# Patient Record
Sex: FEMALE | Race: WHITE | NOT HISPANIC OR LATINO | URBAN - METROPOLITAN AREA
[De-identification: names, ages, dates, MRNs, and addresses within clinical notes are randomized per-mention and may not be internally consistent; named-entity substitution may affect disease eponyms.]

---

## 2019-08-20 ENCOUNTER — APPOINTMENT (RX ONLY)
Dept: URBAN - METROPOLITAN AREA CLINIC 12 | Facility: CLINIC | Age: 54
Setting detail: DERMATOLOGY
End: 2019-08-20

## 2019-08-20 VITALS — HEIGHT: 69 IN | WEIGHT: 205 LBS

## 2019-08-20 DIAGNOSIS — Z41.9 ENCOUNTER FOR PROCEDURE FOR PURPOSES OTHER THAN REMEDYING HEALTH STATE, UNSPECIFIED: ICD-10-CM

## 2019-08-20 DIAGNOSIS — D22 MELANOCYTIC NEVI: ICD-10-CM

## 2019-08-20 DIAGNOSIS — Z71.89 OTHER SPECIFIED COUNSELING: ICD-10-CM

## 2019-08-20 DIAGNOSIS — Z80.8 FAMILY HISTORY OF MALIGNANT NEOPLASM OF OTHER ORGANS OR SYSTEMS: ICD-10-CM

## 2019-08-20 DIAGNOSIS — D18.0 HEMANGIOMA: ICD-10-CM

## 2019-08-20 DIAGNOSIS — L82.1 OTHER SEBORRHEIC KERATOSIS: ICD-10-CM

## 2019-08-20 PROBLEM — D22.61 MELANOCYTIC NEVI OF RIGHT UPPER LIMB, INCLUDING SHOULDER: Status: ACTIVE | Noted: 2019-08-20

## 2019-08-20 PROBLEM — D48.5 NEOPLASM OF UNCERTAIN BEHAVIOR OF SKIN: Status: ACTIVE | Noted: 2019-08-20

## 2019-08-20 PROBLEM — D22.62 MELANOCYTIC NEVI OF LEFT UPPER LIMB, INCLUDING SHOULDER: Status: ACTIVE | Noted: 2019-08-20

## 2019-08-20 PROBLEM — D22.39 MELANOCYTIC NEVI OF OTHER PARTS OF FACE: Status: ACTIVE | Noted: 2019-08-20

## 2019-08-20 PROBLEM — D22.5 MELANOCYTIC NEVI OF TRUNK: Status: ACTIVE | Noted: 2019-08-20

## 2019-08-20 PROBLEM — D18.01 HEMANGIOMA OF SKIN AND SUBCUTANEOUS TISSUE: Status: ACTIVE | Noted: 2019-08-20

## 2019-08-20 PROCEDURE — ? COSMETIC CONSULTATION: BROWN SPOTS

## 2019-08-20 PROCEDURE — ? COUNSELING

## 2019-08-20 PROCEDURE — ? OBSERVATION

## 2019-08-20 PROCEDURE — 99213 OFFICE O/P EST LOW 20 MIN: CPT

## 2019-08-20 ASSESSMENT — LOCATION SIMPLE DESCRIPTION DERM
LOCATION SIMPLE: CHEST
LOCATION SIMPLE: LEFT LOWER BACK
LOCATION SIMPLE: RIGHT CHEEK
LOCATION SIMPLE: RIGHT UPPER BACK
LOCATION SIMPLE: ABDOMEN
LOCATION SIMPLE: RIGHT CHEEK
LOCATION SIMPLE: RIGHT POSTERIOR UPPER ARM
LOCATION SIMPLE: NOSE
LOCATION SIMPLE: LEFT UPPER ARM
LOCATION SIMPLE: RIGHT UPPER ARM
LOCATION SIMPLE: LEFT CHEEK
LOCATION SIMPLE: CHIN

## 2019-08-20 ASSESSMENT — LOCATION DETAILED DESCRIPTION DERM
LOCATION DETAILED: LEFT LATERAL SUPERIOR CHEST
LOCATION DETAILED: RIGHT CENTRAL MALAR CHEEK
LOCATION DETAILED: RIGHT INFERIOR UPPER BACK
LOCATION DETAILED: RIGHT INFERIOR CENTRAL MALAR CHEEK
LOCATION DETAILED: LEFT CHIN
LOCATION DETAILED: EPIGASTRIC SKIN
LOCATION DETAILED: LEFT SUPERIOR LATERAL MIDBACK
LOCATION DETAILED: RIGHT PROXIMAL POSTERIOR UPPER ARM
LOCATION DETAILED: RIGHT MEDIAL SUPERIOR CHEST
LOCATION DETAILED: RIGHT ANTERIOR PROXIMAL UPPER ARM
LOCATION DETAILED: LEFT ANTERIOR PROXIMAL UPPER ARM
LOCATION DETAILED: NASAL DORSUM
LOCATION DETAILED: LEFT INFERIOR CENTRAL MALAR CHEEK

## 2019-08-20 ASSESSMENT — LOCATION ZONE DERM
LOCATION ZONE: TRUNK
LOCATION ZONE: FACE
LOCATION ZONE: NOSE
LOCATION ZONE: ARM
LOCATION ZONE: FACE

## 2019-08-20 ASSESSMENT — PAIN INTENSITY VAS: HOW INTENSE IS YOUR PAIN 0 BEING NO PAIN, 10 BEING THE MOST SEVERE PAIN POSSIBLE?: NO PAIN

## 2019-08-20 NOTE — HPI: SKIN LESION
Is This A New Presentation, Or A Follow-Up?: Skin Lesion
What Type Of Note Output Would You Prefer (Optional)?: Standard Output
Has Your Skin Lesion Been Treated?: not been treated
Additional History: Would also like a full skin examination given her family history and length of time since last seen 11/2015
Which Family Member (Optional)?: Father
Which Family Member (Optional)?: Parents and sister

## 2019-08-26 ENCOUNTER — APPOINTMENT (RX ONLY)
Dept: URBAN - METROPOLITAN AREA CLINIC 12 | Facility: CLINIC | Age: 54
Setting detail: DERMATOLOGY
End: 2019-08-26

## 2019-08-26 DIAGNOSIS — Z41.9 ENCOUNTER FOR PROCEDURE FOR PURPOSES OTHER THAN REMEDYING HEALTH STATE, UNSPECIFIED: ICD-10-CM

## 2019-08-26 PROCEDURE — ? PALOMAR ICON LASER

## 2019-08-26 ASSESSMENT — LOCATION DETAILED DESCRIPTION DERM
LOCATION DETAILED: LEFT CHIN
LOCATION DETAILED: LEFT CENTRAL MALAR CHEEK
LOCATION DETAILED: INFERIOR MID FOREHEAD
LOCATION DETAILED: RIGHT INFERIOR CENTRAL MALAR CHEEK
LOCATION DETAILED: NASAL TIP

## 2019-08-26 ASSESSMENT — LOCATION ZONE DERM
LOCATION ZONE: NOSE
LOCATION ZONE: FACE

## 2019-08-26 ASSESSMENT — LOCATION SIMPLE DESCRIPTION DERM
LOCATION SIMPLE: RIGHT CHEEK
LOCATION SIMPLE: LEFT CHEEK
LOCATION SIMPLE: NOSE
LOCATION SIMPLE: CHIN
LOCATION SIMPLE: INFERIOR FOREHEAD

## 2019-08-26 NOTE — PROCEDURE: PALOMAR ICON LASER
Render Post Care In The Note?: yes
Pulse Duration (In Milliseconds): 5
Number Of Passes: 1
Pulse Duration (In Milliseconds): 20
Price (Use Numbers Only, No Special Characters Or $): 200
External Cooling Fan Speed: 0
Fluence: 36
Post-Care Instructions: I reviewed with the patient in detail post-care instructions. Patient should stay away from the sun and wear sun protection until treated areas are fully healed.
Treated Area: small area
Fluence: 40
Detail Level: Zone
Fluence: 20
Location: full face
Pre-Procedure Text: The patients skin was cleaned.
Overlap: 50%
Overlap: 100%
Fluence Units: J/cm2
Pulse Duration (In Milliseconds): 10
Consent: Written consent obtained, risks reviewed including but not limited to crusting, scabbing, blistering, scarring, darker or lighter pigmentary change, bruising, and/or incomplete response.
Anesthesia Type: 1% lidocaine with epinephrine

## 2019-10-07 ENCOUNTER — APPOINTMENT (RX ONLY)
Dept: URBAN - METROPOLITAN AREA CLINIC 12 | Facility: CLINIC | Age: 54
Setting detail: DERMATOLOGY
End: 2019-10-07

## 2019-10-07 DIAGNOSIS — Z41.9 ENCOUNTER FOR PROCEDURE FOR PURPOSES OTHER THAN REMEDYING HEALTH STATE, UNSPECIFIED: ICD-10-CM

## 2019-10-07 PROCEDURE — ? PALOMAR ICON LASER

## 2019-10-07 ASSESSMENT — LOCATION SIMPLE DESCRIPTION DERM
LOCATION SIMPLE: CHIN
LOCATION SIMPLE: RIGHT CHEEK
LOCATION SIMPLE: NOSE
LOCATION SIMPLE: INFERIOR FOREHEAD
LOCATION SIMPLE: LEFT CHEEK

## 2019-10-07 ASSESSMENT — LOCATION DETAILED DESCRIPTION DERM
LOCATION DETAILED: INFERIOR MID FOREHEAD
LOCATION DETAILED: LEFT INFERIOR CENTRAL MALAR CHEEK
LOCATION DETAILED: NASAL DORSUM
LOCATION DETAILED: RIGHT INFERIOR CENTRAL MALAR CHEEK
LOCATION DETAILED: LEFT CHIN

## 2019-10-07 ASSESSMENT — LOCATION ZONE DERM
LOCATION ZONE: FACE
LOCATION ZONE: NOSE

## 2019-10-07 NOTE — PROCEDURE: PALOMAR ICON LASER
External Cooling Fan Speed: 0
Overlap: 100%
Location: full face except eyelids
Price (Use Numbers Only, No Special Characters Or $): 100
Number Of Passes: 1
Consent: Written consent obtained, risks reviewed including but not limited to crusting, scabbing, blistering, scarring, darker or lighter pigmentary change, bruising, and/or incomplete response.
Fluence: 40
Fluence: 40
Treatment Number: 2
Pulse Duration (In Milliseconds): 10
Render Post Care In The Note?: yes
Fluence Units: J/cm2
Overlap: 50%
Treated Area: small area
Pulse Duration (In Milliseconds): 15
Pre-Procedure Text: The patients skin was cleaned.
Location: cheeks
Detail Level: Zone
Fluence: 36
Anesthesia Type: 1% lidocaine with epinephrine
Post-Care Instructions: I reviewed with the patient in detail post-care instructions. Patient should stay away from the sun and wear sun protection until treated areas are fully healed.

## 2020-10-27 ENCOUNTER — APPOINTMENT (RX ONLY)
Dept: URBAN - METROPOLITAN AREA CLINIC 12 | Facility: CLINIC | Age: 55
Setting detail: DERMATOLOGY
End: 2020-10-27

## 2020-10-27 DIAGNOSIS — Z71.89 OTHER SPECIFIED COUNSELING: ICD-10-CM

## 2020-10-27 DIAGNOSIS — Z80.8 FAMILY HISTORY OF MALIGNANT NEOPLASM OF OTHER ORGANS OR SYSTEMS: ICD-10-CM

## 2020-10-27 DIAGNOSIS — L82.1 OTHER SEBORRHEIC KERATOSIS: ICD-10-CM

## 2020-10-27 DIAGNOSIS — D22 MELANOCYTIC NEVI: ICD-10-CM

## 2020-10-27 DIAGNOSIS — D18.0 HEMANGIOMA: ICD-10-CM

## 2020-10-27 DIAGNOSIS — L91.0 HYPERTROPHIC SCAR: ICD-10-CM

## 2020-10-27 PROBLEM — D22.5 MELANOCYTIC NEVI OF TRUNK: Status: ACTIVE | Noted: 2020-10-27

## 2020-10-27 PROBLEM — D18.01 HEMANGIOMA OF SKIN AND SUBCUTANEOUS TISSUE: Status: ACTIVE | Noted: 2020-10-27

## 2020-10-27 PROCEDURE — 99213 OFFICE O/P EST LOW 20 MIN: CPT | Mod: 25

## 2020-10-27 PROCEDURE — ? COUNSELING

## 2020-10-27 PROCEDURE — ? PATIENT EDUCATION

## 2020-10-27 PROCEDURE — 11901 INJECT SKIN LESIONS >7: CPT

## 2020-10-27 PROCEDURE — ? INTRALESIONAL KENALOG

## 2020-10-27 ASSESSMENT — LOCATION SIMPLE DESCRIPTION DERM
LOCATION SIMPLE: ABDOMEN
LOCATION SIMPLE: CHEST
LOCATION SIMPLE: RIGHT BACK

## 2020-10-27 ASSESSMENT — LOCATION DETAILED DESCRIPTION DERM
LOCATION DETAILED: RIGHT SUPERIOR LATERAL UPPER BACK
LOCATION DETAILED: RIGHT MEDIAL SUPERIOR CHEST
LOCATION DETAILED: EPIGASTRIC SKIN
LOCATION DETAILED: PERIUMBILICAL SKIN
LOCATION DETAILED: LEFT LATERAL ABDOMEN

## 2020-10-27 ASSESSMENT — LOCATION ZONE DERM: LOCATION ZONE: TRUNK

## 2020-10-27 NOTE — PROCEDURE: PATIENT EDUCATION
Abridged Text (If No Specifics Are Selected): Educational resources were provided and detailed information can be found on the patient education handout.
Detail Level: Simple
Render Patient Education In Note?: render abridged patient education
Include Automatic Impression Counseling If It Exists: Yes

## 2020-10-27 NOTE — PROCEDURE: INTRALESIONAL KENALOG
Detail Level: Detailed
Concentration Of Solution Injected (Mg/Ml): 10.0
Include Z78.9 (Other Specified Conditions Influencing Health Status) As An Associated Diagnosis?: No
Expiration Date (Optional): 3/2022
Kenalog Preparation: Kenalog
Administered By (Optional): Pete Levin P.A.-C
Lot # (Optional): [id number]
Consent: The risks of atrophy were reviewed with the patient.
X Size Of Lesion In Cm (Optional): 0
Total Volume Injected (Ccs- Only Use Numbers And Decimals): 1.0
Medical Necessity Clause: This procedure was medically necessary because the lesions that were treated were: painful

## 2023-10-27 ENCOUNTER — APPOINTMENT (RX ONLY)
Dept: URBAN - METROPOLITAN AREA CLINIC 12 | Facility: CLINIC | Age: 58
Setting detail: DERMATOLOGY
End: 2023-10-27

## 2023-10-27 DIAGNOSIS — L82.1 OTHER SEBORRHEIC KERATOSIS: ICD-10-CM

## 2023-10-27 DIAGNOSIS — B35.4 TINEA CORPORIS: ICD-10-CM

## 2023-10-27 PROCEDURE — ? PHOTO-DOCUMENTATION

## 2023-10-27 PROCEDURE — ? PRESCRIPTION MEDICATION MANAGEMENT

## 2023-10-27 PROCEDURE — ? KOH PREP

## 2023-10-27 PROCEDURE — ? PRESCRIPTION

## 2023-10-27 PROCEDURE — ? COUNSELING

## 2023-10-27 PROCEDURE — 99213 OFFICE O/P EST LOW 20 MIN: CPT

## 2023-10-27 RX ORDER — CICLOPIROX OLAMINE 7.7 MG/G
0.77% CREAM TOPICAL BID
Qty: 90 | Refills: 2 | Status: ERX | COMMUNITY
Start: 2023-10-27

## 2023-10-27 RX ADMIN — CICLOPIROX OLAMINE 0.77%: 7.7 CREAM TOPICAL at 00:00

## 2023-10-27 ASSESSMENT — LOCATION DETAILED DESCRIPTION DERM
LOCATION DETAILED: LEFT LATERAL ABDOMEN
LOCATION DETAILED: RIGHT PROXIMAL LATERAL POSTERIOR UPPER ARM

## 2023-10-27 ASSESSMENT — LOCATION ZONE DERM
LOCATION ZONE: TRUNK
LOCATION ZONE: ARM

## 2023-10-27 ASSESSMENT — PAIN INTENSITY VAS: HOW INTENSE IS YOUR PAIN 0 BEING NO PAIN, 10 BEING THE MOST SEVERE PAIN POSSIBLE?: NO PAIN

## 2023-10-27 ASSESSMENT — LOCATION SIMPLE DESCRIPTION DERM
LOCATION SIMPLE: RIGHT POSTERIOR UPPER ARM
LOCATION SIMPLE: ABDOMEN

## 2023-10-27 ASSESSMENT — SEVERITY ASSESSMENT: SEVERITY: MODERATE

## 2023-10-27 NOTE — PROCEDURE: PRESCRIPTION MEDICATION MANAGEMENT
Render In Strict Bullet Format?: No
Initiate Treatment: Ciclopirox 0.77% cream, apply to rash affected area of the abdomen twice daily x4-6 weeks, continue for 1 additional week past clearance.
Plan: RTC in 6 weeks should the rash fail to resolve
Detail Level: Zone

## 2023-10-27 NOTE — PROCEDURE: PHOTO-DOCUMENTATION
Details (Free Text): in order to monitor progress
Detail Level: Zone
Photo Preface (Leave Blank If You Do Not Want): Photographs were obtained today

## 2023-11-04 ENCOUNTER — APPOINTMENT (OUTPATIENT)
Dept: GENERAL RADIOLOGY | Age: 58
DRG: 190 | End: 2023-11-04
Payer: MEDICARE

## 2023-11-04 ENCOUNTER — HOSPITAL ENCOUNTER (OUTPATIENT)
Age: 58
Setting detail: OBSERVATION
Discharge: HOME | DRG: 190 | End: 2023-11-06
Attending: EMERGENCY MEDICINE | Admitting: INTERNAL MEDICINE
Payer: MEDICARE

## 2023-11-04 DIAGNOSIS — J44.1 COPD EXACERBATION (HCC): Primary | ICD-10-CM

## 2023-11-04 DIAGNOSIS — Z99.81 SUPPLEMENTAL OXYGEN DEPENDENT: ICD-10-CM

## 2023-11-04 LAB
ALBUMIN SERPL-MCNC: 4.3 G/DL (ref 3.5–5.2)
ALP SERPL-CCNC: 72 U/L (ref 35–104)
ALT SERPL-CCNC: 10 U/L (ref 0–32)
ANION GAP SERPL CALCULATED.3IONS-SCNC: 5 MMOL/L (ref 7–16)
AST SERPL-CCNC: 13 U/L (ref 0–31)
B PARAP IS1001 DNA NPH QL NAA+NON-PROBE: NOT DETECTED
B PERT DNA SPEC QL NAA+PROBE: NOT DETECTED
BASOPHILS # BLD: 0.03 K/UL (ref 0–0.2)
BASOPHILS NFR BLD: 0 % (ref 0–2)
BILIRUB SERPL-MCNC: 0.2 MG/DL (ref 0–1.2)
BNP SERPL-MCNC: 172 PG/ML (ref 0–125)
BUN SERPL-MCNC: 10 MG/DL (ref 6–20)
C PNEUM DNA NPH QL NAA+NON-PROBE: NOT DETECTED
CALCIUM SERPL-MCNC: 9.6 MG/DL (ref 8.6–10.2)
CHLORIDE SERPL-SCNC: 99 MMOL/L (ref 98–107)
CO2 SERPL-SCNC: 36 MMOL/L (ref 22–29)
CREAT SERPL-MCNC: 0.7 MG/DL (ref 0.5–1)
D DIMER: <200 NG/ML DDU (ref 0–232)
EKG ATRIAL RATE: 78 BPM
EKG P AXIS: 43 DEGREES
EKG P-R INTERVAL: 138 MS
EKG Q-T INTERVAL: 378 MS
EKG QRS DURATION: 74 MS
EKG QTC CALCULATION (BAZETT): 430 MS
EKG R AXIS: 47 DEGREES
EKG T AXIS: 71 DEGREES
EKG VENTRICULAR RATE: 78 BPM
EOSINOPHIL # BLD: 0.16 K/UL (ref 0.05–0.5)
EOSINOPHILS RELATIVE PERCENT: 2 % (ref 0–6)
ERYTHROCYTE [DISTWIDTH] IN BLOOD BY AUTOMATED COUNT: 12.7 % (ref 11.5–15)
FLUAV RNA NPH QL NAA+NON-PROBE: NOT DETECTED
FLUBV RNA NPH QL NAA+NON-PROBE: NOT DETECTED
GFR SERPL CREATININE-BSD FRML MDRD: >60 ML/MIN/1.73M2
GLUCOSE SERPL-MCNC: 98 MG/DL (ref 74–99)
HADV DNA NPH QL NAA+NON-PROBE: NOT DETECTED
HCOV 229E RNA NPH QL NAA+NON-PROBE: NOT DETECTED
HCOV HKU1 RNA NPH QL NAA+NON-PROBE: NOT DETECTED
HCOV NL63 RNA NPH QL NAA+NON-PROBE: NOT DETECTED
HCOV OC43 RNA NPH QL NAA+NON-PROBE: NOT DETECTED
HCT VFR BLD AUTO: 47.3 % (ref 34–48)
HGB BLD-MCNC: 14.4 G/DL (ref 11.5–15.5)
HMPV RNA NPH QL NAA+NON-PROBE: NOT DETECTED
HPIV1 RNA NPH QL NAA+NON-PROBE: NOT DETECTED
HPIV2 RNA NPH QL NAA+NON-PROBE: NOT DETECTED
HPIV3 RNA NPH QL NAA+NON-PROBE: NOT DETECTED
HPIV4 RNA NPH QL NAA+NON-PROBE: NOT DETECTED
IMM GRANULOCYTES # BLD AUTO: <0.03 K/UL (ref 0–0.58)
IMM GRANULOCYTES NFR BLD: 0 % (ref 0–5)
LYMPHOCYTES NFR BLD: 1.81 K/UL (ref 1.5–4)
LYMPHOCYTES RELATIVE PERCENT: 24 % (ref 20–42)
M PNEUMO DNA NPH QL NAA+NON-PROBE: NOT DETECTED
MCH RBC QN AUTO: 29.3 PG (ref 26–35)
MCHC RBC AUTO-ENTMCNC: 30.4 G/DL (ref 32–34.5)
MCV RBC AUTO: 96.1 FL (ref 80–99.9)
MONOCYTES NFR BLD: 0.37 K/UL (ref 0.1–0.95)
MONOCYTES NFR BLD: 5 % (ref 2–12)
NEUTROPHILS NFR BLD: 68 % (ref 43–80)
NEUTS SEG NFR BLD: 5.02 K/UL (ref 1.8–7.3)
PLATELET # BLD AUTO: 217 K/UL (ref 130–450)
PMV BLD AUTO: 10.6 FL (ref 7–12)
POTASSIUM SERPL-SCNC: 4.3 MMOL/L (ref 3.5–5)
PROCALCITONIN SERPL-MCNC: 0.03 NG/ML (ref 0–0.08)
PROT SERPL-MCNC: 7.1 G/DL (ref 6.4–8.3)
RBC # BLD AUTO: 4.92 M/UL (ref 3.5–5.5)
RSV RNA NPH QL NAA+NON-PROBE: NOT DETECTED
RV+EV RNA NPH QL NAA+NON-PROBE: NOT DETECTED
SARS-COV-2 RNA NPH QL NAA+NON-PROBE: NOT DETECTED
SODIUM SERPL-SCNC: 140 MMOL/L (ref 132–146)
SPECIMEN DESCRIPTION: NORMAL
TROPONIN I SERPL HS-MCNC: 7 NG/L (ref 0–9)
TROPONIN I SERPL HS-MCNC: 7 NG/L (ref 0–9)
WBC OTHER # BLD: 7.4 K/UL (ref 4.5–11.5)

## 2023-11-04 PROCEDURE — 85025 COMPLETE CBC W/AUTO DIFF WBC: CPT

## 2023-11-04 PROCEDURE — 2580000003 HC RX 258

## 2023-11-04 PROCEDURE — 2580000003 HC RX 258: Performed by: STUDENT IN AN ORGANIZED HEALTH CARE EDUCATION/TRAINING PROGRAM

## 2023-11-04 PROCEDURE — 85379 FIBRIN DEGRADATION QUANT: CPT

## 2023-11-04 PROCEDURE — 94640 AIRWAY INHALATION TREATMENT: CPT

## 2023-11-04 PROCEDURE — 6360000002 HC RX W HCPCS: Performed by: STUDENT IN AN ORGANIZED HEALTH CARE EDUCATION/TRAINING PROGRAM

## 2023-11-04 PROCEDURE — 0202U NFCT DS 22 TRGT SARS-COV-2: CPT

## 2023-11-04 PROCEDURE — 6360000002 HC RX W HCPCS

## 2023-11-04 PROCEDURE — 99285 EMERGENCY DEPT VISIT HI MDM: CPT

## 2023-11-04 PROCEDURE — G0378 HOSPITAL OBSERVATION PER HR: HCPCS

## 2023-11-04 PROCEDURE — 83880 ASSAY OF NATRIURETIC PEPTIDE: CPT

## 2023-11-04 PROCEDURE — 96374 THER/PROPH/DIAG INJ IV PUSH: CPT

## 2023-11-04 PROCEDURE — 71045 X-RAY EXAM CHEST 1 VIEW: CPT

## 2023-11-04 PROCEDURE — 84145 PROCALCITONIN (PCT): CPT

## 2023-11-04 PROCEDURE — 96372 THER/PROPH/DIAG INJ SC/IM: CPT

## 2023-11-04 PROCEDURE — 93005 ELECTROCARDIOGRAM TRACING: CPT

## 2023-11-04 PROCEDURE — 81001 URINALYSIS AUTO W/SCOPE: CPT

## 2023-11-04 PROCEDURE — 96375 TX/PRO/DX INJ NEW DRUG ADDON: CPT

## 2023-11-04 PROCEDURE — 87086 URINE CULTURE/COLONY COUNT: CPT

## 2023-11-04 PROCEDURE — 2500000003 HC RX 250 WO HCPCS

## 2023-11-04 PROCEDURE — 6370000000 HC RX 637 (ALT 250 FOR IP): Performed by: STUDENT IN AN ORGANIZED HEALTH CARE EDUCATION/TRAINING PROGRAM

## 2023-11-04 PROCEDURE — 84484 ASSAY OF TROPONIN QUANT: CPT

## 2023-11-04 PROCEDURE — 6370000000 HC RX 637 (ALT 250 FOR IP)

## 2023-11-04 PROCEDURE — 80053 COMPREHEN METABOLIC PANEL: CPT

## 2023-11-04 RX ORDER — BUDESONIDE 0.25 MG/2ML
250 INHALANT ORAL 2 TIMES DAILY
Status: DISCONTINUED | OUTPATIENT
Start: 2023-11-04 | End: 2023-11-06 | Stop reason: HOSPADM

## 2023-11-04 RX ORDER — ACETAMINOPHEN 650 MG/1
650 SUPPOSITORY RECTAL EVERY 6 HOURS PRN
Status: DISCONTINUED | OUTPATIENT
Start: 2023-11-04 | End: 2023-11-06 | Stop reason: HOSPADM

## 2023-11-04 RX ORDER — ENOXAPARIN SODIUM 100 MG/ML
30 INJECTION SUBCUTANEOUS 2 TIMES DAILY
Status: DISCONTINUED | OUTPATIENT
Start: 2023-11-04 | End: 2023-11-06 | Stop reason: HOSPADM

## 2023-11-04 RX ORDER — SODIUM CHLORIDE 9 MG/ML
INJECTION, SOLUTION INTRAVENOUS PRN
Status: DISCONTINUED | OUTPATIENT
Start: 2023-11-04 | End: 2023-11-06 | Stop reason: HOSPADM

## 2023-11-04 RX ORDER — ONDANSETRON 4 MG/1
4 TABLET, ORALLY DISINTEGRATING ORAL EVERY 8 HOURS PRN
Status: DISCONTINUED | OUTPATIENT
Start: 2023-11-04 | End: 2023-11-06 | Stop reason: HOSPADM

## 2023-11-04 RX ORDER — IPRATROPIUM BROMIDE AND ALBUTEROL SULFATE 2.5; .5 MG/3ML; MG/3ML
1 SOLUTION RESPIRATORY (INHALATION)
Status: DISCONTINUED | OUTPATIENT
Start: 2023-11-04 | End: 2023-11-06 | Stop reason: HOSPADM

## 2023-11-04 RX ORDER — KETOROLAC TROMETHAMINE 30 MG/ML
15 INJECTION, SOLUTION INTRAMUSCULAR; INTRAVENOUS ONCE
Status: COMPLETED | OUTPATIENT
Start: 2023-11-04 | End: 2023-11-04

## 2023-11-04 RX ORDER — POTASSIUM CHLORIDE 7.45 MG/ML
10 INJECTION INTRAVENOUS PRN
Status: DISCONTINUED | OUTPATIENT
Start: 2023-11-04 | End: 2023-11-06 | Stop reason: HOSPADM

## 2023-11-04 RX ORDER — LOSARTAN POTASSIUM AND HYDROCHLOROTHIAZIDE 12.5; 5 MG/1; MG/1
1 TABLET ORAL DAILY
Status: ON HOLD | COMMUNITY

## 2023-11-04 RX ORDER — MAGNESIUM SULFATE IN WATER 40 MG/ML
2000 INJECTION, SOLUTION INTRAVENOUS PRN
Status: DISCONTINUED | OUTPATIENT
Start: 2023-11-04 | End: 2023-11-06 | Stop reason: HOSPADM

## 2023-11-04 RX ORDER — PAROXETINE HYDROCHLORIDE 40 MG/1
40 TABLET, FILM COATED ORAL EVERY MORNING
Status: ON HOLD | COMMUNITY

## 2023-11-04 RX ORDER — ACETAMINOPHEN 325 MG/1
650 TABLET ORAL EVERY 6 HOURS PRN
Status: DISCONTINUED | OUTPATIENT
Start: 2023-11-04 | End: 2023-11-06 | Stop reason: HOSPADM

## 2023-11-04 RX ORDER — ALBUTEROL SULFATE 90 UG/1
2 AEROSOL, METERED RESPIRATORY (INHALATION) EVERY 6 HOURS PRN
Status: ON HOLD | COMMUNITY

## 2023-11-04 RX ORDER — ONDANSETRON 2 MG/ML
4 INJECTION INTRAMUSCULAR; INTRAVENOUS EVERY 6 HOURS PRN
Status: DISCONTINUED | OUTPATIENT
Start: 2023-11-04 | End: 2023-11-06 | Stop reason: HOSPADM

## 2023-11-04 RX ORDER — M-VIT,TX,IRON,MINS/CALC/FOLIC 27MG-0.4MG
1 TABLET ORAL DAILY
Status: ON HOLD | COMMUNITY
End: 2023-11-12

## 2023-11-04 RX ORDER — SODIUM CHLORIDE 0.9 % (FLUSH) 0.9 %
5-40 SYRINGE (ML) INJECTION PRN
Status: DISCONTINUED | OUTPATIENT
Start: 2023-11-04 | End: 2023-11-06 | Stop reason: HOSPADM

## 2023-11-04 RX ORDER — POLYETHYLENE GLYCOL 3350 17 G/17G
17 POWDER, FOR SOLUTION ORAL DAILY PRN
Status: DISCONTINUED | OUTPATIENT
Start: 2023-11-04 | End: 2023-11-06 | Stop reason: HOSPADM

## 2023-11-04 RX ORDER — GUAIFENESIN 400 MG/1
400 TABLET ORAL 4 TIMES DAILY PRN
Status: ON HOLD | COMMUNITY
End: 2023-11-12

## 2023-11-04 RX ORDER — IPRATROPIUM BROMIDE AND ALBUTEROL SULFATE 2.5; .5 MG/3ML; MG/3ML
3 SOLUTION RESPIRATORY (INHALATION) ONCE
Status: COMPLETED | OUTPATIENT
Start: 2023-11-04 | End: 2023-11-04

## 2023-11-04 RX ORDER — POTASSIUM CHLORIDE 20 MEQ/1
40 TABLET, EXTENDED RELEASE ORAL PRN
Status: DISCONTINUED | OUTPATIENT
Start: 2023-11-04 | End: 2023-11-06 | Stop reason: HOSPADM

## 2023-11-04 RX ORDER — NICOTINE 21 MG/24HR
1 PATCH, TRANSDERMAL 24 HOURS TRANSDERMAL DAILY
Status: DISCONTINUED | OUTPATIENT
Start: 2023-11-04 | End: 2023-11-06 | Stop reason: HOSPADM

## 2023-11-04 RX ORDER — SODIUM CHLORIDE 0.9 % (FLUSH) 0.9 %
5-40 SYRINGE (ML) INJECTION EVERY 12 HOURS SCHEDULED
Status: DISCONTINUED | OUTPATIENT
Start: 2023-11-04 | End: 2023-11-06 | Stop reason: HOSPADM

## 2023-11-04 RX ADMIN — MICONAZOLE NITRATE: 20.6 POWDER TOPICAL at 21:45

## 2023-11-04 RX ADMIN — ENOXAPARIN SODIUM 30 MG: 100 INJECTION SUBCUTANEOUS at 21:44

## 2023-11-04 RX ADMIN — IPRATROPIUM BROMIDE AND ALBUTEROL SULFATE 3 DOSE: 2.5; .5 SOLUTION RESPIRATORY (INHALATION) at 10:09

## 2023-11-04 RX ADMIN — IPRATROPIUM BROMIDE AND ALBUTEROL SULFATE 1 DOSE: .5; 2.5 SOLUTION RESPIRATORY (INHALATION) at 16:07

## 2023-11-04 RX ADMIN — WATER 125 MG: 1 INJECTION INTRAMUSCULAR; INTRAVENOUS; SUBCUTANEOUS at 10:12

## 2023-11-04 RX ADMIN — KETOROLAC TROMETHAMINE 15 MG: 30 INJECTION, SOLUTION INTRAMUSCULAR; INTRAVENOUS at 21:44

## 2023-11-04 RX ADMIN — BUDESONIDE 250 MCG: 0.25 INHALANT RESPIRATORY (INHALATION) at 20:22

## 2023-11-04 RX ADMIN — IPRATROPIUM BROMIDE AND ALBUTEROL SULFATE 1 DOSE: .5; 2.5 SOLUTION RESPIRATORY (INHALATION) at 20:22

## 2023-11-04 RX ADMIN — SODIUM CHLORIDE, PRESERVATIVE FREE 10 ML: 5 INJECTION INTRAVENOUS at 21:45

## 2023-11-04 RX ADMIN — ACETAMINOPHEN 650 MG: 325 TABLET ORAL at 17:59

## 2023-11-04 ASSESSMENT — PAIN SCALES - GENERAL
PAINLEVEL_OUTOF10: 6
PAINLEVEL_OUTOF10: 5

## 2023-11-04 NOTE — PROGRESS NOTES
LOVENOX PROPHYLAXIS EVALUATION  (Populations not addressed in this protocol: trauma, obstetrics, or COVID-19)    Wt Readings from Last 3 Encounters:   11/04/23 104.3 kg (230 lb)       Estimated Creatinine Clearance: 110 mL/min (based on SCr of 0.7 mg/dL).   Recent Labs     11/04/23  0954   BUN 10   CREATININE 0.7      HGB 14.4   HCT 47.3       Weight Range: 101-150.9 kg    CRCL = 30 or greater    50.9 kg   and below     .9  kg   101-150.9 kg   151-174.9  kg   175 kg  or greater     30mg subq  daily     40mg subq daily    (or 30mg subq BID orthopedic)     30mg subq  BID   40mg subq  BID   60mg subq BID       Per P/T protocol for appropriate subq anticoagulation by weight and CRCL change to:    Enoxaparin 30mg subq BID      Luisa Fox [facility]  6:42 PM  11/04/23

## 2023-11-04 NOTE — H&P
11/04/2023 09:54 AM    CL 99 11/04/2023 09:54 AM    CO2 36 11/04/2023 09:54 AM    BUN 10 11/04/2023 09:54 AM    CREATININE 0.7 11/04/2023 09:54 AM    LABGLOM >60 11/04/2023 09:54 AM    GLUCOSE 98 11/04/2023 09:54 AM    PROT 7.1 11/04/2023 09:54 AM    LABALBU 4.3 11/04/2023 09:54 AM    CALCIUM 9.6 11/04/2023 09:54 AM    BILITOT 0.2 11/04/2023 09:54 AM    ALKPHOS 72 11/04/2023 09:54 AM    AST 13 11/04/2023 09:54 AM    ALT 10 11/04/2023 09:54 AM     Magnesium:  No results found for: \"MG\"  Phosphorus:  No results found for: \"PHOS\"  Troponin:  No results found for: \"TROPONINI\"    Imaging Studies:    XR CHEST 1 VIEW   Final Result   No acute process. Resident's Assessment and Plan     Assessment and Plan:    Exacerbation of COPD    Patient has been diagnosed with COPD 10 years ago, she was taking albuterol pump on and off. She was never seen a pulmologist. She has been on home oxygen 2 L. Her xray is unremarkable, with no leukocytosis, Pro radha negative, No fever or chills. Her troponin was negative with NSR in EKG. We will follow respiratory cultures and started her on breathing treatments. We have started her on IV steroids and will continue steroids for her exacerbation. We will establish her a pulmonologist on her discharge. Possible UTI? Patient also complains of burning micturition, with no symptoms of flank pain, fever, bladder incontinence  We will follow up her urine microscopy and cultures. Hx of hypertension  Patient has a hx of hypertension, she was taking losartan and hydrochlorothiazide. We will monitor her BP and restart her antihypertensives. Hx of BENJAMIN  Patient complains of having hx of BENJAMIN with using CPAP previously. Complains of snoring and increasing dyspnea since stopping using her CPAP. We will monitor sleep study outpatient. PT/OT evaluation: not indicated at this time   DVT prophylaxis: Lovenox  GI prophylaxis: adult diet  Diet:   ADULT DIET;  Regular   Bowel

## 2023-11-04 NOTE — ED PROVIDER NOTES
Resident and the Nurse assigned to [facility]. I have personally performed and/or participated in the history, exam, medical decision making, and procedures and agree with all pertinent clinical information. I have reviewed my findings and recommendations with [facility] and members of family present at the time of disposition. I have personally reviewed all laboratory radiology results from today's visit. I have personally reviewed and agree with resident interpretation of EKG for all EKGs from today's visit. MDM:     I, Dr. Larry Charles am the primary provider of record    Medical Decision Making  Patient presents via EMS for shortness of breath. History of supple oxygen dependent recently relocated to this area, states about a concentrator but had no other medications. Treated for COPD exacerbation, remainder of work-up reassuring. Social determinants to health, she has poor outpatient follow-up is satting 90% on her home oxygen. Unable to arrange close follow-up, spoke with house team daily with the patient. Patient is placed on cardiac monitor and continuous pulse ox for monitoring. EKG is ordered to evaluate patient's current cardiac rhythm, and to interpret QT interval prior to administering medications. CBC is ordered to evaluate for any signs of infection or inflammation by obtaining a WBC count, or any signs of acute anemia by interpreting hemoglobin. CMP was ordered to evaluate for any electrolyte imbalances, kidney function, hepatic injury or any elevations in anion gap. Troponin ordered to evaluate for possible cardiac etiology of symptoms including but not limited to STEMI or NSTEMI. Viral swabs are ordered to evaluate possible viral etiology of symptoms. BNP ordered to evaluate for possible cardiac failure or worsening cardiac function. D-dimer was ordered to evaluate for any elevations which may point to vascular thrombus as etiology of symptoms.  Chest x-ray is ordered to

## 2023-11-05 LAB
ANION GAP SERPL CALCULATED.3IONS-SCNC: 13 MMOL/L (ref 7–16)
BACTERIA URNS QL MICRO: ABNORMAL
BASOPHILS # BLD: 0.03 K/UL (ref 0–0.2)
BASOPHILS NFR BLD: 0 % (ref 0–2)
BILIRUB UR QL STRIP: ABNORMAL
BUN SERPL-MCNC: 16 MG/DL (ref 6–20)
CALCIUM SERPL-MCNC: 9.2 MG/DL (ref 8.6–10.2)
CHLORIDE SERPL-SCNC: 100 MMOL/L (ref 98–107)
CLARITY UR: ABNORMAL
CO2 SERPL-SCNC: 28 MMOL/L (ref 22–29)
COLOR UR: ABNORMAL
CREAT SERPL-MCNC: 0.6 MG/DL (ref 0.5–1)
EOSINOPHIL # BLD: 0.01 K/UL (ref 0.05–0.5)
EOSINOPHILS RELATIVE PERCENT: 0 % (ref 0–6)
EPI CELLS #/AREA URNS HPF: ABNORMAL /HPF
ERYTHROCYTE [DISTWIDTH] IN BLOOD BY AUTOMATED COUNT: 12.4 % (ref 11.5–15)
GFR SERPL CREATININE-BSD FRML MDRD: >60 ML/MIN/1.73M2
GLUCOSE SERPL-MCNC: 96 MG/DL (ref 74–99)
GLUCOSE UR STRIP-MCNC: NEGATIVE MG/DL
HCT VFR BLD AUTO: 42.2 % (ref 34–48)
HGB BLD-MCNC: 13 G/DL (ref 11.5–15.5)
HGB UR QL STRIP.AUTO: NEGATIVE
IMM GRANULOCYTES # BLD AUTO: 0.03 K/UL (ref 0–0.58)
IMM GRANULOCYTES NFR BLD: 0 % (ref 0–5)
KETONES UR STRIP-MCNC: ABNORMAL MG/DL
LEUKOCYTE ESTERASE UR QL STRIP: ABNORMAL
LYMPHOCYTES NFR BLD: 1.53 K/UL (ref 1.5–4)
LYMPHOCYTES RELATIVE PERCENT: 20 % (ref 20–42)
MCH RBC QN AUTO: 29 PG (ref 26–35)
MCHC RBC AUTO-ENTMCNC: 30.8 G/DL (ref 32–34.5)
MCV RBC AUTO: 94 FL (ref 80–99.9)
MONOCYTES NFR BLD: 0.55 K/UL (ref 0.1–0.95)
MONOCYTES NFR BLD: 7 % (ref 2–12)
NEUTROPHILS NFR BLD: 72 % (ref 43–80)
NEUTS SEG NFR BLD: 5.56 K/UL (ref 1.8–7.3)
NITRITE UR QL STRIP: NEGATIVE
PH UR STRIP: 6 [PH] (ref 5–9)
PLATELET # BLD AUTO: 214 K/UL (ref 130–450)
PMV BLD AUTO: 10.7 FL (ref 7–12)
POTASSIUM SERPL-SCNC: 4 MMOL/L (ref 3.5–5)
PROT UR STRIP-MCNC: ABNORMAL MG/DL
RBC # BLD AUTO: 4.49 M/UL (ref 3.5–5.5)
RBC #/AREA URNS HPF: ABNORMAL /HPF
SODIUM SERPL-SCNC: 141 MMOL/L (ref 132–146)
SP GR UR STRIP: >1.03 (ref 1–1.03)
UROBILINOGEN UR STRIP-ACNC: 0.2 EU/DL (ref 0–1)
WBC #/AREA URNS HPF: ABNORMAL /HPF
WBC OTHER # BLD: 7.7 K/UL (ref 4.5–11.5)

## 2023-11-05 PROCEDURE — 36415 COLL VENOUS BLD VENIPUNCTURE: CPT

## 2023-11-05 PROCEDURE — 6370000000 HC RX 637 (ALT 250 FOR IP)

## 2023-11-05 PROCEDURE — G0378 HOSPITAL OBSERVATION PER HR: HCPCS

## 2023-11-05 PROCEDURE — 6360000002 HC RX W HCPCS

## 2023-11-05 PROCEDURE — 96376 TX/PRO/DX INJ SAME DRUG ADON: CPT

## 2023-11-05 PROCEDURE — 94640 AIRWAY INHALATION TREATMENT: CPT

## 2023-11-05 PROCEDURE — 2580000003 HC RX 258

## 2023-11-05 PROCEDURE — 96375 TX/PRO/DX INJ NEW DRUG ADDON: CPT

## 2023-11-05 PROCEDURE — 2700000000 HC OXYGEN THERAPY PER DAY

## 2023-11-05 PROCEDURE — 96372 THER/PROPH/DIAG INJ SC/IM: CPT

## 2023-11-05 PROCEDURE — 80048 BASIC METABOLIC PNL TOTAL CA: CPT

## 2023-11-05 PROCEDURE — 85025 COMPLETE CBC W/AUTO DIFF WBC: CPT

## 2023-11-05 PROCEDURE — 99222 1ST HOSP IP/OBS MODERATE 55: CPT | Performed by: INTERNAL MEDICINE

## 2023-11-05 RX ORDER — DOXYCYCLINE HYCLATE 100 MG/1
100 CAPSULE ORAL EVERY 12 HOURS SCHEDULED
Status: DISCONTINUED | OUTPATIENT
Start: 2023-11-05 | End: 2023-11-06 | Stop reason: HOSPADM

## 2023-11-05 RX ORDER — HYDROXYZINE HYDROCHLORIDE 50 MG/ML
25 INJECTION, SOLUTION INTRAMUSCULAR ONCE
Status: COMPLETED | OUTPATIENT
Start: 2023-11-05 | End: 2023-11-05

## 2023-11-05 RX ADMIN — MICONAZOLE NITRATE: 20.6 POWDER TOPICAL at 22:12

## 2023-11-05 RX ADMIN — IPRATROPIUM BROMIDE AND ALBUTEROL SULFATE 1 DOSE: .5; 2.5 SOLUTION RESPIRATORY (INHALATION) at 08:58

## 2023-11-05 RX ADMIN — DOXYCYCLINE HYCLATE 100 MG: 100 CAPSULE ORAL at 22:12

## 2023-11-05 RX ADMIN — BUDESONIDE 250 MCG: 0.25 INHALANT RESPIRATORY (INHALATION) at 08:58

## 2023-11-05 RX ADMIN — ACETAMINOPHEN 650 MG: 325 TABLET ORAL at 09:49

## 2023-11-05 RX ADMIN — WATER 1000 MG: 1 INJECTION INTRAMUSCULAR; INTRAVENOUS; SUBCUTANEOUS at 11:21

## 2023-11-05 RX ADMIN — IPRATROPIUM BROMIDE AND ALBUTEROL SULFATE 1 DOSE: .5; 2.5 SOLUTION RESPIRATORY (INHALATION) at 20:24

## 2023-11-05 RX ADMIN — METHYLPREDNISOLONE SODIUM SUCCINATE 40 MG: 40 INJECTION, POWDER, FOR SOLUTION INTRAMUSCULAR; INTRAVENOUS at 11:21

## 2023-11-05 RX ADMIN — HYDROXYZINE HYDROCHLORIDE 25 MG: 50 INJECTION, SOLUTION INTRAMUSCULAR at 17:28

## 2023-11-05 RX ADMIN — MICONAZOLE NITRATE: 20.6 POWDER TOPICAL at 08:04

## 2023-11-05 RX ADMIN — IPRATROPIUM BROMIDE AND ALBUTEROL SULFATE 1 DOSE: .5; 2.5 SOLUTION RESPIRATORY (INHALATION) at 12:28

## 2023-11-05 RX ADMIN — METHYLPREDNISOLONE SODIUM SUCCINATE 40 MG: 40 INJECTION, POWDER, FOR SOLUTION INTRAMUSCULAR; INTRAVENOUS at 22:12

## 2023-11-05 RX ADMIN — ENOXAPARIN SODIUM 30 MG: 100 INJECTION SUBCUTANEOUS at 22:12

## 2023-11-05 RX ADMIN — SODIUM CHLORIDE, PRESERVATIVE FREE 10 ML: 5 INJECTION INTRAVENOUS at 22:12

## 2023-11-05 RX ADMIN — BUDESONIDE 250 MCG: 0.25 INHALANT RESPIRATORY (INHALATION) at 20:24

## 2023-11-05 RX ADMIN — SODIUM CHLORIDE, PRESERVATIVE FREE 10 ML: 5 INJECTION INTRAVENOUS at 08:03

## 2023-11-05 RX ADMIN — IPRATROPIUM BROMIDE AND ALBUTEROL SULFATE 1 DOSE: .5; 2.5 SOLUTION RESPIRATORY (INHALATION) at 16:27

## 2023-11-05 RX ADMIN — ENOXAPARIN SODIUM 30 MG: 100 INJECTION SUBCUTANEOUS at 08:00

## 2023-11-05 RX ADMIN — DOXYCYCLINE HYCLATE 100 MG: 100 CAPSULE ORAL at 11:21

## 2023-11-05 NOTE — PLAN OF CARE
Problem: Discharge Planning  Goal: Discharge to home or other facility with appropriate resources  11/4/2023 2218 by Berna Gregory RN  Outcome: Progressing     Problem: Safety - Adult  Goal: Free from fall injury  11/4/2023 2218 by Berna Gregory RN  Outcome: Progressing     Problem: ABCDS Injury Assessment  Goal: Absence of physical injury  11/4/2023 2218 by Berna Gregory RN  Outcome: Progressing     Problem: Pain  Goal: Verbalizes/displays adequate comfort level or baseline comfort level  Outcome: Progressing

## 2023-11-05 NOTE — PATIENT CARE CONFERENCE
[facility] Quality Flow/Interdisciplinary Rounds Progress Note        Quality Flow Rounds held on November 5, 2023    Disciplines Attending:  Bedside Nurse and Nursing Unit Leadership    Deb Myrick was admitted on 11/4/2023  9:39 AM    Anticipated Discharge Date:       Disposition:    Gabe Score:  Gabe Scale Score: 21    Readmission Risk              Risk of Unplanned Readmission:  0           Discussed patient goal for the day, patient clinical progression, and barriers to discharge.   The following Goal(s) of the Day/Commitment(s) have been identified:  Labs - Report Results      Jose Parra RN  November 5, 2023

## 2023-11-05 NOTE — PROGRESS NOTES
Pt arrived to [facility] with the following belongings: shirt, shorts, shoes, necklace, rings, purse, phone, , and wallet. Pt oriented to room and all questions answered.

## 2023-11-05 NOTE — PROGRESS NOTES
[facility] [facility]  Internal Medicine Residency Program  Progress Note - House Team       Patient:  Germaine Cárdenas 63 y.o. female   MRN: [id number]       Date of Service: 11/5/2023    CC: Shortness of breath  Overnight events: Headache was not relieved with Tylenol, Toradol ordered    Subjective     This morning the patient was seen at bedside in no acute distress. Patient is having some worsening cough, sputum production is unchanged. Patient denies fever, chills, chest pain. The patient mentions about using meth for 2 to 3 years before she moved to [facility] around 10 days back. Patient is concerned about meth withdrawal, states she was using it from streets for recreational purposes. She also uses some marijuana. Patient denies alcohol use. Patient states that she moved from [place] to [facility] around 10 days back to stay with her daughter. It has been an environmental change for her, specifically the heating in her current home and stairs have made her respiratory distress worsened. She was on 2 L oxygen for a long time and her oxygen requirement has increased recently. Patient also mentions about not taking her hydrochlorothiazide as she has a baseline urge incontinence, and her current living situation is making her incontinence worsened on thiazide as she does not have any access to toilet downstairs. She also has some burning with urination, has a history of UTI in May. Patient denies any vaginal discharge. Patient mentions about having reflux symptoms and food sticking to her chest for last 3 to 4-months . Patient states the food first sticks to the throat then sticks to the epigastric region, patient starts belching. It occurs with both solids and liquids.   Patient has a history of GERD with \"Z-line esophagus \"and colon polyps, patient was supposed to follow-up every 3 years with endoscopy and colonoscopy but she did not see a physician for over 8 years regarding this

## 2023-11-05 NOTE — PROGRESS NOTES
4 Eyes Skin Assessment     NAME:  Queenie Rodriguez OF BIRTH:  1965  MEDICAL RECORD NUMBER:  [id number]    The patient is being assessed for  Admission    I agree that at least one RN has performed a thorough Head to Toe Skin Assessment on the patient. ALL assessment sites listed below have been assessed. Areas assessed by both nurses:    Head, Face, Ears, Shoulders, Back, Chest, Arms, Elbows, Hands, Sacrum. Buttock, Coccyx, Ischium, Legs. Feet and Heels, and Under Medical Devices         Does the Patient have a Wound?  No noted wound(s)       Gabe Prevention initiated by RN: No  Wound Care Orders initiated by RN: No    Pressure Injury (Stage 3,4, Unstageable, DTI, NWPT, and Complex wounds) if present, place Wound referral order by RN under : No    New Ostomies, if present place, Ostomy referral order under : No     Nurse 1 eSignature: Electronically signed by Lelia Ocampo RN on 11/4/23 at 11:25 PM EDT    **SHARE this note so that the co-signing nurse can place an eSignature**    Nurse 2 eSignature: Electronically signed by Jon Pope RN on 11/5/23 at 3:42 AM EST

## 2023-11-05 NOTE — PROGRESS NOTES
[facility]  Internal Medicine Residency / House Medicine Service      Initial H and P    Attending Physician Statement  I have discussed the case, including pertinent history and exam findings with the resident and the team. I have reviewed all significant past medical history, surgical history, social history, family history, allergies, and home medications independently. I have seen and examined the patient and the key elements of the encounter have been performed by me. I agree with the assessment, plan and orders as documented by the resident.       Case Discussed During AM Rounds    Admitted on 11/4 with suspected acute on chronic respiratory failure secondary to COPD exacerbation    Recently relocated from [place]- reported living with daughter- reported hx of COPD with chronic oxygen use at home    Still with intermittent tobacco use at home    Noting increased shortness and increased cough severity; no noted increase in production in cough   Additional hx of HTN - but not taking home diuretic    This am- oxygen requirements remain above home level of 2 L NC; BP not controlled this am   + end expiratory wheezing appreciated on exam; however speaking in full sentences; no tachypnea     Acute on Chronic Hypoxic Respiratory Failure secondary to COPD Exacerbation   Resume IV steroids for additional 24 hours   Aerosols   Oxygen wean protocol    Initiate Atbs- after cultures received     Dysuria and hx of urge incontinence   U/A and culture were ordered on admission- but not yet collected   Need to collect urine and start empiric atbs after     Dysphagia- intermittent in nature   Will need eventual work-up- if difficulty with diet   Hx per report of esophageal dilatation    Consider Barium swallow    Will need Gen Surgery assessment and potential EGD as outpatient     Hypertension- off diuretic   Monitor BP   If not at goal- initiate CCB     Illicit Methamphetamine use    Edentulous

## 2023-11-06 ENCOUNTER — APPOINTMENT (OUTPATIENT)
Dept: GENERAL RADIOLOGY | Age: 58
DRG: 190 | End: 2023-11-06
Payer: MEDICARE

## 2023-11-06 ENCOUNTER — HOSPITAL ENCOUNTER (INPATIENT)
Age: 58
LOS: 2 days | Discharge: HOME HEALTH | DRG: 190 | End: 2023-11-08
Attending: EMERGENCY MEDICINE | Admitting: INTERNAL MEDICINE
Payer: MEDICARE

## 2023-11-06 VITALS
TEMPERATURE: 97.6 F | BODY MASS INDEX: 36.26 KG/M2 | OXYGEN SATURATION: 96 % | HEIGHT: 67 IN | WEIGHT: 231 LBS | DIASTOLIC BLOOD PRESSURE: 68 MMHG | RESPIRATION RATE: 18 BRPM | HEART RATE: 65 BPM | SYSTOLIC BLOOD PRESSURE: 118 MMHG

## 2023-11-06 DIAGNOSIS — J44.1 COPD EXACERBATION (HCC): Primary | ICD-10-CM

## 2023-11-06 DIAGNOSIS — F39 MOOD DISORDER (HCC): ICD-10-CM

## 2023-11-06 LAB
ANION GAP SERPL CALCULATED.3IONS-SCNC: 10 MMOL/L (ref 7–16)
ANION GAP SERPL CALCULATED.3IONS-SCNC: 8 MMOL/L (ref 7–16)
BASOPHILS # BLD: 0.01 K/UL (ref 0–0.2)
BASOPHILS # BLD: 0.03 K/UL (ref 0–0.2)
BASOPHILS NFR BLD: 0 % (ref 0–2)
BASOPHILS NFR BLD: 0 % (ref 0–2)
BUN SERPL-MCNC: 17 MG/DL (ref 6–20)
BUN SERPL-MCNC: 20 MG/DL (ref 6–20)
CALCIUM SERPL-MCNC: 9.3 MG/DL (ref 8.6–10.2)
CALCIUM SERPL-MCNC: 9.8 MG/DL (ref 8.6–10.2)
CHLORIDE SERPL-SCNC: 101 MMOL/L (ref 98–107)
CHLORIDE SERPL-SCNC: 106 MMOL/L (ref 98–107)
CO2 SERPL-SCNC: 31 MMOL/L (ref 22–29)
CO2 SERPL-SCNC: 31 MMOL/L (ref 22–29)
CREAT SERPL-MCNC: 0.7 MG/DL (ref 0.5–1)
CREAT SERPL-MCNC: 0.7 MG/DL (ref 0.5–1)
EOSINOPHIL # BLD: 0 K/UL (ref 0.05–0.5)
EOSINOPHIL # BLD: 0 K/UL (ref 0.05–0.5)
EOSINOPHILS RELATIVE PERCENT: 0 % (ref 0–6)
EOSINOPHILS RELATIVE PERCENT: 0 % (ref 0–6)
ERYTHROCYTE [DISTWIDTH] IN BLOOD BY AUTOMATED COUNT: 12.4 % (ref 11.5–15)
ERYTHROCYTE [DISTWIDTH] IN BLOOD BY AUTOMATED COUNT: 12.6 % (ref 11.5–15)
GFR SERPL CREATININE-BSD FRML MDRD: >60 ML/MIN/1.73M2
GFR SERPL CREATININE-BSD FRML MDRD: >60 ML/MIN/1.73M2
GLUCOSE SERPL-MCNC: 130 MG/DL (ref 74–99)
GLUCOSE SERPL-MCNC: 172 MG/DL (ref 74–99)
HCT VFR BLD AUTO: 43.6 % (ref 34–48)
HCT VFR BLD AUTO: 48 % (ref 34–48)
HGB BLD-MCNC: 13.7 G/DL (ref 11.5–15.5)
HGB BLD-MCNC: 14.8 G/DL (ref 11.5–15.5)
IMM GRANULOCYTES # BLD AUTO: 0.05 K/UL (ref 0–0.58)
IMM GRANULOCYTES # BLD AUTO: <0.03 K/UL (ref 0–0.58)
IMM GRANULOCYTES NFR BLD: 0 % (ref 0–5)
IMM GRANULOCYTES NFR BLD: 1 % (ref 0–5)
LYMPHOCYTES NFR BLD: 0.62 K/UL (ref 1.5–4)
LYMPHOCYTES NFR BLD: 0.7 K/UL (ref 1.5–4)
LYMPHOCYTES RELATIVE PERCENT: 7 % (ref 20–42)
LYMPHOCYTES RELATIVE PERCENT: 9 % (ref 20–42)
MCH RBC QN AUTO: 29.1 PG (ref 26–35)
MCH RBC QN AUTO: 29.7 PG (ref 26–35)
MCHC RBC AUTO-ENTMCNC: 30.8 G/DL (ref 32–34.5)
MCHC RBC AUTO-ENTMCNC: 31.4 G/DL (ref 32–34.5)
MCV RBC AUTO: 94.3 FL (ref 80–99.9)
MCV RBC AUTO: 94.4 FL (ref 80–99.9)
MICROORGANISM SPEC CULT: ABNORMAL
MONOCYTES NFR BLD: 0.1 K/UL (ref 0.1–0.95)
MONOCYTES NFR BLD: 0.32 K/UL (ref 0.1–0.95)
MONOCYTES NFR BLD: 1 % (ref 2–12)
MONOCYTES NFR BLD: 3 % (ref 2–12)
NEUTROPHILS NFR BLD: 90 % (ref 43–80)
NEUTROPHILS NFR BLD: 90 % (ref 43–80)
NEUTS SEG NFR BLD: 6.52 K/UL (ref 1.8–7.3)
NEUTS SEG NFR BLD: 9.71 K/UL (ref 1.8–7.3)
PLATELET # BLD AUTO: 222 K/UL (ref 130–450)
PLATELET, FLUORESCENCE: 246 K/UL (ref 130–450)
PMV BLD AUTO: 10.7 FL (ref 7–12)
PMV BLD AUTO: 10.8 FL (ref 7–12)
POTASSIUM SERPL-SCNC: 4.4 MMOL/L (ref 3.5–5)
POTASSIUM SERPL-SCNC: 5.3 MMOL/L (ref 3.5–5)
RBC # BLD AUTO: 4.62 M/UL (ref 3.5–5.5)
RBC # BLD AUTO: 5.09 M/UL (ref 3.5–5.5)
SODIUM SERPL-SCNC: 142 MMOL/L (ref 132–146)
SODIUM SERPL-SCNC: 145 MMOL/L (ref 132–146)
SPECIMEN DESCRIPTION: ABNORMAL
WBC OTHER # BLD: 10.8 K/UL (ref 4.5–11.5)
WBC OTHER # BLD: 7.3 K/UL (ref 4.5–11.5)

## 2023-11-06 PROCEDURE — G0378 HOSPITAL OBSERVATION PER HR: HCPCS

## 2023-11-06 PROCEDURE — 85025 COMPLETE CBC W/AUTO DIFF WBC: CPT

## 2023-11-06 PROCEDURE — 6360000002 HC RX W HCPCS: Performed by: STUDENT IN AN ORGANIZED HEALTH CARE EDUCATION/TRAINING PROGRAM

## 2023-11-06 PROCEDURE — 6370000000 HC RX 637 (ALT 250 FOR IP): Performed by: STUDENT IN AN ORGANIZED HEALTH CARE EDUCATION/TRAINING PROGRAM

## 2023-11-06 PROCEDURE — 80048 BASIC METABOLIC PNL TOTAL CA: CPT

## 2023-11-06 PROCEDURE — 6360000002 HC RX W HCPCS

## 2023-11-06 PROCEDURE — 94640 AIRWAY INHALATION TREATMENT: CPT

## 2023-11-06 PROCEDURE — 6370000000 HC RX 637 (ALT 250 FOR IP)

## 2023-11-06 PROCEDURE — 99222 1ST HOSP IP/OBS MODERATE 55: CPT | Performed by: INTERNAL MEDICINE

## 2023-11-06 PROCEDURE — 2140000000 HC CCU INTERMEDIATE R&B

## 2023-11-06 PROCEDURE — 99285 EMERGENCY DEPT VISIT HI MDM: CPT

## 2023-11-06 PROCEDURE — 96372 THER/PROPH/DIAG INJ SC/IM: CPT

## 2023-11-06 PROCEDURE — 36415 COLL VENOUS BLD VENIPUNCTURE: CPT

## 2023-11-06 PROCEDURE — 93005 ELECTROCARDIOGRAM TRACING: CPT | Performed by: STUDENT IN AN ORGANIZED HEALTH CARE EDUCATION/TRAINING PROGRAM

## 2023-11-06 PROCEDURE — 2580000003 HC RX 258

## 2023-11-06 PROCEDURE — 71045 X-RAY EXAM CHEST 1 VIEW: CPT

## 2023-11-06 RX ORDER — ARFORMOTEROL TARTRATE 15 UG/2ML
15 SOLUTION RESPIRATORY (INHALATION)
Status: DISCONTINUED | OUTPATIENT
Start: 2023-11-06 | End: 2023-11-06 | Stop reason: HOSPADM

## 2023-11-06 RX ORDER — PANTOPRAZOLE SODIUM 40 MG/1
40 TABLET, DELAYED RELEASE ORAL
Status: DISCONTINUED | OUTPATIENT
Start: 2023-11-06 | End: 2023-11-06 | Stop reason: HOSPADM

## 2023-11-06 RX ORDER — METHYLPREDNISOLONE 4 MG/1
40 TABLET ORAL DAILY
Status: DISCONTINUED | OUTPATIENT
Start: 2023-11-06 | End: 2023-11-06

## 2023-11-06 RX ORDER — IPRATROPIUM BROMIDE AND ALBUTEROL SULFATE 2.5; .5 MG/3ML; MG/3ML
1 SOLUTION RESPIRATORY (INHALATION) ONCE
Status: COMPLETED | OUTPATIENT
Start: 2023-11-06 | End: 2023-11-06

## 2023-11-06 RX ORDER — PAROXETINE 10 MG/1
40 TABLET, FILM COATED ORAL DAILY
Status: DISCONTINUED | OUTPATIENT
Start: 2023-11-06 | End: 2023-11-06 | Stop reason: HOSPADM

## 2023-11-06 RX ORDER — PREDNISONE 20 MG/1
40 TABLET ORAL DAILY
Status: DISCONTINUED | OUTPATIENT
Start: 2023-11-06 | End: 2023-11-06 | Stop reason: HOSPADM

## 2023-11-06 RX ADMIN — SODIUM ZIRCONIUM CYCLOSILICATE 10 G: 10 POWDER, FOR SUSPENSION ORAL at 09:45

## 2023-11-06 RX ADMIN — BUDESONIDE 250 MCG: 0.25 INHALANT RESPIRATORY (INHALATION) at 09:02

## 2023-11-06 RX ADMIN — ARFORMOTEROL TARTRATE 15 MCG: 15 SOLUTION RESPIRATORY (INHALATION) at 09:02

## 2023-11-06 RX ADMIN — SODIUM CHLORIDE, PRESERVATIVE FREE 10 ML: 5 INJECTION INTRAVENOUS at 08:46

## 2023-11-06 RX ADMIN — IPRATROPIUM BROMIDE AND ALBUTEROL SULFATE 1 DOSE: 2.5; .5 SOLUTION RESPIRATORY (INHALATION) at 16:26

## 2023-11-06 RX ADMIN — ENOXAPARIN SODIUM 30 MG: 100 INJECTION SUBCUTANEOUS at 08:45

## 2023-11-06 RX ADMIN — PREDNISONE 40 MG: 20 TABLET ORAL at 08:45

## 2023-11-06 RX ADMIN — MICONAZOLE NITRATE: 20.6 POWDER TOPICAL at 08:47

## 2023-11-06 RX ADMIN — PAROXETINE 40 MG: 10 TABLET, FILM COATED ORAL at 08:45

## 2023-11-06 RX ADMIN — DOXYCYCLINE HYCLATE 100 MG: 100 CAPSULE ORAL at 08:47

## 2023-11-06 RX ADMIN — IPRATROPIUM BROMIDE AND ALBUTEROL SULFATE 1 DOSE: .5; 2.5 SOLUTION RESPIRATORY (INHALATION) at 09:02

## 2023-11-06 ASSESSMENT — LIFESTYLE VARIABLES
HOW OFTEN DO YOU HAVE A DRINK CONTAINING ALCOHOL: NEVER
HOW MANY STANDARD DRINKS CONTAINING ALCOHOL DO YOU HAVE ON A TYPICAL DAY: PATIENT DOES NOT DRINK

## 2023-11-06 NOTE — ED PROVIDER NOTES
respiratory failure likely due to COPD exacerbation. CONSULTS:   IP CONSULT TO INTERNAL MEDICINE        I am the Primary Clinician of Record. FINAL IMPRESSION      1. COPD exacerbation ([address])          DISPOSITION/PLAN     DISPOSITION Admitted 11/06/2023 04:38:32 PM      PATIENT REFERRED TO:  No follow-up provider specified.     DISCHARGE MEDICATIONS:  Current Discharge Medication List          DISCONTINUED MEDICATIONS:  Current Discharge Medication List                 (Please note that portions of this note were completed with a voice recognition program.  Efforts were made to edit the dictations but occasionally words are mis-transcribed.)    Maury Sher DO (electronically signed)           Britany Woods DO  Resident  11/07/23 [id number]

## 2023-11-06 NOTE — PATIENT CARE CONFERENCE
P Quality Flow/Interdisciplinary Rounds Progress Note        Quality Flow Rounds held on November 6, 2023    Disciplines Attending:  Bedside Nurse, , , and Nursing Unit Leadership    Tashia Sanchez was admitted on 11/4/2023  9:39 AM    Anticipated Discharge Date:       Disposition:    Gabe Score:  Gabe Scale Score: 20    Readmission Risk              Risk of Unplanned Readmission:  0           Discussed patient goal for the day, patient clinical progression, and barriers to discharge.   The following Goal(s) of the Day/Commitment(s) have been identified:  downgrade/discharge planning       Nieves Hidalgo RN  November 6, 2023

## 2023-11-06 NOTE — PLAN OF CARE
Problem: Discharge Planning  Goal: Discharge to home or other facility with appropriate resources  11/6/2023 0946 by Sofiya Bobby RN  Outcome: Progressing     Problem: Safety - Adult  Goal: Free from fall injury  11/6/2023 0946 by Sofiya Bobby RN  Outcome: Progressing     Problem: ABCDS Injury Assessment  Goal: Absence of physical injury  11/6/2023 0946 by Sofiya Bobby RN  Outcome: Progressing     Problem: Pain  Goal: Verbalizes/displays adequate comfort level or baseline comfort level  11/6/2023 0946 by Sofiya Bobby RN  Outcome: Progressing

## 2023-11-06 NOTE — PROGRESS NOTES
Patient put on call light, RN entered room, patient was tearful and said she needs to leave and wants to sign out AMA. This RN educated patient on possible risks of leaving against medical advice and encouraged patient to stay. Patient addiment about leaving. Provided patient with AMA liability waiver, removed tele monitor and IV and patient discharged.

## 2023-11-06 NOTE — CARE COORDINATION
Patient presented to ED for c/o increased SOB and cough and burning on urination. .  Placed on 4 l of oxygen with pox 94%. Currently on 2 L with po 96 %. Started on IV Rocephin and oral doxycycline. IV solumedrol x 24 hrs. Met with patient at bedside to discuss transition of care. Patient recently moved to [facility]  from [place]. She is living with her daughter in a 2 story home with bedrom and bath on second level. Her PCP is from [place] . Dr. Baldo Overton and she was using [facility] in [place]. She has oxygen from [employer] that she uses at 2 l. Her plan is home and her daughter will transport when medically cleared.  CM/Sw will continue to follow

## 2023-11-07 LAB
25(OH)D3 SERPL-MCNC: 15.7 NG/ML (ref 30–100)
AMPHET UR QL SCN: NEGATIVE
ANION GAP SERPL CALCULATED.3IONS-SCNC: 9 MMOL/L (ref 7–16)
BARBITURATES UR QL SCN: NEGATIVE
BASOPHILS # BLD: 0.05 K/UL (ref 0–0.2)
BASOPHILS NFR BLD: 1 % (ref 0–2)
BENZODIAZ UR QL: NEGATIVE
BUN SERPL-MCNC: 22 MG/DL (ref 6–20)
BUPRENORPHINE UR QL: NEGATIVE
CALCIUM SERPL-MCNC: 9.2 MG/DL (ref 8.6–10.2)
CANNABINOIDS UR QL SCN: POSITIVE
CHLORIDE SERPL-SCNC: 104 MMOL/L (ref 98–107)
CO2 SERPL-SCNC: 33 MMOL/L (ref 22–29)
COCAINE UR QL SCN: NEGATIVE
CREAT SERPL-MCNC: 0.6 MG/DL (ref 0.5–1)
EKG ATRIAL RATE: 123 BPM
EKG P AXIS: 68 DEGREES
EKG P-R INTERVAL: 206 MS
EKG Q-T INTERVAL: 276 MS
EKG QRS DURATION: 76 MS
EKG QTC CALCULATION (BAZETT): 395 MS
EKG R AXIS: 56 DEGREES
EKG T AXIS: 69 DEGREES
EKG VENTRICULAR RATE: 123 BPM
EOSINOPHIL # BLD: 0.03 K/UL (ref 0.05–0.5)
EOSINOPHILS RELATIVE PERCENT: 0 % (ref 0–6)
ERYTHROCYTE [DISTWIDTH] IN BLOOD BY AUTOMATED COUNT: 12.8 % (ref 11.5–15)
FENTANYL UR QL: NEGATIVE
GFR SERPL CREATININE-BSD FRML MDRD: >60 ML/MIN/1.73M2
GLUCOSE BLD-MCNC: 172 MG/DL (ref 74–99)
GLUCOSE SERPL-MCNC: 78 MG/DL (ref 74–99)
HCT VFR BLD AUTO: 43.3 % (ref 34–48)
HGB BLD-MCNC: 13.3 G/DL (ref 11.5–15.5)
IMM GRANULOCYTES # BLD AUTO: 0.04 K/UL (ref 0–0.58)
IMM GRANULOCYTES NFR BLD: 0 % (ref 0–5)
LYMPHOCYTES NFR BLD: 2.88 K/UL (ref 1.5–4)
LYMPHOCYTES RELATIVE PERCENT: 30 % (ref 20–42)
MAGNESIUM SERPL-MCNC: 1.5 MG/DL (ref 1.6–2.6)
MCH RBC QN AUTO: 29 PG (ref 26–35)
MCHC RBC AUTO-ENTMCNC: 30.7 G/DL (ref 32–34.5)
MCV RBC AUTO: 94.5 FL (ref 80–99.9)
METHADONE UR QL: NEGATIVE
MONOCYTES NFR BLD: 1.04 K/UL (ref 0.1–0.95)
MONOCYTES NFR BLD: 11 % (ref 2–12)
NEUTROPHILS NFR BLD: 58 % (ref 43–80)
NEUTS SEG NFR BLD: 5.59 K/UL (ref 1.8–7.3)
OPIATES UR QL SCN: NEGATIVE
OXYCODONE UR QL SCN: NEGATIVE
PCP UR QL SCN: NEGATIVE
PHOSPHATE SERPL-MCNC: 3.8 MG/DL (ref 2.5–4.5)
PLATELET # BLD AUTO: 217 K/UL (ref 130–450)
PMV BLD AUTO: 10.8 FL (ref 7–12)
POTASSIUM SERPL-SCNC: 3.7 MMOL/L (ref 3.5–5)
RBC # BLD AUTO: 4.58 M/UL (ref 3.5–5.5)
SODIUM SERPL-SCNC: 146 MMOL/L (ref 132–146)
TEST INFORMATION: ABNORMAL
WBC OTHER # BLD: 9.6 K/UL (ref 4.5–11.5)

## 2023-11-07 PROCEDURE — 2700000000 HC OXYGEN THERAPY PER DAY

## 2023-11-07 PROCEDURE — 36415 COLL VENOUS BLD VENIPUNCTURE: CPT

## 2023-11-07 PROCEDURE — 2580000003 HC RX 258

## 2023-11-07 PROCEDURE — 85025 COMPLETE CBC W/AUTO DIFF WBC: CPT

## 2023-11-07 PROCEDURE — 99232 SBSQ HOSP IP/OBS MODERATE 35: CPT | Performed by: INTERNAL MEDICINE

## 2023-11-07 PROCEDURE — 87081 CULTURE SCREEN ONLY: CPT

## 2023-11-07 PROCEDURE — 84100 ASSAY OF PHOSPHORUS: CPT

## 2023-11-07 PROCEDURE — 82306 VITAMIN D 25 HYDROXY: CPT

## 2023-11-07 PROCEDURE — 6370000000 HC RX 637 (ALT 250 FOR IP)

## 2023-11-07 PROCEDURE — 82962 GLUCOSE BLOOD TEST: CPT

## 2023-11-07 PROCEDURE — 80048 BASIC METABOLIC PNL TOTAL CA: CPT

## 2023-11-07 PROCEDURE — 6360000002 HC RX W HCPCS

## 2023-11-07 PROCEDURE — 2140000000 HC CCU INTERMEDIATE R&B

## 2023-11-07 PROCEDURE — 83735 ASSAY OF MAGNESIUM: CPT

## 2023-11-07 PROCEDURE — 80307 DRUG TEST PRSMV CHEM ANLYZR: CPT

## 2023-11-07 PROCEDURE — 93010 ELECTROCARDIOGRAM REPORT: CPT | Performed by: INTERNAL MEDICINE

## 2023-11-07 PROCEDURE — 94640 AIRWAY INHALATION TREATMENT: CPT

## 2023-11-07 RX ORDER — ENOXAPARIN SODIUM 100 MG/ML
30 INJECTION SUBCUTANEOUS 2 TIMES DAILY
Status: DISCONTINUED | OUTPATIENT
Start: 2023-11-07 | End: 2023-11-08 | Stop reason: HOSPADM

## 2023-11-07 RX ORDER — SODIUM CHLORIDE 9 MG/ML
INJECTION, SOLUTION INTRAVENOUS PRN
Status: DISCONTINUED | OUTPATIENT
Start: 2023-11-07 | End: 2023-11-08 | Stop reason: HOSPADM

## 2023-11-07 RX ORDER — NICOTINE 21 MG/24HR
1 PATCH, TRANSDERMAL 24 HOURS TRANSDERMAL DAILY
Status: DISCONTINUED | OUTPATIENT
Start: 2023-11-07 | End: 2023-11-08 | Stop reason: HOSPADM

## 2023-11-07 RX ORDER — SODIUM CHLORIDE 0.9 % (FLUSH) 0.9 %
5-40 SYRINGE (ML) INJECTION EVERY 12 HOURS SCHEDULED
Status: DISCONTINUED | OUTPATIENT
Start: 2023-11-07 | End: 2023-11-08 | Stop reason: HOSPADM

## 2023-11-07 RX ORDER — DEXTROSE MONOHYDRATE 100 MG/ML
INJECTION, SOLUTION INTRAVENOUS CONTINUOUS PRN
Status: DISCONTINUED | OUTPATIENT
Start: 2023-11-07 | End: 2023-11-08 | Stop reason: HOSPADM

## 2023-11-07 RX ORDER — ARFORMOTEROL TARTRATE 15 UG/2ML
15 SOLUTION RESPIRATORY (INHALATION)
Status: DISCONTINUED | OUTPATIENT
Start: 2023-11-07 | End: 2023-11-08 | Stop reason: HOSPADM

## 2023-11-07 RX ORDER — PREDNISONE 20 MG/1
40 TABLET ORAL DAILY
Status: ON HOLD | COMMUNITY
End: 2023-11-08 | Stop reason: HOSPADM

## 2023-11-07 RX ORDER — ACETAMINOPHEN 650 MG/1
650 SUPPOSITORY RECTAL EVERY 6 HOURS PRN
Status: DISCONTINUED | OUTPATIENT
Start: 2023-11-07 | End: 2023-11-08 | Stop reason: HOSPADM

## 2023-11-07 RX ORDER — ACETAMINOPHEN 325 MG/1
650 TABLET ORAL EVERY 6 HOURS PRN
Status: DISCONTINUED | OUTPATIENT
Start: 2023-11-07 | End: 2023-11-08 | Stop reason: HOSPADM

## 2023-11-07 RX ORDER — PAROXETINE 10 MG/1
40 TABLET, FILM COATED ORAL EVERY MORNING
Status: DISCONTINUED | OUTPATIENT
Start: 2023-11-07 | End: 2023-11-08 | Stop reason: HOSPADM

## 2023-11-07 RX ORDER — SODIUM CHLORIDE 0.9 % (FLUSH) 0.9 %
5-40 SYRINGE (ML) INJECTION PRN
Status: DISCONTINUED | OUTPATIENT
Start: 2023-11-07 | End: 2023-11-08 | Stop reason: HOSPADM

## 2023-11-07 RX ORDER — ONDANSETRON 2 MG/ML
4 INJECTION INTRAMUSCULAR; INTRAVENOUS EVERY 6 HOURS PRN
Status: DISCONTINUED | OUTPATIENT
Start: 2023-11-07 | End: 2023-11-08 | Stop reason: HOSPADM

## 2023-11-07 RX ORDER — PREDNISONE 20 MG/1
40 TABLET ORAL DAILY
Status: DISCONTINUED | OUTPATIENT
Start: 2023-11-07 | End: 2023-11-08 | Stop reason: HOSPADM

## 2023-11-07 RX ORDER — POLYETHYLENE GLYCOL 3350 17 G/17G
17 POWDER, FOR SOLUTION ORAL DAILY PRN
Status: DISCONTINUED | OUTPATIENT
Start: 2023-11-07 | End: 2023-11-08 | Stop reason: HOSPADM

## 2023-11-07 RX ORDER — PANTOPRAZOLE SODIUM 40 MG/1
40 TABLET, DELAYED RELEASE ORAL
Status: DISCONTINUED | OUTPATIENT
Start: 2023-11-07 | End: 2023-11-08 | Stop reason: HOSPADM

## 2023-11-07 RX ORDER — DOXYCYCLINE HYCLATE 100 MG/1
100 CAPSULE ORAL EVERY 12 HOURS SCHEDULED
Status: DISCONTINUED | OUTPATIENT
Start: 2023-11-07 | End: 2023-11-08 | Stop reason: HOSPADM

## 2023-11-07 RX ORDER — ONDANSETRON 4 MG/1
4 TABLET, ORALLY DISINTEGRATING ORAL EVERY 8 HOURS PRN
Status: DISCONTINUED | OUTPATIENT
Start: 2023-11-07 | End: 2023-11-08 | Stop reason: HOSPADM

## 2023-11-07 RX ORDER — IPRATROPIUM BROMIDE AND ALBUTEROL SULFATE 2.5; .5 MG/3ML; MG/3ML
1 SOLUTION RESPIRATORY (INHALATION)
Status: DISCONTINUED | OUTPATIENT
Start: 2023-11-07 | End: 2023-11-08 | Stop reason: HOSPADM

## 2023-11-07 RX ORDER — BUDESONIDE 0.5 MG/2ML
0.5 INHALANT ORAL
Status: DISCONTINUED | OUTPATIENT
Start: 2023-11-07 | End: 2023-11-08 | Stop reason: HOSPADM

## 2023-11-07 RX ADMIN — DOXYCYCLINE HYCLATE 100 MG: 100 CAPSULE ORAL at 21:53

## 2023-11-07 RX ADMIN — IPRATROPIUM BROMIDE AND ALBUTEROL SULFATE 1 DOSE: 2.5; .5 SOLUTION RESPIRATORY (INHALATION) at 08:50

## 2023-11-07 RX ADMIN — BUDESONIDE 500 MCG: 0.5 SUSPENSION RESPIRATORY (INHALATION) at 08:51

## 2023-11-07 RX ADMIN — ENOXAPARIN SODIUM 30 MG: 100 INJECTION SUBCUTANEOUS at 09:48

## 2023-11-07 RX ADMIN — DOXYCYCLINE HYCLATE 100 MG: 100 CAPSULE ORAL at 09:47

## 2023-11-07 RX ADMIN — ARFORMOTEROL TARTRATE 15 MCG: 15 SOLUTION RESPIRATORY (INHALATION) at 08:51

## 2023-11-07 RX ADMIN — PREDNISONE 40 MG: 20 TABLET ORAL at 09:47

## 2023-11-07 RX ADMIN — IPRATROPIUM BROMIDE AND ALBUTEROL SULFATE 1 DOSE: 2.5; .5 SOLUTION RESPIRATORY (INHALATION) at 12:21

## 2023-11-07 RX ADMIN — IPRATROPIUM BROMIDE AND ALBUTEROL SULFATE 1 DOSE: 2.5; .5 SOLUTION RESPIRATORY (INHALATION) at 19:10

## 2023-11-07 RX ADMIN — PAROXETINE 40 MG: 10 TABLET, FILM COATED ORAL at 12:07

## 2023-11-07 RX ADMIN — ARFORMOTEROL TARTRATE 15 MCG: 15 SOLUTION RESPIRATORY (INHALATION) at 19:10

## 2023-11-07 RX ADMIN — ENOXAPARIN SODIUM 30 MG: 100 INJECTION SUBCUTANEOUS at 21:53

## 2023-11-07 RX ADMIN — PANTOPRAZOLE SODIUM 40 MG: 40 TABLET, DELAYED RELEASE ORAL at 06:31

## 2023-11-07 RX ADMIN — BUDESONIDE 500 MCG: 0.5 SUSPENSION RESPIRATORY (INHALATION) at 19:10

## 2023-11-07 RX ADMIN — WATER 1000 MG: 1 INJECTION INTRAMUSCULAR; INTRAVENOUS; SUBCUTANEOUS at 09:48

## 2023-11-07 RX ADMIN — SODIUM CHLORIDE, PRESERVATIVE FREE 10 ML: 5 INJECTION INTRAVENOUS at 21:53

## 2023-11-07 RX ADMIN — SODIUM CHLORIDE, PRESERVATIVE FREE 10 ML: 5 INJECTION INTRAVENOUS at 09:47

## 2023-11-07 RX ADMIN — IPRATROPIUM BROMIDE AND ALBUTEROL SULFATE 1 DOSE: 2.5; .5 SOLUTION RESPIRATORY (INHALATION) at 15:50

## 2023-11-07 ASSESSMENT — PAIN SCALES - GENERAL: PAINLEVEL_OUTOF10: 0

## 2023-11-07 NOTE — FLOWSHEET NOTE
11/07/23 0547   Belongings   Dental Appliances Uppers; Lowers; At bedside   Vision - Corrective Lenses Eyeglasses; At home   Hearing Aid None   Clothing Undergarments; Footwear; At bedside; Shorts; Shirt   Jewelry Necklace;Ring; At bedside   Body Piercings Removed N/A   Electronic Devices Cell Phone;; At bedside   Weapons (Notify Protective Services/Security) None   Other Valuables At home   Home Medications None   Valuables Given To Patient   Provide Name(s) of Who Valuable(s) Were Given To Brenda   Responsible person(s) in the waiting room n/a   Patient approves for provider to speak to responsible person post operatively Yes

## 2023-11-07 NOTE — H&P
diet  Diet:   No diet orders on file   Bowel regimen: glycolax PRN  Pain management: as needed  Code status: Prior   Disposition: Continue Current Care  Family: updated as available    Alina Lion MD, PGY-1   Attending physician: Dr. Lois Garrido  Precepted With: Dr. Barney [facility]  Internal Medicine Faculty     Attending Physician Statement    Deb Age is a 63 y.o. female was seen, examined and discussed with the multi-disciplinary teaching team during rounds. I have personally seen and examined the patient and the key elements of the encounter were performed by me. The medications & laboratory data was discussed and adjusted where necessary. The radiographic images were reviewed as a group or with radiologist if felt dis-concordant with the exam or history. The above findings were corroborated, plans confirmed and changes made were needed    The data collected below information that was obtained, reviewed, analyzed and interpreted today. Imaging test are reviewed during rounds. Comparison to previous images are always explored. CBC:   Lab Results   Component Value Date/Time    WBC 9.6 11/07/2023 06:02 AM    RBC 4.58 11/07/2023 06:02 AM    HGB 13.3 11/07/2023 06:02 AM    HCT 43.3 11/07/2023 06:02 AM     11/07/2023 06:02 AM    MCV 94.5 11/07/2023 06:02 AM       BMP:    Lab Results   Component Value Date/Time     11/07/2023 06:02 AM    K 3.7 11/07/2023 06:02 AM     11/07/2023 06:02 AM    CO2 33 11/07/2023 06:02 AM    BUN 22 11/07/2023 06:02 AM    CREATININE 0.6 11/07/2023 06:02 AM    GLUCOSE 78 11/07/2023 06:02 AM    CALCIUM 9.2 11/07/2023 06:02 AM       TSH:  No results found for: \"TSH\"      Imaging Studies:    RADIOLOGY:  Films were read/reviewed/discussed with team CT negative for PE, CXR normal no infiltrates    EKG:    Rhythm Strips were reviewed and discussed. Sinus    Current issues are :  21-year-old woman with a history of

## 2023-11-07 NOTE — PATIENT CARE CONFERENCE
P Quality Flow/Interdisciplinary Rounds Progress Note        Quality Flow Rounds held on November 7, 2023    Disciplines Attending:  Bedside Nurse, , , and Nursing Unit Leadership    Dion Blanco was admitted on 11/6/2023  2:49 PM    Anticipated Discharge Date:       Disposition:    Gabe Score:  Gabe Scale Score: 20    Readmission Risk              Risk of Unplanned Readmission:  14           Discussed patient goal for the day, patient clinical progression, and barriers to discharge.   The following Goal(s) of the Day/Commitment(s) have been identified:  Diagnostics - Report Results      Cristo Yap RN  November 7, 2023

## 2023-11-07 NOTE — CARE COORDINATION
Patient presented to ED because daughter states her mom couldn't breathe. Patient had signed out AMA earlier that day. Patient 84%  on 4 L in Ed. Now 98% on RA. Patient states she came back because the  had taken her only oxygen tube when they transported  her last admission. Started on IV Rocephin, nebulizer and oral prednisone. Imaging negative. Patient recently moved to [facility] from [place]. She is living with her daughter in a 2 story home with bedrom and bath on second level. Her PCP is from [place] . Dr. Laurita Cortez and she was using [facility] in [place]. She has oxygen from [facility] that she uses at 2 l. Message left with Ghada Cohen from [facility] to address oxygen equipment needs. Her plan is home and her daughter will transport when medically cleared.  CM/Sw will continue to follow

## 2023-11-08 VITALS
HEART RATE: 80 BPM | HEIGHT: 67 IN | RESPIRATION RATE: 18 BRPM | DIASTOLIC BLOOD PRESSURE: 98 MMHG | WEIGHT: 232.4 LBS | BODY MASS INDEX: 36.47 KG/M2 | TEMPERATURE: 99.3 F | OXYGEN SATURATION: 95 % | SYSTOLIC BLOOD PRESSURE: 132 MMHG

## 2023-11-08 PROBLEM — F39 MOOD DISORDER (HCC): Status: ACTIVE | Noted: 2023-11-08

## 2023-11-08 LAB
ANION GAP SERPL CALCULATED.3IONS-SCNC: 10 MMOL/L (ref 7–16)
BASOPHILS # BLD: 0.05 K/UL (ref 0–0.2)
BASOPHILS NFR BLD: 1 % (ref 0–2)
BUN SERPL-MCNC: 18 MG/DL (ref 6–20)
CALCIUM SERPL-MCNC: 9.2 MG/DL (ref 8.6–10.2)
CHLORIDE SERPL-SCNC: 101 MMOL/L (ref 98–107)
CO2 SERPL-SCNC: 31 MMOL/L (ref 22–29)
CREAT SERPL-MCNC: 0.7 MG/DL (ref 0.5–1)
EOSINOPHIL # BLD: 0.03 K/UL (ref 0.05–0.5)
EOSINOPHILS RELATIVE PERCENT: 0 % (ref 0–6)
ERYTHROCYTE [DISTWIDTH] IN BLOOD BY AUTOMATED COUNT: 12.7 % (ref 11.5–15)
GFR SERPL CREATININE-BSD FRML MDRD: >60 ML/MIN/1.73M2
GLUCOSE SERPL-MCNC: 77 MG/DL (ref 74–99)
HCT VFR BLD AUTO: 41.9 % (ref 34–48)
HGB BLD-MCNC: 13 G/DL (ref 11.5–15.5)
IMM GRANULOCYTES # BLD AUTO: <0.03 K/UL (ref 0–0.58)
IMM GRANULOCYTES NFR BLD: 0 % (ref 0–5)
LYMPHOCYTES NFR BLD: 2.03 K/UL (ref 1.5–4)
LYMPHOCYTES RELATIVE PERCENT: 28 % (ref 20–42)
MAGNESIUM SERPL-MCNC: 1.6 MG/DL (ref 1.6–2.6)
MCH RBC QN AUTO: 29.1 PG (ref 26–35)
MCHC RBC AUTO-ENTMCNC: 31 G/DL (ref 32–34.5)
MCV RBC AUTO: 93.7 FL (ref 80–99.9)
MONOCYTES NFR BLD: 0.61 K/UL (ref 0.1–0.95)
MONOCYTES NFR BLD: 8 % (ref 2–12)
NEUTROPHILS NFR BLD: 63 % (ref 43–80)
NEUTS SEG NFR BLD: 4.6 K/UL (ref 1.8–7.3)
PHOSPHATE SERPL-MCNC: 3.6 MG/DL (ref 2.5–4.5)
PLATELET # BLD AUTO: 189 K/UL (ref 130–450)
PMV BLD AUTO: 10.7 FL (ref 7–12)
POTASSIUM SERPL-SCNC: 3.8 MMOL/L (ref 3.5–5)
RBC # BLD AUTO: 4.47 M/UL (ref 3.5–5.5)
SODIUM SERPL-SCNC: 142 MMOL/L (ref 132–146)
WBC OTHER # BLD: 7.3 K/UL (ref 4.5–11.5)

## 2023-11-08 PROCEDURE — 6370000000 HC RX 637 (ALT 250 FOR IP)

## 2023-11-08 PROCEDURE — 2500000003 HC RX 250 WO HCPCS: Performed by: INTERNAL MEDICINE

## 2023-11-08 PROCEDURE — 2700000000 HC OXYGEN THERAPY PER DAY

## 2023-11-08 PROCEDURE — 84100 ASSAY OF PHOSPHORUS: CPT

## 2023-11-08 PROCEDURE — 97530 THERAPEUTIC ACTIVITIES: CPT

## 2023-11-08 PROCEDURE — 97165 OT EVAL LOW COMPLEX 30 MIN: CPT

## 2023-11-08 PROCEDURE — 85025 COMPLETE CBC W/AUTO DIFF WBC: CPT

## 2023-11-08 PROCEDURE — 97161 PT EVAL LOW COMPLEX 20 MIN: CPT

## 2023-11-08 PROCEDURE — 6360000002 HC RX W HCPCS

## 2023-11-08 PROCEDURE — 80048 BASIC METABOLIC PNL TOTAL CA: CPT

## 2023-11-08 PROCEDURE — 97535 SELF CARE MNGMENT TRAINING: CPT

## 2023-11-08 PROCEDURE — 83735 ASSAY OF MAGNESIUM: CPT

## 2023-11-08 PROCEDURE — 94640 AIRWAY INHALATION TREATMENT: CPT

## 2023-11-08 PROCEDURE — 2580000003 HC RX 258

## 2023-11-08 PROCEDURE — 36415 COLL VENOUS BLD VENIPUNCTURE: CPT

## 2023-11-08 RX ORDER — BUDESONIDE AND FORMOTEROL FUMARATE DIHYDRATE 160; 4.5 UG/1; UG/1
2 AEROSOL RESPIRATORY (INHALATION) 2 TIMES DAILY
Qty: 3 EACH | Refills: 3 | Status: ON HOLD | OUTPATIENT
Start: 2023-11-08 | End: 2023-11-12

## 2023-11-08 RX ORDER — NICOTINE 21 MG/24HR
1 PATCH, TRANSDERMAL 24 HOURS TRANSDERMAL DAILY
Qty: 30 PATCH | Refills: 3 | Status: ON HOLD | OUTPATIENT
Start: 2023-11-09 | End: 2023-11-12

## 2023-11-08 RX ORDER — MAGNESIUM SULFATE 1 G/100ML
1000 INJECTION INTRAVENOUS ONCE
Status: COMPLETED | OUTPATIENT
Start: 2023-11-08 | End: 2023-11-08

## 2023-11-08 RX ORDER — ERGOCALCIFEROL 1.25 MG/1
50000 CAPSULE ORAL WEEKLY
Qty: 4 CAPSULE | Refills: 0 | Status: ON HOLD | OUTPATIENT
Start: 2023-11-08 | End: 2023-11-12

## 2023-11-08 RX ORDER — PREDNISONE 10 MG/1
TABLET ORAL
Qty: 10 TABLET | Refills: 0 | Status: ON HOLD | OUTPATIENT
Start: 2023-11-08 | End: 2023-11-12

## 2023-11-08 RX ORDER — VITAMIN B COMPLEX
1000 TABLET ORAL DAILY
Status: DISCONTINUED | OUTPATIENT
Start: 2023-11-08 | End: 2023-11-08 | Stop reason: HOSPADM

## 2023-11-08 RX ORDER — CHOLECALCIFEROL (VITAMIN D3) 25 MCG
1000 TABLET ORAL DAILY
Qty: 60 TABLET | Refills: 1 | Status: ON HOLD | OUTPATIENT
Start: 2023-11-09 | End: 2023-11-12

## 2023-11-08 RX ORDER — TIOTROPIUM BROMIDE 18 UG/1
18 CAPSULE ORAL; RESPIRATORY (INHALATION) DAILY
Qty: 30 CAPSULE | Refills: 5 | Status: ON HOLD | OUTPATIENT
Start: 2023-11-08 | End: 2023-11-12

## 2023-11-08 RX ORDER — PANTOPRAZOLE SODIUM 40 MG/1
40 TABLET, DELAYED RELEASE ORAL
Qty: 30 TABLET | Refills: 3 | Status: ON HOLD | OUTPATIENT
Start: 2023-11-09 | End: 2023-11-12

## 2023-11-08 RX ORDER — DOXYCYCLINE HYCLATE 100 MG/1
100 CAPSULE ORAL EVERY 12 HOURS SCHEDULED
Qty: 5 CAPSULE | Refills: 0 | Status: SHIPPED | OUTPATIENT
Start: 2023-11-08 | End: 2023-11-12

## 2023-11-08 RX ADMIN — ENOXAPARIN SODIUM 30 MG: 100 INJECTION SUBCUTANEOUS at 08:49

## 2023-11-08 RX ADMIN — PREDNISONE 40 MG: 20 TABLET ORAL at 08:49

## 2023-11-08 RX ADMIN — PANTOPRAZOLE SODIUM 40 MG: 40 TABLET, DELAYED RELEASE ORAL at 05:10

## 2023-11-08 RX ADMIN — BUDESONIDE 500 MCG: 0.5 SUSPENSION RESPIRATORY (INHALATION) at 08:42

## 2023-11-08 RX ADMIN — SODIUM CHLORIDE, PRESERVATIVE FREE 10 ML: 5 INJECTION INTRAVENOUS at 08:48

## 2023-11-08 RX ADMIN — SODIUM CHLORIDE, PRESERVATIVE FREE 10 ML: 5 INJECTION INTRAVENOUS at 10:05

## 2023-11-08 RX ADMIN — IPRATROPIUM BROMIDE AND ALBUTEROL SULFATE 1 DOSE: 2.5; .5 SOLUTION RESPIRATORY (INHALATION) at 08:42

## 2023-11-08 RX ADMIN — MAGNESIUM SULFATE HEPTAHYDRATE 1000 MG: 1 INJECTION, SOLUTION INTRAVENOUS at 08:52

## 2023-11-08 RX ADMIN — IPRATROPIUM BROMIDE AND ALBUTEROL SULFATE 1 DOSE: 2.5; .5 SOLUTION RESPIRATORY (INHALATION) at 11:56

## 2023-11-08 RX ADMIN — Medication 1000 UNITS: at 08:51

## 2023-11-08 RX ADMIN — WATER 1000 MG: 1 INJECTION INTRAMUSCULAR; INTRAVENOUS; SUBCUTANEOUS at 08:50

## 2023-11-08 RX ADMIN — PAROXETINE 40 MG: 10 TABLET, FILM COATED ORAL at 08:49

## 2023-11-08 RX ADMIN — DOXYCYCLINE HYCLATE 100 MG: 100 CAPSULE ORAL at 08:49

## 2023-11-08 RX ADMIN — ARFORMOTEROL TARTRATE 15 MCG: 15 SOLUTION RESPIRATORY (INHALATION) at 08:42

## 2023-11-08 RX ADMIN — ANTI-FUNGAL POWDER MICONAZOLE NITRATE TALC FREE: 1.42 POWDER TOPICAL at 08:50

## 2023-11-08 ASSESSMENT — PAIN SCALES - GENERAL: PAINLEVEL_OUTOF10: 0

## 2023-11-08 NOTE — PLAN OF CARE
Problem: Discharge Planning  Goal: Discharge to home or other facility with appropriate resources  11/8/2023 1437 by Dona Drummond  Outcome: Progressing  11/8/2023 0205 by Brian Cole RN  Outcome: Progressing     Problem: Pain  Goal: Verbalizes/displays adequate comfort level or baseline comfort level  11/8/2023 1437 by Esme Drummond RN  Outcome: Progressing  11/8/2023 0205 by Brian Cole RN  Outcome: Progressing     Problem: Chronic Conditions and Co-morbidities  Goal: Patient's chronic conditions and co-morbidity symptoms are monitored and maintained or improved  11/8/2023 1437 by Esme Drummond RN  Outcome: Progressing  11/8/2023 0205 by Brian Cole RN  Outcome: Progressing     Problem: Safety - Adult  Goal: Free from fall injury  11/8/2023 1437 by Esme Drummond RN  Outcome: Progressing  11/8/2023 0205 by Brian Cole RN  Outcome: Progressing     Problem: ABCDS Injury Assessment  Goal: Absence of physical injury  11/8/2023 1437 by Dona Drummond  Outcome: Progressing  11/8/2023 0205 by Brian Cole RN  Outcome: Progressing

## 2023-11-08 NOTE — PATIENT CARE CONFERENCE
P Quality Flow/Interdisciplinary Rounds Progress Note        Quality Flow Rounds held on November 8, 2023    Disciplines Attending:  Bedside Nurse, , , and Nursing Unit Leadership    Bao Cuenca was admitted on 11/6/2023  2:49 PM    Anticipated Discharge Date:       Disposition:    Gabe Score:  Gabe Scale Score: 21    Readmission Risk              Risk of Unplanned Readmission:  15           Discussed patient goal for the day, patient clinical progression, and barriers to discharge.   The following Goal(s) of the Day/Commitment(s) have been identified:  downgrade/compliance with care      Carlos Escamilla RN  November 8, 2023

## 2023-11-08 NOTE — CARE COORDINATION
11/8/23  Transition of care Update. Patient admitted for COPD exacerbation. Patient is on 2 liters of 02 which is her baseline with home 02 supplied by [facility]. Spoke with patient about home care but patient has no PCP to set home care up. Reviewed with patient setting up a PCP and she could request home care support at her visit for support. Patient agreeable and is aware [facility] is in network with her insurance. Attempted to set up a PCP but referral line is down at this time will re attempt at a later time. EVY/Toñito to follow.     Electronically signed by VIOLETTE Lemus on 11/8/2023 at 1:18 PM

## 2023-11-08 NOTE — DISCHARGE SUMMARY
[address]  Discharge Summary    PCP: No primary care provider on file. Admit Date:11/6/2023  Discharge Date: 11/8/2023    Admission Diagnosis:   Acute on chronic hypoxic RF 2/2 COPD exacerbation  Chronic urge incontinence w/ concern for UTI  Hx HTN  Polysubstance abuse  Tobacco abuse  Hx BENJAMIN, previously on CPAP  Hx dysphagia  Fungal skin infection  Hx depression    Discharge Diagnosis:  Acute on chronic hypoxic RF 2/2 COPD exacerbation  Chronic urge incontinence   UTI  Hx HTN  Polysubstance abuse  Tobacco abuse  Hx BENJAMIN, previously on CPAP  Hx dysphagia  Fungal skin infection  Hx depression    Hospital Course: The patient is a 63 y.o. female , with past medical hx of COPD on 2 L oxygen at base, amphetamine heroin and cannabinoid use disorder , hypertension and BENJAMIN, dysphagia GERD with \"Z-line esophagus \" s/p esophageal stricture balloon dilation 8 years ago   and colon polyps and Bipolar and schizoaffective disorder      She moved to [place] to live with her daughter 15 days ago from [place]     presented on 11/4 with increasing SOB and increasing oxygen requirement from 2 to 4 L  of 10 days duration. Patient also endorses chronic cough with whitish sputum production. She has no fever, chills, flu like symptoms or exposure to any sick people. She admitted  with COPD exacerbation and started on steroid doxycycline and ceftriaxone. Her U/A showed bacteria 2+  WBC  9-12 and trace esterase, she has history of stress incontinency methamphetamine heroin and marijuana Tobacco abuse, schizoaffective disorder and depression. She 11/6  morning AMA.  And came back the same afternoon       She is not taking her home medication for BP Losartan-HTZ because she has stress incontinency and she feels they make it worse     Patient respiratory panel was negative troponin 7 CO2 36 Pro radha 0.03 D-Dimer <200 , Urine culture mixed gram positive <87559   Ceftriaxone 1

## 2023-11-08 NOTE — DISCHARGE INSTRUCTIONS
Internal medicine    Follow ups  Please follow up with the internal medicine clinic at [facility] appointment has been scheduled for 11/6/23 3 pm  Please keep all other follow up appointments:  [address]  [place], [address] [facility]  [address] [place]: [phone]  FX: [phone]    Pulmonary rehab   [facility]  [address] [facility], [address] [facility]  [phone]  Changes in healthcare   Please take all medications as indicated  Diet: regular diet   Activity: activity as tolerated  New Medications started during this hospital stay  Symbicort inhaler 2 times per day for 6 month  doxycycline  100 mg 2 times per day  Miconazole powder   Nicotine patch 21 mg  Pantoprazole 40 daily  Spriva inhailer daily  Vit D 45560 weekly  Vit D 1000 daily   Changes to your medications  none  Medications you should stop taking   none  Additional labs, testing or imaging needed after discharge   none  Even if you are feeling better and not having symptoms do not stop taking antibiotic earlier then prescribed 11/11/23   Please contact us if you have any concerns, wish to change or make an appointment:  Internal medicine clinic   Phone: [phone]  Fax: [phone]  [address] [place] [address]  Or please call the nurse line at [phone]. Should you have further questions in regards to this visit, you can review your clinical note and after visit summary document on your [employer] account. Other questions can be directed to our nurse line at [phone]. Other than any new prescriptions given to you today, the list of home medications on this After Visit Summary are based on information provided to us from you and your healthcare providers. This information, including the list, dose, and frequency of medications is only as accurate as the information you provided.  If you have any questions or

## 2023-11-09 LAB
MICROORGANISM SPEC CULT: NORMAL
SPECIMEN DESCRIPTION: NORMAL

## 2023-11-11 ENCOUNTER — HOSPITAL ENCOUNTER (INPATIENT)
Age: 58
LOS: 3 days | Discharge: HOME | DRG: 885 | End: 2023-11-15
Attending: EMERGENCY MEDICINE | Admitting: PSYCHIATRY & NEUROLOGY
Payer: COMMERCIAL

## 2023-11-11 ENCOUNTER — TELEPHONE (OUTPATIENT)
Dept: OTHER | Facility: CLINIC | Age: 58
End: 2023-11-11

## 2023-11-11 DIAGNOSIS — F32.A DEPRESSION, UNSPECIFIED DEPRESSION TYPE: Primary | ICD-10-CM

## 2023-11-11 LAB
ALBUMIN SERPL-MCNC: 3.9 G/DL (ref 3.5–5.2)
ALP SERPL-CCNC: 65 U/L (ref 35–104)
ALT SERPL-CCNC: 15 U/L (ref 0–32)
AMPHET UR QL SCN: NEGATIVE
ANION GAP SERPL CALCULATED.3IONS-SCNC: 13 MMOL/L (ref 7–16)
APAP SERPL-MCNC: <5 UG/ML (ref 10–30)
AST SERPL-CCNC: 13 U/L (ref 0–31)
BARBITURATES UR QL SCN: NEGATIVE
BASOPHILS # BLD: 0.04 K/UL (ref 0–0.2)
BASOPHILS NFR BLD: 0 % (ref 0–2)
BENZODIAZ UR QL: NEGATIVE
BILIRUB SERPL-MCNC: 0.2 MG/DL (ref 0–1.2)
BUN SERPL-MCNC: 24 MG/DL (ref 6–20)
BUPRENORPHINE UR QL: NEGATIVE
CALCIUM SERPL-MCNC: 9.1 MG/DL (ref 8.6–10.2)
CANNABINOIDS UR QL SCN: POSITIVE
CHLORIDE SERPL-SCNC: 103 MMOL/L (ref 98–107)
CO2 SERPL-SCNC: 26 MMOL/L (ref 22–29)
COCAINE UR QL SCN: NEGATIVE
CREAT SERPL-MCNC: 0.7 MG/DL (ref 0.5–1)
EOSINOPHIL # BLD: 0.04 K/UL (ref 0.05–0.5)
EOSINOPHILS RELATIVE PERCENT: 0 % (ref 0–6)
ERYTHROCYTE [DISTWIDTH] IN BLOOD BY AUTOMATED COUNT: 13.1 % (ref 11.5–15)
ETHANOLAMINE SERPL-MCNC: <10 MG/DL
FENTANYL UR QL: NEGATIVE
GFR SERPL CREATININE-BSD FRML MDRD: >60 ML/MIN/1.73M2
GLUCOSE SERPL-MCNC: 94 MG/DL (ref 74–99)
HCT VFR BLD AUTO: 44 % (ref 34–48)
HGB BLD-MCNC: 13.8 G/DL (ref 11.5–15.5)
IMM GRANULOCYTES # BLD AUTO: 0.05 K/UL (ref 0–0.58)
IMM GRANULOCYTES NFR BLD: 1 % (ref 0–5)
LYMPHOCYTES NFR BLD: 2.51 K/UL (ref 1.5–4)
LYMPHOCYTES RELATIVE PERCENT: 23 % (ref 20–42)
MCH RBC QN AUTO: 29 PG (ref 26–35)
MCHC RBC AUTO-ENTMCNC: 31.4 G/DL (ref 32–34.5)
MCV RBC AUTO: 92.4 FL (ref 80–99.9)
METHADONE UR QL: NEGATIVE
MONOCYTES NFR BLD: 0.84 K/UL (ref 0.1–0.95)
MONOCYTES NFR BLD: 8 % (ref 2–12)
NEUTROPHILS NFR BLD: 68 % (ref 43–80)
NEUTS SEG NFR BLD: 7.57 K/UL (ref 1.8–7.3)
OPIATES UR QL SCN: NEGATIVE
OXYCODONE UR QL SCN: NEGATIVE
PCP UR QL SCN: NEGATIVE
PLATELET # BLD AUTO: 212 K/UL (ref 130–450)
PMV BLD AUTO: 11.1 FL (ref 7–12)
POTASSIUM SERPL-SCNC: 4.1 MMOL/L (ref 3.5–5)
PROT SERPL-MCNC: 6.6 G/DL (ref 6.4–8.3)
RBC # BLD AUTO: 4.76 M/UL (ref 3.5–5.5)
SALICYLATES SERPL-MCNC: <0.3 MG/DL (ref 0–30)
SODIUM SERPL-SCNC: 142 MMOL/L (ref 132–146)
TEST INFORMATION: ABNORMAL
TOXIC TRICYCLIC SC,BLOOD: NEGATIVE
WBC OTHER # BLD: 11.1 K/UL (ref 4.5–11.5)

## 2023-11-11 PROCEDURE — 80143 DRUG ASSAY ACETAMINOPHEN: CPT

## 2023-11-11 PROCEDURE — 80307 DRUG TEST PRSMV CHEM ANLYZR: CPT

## 2023-11-11 PROCEDURE — 81003 URINALYSIS AUTO W/O SCOPE: CPT

## 2023-11-11 PROCEDURE — 93005 ELECTROCARDIOGRAM TRACING: CPT | Performed by: EMERGENCY MEDICINE

## 2023-11-11 PROCEDURE — 80053 COMPREHEN METABOLIC PANEL: CPT

## 2023-11-11 PROCEDURE — 80179 DRUG ASSAY SALICYLATE: CPT

## 2023-11-11 PROCEDURE — 85025 COMPLETE CBC W/AUTO DIFF WBC: CPT

## 2023-11-11 PROCEDURE — 99285 EMERGENCY DEPT VISIT HI MDM: CPT

## 2023-11-11 PROCEDURE — G0480 DRUG TEST DEF 1-7 CLASSES: HCPCS

## 2023-11-11 ASSESSMENT — PAIN - FUNCTIONAL ASSESSMENT: PAIN_FUNCTIONAL_ASSESSMENT: NONE - DENIES PAIN

## 2023-11-11 NOTE — TELEPHONE ENCOUNTER
Writer contacted Dr Calixto Torres to inform of 30 day readmission risk. 's attempt to contact Dr Calixto Torres was unsuccessful.      Call Back: If you need to call back to inform of disposition you can contact me at [phone]

## 2023-11-12 PROBLEM — F31.9 BIPOLAR 1 DISORDER (HCC): Status: ACTIVE | Noted: 2023-11-12

## 2023-11-12 PROBLEM — F31.63 BIPOLAR DISORDER, CURRENT EPISODE MIXED, SEVERE (HCC): Status: ACTIVE | Noted: 2023-11-12

## 2023-11-12 LAB
BILIRUB UR QL STRIP: NEGATIVE
CLARITY UR: CLEAR
COLOR UR: YELLOW
COMMENT: NORMAL
GLUCOSE UR STRIP-MCNC: NEGATIVE MG/DL
HGB UR QL STRIP.AUTO: NEGATIVE
KETONES UR STRIP-MCNC: NEGATIVE MG/DL
LEUKOCYTE ESTERASE UR QL STRIP: NEGATIVE
NITRITE UR QL STRIP: NEGATIVE
PH UR STRIP: 7.5 [PH] (ref 5–9)
PROT UR STRIP-MCNC: NEGATIVE MG/DL
SP GR UR STRIP: 1.01 (ref 1–1.03)
UROBILINOGEN UR STRIP-ACNC: 0.2 EU/DL (ref 0–1)

## 2023-11-12 PROCEDURE — 6370000000 HC RX 637 (ALT 250 FOR IP): Performed by: INTERNAL MEDICINE

## 2023-11-12 PROCEDURE — 90792 PSYCH DIAG EVAL W/MED SRVCS: CPT | Performed by: NURSE PRACTITIONER

## 2023-11-12 PROCEDURE — 1240000000 HC EMOTIONAL WELLNESS R&B

## 2023-11-12 PROCEDURE — 6370000000 HC RX 637 (ALT 250 FOR IP): Performed by: PSYCHIATRY & NEUROLOGY

## 2023-11-12 PROCEDURE — 0202U NFCT DS 22 TRGT SARS-COV-2: CPT

## 2023-11-12 PROCEDURE — 6370000000 HC RX 637 (ALT 250 FOR IP): Performed by: NURSE PRACTITIONER

## 2023-11-12 RX ORDER — PAROXETINE 10 MG/1
20 TABLET, FILM COATED ORAL NIGHTLY
Status: COMPLETED | OUTPATIENT
Start: 2023-11-12 | End: 2023-11-12

## 2023-11-12 RX ORDER — ACETAMINOPHEN 325 MG/1
650 TABLET ORAL EVERY 4 HOURS PRN
Status: DISCONTINUED | OUTPATIENT
Start: 2023-11-12 | End: 2023-11-15 | Stop reason: HOSPADM

## 2023-11-12 RX ORDER — HALOPERIDOL 2 MG/1
3 TABLET ORAL EVERY 6 HOURS PRN
Status: DISCONTINUED | OUTPATIENT
Start: 2023-11-12 | End: 2023-11-15 | Stop reason: HOSPADM

## 2023-11-12 RX ORDER — BENZONATATE 100 MG/1
100 CAPSULE ORAL 3 TIMES DAILY PRN
Status: DISCONTINUED | OUTPATIENT
Start: 2023-11-12 | End: 2023-11-13

## 2023-11-12 RX ORDER — ALBUTEROL SULFATE 2.5 MG/3ML
2.5 SOLUTION RESPIRATORY (INHALATION) EVERY 6 HOURS PRN
Status: DISCONTINUED | OUTPATIENT
Start: 2023-11-12 | End: 2023-11-13

## 2023-11-12 RX ORDER — HALOPERIDOL 5 MG/ML
3 INJECTION INTRAMUSCULAR EVERY 6 HOURS PRN
Status: DISCONTINUED | OUTPATIENT
Start: 2023-11-12 | End: 2023-11-15 | Stop reason: HOSPADM

## 2023-11-12 RX ORDER — LOSARTAN POTASSIUM AND HYDROCHLOROTHIAZIDE 12.5; 5 MG/1; MG/1
1 TABLET ORAL DAILY
Status: DISCONTINUED | OUTPATIENT
Start: 2023-11-12 | End: 2023-11-12 | Stop reason: CLARIF

## 2023-11-12 RX ORDER — PREDNISONE 20 MG/1
40 TABLET ORAL
Status: DISCONTINUED | OUTPATIENT
Start: 2023-11-12 | End: 2023-11-15 | Stop reason: HOSPADM

## 2023-11-12 RX ORDER — ARFORMOTEROL TARTRATE 15 UG/2ML
15 SOLUTION RESPIRATORY (INHALATION)
Status: DISCONTINUED | OUTPATIENT
Start: 2023-11-12 | End: 2023-11-15 | Stop reason: HOSPADM

## 2023-11-12 RX ORDER — TRAZODONE HYDROCHLORIDE 50 MG/1
25 TABLET ORAL NIGHTLY PRN
Status: DISCONTINUED | OUTPATIENT
Start: 2023-11-12 | End: 2023-11-15 | Stop reason: HOSPADM

## 2023-11-12 RX ORDER — IPRATROPIUM BROMIDE AND ALBUTEROL SULFATE 2.5; .5 MG/3ML; MG/3ML
1 SOLUTION RESPIRATORY (INHALATION)
Status: DISCONTINUED | OUTPATIENT
Start: 2023-11-12 | End: 2023-11-15 | Stop reason: HOSPADM

## 2023-11-12 RX ORDER — DIVALPROEX SODIUM 250 MG/1
250 TABLET, DELAYED RELEASE ORAL EVERY 12 HOURS SCHEDULED
Status: DISCONTINUED | OUTPATIENT
Start: 2023-11-12 | End: 2023-11-15 | Stop reason: HOSPADM

## 2023-11-12 RX ORDER — POLYETHYLENE GLYCOL 3350 17 G/17G
17 POWDER, FOR SOLUTION ORAL DAILY PRN
Status: DISCONTINUED | OUTPATIENT
Start: 2023-11-12 | End: 2023-11-15 | Stop reason: HOSPADM

## 2023-11-12 RX ORDER — PAROXETINE 10 MG/1
10 TABLET, FILM COATED ORAL DAILY
Status: COMPLETED | OUTPATIENT
Start: 2023-11-13 | End: 2023-11-14

## 2023-11-12 RX ORDER — OLANZAPINE 5 MG/1
5 TABLET ORAL NIGHTLY
Status: DISCONTINUED | OUTPATIENT
Start: 2023-11-12 | End: 2023-11-15 | Stop reason: HOSPADM

## 2023-11-12 RX ORDER — HYDROCHLOROTHIAZIDE 25 MG/1
12.5 TABLET ORAL DAILY
Status: DISCONTINUED | OUTPATIENT
Start: 2023-11-12 | End: 2023-11-15 | Stop reason: HOSPADM

## 2023-11-12 RX ORDER — LOSARTAN POTASSIUM 50 MG/1
50 TABLET ORAL DAILY
Status: DISCONTINUED | OUTPATIENT
Start: 2023-11-12 | End: 2023-11-15 | Stop reason: HOSPADM

## 2023-11-12 RX ORDER — NICOTINE 21 MG/24HR
1 PATCH, TRANSDERMAL 24 HOURS TRANSDERMAL DAILY
Status: DISCONTINUED | OUTPATIENT
Start: 2023-11-12 | End: 2023-11-15 | Stop reason: HOSPADM

## 2023-11-12 RX ORDER — MAGNESIUM HYDROXIDE/ALUMINUM HYDROXICE/SIMETHICONE 120; 1200; 1200 MG/30ML; MG/30ML; MG/30ML
30 SUSPENSION ORAL PRN
Status: DISCONTINUED | OUTPATIENT
Start: 2023-11-12 | End: 2023-11-15 | Stop reason: HOSPADM

## 2023-11-12 RX ADMIN — PREDNISONE 40 MG: 20 TABLET ORAL at 18:45

## 2023-11-12 RX ADMIN — DIVALPROEX SODIUM 250 MG: 250 TABLET, DELAYED RELEASE ORAL at 20:57

## 2023-11-12 RX ADMIN — DIVALPROEX SODIUM 250 MG: 250 TABLET, DELAYED RELEASE ORAL at 12:02

## 2023-11-12 RX ADMIN — TRAZODONE HYDROCHLORIDE 25 MG: 50 TABLET ORAL at 21:00

## 2023-11-12 RX ADMIN — PAROXETINE 20 MG: 10 TABLET, FILM COATED ORAL at 20:58

## 2023-11-12 RX ADMIN — OLANZAPINE 5 MG: 5 TABLET, FILM COATED ORAL at 20:58

## 2023-11-12 RX ADMIN — LOSARTAN POTASSIUM 50 MG: 50 TABLET, FILM COATED ORAL at 12:02

## 2023-11-12 RX ADMIN — HYDROCHLOROTHIAZIDE 12.5 MG: 25 TABLET ORAL at 12:03

## 2023-11-12 ASSESSMENT — PATIENT HEALTH QUESTIONNAIRE - PHQ9
2. FEELING DOWN, DEPRESSED OR HOPELESS: 3
SUM OF ALL RESPONSES TO PHQ QUESTIONS 1-9: 20
SUM OF ALL RESPONSES TO PHQ QUESTIONS 1-9: 20
9. THOUGHTS THAT YOU WOULD BE BETTER OFF DEAD, OR OF HURTING YOURSELF: 2
1. LITTLE INTEREST OR PLEASURE IN DOING THINGS: 0
5. POOR APPETITE OR OVEREATING: 1
3. TROUBLE FALLING OR STAYING ASLEEP: 3
8. MOVING OR SPEAKING SO SLOWLY THAT OTHER PEOPLE COULD HAVE NOTICED. OR THE OPPOSITE, BEING SO FIGETY OR RESTLESS THAT YOU HAVE BEEN MOVING AROUND A LOT MORE THAN USUAL: 1
10. IF YOU CHECKED OFF ANY PROBLEMS, HOW DIFFICULT HAVE THESE PROBLEMS MADE IT FOR YOU TO DO YOUR WORK, TAKE CARE OF THINGS AT HOME, OR GET ALONG WITH OTHER PEOPLE: 3
1. LITTLE INTEREST OR PLEASURE IN DOING THINGS: 3
SUM OF ALL RESPONSES TO PHQ9 QUESTIONS 1 & 2: 6
SUM OF ALL RESPONSES TO PHQ QUESTIONS 1-9: 1
SUM OF ALL RESPONSES TO PHQ QUESTIONS 1-9: 20
SUM OF ALL RESPONSES TO PHQ QUESTIONS 1-9: 1
SUM OF ALL RESPONSES TO PHQ QUESTIONS 1-9: 1
2. FEELING DOWN, DEPRESSED OR HOPELESS: 1
SUM OF ALL RESPONSES TO PHQ9 QUESTIONS 1 & 2: 1
SUM OF ALL RESPONSES TO PHQ QUESTIONS 1-9: 1
SUM OF ALL RESPONSES TO PHQ QUESTIONS 1-9: 18
7. TROUBLE CONCENTRATING ON THINGS, SUCH AS READING THE NEWSPAPER OR WATCHING TELEVISION: 2
6. FEELING BAD ABOUT YOURSELF - OR THAT YOU ARE A FAILURE OR HAVE LET YOURSELF OR YOUR FAMILY DOWN: 3
4. FEELING TIRED OR HAVING LITTLE ENERGY: 2

## 2023-11-12 ASSESSMENT — SLEEP AND FATIGUE QUESTIONNAIRES
AVERAGE NUMBER OF SLEEP HOURS: 2
SLEEP PATTERN: DIFFICULTY FALLING ASLEEP;DISTURBED/INTERRUPTED SLEEP
DO YOU HAVE DIFFICULTY SLEEPING: YES
AVERAGE NUMBER OF SLEEP HOURS: 2
DO YOU USE A SLEEP AID: NO
SLEEP PATTERN: DIFFICULTY FALLING ASLEEP;DISTURBED/INTERRUPTED SLEEP
DO YOU HAVE DIFFICULTY SLEEPING: YES

## 2023-11-12 ASSESSMENT — LIFESTYLE VARIABLES
HOW OFTEN DO YOU HAVE A DRINK CONTAINING ALCOHOL: NEVER
HOW MANY STANDARD DRINKS CONTAINING ALCOHOL DO YOU HAVE ON A TYPICAL DAY: PATIENT DOES NOT DRINK
HOW OFTEN DO YOU HAVE A DRINK CONTAINING ALCOHOL: NEVER

## 2023-11-12 NOTE — ED NOTES
SW contacted admitting Araceli Jerome), patient assigned to bed [address]B on [address], [facility], [address] [facility]  11/12/23 0358

## 2023-11-12 NOTE — GROUP NOTE
Group Therapy Note    Date: 11/12/2023    Group Start Time: 1430  Group End Time: 1500  Group Topic: Cognitive Skills    [facility] 7SE ACUTE BH 1    Vik Campos MSW, LSW        Group Therapy Note    Attendees:13       Patient's Goal: to participate in group discussion on self-care. Notes: pt was an active listener in group. Status After Intervention:  Unchanged    Participation Level:  Active Listener and Interactive    Participation Quality: Appropriate and Attentive      Speech:  normal      Thought Process/Content: Logical      Affective Functioning: Congruent      Mood: anxious      Level of consciousness:  Alert      Response to Learning: Able to verbalize current knowledge/experience      Endings: None Reported    Modes of Intervention: Education, Support, Socialization, Exploration, Clarifying, and Problem-solving      Discipline Responsible: /Counselor      Signature:  CORRINE Ham, VIOLETTE

## 2023-11-12 NOTE — ED NOTES
PT presented to Dr. Page Has. Dr. Page Has accepted pt for admission.   Notified Dr. Page Has pt reports hx of Bipolar with inpatient pysch admissions in [place], [place]  11/12/23 [address] [place], [place]  11/12/23 8591

## 2023-11-12 NOTE — ED NOTES
Patient upset that her belongings are locked up. When I attempted to do my assessment she advised she was not talking to me since I took her items.       Claudia Hidalgo RN  11/11/23 4673

## 2023-11-12 NOTE — GROUP NOTE
Group Therapy Note    Date: 11/12/2023    Group Start Time: 1115  Group End Time: 9627  Group Topic: Psychoeducation    [facility] 7W ACUTE BH 2    Jun Rivera                                                                        Group Therapy Note    Date: 11/12/2023  Start Time: 1115  End Time:  8225  Number of Participants: 8    Type of Group: Psychoeducation     Name: Stress vs. anxiety     Patient's Goal: ID differences between stress and anxiety. ID ways to cope with stress and anxiety. Notes: Patient was actively engaged in group discussion and made positive contributions to group discussion. Status After Intervention:  Improved    Participation Level:  Active Listener and Interactive    Participation Quality: Appropriate, Attentive, Sharing, and Supportive    Speech:  normal    Thought Process/Content: Logical    Affective Functioning: Congruent    Mood:  Appropriate    Level of consciousness:  Alert and Attentive    Response to Learning: Able to verbalize current knowledge/experience, Able to verbalize/acknowledge new learning, Able to retain information, and Progressing to goal    Endings: None Reported    Modes of Intervention: Education    Discipline Responsible: Psychoeducational Specialist      Signature:  Jun Rivera

## 2023-11-12 NOTE — ED NOTES
Nurse to nurse given to [address] [place] [address], [address] [facility], RN  11/12/23 7259

## 2023-11-12 NOTE — H&P
Department of Psychiatry  History and Physical - Adult     CHIEF COMPLAINT:  \" I finally got off drugs and now all my emotions and thoughts are with me\"    Patient was seen after discussing with the treatment team and reviewing the chart    CIRCUMSTANCES OF ADMISSION:   Patient presented to [facility] emergency department reporting an increase in depression with suicidal ideations that have become more severe with a plan to overdose on medications or cut self. HISTORY OF PRESENT ILLNESS:      The patient is a 63 y.o. female with significant past history of past inpatient psychiatric hospitalizations, polysubstance abuse, recently moved to [place] from [place] to stay with her daughter, has been sober for 2-1/2 weeks, with medical history of O2 dependence and urinary incontinence, presented to [facility] emergency department reporting an increase in depression with suicidal ideations that have become more severe with a plan to overdose on medications or cut self. QTc 395, UDS positive for marijuana. She was medically cleared in the emergency department did to 9 W. for psychiatric evaluation and stabilization. On evaluation today the patient is very forthcoming stating that she has been using all kinds of drugs for a very long time and she has finally gotten off of these drugs. She endorses using methamphetamine and heroin. She states that she has only been off the drugs for 2-1/2 weeks and she is pleased with herself for making this decision. She states that she moved to [place] from [place] to live with her daughter so that she can stay off of the drugs and have better support from her family. She is further states that she has a history of bipolar disorder and has only been taking Paxil. She states that now that she is off the drugs all of her emotions and memories are coming back and she needs help with this. She states that her mood is unstable and all over the place.   She is

## 2023-11-12 NOTE — ED NOTES
Behavioral Health Crisis Assessment      Chief Complaint: \"I'm depressed, I'm suicidal\"    Mental Status Exam: Patient was alert/oriented X3. Patient with poor eye contact, observed utilizing oxygen. Patient was calm/cooperative. Patient reported being depressed, affect congruent. Patient became tearful throughout interview. Patient denied having hallucinations. Patient admitted SI intensifying the last 4 days, with plan to OD. Patient denied having HI. Legal Status:  [] Voluntary:  [x] Involuntary, Issued by: [facility] ED doctor    Gender:  [] Male [x] Female [] Transgender  [] Other    Sexual Orientation:  [x] Heterosexual [] Homosexual [] Bisexual [] Other    Brief Clinical Summary:    Patient is a 63year old female who was brought to the ED via ambulance. Patient is on a pink slip: Depression with suicidal ideation, states will OD on pills. SW met with patient for assessment. Patient noted she moved to [address] [place] [address] a month ago. Patient now lives with her daughter and her family. Patient said her daughter called for help for patient today. Patient stated she feels like a burden, feels like her daughter does not want her around. Patient discussed limitations in moving about due to her oxygen use needs. Patient noted SI thoughts have intensified the last 4 days. Patient said she has a history of superficial cutting when younger (abuse history) and an overdose \"10 years ago. \" Patient was asked for current outpatient provider. \"I don't have one. \" Patient said her PCP in [facility]. (Dr. Kinsey Leonard) was previous prescriber. Patient said she has had multiple admission history in [facility], noted [address] \"in [facility],\" [facility], [address] [facility]. Patient noted having a history of admission in these facilities for dual treatment. Patient noted having a history of heroin abuse, stated she has been sober, off meth \"for two and a half weeks. \"     Patient denied having a legal guardian or POA.     Collateral Information:

## 2023-11-13 LAB
B PARAP IS1001 DNA NPH QL NAA+NON-PROBE: NOT DETECTED
B PERT DNA SPEC QL NAA+PROBE: NOT DETECTED
C PNEUM DNA NPH QL NAA+NON-PROBE: NOT DETECTED
CHOLEST SERPL-MCNC: 180 MG/DL
EKG ATRIAL RATE: 76 BPM
EKG P AXIS: 60 DEGREES
EKG P-R INTERVAL: 208 MS
EKG Q-T INTERVAL: 396 MS
EKG QRS DURATION: 84 MS
EKG QTC CALCULATION (BAZETT): 445 MS
EKG R AXIS: 42 DEGREES
EKG T AXIS: 41 DEGREES
EKG VENTRICULAR RATE: 76 BPM
FLUAV RNA NPH QL NAA+NON-PROBE: NOT DETECTED
FLUBV RNA NPH QL NAA+NON-PROBE: NOT DETECTED
HADV DNA NPH QL NAA+NON-PROBE: NOT DETECTED
HCOV 229E RNA NPH QL NAA+NON-PROBE: NOT DETECTED
HCOV HKU1 RNA NPH QL NAA+NON-PROBE: NOT DETECTED
HCOV NL63 RNA NPH QL NAA+NON-PROBE: NOT DETECTED
HCOV OC43 RNA NPH QL NAA+NON-PROBE: NOT DETECTED
HDLC SERPL-MCNC: 86 MG/DL
HMPV RNA NPH QL NAA+NON-PROBE: NOT DETECTED
HPIV1 RNA NPH QL NAA+NON-PROBE: NOT DETECTED
HPIV2 RNA NPH QL NAA+NON-PROBE: NOT DETECTED
HPIV3 RNA NPH QL NAA+NON-PROBE: NOT DETECTED
HPIV4 RNA NPH QL NAA+NON-PROBE: NOT DETECTED
LDLC SERPL CALC-MCNC: 78 MG/DL
M PNEUMO DNA NPH QL NAA+NON-PROBE: NOT DETECTED
RSV RNA NPH QL NAA+NON-PROBE: NOT DETECTED
RV+EV RNA NPH QL NAA+NON-PROBE: NOT DETECTED
SARS-COV-2 RNA NPH QL NAA+NON-PROBE: NOT DETECTED
SPECIMEN DESCRIPTION: NORMAL
TRIGL SERPL-MCNC: 81 MG/DL
VLDLC SERPL CALC-MCNC: 16 MG/DL

## 2023-11-13 PROCEDURE — 6370000000 HC RX 637 (ALT 250 FOR IP)

## 2023-11-13 PROCEDURE — 1240000000 HC EMOTIONAL WELLNESS R&B

## 2023-11-13 PROCEDURE — 80061 LIPID PANEL: CPT

## 2023-11-13 PROCEDURE — 94640 AIRWAY INHALATION TREATMENT: CPT

## 2023-11-13 PROCEDURE — 6360000002 HC RX W HCPCS

## 2023-11-13 PROCEDURE — 6370000000 HC RX 637 (ALT 250 FOR IP): Performed by: PSYCHIATRY & NEUROLOGY

## 2023-11-13 PROCEDURE — 6370000000 HC RX 637 (ALT 250 FOR IP): Performed by: NURSE PRACTITIONER

## 2023-11-13 PROCEDURE — 6370000000 HC RX 637 (ALT 250 FOR IP): Performed by: INTERNAL MEDICINE

## 2023-11-13 PROCEDURE — 99232 SBSQ HOSP IP/OBS MODERATE 35: CPT | Performed by: NURSE PRACTITIONER

## 2023-11-13 PROCEDURE — 6360000002 HC RX W HCPCS: Performed by: INTERNAL MEDICINE

## 2023-11-13 PROCEDURE — 36415 COLL VENOUS BLD VENIPUNCTURE: CPT

## 2023-11-13 RX ORDER — HYDROXYZINE PAMOATE 25 MG/1
25 CAPSULE ORAL 3 TIMES DAILY PRN
Status: DISCONTINUED | OUTPATIENT
Start: 2023-11-13 | End: 2023-11-15 | Stop reason: HOSPADM

## 2023-11-13 RX ORDER — GUAIFENESIN 400 MG/1
400 TABLET ORAL 3 TIMES DAILY
Status: DISCONTINUED | OUTPATIENT
Start: 2023-11-13 | End: 2023-11-15 | Stop reason: HOSPADM

## 2023-11-13 RX ORDER — BUDESONIDE 0.5 MG/2ML
0.5 INHALANT ORAL
Status: DISCONTINUED | OUTPATIENT
Start: 2023-11-13 | End: 2023-11-15 | Stop reason: HOSPADM

## 2023-11-13 RX ADMIN — IPRATROPIUM BROMIDE AND ALBUTEROL SULFATE 1 DOSE: 2.5; .5 SOLUTION RESPIRATORY (INHALATION) at 22:02

## 2023-11-13 RX ADMIN — LOSARTAN POTASSIUM 50 MG: 50 TABLET, FILM COATED ORAL at 08:48

## 2023-11-13 RX ADMIN — PREDNISONE 40 MG: 20 TABLET ORAL at 12:37

## 2023-11-13 RX ADMIN — PAROXETINE 10 MG: 10 TABLET, FILM COATED ORAL at 08:48

## 2023-11-13 RX ADMIN — HYDROXYZINE PAMOATE 25 MG: 25 CAPSULE ORAL at 21:35

## 2023-11-13 RX ADMIN — BUDESONIDE 500 MCG: 0.5 SUSPENSION RESPIRATORY (INHALATION) at 22:02

## 2023-11-13 RX ADMIN — DIVALPROEX SODIUM 250 MG: 250 TABLET, DELAYED RELEASE ORAL at 21:29

## 2023-11-13 RX ADMIN — GUAIFENESIN 400 MG: 400 TABLET ORAL at 21:30

## 2023-11-13 RX ADMIN — IPRATROPIUM BROMIDE AND ALBUTEROL SULFATE 1 DOSE: 2.5; .5 SOLUTION RESPIRATORY (INHALATION) at 15:55

## 2023-11-13 RX ADMIN — DIVALPROEX SODIUM 250 MG: 250 TABLET, DELAYED RELEASE ORAL at 08:48

## 2023-11-13 RX ADMIN — OLANZAPINE 5 MG: 5 TABLET, FILM COATED ORAL at 21:29

## 2023-11-13 RX ADMIN — GUAIFENESIN 400 MG: 400 TABLET ORAL at 15:32

## 2023-11-13 RX ADMIN — ARFORMOTEROL TARTRATE 15 MCG: 15 SOLUTION RESPIRATORY (INHALATION) at 22:02

## 2023-11-13 RX ADMIN — TRAZODONE HYDROCHLORIDE 25 MG: 50 TABLET ORAL at 21:29

## 2023-11-13 RX ADMIN — HYDROCHLOROTHIAZIDE 12.5 MG: 25 TABLET ORAL at 08:48

## 2023-11-13 ASSESSMENT — PAIN SCALES - GENERAL: PAINLEVEL_OUTOF10: 0

## 2023-11-13 NOTE — GROUP NOTE
Group Therapy Note    Date: 11/13/2023  Start Time: 6303  End Time:  1600  Number of Participants: 12    Type of Group: Recreational    Wellness Binder Information  Module Name:  Trivia      Patient's Goal:  To improve/maintain cognition through the use of brain exercises. To improve social skills amongst peers. Notes:  CTRS engaged in trivia. Patient active in responses and socialized well with peers. Status After Intervention:  Improved    Participation Level:  Active Listener and Interactive    Participation Quality: Appropriate and Attentive      Speech:  normal      Thought Process/Content: Logical      Affective Functioning: Congruent      Mood: euthymic      Level of consciousness:  Alert and Attentive      Response to Learning: Able to verbalize current knowledge/experience, Able to verbalize/acknowledge new learning, and Able to retain information      Endings: None Reported    Modes of Intervention: Socialization and Activity      Discipline Responsible: Psychoeducational Specialist      Signature:  VIRGIL Elise     Group Therapy Note    Attendees: 12

## 2023-11-13 NOTE — DISCHARGE INSTRUCTIONS
Attending Provider: Trisha Kirk MD.     If you have any questions and need to contact this individual please call the unit at [phone]. [facility] Provider will be available on call 24/7 and during holidays. Reason for Admission: The patient is a 63 y.o. female with significant past history of past inpatient psychiatric hospitalizations, polysubstance abuse, recently moved to [place] from [place] to stay with her daughter, has been sober for 2-1/2 weeks, with medical history of O2 dependence and urinary incontinence, presented to [facility] emergency department reporting an increase in depression with suicidal ideations that have become more severe with a plan to overdose on medications or cut self. QTc 395, UDS positive for marijuana. She was medically cleared in the emergency department did to 9 W. for psychiatric evaluation and stabilization. Follow up for Tobacco Cessation at:    [facility]                                 Date:  Friday 11/17 at [address] [place] [address].  [address]   ([address] [place] [address] elevators to 7th floor)   Phone: [phone]   Fax: [phone]

## 2023-11-13 NOTE — GROUP NOTE
Group Therapy Note    Date: 11/13/2023    Group Start Time: 1115  Group End Time: 4209  Group Topic: Psychoeducation    [facility] 7W ACUTE BH 2    Jun Madera                                                                        Group Therapy Note    Date: 11/13/2023  Start Time: 1115  End Time:  1262  Number of Participants: 12    Type of Group: Psychoeducation    Wellness Binder Information  Module Name:  Cognitive Distortions      Patient's Goal:  Patient will be able to ID one or two cognitive distortions they may utilize currently and ID one way to improve thinking patterns. Notes:  CTRS discussed cognitive distortions and provided different examples of some common distortions associated with anxiety and depression. CTRS then provided examples of ways to improve distortions and challenge negative thinking patterns. Patient accepting of handout.        Status After Intervention:  Unchanged    Participation Level: Minimal    Participation Quality: Lethargic      Speech:  normal      Thought Process/Content: Logical      Affective Functioning: Congruent      Mood: tired      Level of consciousness:  Drowsy      Response to Learning: Progressing to goal      Endings: None Reported    Modes of Intervention: Education, Support, and Exploration      Discipline Responsible: Psychoeducational Specialist      Signature:  Jun Madera

## 2023-11-14 LAB — HBA1C MFR BLD: 5.7 % (ref 4–5.6)

## 2023-11-14 PROCEDURE — 83036 HEMOGLOBIN GLYCOSYLATED A1C: CPT

## 2023-11-14 PROCEDURE — 1240000000 HC EMOTIONAL WELLNESS R&B

## 2023-11-14 PROCEDURE — 6370000000 HC RX 637 (ALT 250 FOR IP): Performed by: PSYCHIATRY & NEUROLOGY

## 2023-11-14 PROCEDURE — 2700000000 HC OXYGEN THERAPY PER DAY

## 2023-11-14 PROCEDURE — 6370000000 HC RX 637 (ALT 250 FOR IP): Performed by: INTERNAL MEDICINE

## 2023-11-14 PROCEDURE — 6370000000 HC RX 637 (ALT 250 FOR IP): Performed by: NURSE PRACTITIONER

## 2023-11-14 PROCEDURE — 6360000002 HC RX W HCPCS

## 2023-11-14 PROCEDURE — 36415 COLL VENOUS BLD VENIPUNCTURE: CPT

## 2023-11-14 PROCEDURE — 6370000000 HC RX 637 (ALT 250 FOR IP)

## 2023-11-14 PROCEDURE — 99232 SBSQ HOSP IP/OBS MODERATE 35: CPT | Performed by: NURSE PRACTITIONER

## 2023-11-14 PROCEDURE — 94640 AIRWAY INHALATION TREATMENT: CPT

## 2023-11-14 RX ADMIN — HYDROCHLOROTHIAZIDE 12.5 MG: 25 TABLET ORAL at 09:09

## 2023-11-14 RX ADMIN — PREDNISONE 40 MG: 20 TABLET ORAL at 11:56

## 2023-11-14 RX ADMIN — GUAIFENESIN 400 MG: 400 TABLET ORAL at 14:37

## 2023-11-14 RX ADMIN — OLANZAPINE 5 MG: 5 TABLET, FILM COATED ORAL at 21:04

## 2023-11-14 RX ADMIN — DIVALPROEX SODIUM 250 MG: 250 TABLET, DELAYED RELEASE ORAL at 21:04

## 2023-11-14 RX ADMIN — HYDROXYZINE PAMOATE 25 MG: 25 CAPSULE ORAL at 21:04

## 2023-11-14 RX ADMIN — LOSARTAN POTASSIUM 50 MG: 50 TABLET, FILM COATED ORAL at 09:09

## 2023-11-14 RX ADMIN — PAROXETINE 10 MG: 10 TABLET, FILM COATED ORAL at 09:09

## 2023-11-14 RX ADMIN — TRAZODONE HYDROCHLORIDE 25 MG: 50 TABLET ORAL at 21:04

## 2023-11-14 RX ADMIN — BUDESONIDE 500 MCG: 0.5 SUSPENSION RESPIRATORY (INHALATION) at 08:42

## 2023-11-14 RX ADMIN — IPRATROPIUM BROMIDE AND ALBUTEROL SULFATE 1 DOSE: 2.5; .5 SOLUTION RESPIRATORY (INHALATION) at 08:42

## 2023-11-14 RX ADMIN — GUAIFENESIN 400 MG: 400 TABLET ORAL at 21:04

## 2023-11-14 RX ADMIN — BUDESONIDE 500 MCG: 0.5 SUSPENSION RESPIRATORY (INHALATION) at 21:30

## 2023-11-14 RX ADMIN — GUAIFENESIN 400 MG: 400 TABLET ORAL at 09:09

## 2023-11-14 RX ADMIN — ARFORMOTEROL TARTRATE 15 MCG: 15 SOLUTION RESPIRATORY (INHALATION) at 21:30

## 2023-11-14 RX ADMIN — HYDROXYZINE PAMOATE 25 MG: 25 CAPSULE ORAL at 10:55

## 2023-11-14 RX ADMIN — IPRATROPIUM BROMIDE AND ALBUTEROL SULFATE 1 DOSE: 2.5; .5 SOLUTION RESPIRATORY (INHALATION) at 21:30

## 2023-11-14 RX ADMIN — ARFORMOTEROL TARTRATE 15 MCG: 15 SOLUTION RESPIRATORY (INHALATION) at 08:42

## 2023-11-14 RX ADMIN — DIVALPROEX SODIUM 250 MG: 250 TABLET, DELAYED RELEASE ORAL at 09:09

## 2023-11-14 ASSESSMENT — PAIN SCALES - GENERAL: PAINLEVEL_OUTOF10: 0

## 2023-11-14 NOTE — GROUP NOTE
Group Therapy Note    Date: 11/14/2023    Group Start Time: 1115  Group End Time: 1150  Group Topic: Psychoeducation    [facility] 7W ACUTE BH 2    Bird Santos                                                                        Group Therapy Note    Date: 11/14/2023  Start Time: 1115  End Time:  1150  Number of Participants: 12    Type of Group: Healthy Living/Wellness and Psychoeducation    Wellness Binder Information  Module Name:  Physical wellbeing and chair exercise  Patient's Goal: Patient will be able to identify the importance of exercise on one's mental health. Patient will demonstrate different exercises one can do with minimal resources and at home. Notes:  CTRS discussed physical well being and the importance of exercise on mental health. CTRS then demonstrated some chair exercises and stretches. Patient was accepting of handout and also engaged in exercise regime. Status After Intervention:  Improved    Participation Level:  Active Listener and Interactive    Participation Quality: Appropriate and Attentive      Speech:  normal      Thought Process/Content: Logical      Affective Functioning: Congruent      Mood: euthymic      Level of consciousness:  Alert and Attentive      Response to Learning: Able to verbalize current knowledge/experience, Able to verbalize/acknowledge new learning, and Able to retain information      Endings: None Reported    Modes of Intervention: Education, Activity, and Movement      Discipline Responsible: Psychoeducational Specialist      Signature:  Bird Santos

## 2023-11-14 NOTE — GROUP NOTE
Group Therapy Note    Date: 11/14/2023    Group Start Time: 1400  Group End Time: 1567  Group Topic: Cognitive Skills    [facility] 7W ACUTE BH 2    Vernon, Neha, MSW, LSW        Group Therapy Note    Attendees: 11       Group was led by PharmMD. Patient's Goal:  Pt will be able to discuss mindfulness and relaxation techniques     Notes:  Pt was an active participant in group discussion. Status After Intervention:  Improved    Participation Level: Active Listener and Interactive    Participation Quality: Appropriate, Attentive, Sharing, and Supportive      Speech:  normal      Thought Process/Content: Logical  Linear      Affective Functioning: Congruent      Mood: depressed      Level of consciousness:  Alert, Oriented x4, and Attentive      Response to Learning: Able to verbalize current knowledge/experience, Able to verbalize/acknowledge new learning, Able to retain information, Capable of insight, and Progressing to goal      Endings: None Reported    Modes of Intervention: Education, Support, Socialization, Exploration, Clarifying, and Problem-solving      Discipline Responsible: /Counselor      Signature:   CORRINE Suggs, [place]

## 2023-11-14 NOTE — BH NOTE
Patient presents calm and cooperative during assessment. Patient denies SI/HI/AVH. Patient reports anxiety and depression, ready for discharge. No paranoia or delusions reported or observed. Patient appears depressed and anxious. Appears flat but brightens on conversation. Patient is withdrawn but social with roommate. Patient is visible on the unit. Medications taken without issues. Patient denies pain but reports feeling \"uncomfortable\" d/t respiratory issues and fatima placement. Fatima is patent, no leakage reported or observed. No unit problems reported. Q 15 minute rounds maintained.

## 2023-11-15 VITALS
SYSTOLIC BLOOD PRESSURE: 129 MMHG | OXYGEN SATURATION: 94 % | RESPIRATION RATE: 22 BRPM | TEMPERATURE: 97.6 F | WEIGHT: 230 LBS | HEIGHT: 67 IN | DIASTOLIC BLOOD PRESSURE: 85 MMHG | BODY MASS INDEX: 36.1 KG/M2 | HEART RATE: 88 BPM

## 2023-11-15 PROCEDURE — 6370000000 HC RX 637 (ALT 250 FOR IP)

## 2023-11-15 PROCEDURE — 6370000000 HC RX 637 (ALT 250 FOR IP): Performed by: INTERNAL MEDICINE

## 2023-11-15 PROCEDURE — 6370000000 HC RX 637 (ALT 250 FOR IP): Performed by: NURSE PRACTITIONER

## 2023-11-15 PROCEDURE — 2700000000 HC OXYGEN THERAPY PER DAY

## 2023-11-15 PROCEDURE — 6360000002 HC RX W HCPCS

## 2023-11-15 PROCEDURE — 99239 HOSP IP/OBS DSCHRG MGMT >30: CPT | Performed by: NURSE PRACTITIONER

## 2023-11-15 PROCEDURE — 94640 AIRWAY INHALATION TREATMENT: CPT

## 2023-11-15 RX ORDER — DIVALPROEX SODIUM 250 MG/1
250 TABLET, DELAYED RELEASE ORAL EVERY 12 HOURS SCHEDULED
Qty: 60 TABLET | Refills: 0 | Status: SHIPPED | OUTPATIENT
Start: 2023-11-15 | End: 2023-12-15

## 2023-11-15 RX ORDER — PREDNISONE 20 MG/1
40 TABLET ORAL
Qty: 4 TABLET | Refills: 0 | Status: SHIPPED | OUTPATIENT
Start: 2023-11-15 | End: 2023-11-17

## 2023-11-15 RX ORDER — OLANZAPINE 5 MG/1
5 TABLET ORAL NIGHTLY
Qty: 30 TABLET | Refills: 0 | Status: SHIPPED | OUTPATIENT
Start: 2023-11-15 | End: 2023-12-15

## 2023-11-15 RX ORDER — BUDESONIDE AND FORMOTEROL FUMARATE DIHYDRATE 160; 4.5 UG/1; UG/1
2 AEROSOL RESPIRATORY (INHALATION) 2 TIMES DAILY
Qty: 3 EACH | Refills: 3 | Status: SHIPPED | OUTPATIENT
Start: 2023-11-15 | End: 2024-05-13

## 2023-11-15 RX ORDER — GUAIFENESIN 400 MG/1
400 TABLET ORAL 3 TIMES DAILY
Qty: 56 TABLET | Refills: 0 | Status: SHIPPED | OUTPATIENT
Start: 2023-11-15

## 2023-11-15 RX ORDER — NICOTINE 21 MG/24HR
1 PATCH, TRANSDERMAL 24 HOURS TRANSDERMAL DAILY
Qty: 30 PATCH | Refills: 3
Start: 2023-11-16

## 2023-11-15 RX ADMIN — LOSARTAN POTASSIUM 50 MG: 50 TABLET, FILM COATED ORAL at 08:45

## 2023-11-15 RX ADMIN — GUAIFENESIN 400 MG: 400 TABLET ORAL at 13:59

## 2023-11-15 RX ADMIN — DIVALPROEX SODIUM 250 MG: 250 TABLET, DELAYED RELEASE ORAL at 08:45

## 2023-11-15 RX ADMIN — ARFORMOTEROL TARTRATE 15 MCG: 15 SOLUTION RESPIRATORY (INHALATION) at 09:16

## 2023-11-15 RX ADMIN — GUAIFENESIN 400 MG: 400 TABLET ORAL at 08:45

## 2023-11-15 RX ADMIN — HYDROXYZINE PAMOATE 25 MG: 25 CAPSULE ORAL at 11:54

## 2023-11-15 RX ADMIN — HYDROCHLOROTHIAZIDE 12.5 MG: 25 TABLET ORAL at 08:45

## 2023-11-15 RX ADMIN — PREDNISONE 40 MG: 20 TABLET ORAL at 13:13

## 2023-11-15 RX ADMIN — IPRATROPIUM BROMIDE AND ALBUTEROL SULFATE 1 DOSE: 2.5; .5 SOLUTION RESPIRATORY (INHALATION) at 12:37

## 2023-11-15 RX ADMIN — BUDESONIDE 500 MCG: 0.5 SUSPENSION RESPIRATORY (INHALATION) at 09:16

## 2023-11-15 RX ADMIN — IPRATROPIUM BROMIDE AND ALBUTEROL SULFATE 1 DOSE: 2.5; .5 SOLUTION RESPIRATORY (INHALATION) at 09:16

## 2023-11-15 NOTE — PLAN OF CARE
[facility]  Initial Interdisciplinary Treatment Plan NOTE    Review Date & Time: 11/12/23 1700    Patient was in treatment team    Admission Type:   Admission Type:  Involuntary    Reason for admission:  Reason for Admission: \"I was thinking of suicide\"      Estimated Length of Stay Update:  3-5 days  Estimated Discharge Date Update: 5-7 days    EDUCATION:   Learner Progress Toward Treatment Goals: Reviewed results and recommendations of this team    Method: Group    Outcome: Verbalized understanding    PATIENT GOALS:     PLAN/TREATMENT RECOMMENDATIONS UPDATE:day3     GOALS UPDATE:   Time frame for Short-Term Goals: 3-5 days    Jennifer Guajardo RN
Denies SI/HI  denies hallucinations  oriented x 4  mood is sad and tearful at times during admission questions   admits to hs of heroin use with L/U 5 mos ago and crystal meth use daily x 5 yrs with L/U 2 1/2 weeks ago  states she is anxious and irritable when not using   admits to inpt psych admits x 4 in [place]   denies any outpt psych follow up    pt lost her home and moved to [place] with daughter recently   pt states she lost everything due to her drug use  isolative to room most of this shift  up in wheelchair due to SOB from COPD   home meds verified  wilol continue to monitor
Patient is alert and oriented x 4. Denies pain. No noted signs or symptoms of distress. Denies SI/HI, A/V/H, or thoughts of self harm when asked. Rates Anxiety at 0/10 and Depression at 0/10. Attended some on unit groups this shift. Medication compliant. Polite, and cooperative. Flat Affect. Appropriate with peers and staff. Appears well groomed/neat, room Is clean. Up for all meals. Will continue to monitor for safety q 15 minute safety rounds and environmental assessments. Problem: Self Harm/Suicidality  Goal: Will have no self-injury during hospital stay  Description: INTERVENTIONS:  1. Ensure constant observer at bedside with Q15M safety checks  2. Maintain a safe environment  3. Secure patient belongings  4. Ensure family/visitors adhere to safety recommendations  5. Ensure safety tray has been added to patient's diet order  6. Every shift and PRN: Re-assess suicidal risk via Frequent Screener    Outcome: Progressing     Problem: Depression  Goal: Will be euthymic at discharge  Description: INTERVENTIONS:  1. Administer medication as ordered  2. Provide emotional support via 1:1 interaction with staff  3. Encourage involvement in milieu/groups/activities  4. Monitor for social isolation  11/13/2023 0933 by Rony Groves RN  Outcome: Progressing  11/12/2023 2114 by Rima Oh RN  Outcome: Progressing     Problem: Andree  Goal: Will exhibit normal sleep and speech and no impulsivity  Description: INTERVENTIONS:  1. Administer medication as ordered  2. Set limits on impulsive behavior  3.  Make attempts to decrease external stimuli as possible  11/13/2023 0933 by Rony Groves RN  Outcome: Progressing  11/12/2023 2114 by Rima Oh RN  Outcome: Progressing
Problem: Depression  Goal: Will be euthymic at discharge  Description: INTERVENTIONS:  1. Administer medication as ordered  2. Provide emotional support via 1:1 interaction with staff  3. Encourage involvement in milieu/groups/activities  4. Monitor for social isolation  11/15/2023 0951 by Claudia Reese RN  Outcome: Progressing  11/15/2023 0407 by Savannah Gaytan RN  Outcome: Progressing     Problem: Psychosis  Goal: Will report no hallucinations or delusions  Description: INTERVENTIONS:  1. Administer medication as  ordered  2. Assist with reality testing to support increasing orientation  3. Assess if patient's hallucinations or delusions are encouraging self harm or harm to others and intervene as appropriate  11/15/2023 0951 by Claudia Reese RN  Outcome: Progressing  11/15/2023 0407 by Savannah Gaytan RN  Outcome: Progressing     Problem: Behavior  Goal: Pt/Family maintain appropriate behavior and adhere to behavioral management agreement, if implemented  Description: INTERVENTIONS:  1. Assess patient/family's coping skills and  non-compliant behavior (including use of illegal substances)  2. Notify security of behavior or suspected illegal substances which indicate the need for search of the family and/or belongings  3. Encourage verbalization of thoughts and concerns in a socially appropriate manner  4. Utilize positive, consistent limit setting strategies supporting safety of patient, staff and others  5. Encourage participation in the decision making process about the behavioral management agreement  6. If a visitor's behavior poses a threat to safety call refer to organization policy.   7. Initiate consult with , Psychosocial CNS, Spiritual Care as appropriate  11/15/2023 0951 by Claudia Reese RN  Outcome: Progressing  11/15/2023 0407 by Savannah Gaytan RN  Outcome: Progressing
Problem: Depression  Goal: Will be euthymic at discharge  Description: INTERVENTIONS:  1. Administer medication as ordered  2. Provide emotional support via 1:1 interaction with staff  3. Encourage involvement in milieu/groups/activities  4. Monitor for social isolation  Outcome: Progressing     Problem: Andree  Goal: Will exhibit normal sleep and speech and no impulsivity  Description: INTERVENTIONS:  1. Administer medication as ordered  2. Set limits on impulsive behavior  3. Make attempts to decrease external stimuli as possible  Outcome: Progressing     Problem: Psychosis  Goal: Will report no hallucinations or delusions  Description: INTERVENTIONS:  1. Administer medication as  ordered  2. Assist with reality testing to support increasing orientation  3. Assess if patient's hallucinations or delusions are encouraging self harm or harm to others and intervene as appropriate  Outcome: Progressing     Patient out in day room area with other patients watching television. Appears sad and withdrawn. States, \"today has been rough. My anxiety has been fierce. It's a 7/10. \" Denies any depression. Denies any suicidal or homicidal ideations. Denies any auditory or visual hallucinations. Encouraged patient to let staff know should she have any questions or concerns. Verbalized understanding. Will continue to monitor.
Problem: Self Harm/Suicidality  Goal: Will have no self-injury during hospital stay  Description: INTERVENTIONS:  1. Ensure constant observer at bedside with Q15M safety checks  2. Maintain a safe environment  3. Secure patient belongings  4. Ensure family/visitors adhere to safety recommendations  5. Ensure safety tray has been added to patient's diet order  6. Every shift and PRN: Re-assess suicidal risk via Frequent Screener    11/13/2023 2100 by Marisol Morillo RN  Outcome: Progressing  11/13/2023 0933 by Eliseo Boast, RN  Outcome: Progressing     Problem: Depression  Goal: Will be euthymic at discharge  Description: INTERVENTIONS:  1. Administer medication as ordered  2. Provide emotional support via 1:1 interaction with staff  3. Encourage involvement in milieu/groups/activities  4. Monitor for social isolation  11/13/2023 2100 by Marisol Morillo RN  Outcome: Progressing  11/13/2023 0933 by Eliseo Boast, RN  Outcome: Progressing     Problem: Andree  Goal: Will exhibit normal sleep and speech and no impulsivity  Description: INTERVENTIONS:  1. Administer medication as ordered  2. Set limits on impulsive behavior  3. Make attempts to decrease external stimuli as possible  11/13/2023 2100 by Marisol Morillo RN  Outcome: Progressing  11/13/2023 0933 by Eliseo Boast, RN  Outcome: Progressing     Problem: Psychosis  Goal: Will report no hallucinations or delusions  Description: INTERVENTIONS:  1. Administer medication as  ordered  2. Assist with reality testing to support increasing orientation  3. Assess if patient's hallucinations or delusions are encouraging self harm or harm to others and intervene as appropriate  11/13/2023 2100 by Marisol Morillo RN  Outcome: Progressing  11/13/2023 0933 by Eliseo Boast, RN  Outcome: Progressing     Problem: Behavior  Goal: Pt/Family maintain appropriate behavior and adhere to behavioral management agreement, if implemented  Description: INTERVENTIONS:  1.
Problem: Self Harm/Suicidality  Goal: Will have no self-injury during hospital stay  Description: INTERVENTIONS:  1. Ensure constant observer at bedside with Q15M safety checks  2. Maintain a safe environment  3. Secure patient belongings  4. Ensure family/visitors adhere to safety recommendations  5. Ensure safety tray has been added to patient's diet order  6. Every shift and PRN: Re-assess suicidal risk via Frequent Screener    Outcome: Progressing     Problem: Depression  Goal: Will be euthymic at discharge  Description: INTERVENTIONS:  1. Administer medication as ordered  2. Provide emotional support via 1:1 interaction with staff  3. Encourage involvement in milieu/groups/activities  4. Monitor for social isolation  Outcome: Progressing     Problem: Psychosis  Goal: Will report no hallucinations or delusions  Description: INTERVENTIONS:  1. Administer medication as  ordered  2. Assist with reality testing to support increasing orientation  3. Assess if patient's hallucinations or delusions are encouraging self harm or harm to others and intervene as appropriate  Outcome: Progressing     Problem: Behavior  Goal: Pt/Family maintain appropriate behavior and adhere to behavioral management agreement, if implemented  Description: INTERVENTIONS:  1. Assess patient/family's coping skills and  non-compliant behavior (including use of illegal substances)  2. Notify security of behavior or suspected illegal substances which indicate the need for search of the family and/or belongings  3. Encourage verbalization of thoughts and concerns in a socially appropriate manner  4. Utilize positive, consistent limit setting strategies supporting safety of patient, staff and others  5. Encourage participation in the decision making process about the behavioral management agreement  6. If a visitor's behavior poses a threat to safety call refer to organization policy.   7. Initiate consult with , Psychosocial
medication as ordered  2. Educate regarding involuntary admission procedures and rules  3. Contain excessive/inappropriate behavior per unit and hospital policies  Outcome: Progressing     Problem: Self Care Deficit  Goal: Return ADL status to a safe level of function  Description: INTERVENTIONS:  1. Administer medication as ordered  2. Assess ADL deficits and provide assistive devices as needed  3. Obtain PT/OT consults as needed  4. Assist and instruct patient to increase activity and self care as tolerated  Outcome: Progressing     Problem: Spiritual Care  Goal: Pt/Family able to move forward in process of forgiving self, others, and/or higher power  Description: INTERVENTIONS:  1. Assist patient/family to overcome blocks to healing by use of spiritual practices (prayer, meditation, guided imagery, reiki, breath work, etc). 2. De-myth guilt and help patient/family identify possible irrational spiritual/cultural beliefs and values. 3. Explore possibilities of making amends & reconciliation with self, others, and/or a greater power. 4. Guide patient/family in identifying painful feelings of guilt. 5. Help patient/famiy explore and identify spiritual beliefs, cultural understandings or values that may help or hinder letting go of issue. 6. Help patient/family explore feelings of anger, bitterness, resentment. 7. Help patient/family identify and examine the situation in which these feelings are experienced. 8. Help patient/family identify destructive displacement of feelings onto other individuals. 9. Invite use of sacraments/rituals/ceremonies as appropriate (e.g. - confession, anointing, smudging). 10. Refer patient/family to formal counseling and/or to [facility] community for further support work.   Outcome: Progressing     Problem: Safety - Adult  Goal: Free from fall injury  Outcome: Progressing

## 2023-11-15 NOTE — BH NOTE
[facility]  Discharge Note    Pt discharged with followings belongings:   Clothing: Footwear, Shirt, Shorts (stuffed animal, blanket)  Other Valuables: Purse   Valuables sent home with yes or returned to patient. Patient educated on aftercare instructions: yes    at 6:30 PM .Patient verbalize understanding of AVS:  yes. Status EXAM upon discharge:  Mental Status and Behavioral Exam  Normal: No  Level of Assistance: Independent/Self  Facial Expression: Brightened  Affect: Congruent  Level of Consciousness: Alert  Frequency of Checks: 4 times per hour, close  Mood:Normal: No  Mood: Depressed  Motor Activity:Normal: No  Motor Activity: Decreased  Eye Contact: Good  Observed Behavior: Cooperative  Sexual Misconduct History: Current - no  Preception: Sterling to person, Sterling to time, Sterling to place, [place] to situation  Attention:Normal: Yes  Thought Processes: Circumstantial  Thought Content:Normal: Yes  Thought Content: Preoccupations  Depression Symptoms: Change in energy level  Anxiety Symptoms: Generalized  Andree Symptoms: No problems reported or observed.   Hallucinations: None  Delusions: No  Memory:Normal: Yes  Insight and Judgment: No  Insight and Judgment: Poor judgment, Poor insight    Tobacco Screening:  Practical Counseling, on admission, robert X, if applicable and completed (first 3 are required if patient doesn't refuse)yes:            ( ) Recognizing danger situations (included triggers and roadblocks)                    ( ) Coping skills (new ways to manage stress,relaxation techniques, changing routine, distraction)                                                           ( ) Basic information about quitting (benefits of quitting, techniques in how to quit, available resources  ( ) Referral for counseling faxed to [phone]                                                                                                                   ( ) Patient refused counseling  (

## 2023-11-15 NOTE — DISCHARGE SUMMARY
DISCHARGE SUMMARY      Patient ID:  Papito Dorsey  [id number]  63 y.o.  1965    Admit date: 11/11/2023    Discharge date and time: 11/15/2023    Admitting Physician: Cong Barcenas MD     Discharge Physician: Dr Mic Garcia MD    Discharge Diagnoses:   Patient Active Problem List   Diagnosis    COPD with acute exacerbation ([facility])    COPD exacerbation ([facility])    Mood disorder ([facility])    Bipolar 1 disorder ([facility])    Bipolar disorder, current episode mixed, severe ([facility])       Admission Condition: poor    Discharged Condition: stable    Admission Circumstance:   Patient presented to [facility] emergency department reporting an increase in depression with suicidal ideations that have become more severe with a plan to overdose on medications or cut self. PAST MEDICAL/PSYCHIATRIC HISTORY:   No past medical history on file. FAMILY/SOCIAL HISTORY:  No family history on file.   Social History     Socioeconomic History    Marital status:      Spouse name: Not on file    Number of children: Not on file    Years of education: Not on file    Highest education level: Not on file   Occupational History    Not on file   Tobacco Use    Smoking status: Every Day     Packs/day: .5     Types: Cigarettes    Smokeless tobacco: Never   Substance and Sexual Activity    Alcohol use: Never    Drug use: Yes     Types: Methamphetamines (Crystal Meth), Marijuana (Weed)     Comment: Stopped 10 days back    Sexual activity: Not on file   Other Topics Concern    Not on file   Social History Narrative    Not on file     Social Determinants of Health     Financial Resource Strain: Not on file   Food Insecurity: No Food Insecurity (11/12/2023)    Hunger Vital Sign     Worried About Running Out of Food in the Last Year: Never true     Ran Out of Food in the Last Year: Never true   Transportation Needs: No Transportation Needs (11/15/2023)    Transportation Problems ([facility])     In the past 12 months, has lack of reliable transportation

## 2023-11-15 NOTE — BH NOTE
Patient calm and cooperative during assessment. Denies SI/HI/AVH. Endorses increased anxiety and decreased depression. Appears bright, friendly, and social. Thoughts are linear and logical. Appears future focused. No paranoia or delusions reported or observed. No unit problems reported. Patient is out on the unit, social with select peers, and is medication compliant. Q 15 minute rounding maintained. [General Appearance - Well Developed] : well developed [Normal Appearance] : normal appearance [Well Groomed] : well groomed [General Appearance - Well Nourished] : well nourished [No Deformities] : no deformities [General Appearance - In No Acute Distress] : no acute distress [Normal Conjunctiva] : the conjunctiva exhibited no abnormalities [Eyelids - No Xanthelasma] : the eyelids demonstrated no xanthelasmas [Normal Oropharynx] : normal oropharynx [II] : II [Neck Appearance] : the appearance of the neck was normal [Neck Cervical Mass (___cm)] : no neck mass was observed [Jugular Venous Distention Increased] : there was no jugular-venous distention [Thyroid Diffuse Enlargement] : the thyroid was not enlarged [Thyroid Nodule] : there were no palpable thyroid nodules [Heart Rate And Rhythm] : heart rate and rhythm were normal [Heart Sounds] : normal S1 and S2 [Murmurs] : no murmurs present [Respiration, Rhythm And Depth] : normal respiratory rhythm and effort [Exaggerated Use Of Accessory Muscles For Inspiration] : no accessory muscle use [Auscultation Breath Sounds / Voice Sounds] : lungs were clear to auscultation bilaterally [Abdomen Soft] : soft [Abdomen Tenderness] : non-tender [Abdomen Mass (___ Cm)] : no abdominal mass palpated [Abnormal Walk] : normal gait [Gait - Sufficient For Exercise Testing] : the gait was sufficient for exercise testing [Nail Clubbing] : no clubbing of the fingernails [Cyanosis, Localized] : no localized cyanosis [Petechial Hemorrhages (___cm)] : no petechial hemorrhages [Skin Color & Pigmentation] : normal skin color and pigmentation [] : no rash [No Venous Stasis] : no venous stasis [Skin Lesions] : no skin lesions [No Skin Ulcers] : no skin ulcer [No Xanthoma] : no  xanthoma was observed [Deep Tendon Reflexes (DTR)] : deep tendon reflexes were 2+ and symmetric [Sensation] : the sensory exam was normal to light touch and pinprick [No Focal Deficits] : no focal deficits [Oriented To Time, Place, And Person] : oriented to person, place, and time [Impaired Insight] : insight and judgment were intact [Affect] : the affect was normal [FreeTextEntry1] : I:E ratio 1:3; clear

## 2023-11-15 NOTE — BH NOTE
Attempted to notify patient's daughter at this time via phone call Kenny Aguilar) regarding $4.08 co-pay for home medication to no success. Left voice message.

## 2023-11-15 NOTE — BH NOTE
[facility]  Day 3 Interdisciplinary Treatment Plan NOTE    Review Date & Time: 11/15/2023 1000    Patient was not in treatment team    Estimated Length of Stay Update:  3-5 days  Estimated Discharge Date Update: 11/18/2023    EDUCATION:   Learner Progress Toward Treatment Goals: Reviewed results and recommendations of this team, Reviewed group plan and strategies, Reviewed signs, symptoms and risk of self harm and violent behavior, and Reviewed goals and plan of care    Method: Small group    Outcome: Verbalized understanding    PATIENT GOALS: try not to get mad    PLAN/TREATMENT RECOMMENDATIONS UPDATE:attend groups    GOALS UPDATE:   Time frame for Short-Term Goals: 1 day      Asha Williamson RN

## 2023-11-15 NOTE — GROUP NOTE
Group Therapy Note    Date: 11/15/2023    Group Start Time: 4531  Group End Time: 4907  Group Topic: Psychoeducation    [facility]    Bird Blackburn                                                                        Group Therapy Note    Date: 11/15/2023  Start Time: 1040  End Time:  4057  Number of Participants: 11    Type of Group: Psychoeducation    Wellness Binder Information  Module Name:  Supporting Others    Patient's Goal:  Patient will be able to ID one way to improve supporting others, and will be able to communicate ways family/friends can support them in time of need. Notes:  CTRS discussed different strategies to help support others, and discussed how one's social support can help support them as well. Patient accepting of handout and receptive to information provided. Status After Intervention:  Improved    Participation Level:  Active Listener and Interactive    Participation Quality: Appropriate, Attentive, and Sharing      Speech:  normal      Thought Process/Content: Logical      Affective Functioning: Congruent      Mood: euthymic      Level of consciousness:  Alert and Attentive      Response to Learning: Able to verbalize current knowledge/experience, Able to verbalize/acknowledge new learning, and Able to retain information      Endings: None Reported    Modes of Intervention: Education, Support, and Exploration      Discipline Responsible: Psychoeducational Specialist      Signature:  Bird Blackburn

## 2023-11-15 NOTE — BH NOTE
Patient's daughter was contacted via phone and she states she is not able to pay co-pay of $4.08 for mediaction until pay day.

## 2023-11-15 NOTE — GROUP NOTE
Group Therapy Note    Date: 11/15/2023  Start Time: 3192  End Time:  1625  Number of Participants: 9    Type of Group: Recreational    Wellness Binder Information  Module Name:  Brandtone Game      Patient's Goal:  To improve socialization amongst peers. Notes:  CTRS facilitated a game called the Stampt it game. Patient actively engaged in activity. Status After Intervention:  Improved    Participation Level:  Active Listener and Interactive    Participation Quality: Appropriate, Attentive, and Sharing      Speech:  normal      Thought Process/Content: Logical      Affective Functioning: Congruent      Mood: euthymic      Level of consciousness:  Alert and Attentive      Response to Learning: Able to verbalize current knowledge/experience, Able to verbalize/acknowledge new learning, and Able to retain information      Endings: None Reported    Modes of Intervention: Socialization and Activity      Discipline Responsible: Psychoeducational Specialist      Signature:  VIRGIL Dawn     Group Therapy Note    Attendees: 9

## 2023-11-15 NOTE — GROUP NOTE
Group Therapy Note    Date: 11/15/2023    Group Start Time: 1400  Group End Time: 1430  Group Topic: Cognitive Skills    [facility]    Vernon, Neha, CORRINE, LSW        Group Therapy Note    Attendees: 10       Patient's Goal:  Pt will be able to discuss attachment styles and identify ways to form a secure attachment. Notes:  Pt was an active participant in group discussion. Status After Intervention:  Improved    Participation Level: Active Listener and Interactive    Participation Quality: Appropriate, Attentive, Sharing, and Supportive      Speech:  normal      Thought Process/Content: Logical  Linear      Affective Functioning: Congruent      Mood: anxious and depressed      Level of consciousness:  Alert, Oriented x4, and Attentive      Response to Learning: Resistant      Endings: None Reported    Modes of Intervention: Education, Support, Socialization, Exploration, Clarifying, and Problem-solving      Discipline Responsible: /Counselor      Signature:   CORRINE Milian, [place]
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
## 2023-11-17 ENCOUNTER — OFFICE VISIT (OUTPATIENT)
Dept: INTERNAL MEDICINE | Age: 58
End: 2023-11-17

## 2023-11-17 VITALS
RESPIRATION RATE: 20 BRPM | OXYGEN SATURATION: 96 % | SYSTOLIC BLOOD PRESSURE: 137 MMHG | DIASTOLIC BLOOD PRESSURE: 84 MMHG | WEIGHT: 233.8 LBS | HEIGHT: 67 IN | HEART RATE: 108 BPM | BODY MASS INDEX: 36.7 KG/M2 | TEMPERATURE: 97.7 F

## 2023-11-17 DIAGNOSIS — Z11.4 ENCOUNTER FOR SCREENING FOR HIV: ICD-10-CM

## 2023-11-17 DIAGNOSIS — F19.90 POLYSUBSTANCE USE DISORDER: ICD-10-CM

## 2023-11-17 DIAGNOSIS — F41.9 ANXIETY: ICD-10-CM

## 2023-11-17 DIAGNOSIS — K22.9 IRREGULAR Z LINE OF ESOPHAGUS: ICD-10-CM

## 2023-11-17 DIAGNOSIS — N39.3 STRESS INCONTINENCE (FEMALE) (MALE): ICD-10-CM

## 2023-11-17 DIAGNOSIS — J44.1 COPD WITH ACUTE EXACERBATION (HCC): ICD-10-CM

## 2023-11-17 DIAGNOSIS — Z09 HOSPITAL DISCHARGE FOLLOW-UP: Primary | ICD-10-CM

## 2023-11-17 DIAGNOSIS — Z59.9 FINANCIAL DIFFICULTIES: ICD-10-CM

## 2023-11-17 DIAGNOSIS — Z11.59 NEED FOR HEPATITIS C SCREENING TEST: ICD-10-CM

## 2023-11-17 DIAGNOSIS — F31.9 BIPOLAR 1 DISORDER (HCC): ICD-10-CM

## 2023-11-17 DIAGNOSIS — K21.00 GASTROESOPHAGEAL REFLUX DISEASE WITH ESOPHAGITIS, UNSPECIFIED WHETHER HEMORRHAGE: ICD-10-CM

## 2023-11-17 DIAGNOSIS — G47.33 OSA (OBSTRUCTIVE SLEEP APNEA): ICD-10-CM

## 2023-11-17 RX ORDER — HYDROXYZINE PAMOATE 25 MG/1
25 CAPSULE ORAL 3 TIMES DAILY PRN
Qty: 60 CAPSULE | Refills: 0 | Status: SHIPPED
Start: 2023-11-17 | End: 2023-11-17

## 2023-11-17 RX ORDER — PANTOPRAZOLE SODIUM 40 MG/1
40 TABLET, DELAYED RELEASE ORAL
Qty: 90 TABLET | Refills: 1 | Status: SHIPPED
Start: 2023-11-17 | End: 2023-11-17

## 2023-11-17 RX ORDER — PANTOPRAZOLE SODIUM 40 MG/1
40 TABLET, DELAYED RELEASE ORAL
Qty: 90 TABLET | Refills: 1 | Status: SHIPPED | OUTPATIENT
Start: 2023-11-17

## 2023-11-17 RX ORDER — HYDROXYZINE PAMOATE 25 MG/1
25 CAPSULE ORAL 3 TIMES DAILY PRN
Qty: 60 CAPSULE | Refills: 0 | Status: SHIPPED
Start: 2023-11-17 | End: 2023-11-20

## 2023-11-17 SDOH — ECONOMIC STABILITY: HOUSING INSECURITY
IN THE LAST 12 MONTHS, WAS THERE A TIME WHEN YOU DID NOT HAVE A STEADY PLACE TO SLEEP OR SLEPT IN A SHELTER (INCLUDING NOW)?: YES

## 2023-11-17 SDOH — ECONOMIC STABILITY: FOOD INSECURITY: WITHIN THE PAST 12 MONTHS, THE FOOD YOU BOUGHT JUST DIDN'T LAST AND YOU DIDN'T HAVE MONEY TO GET MORE.: SOMETIMES TRUE

## 2023-11-17 SDOH — ECONOMIC STABILITY - INCOME SECURITY: PROBLEM RELATED TO HOUSING AND ECONOMIC CIRCUMSTANCES, UNSPECIFIED: Z59.9

## 2023-11-17 SDOH — ECONOMIC STABILITY: FOOD INSECURITY: WITHIN THE PAST 12 MONTHS, YOU WORRIED THAT YOUR FOOD WOULD RUN OUT BEFORE YOU GOT MONEY TO BUY MORE.: SOMETIMES TRUE

## 2023-11-17 SDOH — ECONOMIC STABILITY: INCOME INSECURITY: HOW HARD IS IT FOR YOU TO PAY FOR THE VERY BASICS LIKE FOOD, HOUSING, MEDICAL CARE, AND HEATING?: SOMEWHAT HARD

## 2023-11-17 ASSESSMENT — ENCOUNTER SYMPTOMS
ABDOMINAL PAIN: 0
SORE THROAT: 0
NAUSEA: 0
CONSTIPATION: 0
VOMITING: 0
SHORTNESS OF BREATH: 1
COUGH: 0
WHEEZING: 1
DIARRHEA: 0

## 2023-11-17 NOTE — PROGRESS NOTES
[facility]  Internal Medicine Residency Clinic    Attending Physician Statement  I have discussed the case, including pertinent history and exam findings with the resident physician. I have seen and examined the patient and the key elements of the encounter have been performed by me. I agree with the assessment, plan and orders as documented by the resident. I have reviewed all pertinent PMHx, PSHx, FamHx, SocialHx, medications, and allergies and updated history as appropriate. Patient here for hospital follow up. Admitted 11/6/23 - 11/8/23 for COPD exacerbation. At baseline, she is on 2L oxygen. She was doing well post discharge but returned for admission to Psych. Currently doing well and remains motivated to stay off illicit substances. Discussed smoking cessation. Follow up appt with Pulmonology scheduled in December. She has an upcoming Psychiatry appointment. She has not been taking antihypertensive medications. BP remains controlled. We can keep her off medications for now. HCTZ was also difficult to take given her incontinence. GI referral for intermittent dysphagia (solids) with hx of GERD (hx of esophageal dilatation). Start PPI. Urology referral for incontinence. Bedside commode ordered for today. Patient states hx of HF - no signs of fluid overload. ProBNP 172 during admission for COPD exacerbation (BMI 36). Obtain records from [facility]. Remainder of medical problems as per resident note. Kwadwo Kohli MD  11/17/2023 3:57 PM

## 2023-11-17 NOTE — PATIENT INSTRUCTIONS
Dear Tashia Sanchez,    Thank you for coming to your appointment today. I hope we have addressed all of your needs. Please make sure to do the following:  - Continue your medications as listed. - Get labs drawn before our next follow up. - Referrals have been made to GI, Urology, Sleep Medicine, Social Work:  If you do not hear from the office in 1 week, please call the number listed. - We will see each other again in 1 month    Call for a sooner appointment if you develop any new or worsening symptoms. Have a great day!     Sincerely,  Cristina Sidhu M.D  11/17/2023  4:15 PM

## 2023-11-17 NOTE — PROGRESS NOTES
Post-Discharge Transitional Care Follow Up      Claudeen Caldwell   YOB: 1965    Date of Office Visit:  11/17/2023  Date of Hospital Admission: 11/11/23  Date of Hospital Discharge: 11/15/23  Readmission Risk Score (high >=14%. Medium >=10%):Readmission Risk Score: 13.2      Care management risk score Rising risk (score 2-5) and Complex Care (Scores >=6): No Risk Score On File     Non face to face  following discharge, date last encounter closed (first attempt may have been earlier): *No documented post hospital discharge outreach found in the last 14 days     Call initiated 2 business days of discharge: *No response recorded in the last 14 days     Hospital discharge follow-up  -     [facility] incontinence (female) (male)  -     [facility] - Abe Saldaña MD, Urology, [address]  -     DME Order for [facility]) as OP  COPD with acute exacerbation ([address])  -     DME Order for Bedside Commode as OP  -     DME Order for (Specify) as OP  -     DME Order for (Specify) as OP  -     DME Order for Home Oxygen as OP  Irregular Z line of esophagus  -     [facility]  -     pantoprazole (PROTONIX) 40 MG tablet; Take 1 tablet by mouth every morning (before breakfast), Disp-90 tablet, R-1Normal  Polysubstance use disorder  -     [facility] Referral to Social Work (Primary Care Only)  -     [facility], [address] [place] [address], Counseling Services, [facility] Clinics Only  BENJAMIN (obstructive sleep apnea)  -     [address] [facility] [address], [place], Counseling Services, [facility] Clinics Only  -     hydrOXYzine pamoate (VISTARIL) 25 MG capsule;  Take 1 capsule by mouth 3 times daily as needed for Anxiety, Disp-60 capsule, R-0Normal  Bipolar 1 disorder ([address])  -     [facility], [facility] Clinics Only  Financial difficulties  -     [facility] Referral to Social Work

## 2023-11-19 ENCOUNTER — CLINICAL DOCUMENTATION (OUTPATIENT)
Dept: INTERNAL MEDICINE | Age: 58
End: 2023-11-19

## 2023-11-19 ENCOUNTER — HOSPITAL ENCOUNTER (INPATIENT)
Age: 58
LOS: 3 days | Discharge: HOME | DRG: 189 | End: 2023-11-22
Attending: EMERGENCY MEDICINE | Admitting: INTERNAL MEDICINE
Payer: MEDICARE

## 2023-11-19 ENCOUNTER — APPOINTMENT (OUTPATIENT)
Dept: GENERAL RADIOLOGY | Age: 58
DRG: 189 | End: 2023-11-19
Payer: MEDICARE

## 2023-11-19 DIAGNOSIS — J44.1 COPD EXACERBATION (HCC): ICD-10-CM

## 2023-11-19 DIAGNOSIS — J44.1 COPD WITH ACUTE EXACERBATION (HCC): ICD-10-CM

## 2023-11-19 DIAGNOSIS — J96.22 ACUTE ON CHRONIC RESPIRATORY FAILURE WITH HYPOXIA AND HYPERCAPNIA (HCC): ICD-10-CM

## 2023-11-19 DIAGNOSIS — J96.01 ACUTE RESPIRATORY FAILURE WITH HYPOXIA AND HYPERCAPNIA (HCC): ICD-10-CM

## 2023-11-19 DIAGNOSIS — J96.01 ACUTE HYPOXIC RESPIRATORY FAILURE (HCC): Primary | ICD-10-CM

## 2023-11-19 DIAGNOSIS — J96.02 ACUTE RESPIRATORY FAILURE WITH HYPOXIA AND HYPERCAPNIA (HCC): ICD-10-CM

## 2023-11-19 DIAGNOSIS — J96.21 ACUTE ON CHRONIC RESPIRATORY FAILURE WITH HYPOXIA AND HYPERCAPNIA (HCC): ICD-10-CM

## 2023-11-19 DIAGNOSIS — K62.5 RECTAL BLEED: ICD-10-CM

## 2023-11-19 LAB
ALBUMIN SERPL-MCNC: 4.1 G/DL (ref 3.5–5.2)
ALP SERPL-CCNC: 76 U/L (ref 35–104)
ALT SERPL-CCNC: 18 U/L (ref 0–32)
ANION GAP SERPL CALCULATED.3IONS-SCNC: 11 MMOL/L (ref 7–16)
APAP SERPL-MCNC: <5 UG/ML (ref 10–30)
AST SERPL-CCNC: 16 U/L (ref 0–31)
B PARAP IS1001 DNA NPH QL NAA+NON-PROBE: NOT DETECTED
B PERT DNA SPEC QL NAA+PROBE: NOT DETECTED
B.E.: 5 MMOL/L (ref -3–3)
BASOPHILS # BLD: 0.04 K/UL (ref 0–0.2)
BASOPHILS NFR BLD: 0 % (ref 0–2)
BILIRUB SERPL-MCNC: 0.2 MG/DL (ref 0–1.2)
BNP SERPL-MCNC: 83 PG/ML (ref 0–125)
BUN SERPL-MCNC: 13 MG/DL (ref 6–20)
C PNEUM DNA NPH QL NAA+NON-PROBE: NOT DETECTED
CALCIUM SERPL-MCNC: 9.2 MG/DL (ref 8.6–10.2)
CHLORIDE SERPL-SCNC: 100 MMOL/L (ref 98–107)
CO2 SERPL-SCNC: 31 MMOL/L (ref 22–29)
COHB: 2.7 % (ref 0–1.5)
CREAT SERPL-MCNC: 0.6 MG/DL (ref 0.5–1)
CRITICAL: ABNORMAL
DATE ANALYZED: ABNORMAL
DATE OF COLLECTION: ABNORMAL
EOSINOPHIL # BLD: 0.37 K/UL (ref 0.05–0.5)
EOSINOPHILS RELATIVE PERCENT: 4 % (ref 0–6)
ERYTHROCYTE [DISTWIDTH] IN BLOOD BY AUTOMATED COUNT: 13.1 % (ref 11.5–15)
ETHANOLAMINE SERPL-MCNC: <10 MG/DL
FLUAV RNA NPH QL NAA+NON-PROBE: NOT DETECTED
FLUBV RNA NPH QL NAA+NON-PROBE: NOT DETECTED
GFR SERPL CREATININE-BSD FRML MDRD: >60 ML/MIN/1.73M2
GLUCOSE SERPL-MCNC: 83 MG/DL (ref 74–99)
HADV DNA NPH QL NAA+NON-PROBE: NOT DETECTED
HCO3: 32.5 MMOL/L (ref 22–26)
HCOV 229E RNA NPH QL NAA+NON-PROBE: NOT DETECTED
HCOV HKU1 RNA NPH QL NAA+NON-PROBE: NOT DETECTED
HCOV NL63 RNA NPH QL NAA+NON-PROBE: NOT DETECTED
HCOV OC43 RNA NPH QL NAA+NON-PROBE: NOT DETECTED
HCT VFR BLD AUTO: 43.1 % (ref 34–48)
HGB BLD-MCNC: 13.2 G/DL (ref 11.5–15.5)
HHB: 6.9 % (ref 0–5)
HMPV RNA NPH QL NAA+NON-PROBE: NOT DETECTED
HPIV1 RNA NPH QL NAA+NON-PROBE: NOT DETECTED
HPIV2 RNA NPH QL NAA+NON-PROBE: NOT DETECTED
HPIV3 RNA NPH QL NAA+NON-PROBE: NOT DETECTED
HPIV4 RNA NPH QL NAA+NON-PROBE: NOT DETECTED
IMM GRANULOCYTES # BLD AUTO: 0.06 K/UL (ref 0–0.58)
IMM GRANULOCYTES NFR BLD: 1 % (ref 0–5)
INR PPP: 0.9
LAB: ABNORMAL
LACTATE BLDV-SCNC: 1.9 MMOL/L (ref 0.5–2.2)
LYMPHOCYTES NFR BLD: 2.68 K/UL (ref 1.5–4)
LYMPHOCYTES RELATIVE PERCENT: 26 % (ref 20–42)
Lab: 2021
M PNEUMO DNA NPH QL NAA+NON-PROBE: NOT DETECTED
MCH RBC QN AUTO: 29.7 PG (ref 26–35)
MCHC RBC AUTO-ENTMCNC: 30.6 G/DL (ref 32–34.5)
MCV RBC AUTO: 96.9 FL (ref 80–99.9)
METHB: 0.3 % (ref 0–1.5)
MODE: ABNORMAL
MONOCYTES NFR BLD: 0.85 K/UL (ref 0.1–0.95)
MONOCYTES NFR BLD: 8 % (ref 2–12)
NEUTROPHILS NFR BLD: 62 % (ref 43–80)
NEUTS SEG NFR BLD: 6.53 K/UL (ref 1.8–7.3)
O2 CONTENT: 17.9 ML/DL
O2 SATURATION: 92.9 % (ref 92–98.5)
O2HB: 90.1 % (ref 94–97)
OPERATOR ID: 7291
PARTIAL THROMBOPLASTIN TIME: 28 SEC (ref 24.5–35.1)
PATIENT TEMP: 37 C
PCO2: 61 MMHG (ref 35–45)
PH BLOOD GAS: 7.34 (ref 7.35–7.45)
PLATELET # BLD AUTO: 203 K/UL (ref 130–450)
PMV BLD AUTO: 10.8 FL (ref 7–12)
PO2: 66.1 MMHG (ref 75–100)
POTASSIUM SERPL-SCNC: 4.2 MMOL/L (ref 3.5–5)
PROCALCITONIN SERPL-MCNC: 0.05 NG/ML (ref 0–0.08)
PROT SERPL-MCNC: 6.8 G/DL (ref 6.4–8.3)
PROTHROMBIN TIME: 10.2 SEC (ref 9.3–12.4)
RBC # BLD AUTO: 4.45 M/UL (ref 3.5–5.5)
RSV RNA NPH QL NAA+NON-PROBE: NOT DETECTED
RV+EV RNA NPH QL NAA+NON-PROBE: NOT DETECTED
SALICYLATES SERPL-MCNC: <0.3 MG/DL (ref 0–30)
SARS-COV-2 RNA NPH QL NAA+NON-PROBE: NOT DETECTED
SODIUM SERPL-SCNC: 142 MMOL/L (ref 132–146)
SOURCE, BLOOD GAS: ABNORMAL
SPECIMEN DESCRIPTION: NORMAL
THB: 14.1 G/DL (ref 11.5–16.5)
TIME ANALYZED: 2023
TOXIC TRICYCLIC SC,BLOOD: NEGATIVE
TROPONIN I SERPL HS-MCNC: 11 NG/L (ref 0–9)
TROPONIN I SERPL HS-MCNC: 9 NG/L (ref 0–9)
WBC OTHER # BLD: 10.5 K/UL (ref 4.5–11.5)

## 2023-11-19 PROCEDURE — 80179 DRUG ASSAY SALICYLATE: CPT

## 2023-11-19 PROCEDURE — 80053 COMPREHEN METABOLIC PANEL: CPT

## 2023-11-19 PROCEDURE — 80143 DRUG ASSAY ACETAMINOPHEN: CPT

## 2023-11-19 PROCEDURE — 93005 ELECTROCARDIOGRAM TRACING: CPT

## 2023-11-19 PROCEDURE — 84145 PROCALCITONIN (PCT): CPT

## 2023-11-19 PROCEDURE — 99285 EMERGENCY DEPT VISIT HI MDM: CPT

## 2023-11-19 PROCEDURE — 6370000000 HC RX 637 (ALT 250 FOR IP)

## 2023-11-19 PROCEDURE — 94640 AIRWAY INHALATION TREATMENT: CPT

## 2023-11-19 PROCEDURE — 83605 ASSAY OF LACTIC ACID: CPT

## 2023-11-19 PROCEDURE — 80307 DRUG TEST PRSMV CHEM ANLYZR: CPT

## 2023-11-19 PROCEDURE — 82805 BLOOD GASES W/O2 SATURATION: CPT

## 2023-11-19 PROCEDURE — 6360000002 HC RX W HCPCS

## 2023-11-19 PROCEDURE — 2140000000 HC CCU INTERMEDIATE R&B

## 2023-11-19 PROCEDURE — 71045 X-RAY EXAM CHEST 1 VIEW: CPT

## 2023-11-19 PROCEDURE — 84484 ASSAY OF TROPONIN QUANT: CPT

## 2023-11-19 PROCEDURE — 85025 COMPLETE CBC W/AUTO DIFF WBC: CPT

## 2023-11-19 PROCEDURE — G0480 DRUG TEST DEF 1-7 CLASSES: HCPCS

## 2023-11-19 PROCEDURE — 94664 DEMO&/EVAL PT USE INHALER: CPT

## 2023-11-19 PROCEDURE — 85730 THROMBOPLASTIN TIME PARTIAL: CPT

## 2023-11-19 PROCEDURE — 96374 THER/PROPH/DIAG INJ IV PUSH: CPT

## 2023-11-19 PROCEDURE — 0202U NFCT DS 22 TRGT SARS-COV-2: CPT

## 2023-11-19 PROCEDURE — 94660 CPAP INITIATION&MGMT: CPT

## 2023-11-19 PROCEDURE — 2580000003 HC RX 258

## 2023-11-19 PROCEDURE — 85610 PROTHROMBIN TIME: CPT

## 2023-11-19 PROCEDURE — 83880 ASSAY OF NATRIURETIC PEPTIDE: CPT

## 2023-11-19 PROCEDURE — 5A09357 ASSISTANCE WITH RESPIRATORY VENTILATION, LESS THAN 24 CONSECUTIVE HOURS, CONTINUOUS POSITIVE AIRWAY PRESSURE: ICD-10-PCS | Performed by: INTERNAL MEDICINE

## 2023-11-19 RX ORDER — IPRATROPIUM BROMIDE AND ALBUTEROL SULFATE 2.5; .5 MG/3ML; MG/3ML
3 SOLUTION RESPIRATORY (INHALATION) ONCE
Status: COMPLETED | OUTPATIENT
Start: 2023-11-19 | End: 2023-11-19

## 2023-11-19 RX ADMIN — IPRATROPIUM BROMIDE AND ALBUTEROL SULFATE 3 DOSE: 2.5; .5 SOLUTION RESPIRATORY (INHALATION) at 16:34

## 2023-11-19 RX ADMIN — WATER 60 MG: 1 INJECTION INTRAMUSCULAR; INTRAVENOUS; SUBCUTANEOUS at 16:38

## 2023-11-19 NOTE — ED PROVIDER NOTES
[facility]        Pt Name: Amanda Miller  MRN: [id number]  [address] 1965  Date of evaluation: 11/19/2023  Provider: Ysabel Mata MD  Attending Provider: Veronica Corrigan.,   PCP: Aaron Allen MD  Note Started: 3:36 PM EST 11/19/23    CHIEF COMPLAINT       Chief Complaint   Patient presents with    Shortness of Breath     Pt was 88% on 2L of NC and has had increase SOB in the last week. Pt placed on 4L NC, pt been clean 1 month from crystal meth, heroin    Rectal Bleeding     Pt states started at 5 AM today. Pt sitting on brien and is covered with bright red blood       HISTORY OF PRESENT ILLNESS: 1 or more Elements   History From: the patient        Amanda Miller is a 63 y.o. female with a past medical history of COPD, bipolar 1, polysubstance abuse presenting with shortness of breath and rectal bleeding. Patient states that this week, she has been feeling increasing short of breath. She wears 2 L of oxygen at baseline. She has been up to 4 L nasal cannula due to increasing oxygen requirements. She states that she has been taking her inhaler but is only minimally improved her symptoms. Patient states that she has been having a sensation of a bulging near her anus ongoing for several weeks. This a.m., she noticed when she sat, she felt a burst of pain and then noticed bleeding from that area. She is coming in for further evaluation regarding both of these. Patient is a current smoker, denies alcohol use, and is a former drug user but reportedly is clean for 1 month. Nursing Notes were all reviewed and agreed with or any disagreements were addressed in the HPI. REVIEW OF EXTERNAL NOTE :       N/A recent admission on 11/11/2023 for depression. Patient states that she was placed into rehab following her admission. REVIEW OF SYSTEMS :           Positives and Pertinent negatives as per HPI. sit without discomfort. EKG was ordered to evaluate for possible ischemia, rhythm changes, and QTc which may affect medical management. CBC was ordered to evaluate for infectious processes, signs of stress, anemia, or thrombocytopenia. BMP/CMP/LFTs was/were ordered to evaluate for electrolyte abnormalities and to evaluate kidney/liver function. Troponin was ordered to evaluate for signs of heart muscle damage. Lactic acid was ordered to evaluate for signs of infectious processes, dehydration, and/or ischemic tissue. CBC does not suggest acute pathology. CMP shows bicarb of 31. No normal kidney functional, electrolytes, and liver function. Lactic acid not elevated. proBNP not elevated. Coagulation studies are not elevated. Procalcitonin troponins are reassuring. Respiratory panel is negative. Chest x-ray shows no acute process. Was initially medicated with DuoNebs and Solu-Medrol. On reevaluation, she is still requiring oxygen supplementation greater than her baseline. She is also notably sleepy on reevaluation. ABG was collected which shows evidence of hypercapnia and hypoxia. Patient was started on BiPAP. Decision to admit the patient was made. She is agreeable with plan. Case was discussed with Dr. Gaetano Morales, internal medicine, who agrees to meet the patient. At the time of admission, the patient demonstrated understanding of her condition, no questions, the patient was hemodynamically stable. CONSULTS: (Who and What was discussed)  IP CONSULT TO INTERNAL MEDICINE  IP CONSULT HOUSE MEDICINE        I am the Primary Clinician of Record. FINAL IMPRESSION      1. COPD exacerbation ([address])    2. Rectal bleed    3. Acute respiratory failure with hypoxia and hypercapnia ([address])          DISPOSITION/PLAN     DISPOSITION Admitted 11/20/2023 12:15:04 AM      PATIENT REFERRED TO:  No follow-up provider specified.     DISCHARGE MEDICATIONS:  New Prescriptions    No medications on file

## 2023-11-20 ENCOUNTER — TELEPHONE (OUTPATIENT)
Dept: INTERNAL MEDICINE | Age: 58
End: 2023-11-20

## 2023-11-20 PROBLEM — J96.22 ACUTE ON CHRONIC RESPIRATORY FAILURE WITH HYPOXIA AND HYPERCAPNIA (HCC): Status: ACTIVE | Noted: 2023-11-20

## 2023-11-20 PROBLEM — J96.21 ACUTE ON CHRONIC RESPIRATORY FAILURE WITH HYPOXIA AND HYPERCAPNIA (HCC): Status: ACTIVE | Noted: 2023-11-20

## 2023-11-20 LAB
AMPHET UR QL SCN: NEGATIVE
ANION GAP SERPL CALCULATED.3IONS-SCNC: 11 MMOL/L (ref 7–16)
B.E.: -0.6 MMOL/L (ref -3–3)
B.E.: 0.6 MMOL/L (ref -3–3)
B.E.: 3 MMOL/L (ref -3–3)
BARBITURATES UR QL SCN: NEGATIVE
BASOPHILS # BLD: 0.01 K/UL (ref 0–0.2)
BASOPHILS NFR BLD: 0 % (ref 0–2)
BENZODIAZ UR QL: NEGATIVE
BUN SERPL-MCNC: 16 MG/DL (ref 6–20)
BUPRENORPHINE UR QL: NEGATIVE
CALCIUM SERPL-MCNC: 9.2 MG/DL (ref 8.6–10.2)
CANNABINOIDS UR QL SCN: POSITIVE
CHLORIDE SERPL-SCNC: 100 MMOL/L (ref 98–107)
CO2 SERPL-SCNC: 30 MMOL/L (ref 22–29)
COCAINE UR QL SCN: NEGATIVE
COHB: 1.3 % (ref 0–1.5)
COHB: 1.7 % (ref 0–1.5)
COHB: 2.3 % (ref 0–1.5)
CREAT SERPL-MCNC: 0.6 MG/DL (ref 0.5–1)
CRITICAL: ABNORMAL
D DIMER: 350 NG/ML DDU (ref 0–232)
DATE ANALYZED: ABNORMAL
DATE OF COLLECTION: ABNORMAL
EKG ATRIAL RATE: 92 BPM
EKG P AXIS: 41 DEGREES
EKG P-R INTERVAL: 166 MS
EKG Q-T INTERVAL: 370 MS
EKG QRS DURATION: 86 MS
EKG QTC CALCULATION (BAZETT): 457 MS
EKG R AXIS: 35 DEGREES
EKG T AXIS: 69 DEGREES
EKG VENTRICULAR RATE: 92 BPM
EOSINOPHIL # BLD: 0 K/UL (ref 0.05–0.5)
EOSINOPHILS RELATIVE PERCENT: 0 % (ref 0–6)
ERYTHROCYTE [DISTWIDTH] IN BLOOD BY AUTOMATED COUNT: 12.6 % (ref 11.5–15)
FENTANYL UR QL: NEGATIVE
GFR SERPL CREATININE-BSD FRML MDRD: >60 ML/MIN/1.73M2
GLUCOSE SERPL-MCNC: 136 MG/DL (ref 74–99)
HCO3: 26.4 MMOL/L (ref 22–26)
HCO3: 28.6 MMOL/L (ref 22–26)
HCO3: 30.6 MMOL/L (ref 22–26)
HCT VFR BLD AUTO: 43.8 % (ref 34–48)
HGB BLD-MCNC: 13.4 G/DL (ref 11.5–15.5)
HHB: 2.4 % (ref 0–5)
HHB: 2.5 % (ref 0–5)
HHB: 3.4 % (ref 0–5)
IMM GRANULOCYTES # BLD AUTO: 0.09 K/UL (ref 0–0.58)
IMM GRANULOCYTES NFR BLD: 1 % (ref 0–5)
LAB: ABNORMAL
LYMPHOCYTES NFR BLD: 0.72 K/UL (ref 1.5–4)
LYMPHOCYTES RELATIVE PERCENT: 6 % (ref 20–42)
Lab: 318
Lab: 40
Lab: 757
MAGNESIUM SERPL-MCNC: 1.6 MG/DL (ref 1.6–2.6)
MCH RBC QN AUTO: 29.5 PG (ref 26–35)
MCHC RBC AUTO-ENTMCNC: 30.6 G/DL (ref 32–34.5)
MCV RBC AUTO: 96.3 FL (ref 80–99.9)
METHADONE UR QL: NEGATIVE
METHB: 0.3 % (ref 0–1.5)
METHB: 0.3 % (ref 0–1.5)
METHB: 0.4 % (ref 0–1.5)
MODE: ABNORMAL
MONOCYTES NFR BLD: 0.16 K/UL (ref 0.1–0.95)
MONOCYTES NFR BLD: 1 % (ref 2–12)
NEUTROPHILS NFR BLD: 92 % (ref 43–80)
NEUTS SEG NFR BLD: 11.52 K/UL (ref 1.8–7.3)
O2 CONTENT: 18.9 ML/DL
O2 CONTENT: 19 ML/DL
O2 CONTENT: 19.3 ML/DL
O2 SATURATION: 96.5 % (ref 92–98.5)
O2 SATURATION: 97.5 % (ref 92–98.5)
O2 SATURATION: 97.6 % (ref 92–98.5)
O2HB: 93.9 % (ref 94–97)
O2HB: 95.6 % (ref 94–97)
O2HB: 95.9 % (ref 94–97)
OPERATOR ID: 2860
OPERATOR ID: 2860
OPERATOR ID: 2863
OPIATES UR QL SCN: NEGATIVE
OXYCODONE UR QL SCN: NEGATIVE
PATIENT TEMP: 37 C
PCO2: 52.6 MMHG (ref 35–45)
PCO2: 59.7 MMHG (ref 35–45)
PCO2: 60.5 MMHG (ref 35–45)
PCP UR QL SCN: NEGATIVE
PEEP/CPAP: 5 CMH2O
PH BLOOD GAS: 7.29 (ref 7.35–7.45)
PH BLOOD GAS: 7.32 (ref 7.35–7.45)
PH BLOOD GAS: 7.33 (ref 7.35–7.45)
PHOSPHATE SERPL-MCNC: 3.7 MG/DL (ref 2.5–4.5)
PLATELET # BLD AUTO: 188 K/UL (ref 130–450)
PMV BLD AUTO: 10.8 FL (ref 7–12)
PO2: 103.2 MMHG (ref 75–100)
PO2: 90.8 MMHG (ref 75–100)
PO2: 98.5 MMHG (ref 75–100)
POTASSIUM SERPL-SCNC: 5.1 MMOL/L (ref 3.5–5)
RBC # BLD AUTO: 4.55 M/UL (ref 3.5–5.5)
RR MECHANICAL: 14 B/MIN
SODIUM SERPL-SCNC: 141 MMOL/L (ref 132–146)
SOURCE, BLOOD GAS: ABNORMAL
TEST INFORMATION: ABNORMAL
THB: 14 G/DL (ref 11.5–16.5)
THB: 14.3 G/DL (ref 11.5–16.5)
THB: 14.3 G/DL (ref 11.5–16.5)
TIME ANALYZED: 330
TIME ANALYZED: 44
TIME ANALYZED: 807
VT MECHANICAL: 500 ML
WBC OTHER # BLD: 12.5 K/UL (ref 4.5–11.5)

## 2023-11-20 PROCEDURE — 80048 BASIC METABOLIC PNL TOTAL CA: CPT

## 2023-11-20 PROCEDURE — 85025 COMPLETE CBC W/AUTO DIFF WBC: CPT

## 2023-11-20 PROCEDURE — 6370000000 HC RX 637 (ALT 250 FOR IP)

## 2023-11-20 PROCEDURE — 85379 FIBRIN DEGRADATION QUANT: CPT

## 2023-11-20 PROCEDURE — 93010 ELECTROCARDIOGRAM REPORT: CPT | Performed by: INTERNAL MEDICINE

## 2023-11-20 PROCEDURE — 2580000003 HC RX 258

## 2023-11-20 PROCEDURE — 99223 1ST HOSP IP/OBS HIGH 75: CPT | Performed by: INTERNAL MEDICINE

## 2023-11-20 PROCEDURE — 94660 CPAP INITIATION&MGMT: CPT

## 2023-11-20 PROCEDURE — 6360000002 HC RX W HCPCS

## 2023-11-20 PROCEDURE — 2140000000 HC CCU INTERMEDIATE R&B

## 2023-11-20 PROCEDURE — 87040 BLOOD CULTURE FOR BACTERIA: CPT

## 2023-11-20 PROCEDURE — 94640 AIRWAY INHALATION TREATMENT: CPT

## 2023-11-20 PROCEDURE — 82805 BLOOD GASES W/O2 SATURATION: CPT

## 2023-11-20 PROCEDURE — 84100 ASSAY OF PHOSPHORUS: CPT

## 2023-11-20 PROCEDURE — 83735 ASSAY OF MAGNESIUM: CPT

## 2023-11-20 RX ORDER — ONDANSETRON 2 MG/ML
4 INJECTION INTRAMUSCULAR; INTRAVENOUS EVERY 6 HOURS PRN
Status: DISCONTINUED | OUTPATIENT
Start: 2023-11-20 | End: 2023-11-22 | Stop reason: HOSPADM

## 2023-11-20 RX ORDER — SODIUM CHLORIDE 9 MG/ML
INJECTION, SOLUTION INTRAVENOUS PRN
Status: DISCONTINUED | OUTPATIENT
Start: 2023-11-20 | End: 2023-11-22 | Stop reason: HOSPADM

## 2023-11-20 RX ORDER — SODIUM CHLORIDE 0.9 % (FLUSH) 0.9 %
5-40 SYRINGE (ML) INJECTION EVERY 12 HOURS SCHEDULED
Status: DISCONTINUED | OUTPATIENT
Start: 2023-11-20 | End: 2023-11-22 | Stop reason: HOSPADM

## 2023-11-20 RX ORDER — UMECLIDINIUM 62.5 UG/1
1 AEROSOL, POWDER ORAL DAILY
Status: ON HOLD | COMMUNITY
End: 2023-11-21 | Stop reason: SDUPTHER

## 2023-11-20 RX ORDER — SODIUM CHLORIDE 0.9 % (FLUSH) 0.9 %
5-40 SYRINGE (ML) INJECTION PRN
Status: DISCONTINUED | OUTPATIENT
Start: 2023-11-20 | End: 2023-11-22 | Stop reason: HOSPADM

## 2023-11-20 RX ORDER — NICOTINE 21 MG/24HR
1 PATCH, TRANSDERMAL 24 HOURS TRANSDERMAL DAILY
Status: DISCONTINUED | OUTPATIENT
Start: 2023-11-20 | End: 2023-11-22 | Stop reason: HOSPADM

## 2023-11-20 RX ORDER — IPRATROPIUM BROMIDE AND ALBUTEROL SULFATE 2.5; .5 MG/3ML; MG/3ML
1 SOLUTION RESPIRATORY (INHALATION)
Status: DISCONTINUED | OUTPATIENT
Start: 2023-11-20 | End: 2023-11-22 | Stop reason: HOSPADM

## 2023-11-20 RX ORDER — PREDNISONE 20 MG/1
40 TABLET ORAL DAILY
Status: DISCONTINUED | OUTPATIENT
Start: 2023-11-20 | End: 2023-11-22 | Stop reason: HOSPADM

## 2023-11-20 RX ORDER — ONDANSETRON 4 MG/1
4 TABLET, ORALLY DISINTEGRATING ORAL EVERY 8 HOURS PRN
Status: DISCONTINUED | OUTPATIENT
Start: 2023-11-20 | End: 2023-11-22 | Stop reason: HOSPADM

## 2023-11-20 RX ORDER — HYDROXYZINE PAMOATE 25 MG/1
25 CAPSULE ORAL 3 TIMES DAILY PRN
COMMUNITY
End: 2023-11-24 | Stop reason: SDUPTHER

## 2023-11-20 RX ORDER — ENOXAPARIN SODIUM 100 MG/ML
40 INJECTION SUBCUTANEOUS DAILY
Status: DISCONTINUED | OUTPATIENT
Start: 2023-11-20 | End: 2023-11-22 | Stop reason: HOSPADM

## 2023-11-20 RX ORDER — DIVALPROEX SODIUM 250 MG/1
250 TABLET, DELAYED RELEASE ORAL EVERY 12 HOURS SCHEDULED
Status: DISCONTINUED | OUTPATIENT
Start: 2023-11-20 | End: 2023-11-22 | Stop reason: HOSPADM

## 2023-11-20 RX ORDER — HYDROXYZINE PAMOATE 25 MG/1
25 CAPSULE ORAL 3 TIMES DAILY PRN
Status: DISCONTINUED | OUTPATIENT
Start: 2023-11-20 | End: 2023-11-22 | Stop reason: HOSPADM

## 2023-11-20 RX ORDER — BUDESONIDE 0.5 MG/2ML
0.5 INHALANT ORAL
Status: DISCONTINUED | OUTPATIENT
Start: 2023-11-20 | End: 2023-11-22 | Stop reason: HOSPADM

## 2023-11-20 RX ORDER — GUAIFENESIN 400 MG/1
400 TABLET ORAL 3 TIMES DAILY
Status: DISCONTINUED | OUTPATIENT
Start: 2023-11-20 | End: 2023-11-22 | Stop reason: HOSPADM

## 2023-11-20 RX ORDER — PANTOPRAZOLE SODIUM 40 MG/1
40 TABLET, DELAYED RELEASE ORAL
Status: DISCONTINUED | OUTPATIENT
Start: 2023-11-20 | End: 2023-11-22 | Stop reason: HOSPADM

## 2023-11-20 RX ORDER — AZITHROMYCIN 250 MG/1
500 TABLET, FILM COATED ORAL DAILY
Status: DISCONTINUED | OUTPATIENT
Start: 2023-11-20 | End: 2023-11-22 | Stop reason: HOSPADM

## 2023-11-20 RX ORDER — POLYETHYLENE GLYCOL 3350 17 G/17G
17 POWDER, FOR SOLUTION ORAL DAILY PRN
Status: DISCONTINUED | OUTPATIENT
Start: 2023-11-20 | End: 2023-11-22 | Stop reason: HOSPADM

## 2023-11-20 RX ORDER — OLANZAPINE 5 MG/1
5 TABLET ORAL NIGHTLY
Status: DISCONTINUED | OUTPATIENT
Start: 2023-11-20 | End: 2023-11-22 | Stop reason: HOSPADM

## 2023-11-20 RX ORDER — ARFORMOTEROL TARTRATE 15 UG/2ML
15 SOLUTION RESPIRATORY (INHALATION)
Status: DISCONTINUED | OUTPATIENT
Start: 2023-11-20 | End: 2023-11-22 | Stop reason: HOSPADM

## 2023-11-20 RX ADMIN — ENOXAPARIN SODIUM 40 MG: 100 INJECTION SUBCUTANEOUS at 09:39

## 2023-11-20 RX ADMIN — IPRATROPIUM BROMIDE AND ALBUTEROL SULFATE 1 DOSE: .5; 2.5 SOLUTION RESPIRATORY (INHALATION) at 20:18

## 2023-11-20 RX ADMIN — PREDNISONE 40 MG: 20 TABLET ORAL at 09:38

## 2023-11-20 RX ADMIN — DIVALPROEX SODIUM 250 MG: 250 TABLET, DELAYED RELEASE ORAL at 22:07

## 2023-11-20 RX ADMIN — IPRATROPIUM BROMIDE AND ALBUTEROL SULFATE 1 DOSE: .5; 2.5 SOLUTION RESPIRATORY (INHALATION) at 09:28

## 2023-11-20 RX ADMIN — BUDESONIDE 500 MCG: 0.5 SUSPENSION RESPIRATORY (INHALATION) at 20:18

## 2023-11-20 RX ADMIN — AZITHROMYCIN DIHYDRATE 500 MG: 250 TABLET, FILM COATED ORAL at 09:39

## 2023-11-20 RX ADMIN — HYDROXYZINE PAMOATE 25 MG: 25 CAPSULE ORAL at 22:12

## 2023-11-20 RX ADMIN — PANTOPRAZOLE SODIUM 40 MG: 40 TABLET, DELAYED RELEASE ORAL at 09:39

## 2023-11-20 RX ADMIN — BUDESONIDE 500 MCG: 0.5 SUSPENSION RESPIRATORY (INHALATION) at 09:28

## 2023-11-20 RX ADMIN — HYDROXYZINE PAMOATE 25 MG: 25 CAPSULE ORAL at 14:07

## 2023-11-20 RX ADMIN — IPRATROPIUM BROMIDE AND ALBUTEROL SULFATE 1 DOSE: .5; 2.5 SOLUTION RESPIRATORY (INHALATION) at 12:42

## 2023-11-20 RX ADMIN — IPRATROPIUM BROMIDE AND ALBUTEROL SULFATE 1 DOSE: .5; 2.5 SOLUTION RESPIRATORY (INHALATION) at 17:28

## 2023-11-20 RX ADMIN — ARFORMOTEROL TARTRATE 15 MCG: 15 SOLUTION RESPIRATORY (INHALATION) at 09:28

## 2023-11-20 RX ADMIN — ARFORMOTEROL TARTRATE 15 MCG: 15 SOLUTION RESPIRATORY (INHALATION) at 20:18

## 2023-11-20 RX ADMIN — DIVALPROEX SODIUM 250 MG: 250 TABLET, DELAYED RELEASE ORAL at 09:38

## 2023-11-20 RX ADMIN — Medication 10 ML: at 22:08

## 2023-11-20 RX ADMIN — Medication 10 ML: at 09:39

## 2023-11-20 RX ADMIN — OLANZAPINE 5 MG: 5 TABLET, FILM COATED ORAL at 22:07

## 2023-11-20 RX ADMIN — GUAIFENESIN 400 MG: 400 TABLET ORAL at 22:07

## 2023-11-20 RX ADMIN — GUAIFENESIN 400 MG: 400 TABLET ORAL at 09:39

## 2023-11-20 ASSESSMENT — LIFESTYLE VARIABLES
HOW OFTEN DO YOU HAVE A DRINK CONTAINING ALCOHOL: NEVER
HOW OFTEN DO YOU HAVE A DRINK CONTAINING ALCOHOL: NEVER
HOW MANY STANDARD DRINKS CONTAINING ALCOHOL DO YOU HAVE ON A TYPICAL DAY: PATIENT DOES NOT DRINK

## 2023-11-20 ASSESSMENT — PAIN - FUNCTIONAL ASSESSMENT
PAIN_FUNCTIONAL_ASSESSMENT: NONE - DENIES PAIN

## 2023-11-20 NOTE — SIGNIFICANT EVENT
Upon evaluation. Patient was recently admitted and discharged by Internal Medicine. Patient should go back to previous service. Please call [phone] if any issues.      Humberto Carias

## 2023-11-20 NOTE — ED NOTES
Beckie sent to admitting team at pt request to have bipap mask removed.      Kyrie Fung RN  11/20/23 9823

## 2023-11-20 NOTE — PROGRESS NOTES
4 Eyes Skin Assessment     NAME:  Mann Turk OF BIRTH:  1965  MEDICAL RECORD NUMBER:  [id number]    The patient is being assessed for  Admission    I agree that at least one RN has performed a thorough Head to Toe Skin Assessment on the patient. ALL assessment sites listed below have been assessed. Areas assessed by both nurses:    Head, Face, Ears, Shoulders, Back, Chest, Arms, Elbows, Hands, Sacrum. Buttock, Coccyx, Ischium, Legs. Feet and Heels, Under Medical Devices , and Other none         Does the Patient have a Wound?  No noted wound(s)       Gabe Prevention initiated by RN: No  Wound Care Orders initiated by RN: No    Pressure Injury (Stage 3,4, Unstageable, DTI, NWPT, and Complex wounds) if present, place Wound referral order by RN under : No    New Ostomies, if present place, Ostomy referral order under : No     Nurse 1 eSignature: Electronically signed by Yuko Teague RN on 11/20/23 at 6:41 PM EST    **SHARE this note so that the co-signing nurse can place an eSignature**    Nurse 2 eSignature: Electronically signed by Duyen Pike RN on 11/20/23 at 6:42 PM EST

## 2023-11-20 NOTE — H&P
[address] [place] [address]  Internal Medicine Residency Program  History and Physical    Patient:  Bertha De León 63 y.o. female MRN: [id number]     Date of Service: 11/20/2023    Hospital Day: 2    History of Present Illness   Brenda Ramirez is a 63 y.o. female with PMHx of Hypertension, COPD on 2 L, BENJAMIN, GERD, esophageal stricture with irregular Z-line s/p balloon dilation 8 years ago, polysubstance use disorder, bipolar disorder, depression came in with a CC of shortness of breath. She mentioned that she had a polyp/mass on her rectum for a long time. While she was urinating while sitting on the edge of the seat, the polyp ruptured and she started bleeding per rectum. Due to that bleeding she became very anxious and started feeling short of breath. She mentioned that her trousers soaked in blood and there was also blood clots. At the same time she started noticing she had increased phlegm and sputum production as well. She also mentioned that she has been noting increased cough for few days. She denies any contact with sick person. She denies any chest pain, lightheadedness, dizziness, palpitations, abdominal pain, nausea. She mentions that she has been compliant with all her inhalers and her medications. In the ED the patient vitals were significant for pulse of 110, blood pressure 130/89, respirations 18, chest x-ray was negative for any acute abnormality. CBC: WBC 10.5, RBC 4.4, Hb 13.2, HCT 43.1 platelet 874. PT 10.2, INR 0.9, PTT 28.04 BNP 83. CMP, , K4.2, , CO2 31, anion gap 11. Troponin 11 lactic acid 1.9. Respiratory panel negative. Pro-Albino 0.05. ABG showed pH 7.34, PCO2 61, PO2 66 and bicarb of 32.5. Repeat ABG showed pH of 7.29, PCO2 60.5, PO2 90, bicarb 28.6. In the ED patient received methylprednisolone and DuoNeb in the ED. Previous Hospital Admission: COPD exacerbation. Bipolar disorder. Past Medical History:   History reviewed.  No pertinent past evaluation: Not indicated at this time  DVT prophylaxis: Lovenox  GI prophylaxis: PPI  Bowel regimen: scheduled   Pain management: as needed   Diet: ADULT DIET;  Regular  Code Status: Full Code   Disposition: admitted to inpatient     Vance Holstein, MD, PGY-2  Attending physician: Dr. Kyle Dee

## 2023-11-20 NOTE — ED NOTES
With approval from admitting, bipap removed and replaced with nasal cannula to allow for breakfast. Tray provided per physician order. Resp at bedside to assess patient.       Rekha Rankin RN  11/20/23 4944

## 2023-11-20 NOTE — PROGRESS NOTES
Date: 11/19/2023    Time: 10:20 PM    Patient Placed On BIPAP/CPAP/ Non-Invasive Ventilation? Yes    If no must comment. Facial area red/color change? No           If YES are Blister/Lesion present? No   If yes must notify nursing staff  BIPAP/CPAP skin barrier?   Yes    Skin barrier type:mepilexlite       Comments:        Fish Haas RCP

## 2023-11-21 LAB
ANION GAP SERPL CALCULATED.3IONS-SCNC: 12 MMOL/L (ref 7–16)
BASOPHILS # BLD: 0.02 K/UL (ref 0–0.2)
BASOPHILS NFR BLD: 0 % (ref 0–2)
BUN SERPL-MCNC: 21 MG/DL (ref 6–20)
CALCIUM SERPL-MCNC: 8.9 MG/DL (ref 8.6–10.2)
CHLORIDE SERPL-SCNC: 100 MMOL/L (ref 98–107)
CO2 SERPL-SCNC: 29 MMOL/L (ref 22–29)
CREAT SERPL-MCNC: 0.7 MG/DL (ref 0.5–1)
EOSINOPHIL # BLD: 0.01 K/UL (ref 0.05–0.5)
EOSINOPHILS RELATIVE PERCENT: 0 % (ref 0–6)
ERYTHROCYTE [DISTWIDTH] IN BLOOD BY AUTOMATED COUNT: 12.8 % (ref 11.5–15)
GFR SERPL CREATININE-BSD FRML MDRD: >60 ML/MIN/1.73M2
GLUCOSE SERPL-MCNC: 91 MG/DL (ref 74–99)
HCT VFR BLD AUTO: 38 % (ref 34–48)
HGB BLD-MCNC: 11.6 G/DL (ref 11.5–15.5)
IMM GRANULOCYTES # BLD AUTO: 0.05 K/UL (ref 0–0.58)
IMM GRANULOCYTES NFR BLD: 1 % (ref 0–5)
LYMPHOCYTES NFR BLD: 1.81 K/UL (ref 1.5–4)
LYMPHOCYTES RELATIVE PERCENT: 20 % (ref 20–42)
MAGNESIUM SERPL-MCNC: 1.8 MG/DL (ref 1.6–2.6)
MCH RBC QN AUTO: 29.5 PG (ref 26–35)
MCHC RBC AUTO-ENTMCNC: 30.5 G/DL (ref 32–34.5)
MCV RBC AUTO: 96.7 FL (ref 80–99.9)
MONOCYTES NFR BLD: 0.66 K/UL (ref 0.1–0.95)
MONOCYTES NFR BLD: 7 % (ref 2–12)
NEUTROPHILS NFR BLD: 73 % (ref 43–80)
NEUTS SEG NFR BLD: 6.72 K/UL (ref 1.8–7.3)
PHOSPHATE SERPL-MCNC: 2.9 MG/DL (ref 2.5–4.5)
PLATELET # BLD AUTO: 180 K/UL (ref 130–450)
PMV BLD AUTO: 11.2 FL (ref 7–12)
POTASSIUM SERPL-SCNC: 4 MMOL/L (ref 3.5–5)
RBC # BLD AUTO: 3.93 M/UL (ref 3.5–5.5)
SODIUM SERPL-SCNC: 141 MMOL/L (ref 132–146)
WBC OTHER # BLD: 9.3 K/UL (ref 4.5–11.5)

## 2023-11-21 PROCEDURE — 83735 ASSAY OF MAGNESIUM: CPT

## 2023-11-21 PROCEDURE — 84100 ASSAY OF PHOSPHORUS: CPT

## 2023-11-21 PROCEDURE — 6370000000 HC RX 637 (ALT 250 FOR IP)

## 2023-11-21 PROCEDURE — 80048 BASIC METABOLIC PNL TOTAL CA: CPT

## 2023-11-21 PROCEDURE — 2700000000 HC OXYGEN THERAPY PER DAY

## 2023-11-21 PROCEDURE — 97530 THERAPEUTIC ACTIVITIES: CPT

## 2023-11-21 PROCEDURE — 94640 AIRWAY INHALATION TREATMENT: CPT

## 2023-11-21 PROCEDURE — 2580000003 HC RX 258

## 2023-11-21 PROCEDURE — 2140000000 HC CCU INTERMEDIATE R&B

## 2023-11-21 PROCEDURE — 97161 PT EVAL LOW COMPLEX 20 MIN: CPT

## 2023-11-21 PROCEDURE — 6360000002 HC RX W HCPCS

## 2023-11-21 PROCEDURE — 94660 CPAP INITIATION&MGMT: CPT

## 2023-11-21 PROCEDURE — 85025 COMPLETE CBC W/AUTO DIFF WBC: CPT

## 2023-11-21 PROCEDURE — 87070 CULTURE OTHR SPECIMN AEROBIC: CPT

## 2023-11-21 PROCEDURE — 99232 SBSQ HOSP IP/OBS MODERATE 35: CPT | Performed by: INTERNAL MEDICINE

## 2023-11-21 PROCEDURE — 87205 SMEAR GRAM STAIN: CPT

## 2023-11-21 PROCEDURE — 36415 COLL VENOUS BLD VENIPUNCTURE: CPT

## 2023-11-21 RX ADMIN — HYDROXYZINE PAMOATE 25 MG: 25 CAPSULE ORAL at 15:56

## 2023-11-21 RX ADMIN — Medication 10 ML: at 10:20

## 2023-11-21 RX ADMIN — ENOXAPARIN SODIUM 40 MG: 100 INJECTION SUBCUTANEOUS at 10:15

## 2023-11-21 RX ADMIN — PREDNISONE 40 MG: 20 TABLET ORAL at 10:15

## 2023-11-21 RX ADMIN — OLANZAPINE 5 MG: 5 TABLET, FILM COATED ORAL at 20:41

## 2023-11-21 RX ADMIN — GUAIFENESIN 400 MG: 400 TABLET ORAL at 10:15

## 2023-11-21 RX ADMIN — ARFORMOTEROL TARTRATE 15 MCG: 15 SOLUTION RESPIRATORY (INHALATION) at 21:21

## 2023-11-21 RX ADMIN — GUAIFENESIN 400 MG: 400 TABLET ORAL at 15:00

## 2023-11-21 RX ADMIN — ARFORMOTEROL TARTRATE 15 MCG: 15 SOLUTION RESPIRATORY (INHALATION) at 08:25

## 2023-11-21 RX ADMIN — GUAIFENESIN 400 MG: 400 TABLET ORAL at 20:41

## 2023-11-21 RX ADMIN — BUDESONIDE 500 MCG: 0.5 SUSPENSION RESPIRATORY (INHALATION) at 21:21

## 2023-11-21 RX ADMIN — IPRATROPIUM BROMIDE AND ALBUTEROL SULFATE 1 DOSE: .5; 2.5 SOLUTION RESPIRATORY (INHALATION) at 08:25

## 2023-11-21 RX ADMIN — DIVALPROEX SODIUM 250 MG: 250 TABLET, DELAYED RELEASE ORAL at 20:41

## 2023-11-21 RX ADMIN — DIVALPROEX SODIUM 250 MG: 250 TABLET, DELAYED RELEASE ORAL at 10:15

## 2023-11-21 RX ADMIN — Medication 10 ML: at 22:08

## 2023-11-21 RX ADMIN — IPRATROPIUM BROMIDE AND ALBUTEROL SULFATE 1 DOSE: .5; 2.5 SOLUTION RESPIRATORY (INHALATION) at 16:40

## 2023-11-21 RX ADMIN — IPRATROPIUM BROMIDE AND ALBUTEROL SULFATE 1 DOSE: .5; 2.5 SOLUTION RESPIRATORY (INHALATION) at 21:22

## 2023-11-21 RX ADMIN — AZITHROMYCIN DIHYDRATE 500 MG: 250 TABLET, FILM COATED ORAL at 10:15

## 2023-11-21 RX ADMIN — PANTOPRAZOLE SODIUM 40 MG: 40 TABLET, DELAYED RELEASE ORAL at 05:53

## 2023-11-21 RX ADMIN — IPRATROPIUM BROMIDE AND ALBUTEROL SULFATE 1 DOSE: .5; 2.5 SOLUTION RESPIRATORY (INHALATION) at 12:47

## 2023-11-21 RX ADMIN — HYDROXYZINE PAMOATE 25 MG: 25 CAPSULE ORAL at 10:25

## 2023-11-21 RX ADMIN — BUDESONIDE 500 MCG: 0.5 SUSPENSION RESPIRATORY (INHALATION) at 08:25
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
## 2023-11-21 NOTE — CARE COORDINATION
11/21:  Update CM Note:  CM sent referral to [facility].   Electronically signed by Everardo King RN on 11/21/2023 at 3:48 PM

## 2023-11-21 NOTE — CARE COORDINATION
Peer Recovery Support Note    Name: Lydia Franco  Date: 11/21/2023    Chief Complaint   Patient presents with    Shortness of Breath     Pt was 88% on 2L of NC and has had increase SOB in the last week. Pt placed on 4L NC, pt been clean 1 month from crystal meth, heroin    Rectal Bleeding     Pt states started at 5 AM today. Pt sitting on brien and is covered with bright red blood       Peer Support met with patient. [x] Support and education provided  [x] Resources provided   [x] Treatment referral: [facility]  [x] Other: Contact info  [] Patient declined peer recovery services     Referred By: Luis Flynn (MAMADOU)    Notes: Met with patient who was openly upset and emotional about her living and family situation. Said she is no longer using drugs, but smoking cigarettes to deal with her stress and depression. Left patient with tobacco cessation program information, and also encouraged her to consider IOP. Also left AA meeting book per patient request, with the goal of starting to build a healthy support team. Left peer contact information as well.     Signed: Ed Stein, 11/21/2023

## 2023-11-21 NOTE — PROGRESS NOTES
Patient requesting vistaril. Asked deuce from pharmacy if ok to give another dose of vistaril since last one was given at 1 am. Was told it was ok to give. Medication administered.

## 2023-11-21 NOTE — PLAN OF CARE
Problem: ABCDS Injury Assessment  Goal: Absence of physical injury  11/21/2023 0219 by Prakash Park RN  Outcome: Progressing  11/20/2023 1840 by Lucrecia Pulliam RN  Outcome: Progressing     Problem: Chronic Conditions and Co-morbidities  Goal: Patient's chronic conditions and co-morbidity symptoms are monitored and maintained or improved  11/21/2023 0219 by Prakash Park RN  Outcome: Progressing  11/20/2023 1840 by Lucrecia Pulliam RN  Outcome: Progressing     Problem: Discharge Planning  Goal: Discharge to home or other facility with appropriate resources  Outcome: Progressing

## 2023-11-21 NOTE — PROGRESS NOTES
Sitting EOB: NA  Dynamic Standing: Independent      Pt is A & O x: 4 to person, place, month/year, and situation. Sensation: Pt denies numbness and tingling of extremities. Edema: Unremarkable    Patient education  Pt educated on PT role in acute care setting, pursed lip breathing, and safety with mobility. Patient response to education:   Pt verbalized understanding Pt demonstrated skill Pt requires further education in this area   Yes Yes Yes     ASSESSMENT:    Conditions Requiring Skilled Therapeutic Intervention:    [x]Decreased strength     []Decreased ROM  [x]Decreased functional mobility  [x]Decreased balance   [x]Decreased endurance   [x]Decreased posture  []Decreased sensation  []Decreased coordination   []Decreased vision  [x]Decreased safety awareness   []Increased pain       Comments:    Pt was in bed upon room entry; agreeable to PT evaluation. Pt was pleasant and cooperative. Pt SpO2 was 94% on 4L O2. Pt completed all mobility noted above. Pt was assisted to EOB. Pt had no complaints of dizziness with position change. Pt stood from EOB. Pt ambulated to hallway x2 with increased SOB. Pt fatigued quickly. Pt SpO2 post ambulation was 88% post ambulation. Pt recovered to 93% on 4L O2 quickly. Pt was assisted back to bed and positioned comfortably. Pt was left in bed with all needs met at conclusion of session. Treatment:  Patient practiced and was instructed in the following treatment:    Therapeutic activities:  Bed mobility: Pt was cued for technique during bed mobility transfers. Transfers: Pt was cued for hand placement during sit <> stand transfers. Pt completed multiple transfers (EOB x2). Ambulation: Pt was cued for safety, pacing, pursed lip breathing, and step  length. Vitals and symptoms were closely monitored throughout session. Education: Pt was educated on PT role in acute care setting, pursed lip breathing, and safety with mobility. Pt's/family goals:  1. To return home. standardized testing/informal observation of tasks, assessment of data and education on plan of care and goals.     CPT codes:  [x] Low Complexity PT evaluation 67651  [] Moderate Complexity PT evaluation 63592  [] High Complexity PT evaluation 25989  [] PT Re-evaluation 65743  [] Gait training 39060 0 minutes  [] Manual therapy 07308 0 minutes  [x] Therapeutic activities 76857 10 minutes  [] Therapeutic exercises 82150 0 minutes  [] Neuromuscular reeducation 76780 0 minutes     Shawn Berger, PT, DPT  [id number]    Agustin Aaron, SPT

## 2023-11-21 NOTE — TELEPHONE ENCOUNTER
Consult from Dr Rod Currie on 11.17.23 due to positive SDOH related to: financial resource strain (somewhat hard)  Chart reviewed and pt evaluated by [address]; note pt is readmitted due to respiratory and rectal bleeding issues.   Will contact pt after discharge or see at [facility] appt in office

## 2023-11-21 NOTE — PROGRESS NOTES
[facility]  Internal Medicine Department     Attending Physician Statement:  Anthony Rico D.O. I have discussed the case, including pertinent history and exam findings with the resident. I have reviewed all past medical history, past surgical history, family history, social history, medications, and allergies and updated as appropriate in the history section of the chart. I have seen and examined the patient and the key elements of the encounter have been performed by me. I agree with the assessment, plan and orders as documented by the resident. Acute Hypoxic Hypercapnic Respiratory Failure   -2/2 COPD exacerbation likely from medication noncompliance and tobacco use   -patient still actively smoking at least 0.5 ppd and marajuana  -plan to continue LABA/ICS and LAMA;   -conitnue oral corticosteroids and azithromycin  -wean donna to home 2 L  -symptoms improving but patient still actively wheezing  -likely discharge tomorrow; tobacco cessation classes ordered and respiratory rehab as outpatient      Depression  -conitnue current regimen and confrim patient is taking her medications   -no SI/HI at this time      External Hemmorhoids   -no signs of infection; margueritenet is having active bleeding prior to admission;   -topical pain control     Remainder of medical problems as per resident note.

## 2023-11-22 VITALS
WEIGHT: 251.38 LBS | SYSTOLIC BLOOD PRESSURE: 147 MMHG | OXYGEN SATURATION: 94 % | RESPIRATION RATE: 18 BRPM | HEIGHT: 67 IN | HEART RATE: 101 BPM | DIASTOLIC BLOOD PRESSURE: 90 MMHG | BODY MASS INDEX: 39.45 KG/M2 | TEMPERATURE: 98.2 F

## 2023-11-22 LAB
ANION GAP SERPL CALCULATED.3IONS-SCNC: 13 MMOL/L (ref 7–16)
BASOPHILS # BLD: 0.02 K/UL (ref 0–0.2)
BASOPHILS NFR BLD: 0 % (ref 0–2)
BUN SERPL-MCNC: 18 MG/DL (ref 6–20)
CALCIUM SERPL-MCNC: 9.1 MG/DL (ref 8.6–10.2)
CHLORIDE SERPL-SCNC: 103 MMOL/L (ref 98–107)
CO2 SERPL-SCNC: 29 MMOL/L (ref 22–29)
CREAT SERPL-MCNC: 0.7 MG/DL (ref 0.5–1)
EOSINOPHIL # BLD: 0.02 K/UL (ref 0.05–0.5)
EOSINOPHILS RELATIVE PERCENT: 0 % (ref 0–6)
ERYTHROCYTE [DISTWIDTH] IN BLOOD BY AUTOMATED COUNT: 12.9 % (ref 11.5–15)
GFR SERPL CREATININE-BSD FRML MDRD: >60 ML/MIN/1.73M2
GLUCOSE SERPL-MCNC: 99 MG/DL (ref 74–99)
HCT VFR BLD AUTO: 38.1 % (ref 34–48)
HGB BLD-MCNC: 11.6 G/DL (ref 11.5–15.5)
IMM GRANULOCYTES # BLD AUTO: 0.05 K/UL (ref 0–0.58)
IMM GRANULOCYTES NFR BLD: 1 % (ref 0–5)
LYMPHOCYTES NFR BLD: 1.76 K/UL (ref 1.5–4)
LYMPHOCYTES RELATIVE PERCENT: 21 % (ref 20–42)
MAGNESIUM SERPL-MCNC: 1.8 MG/DL (ref 1.6–2.6)
MCH RBC QN AUTO: 29.5 PG (ref 26–35)
MCHC RBC AUTO-ENTMCNC: 30.4 G/DL (ref 32–34.5)
MCV RBC AUTO: 96.9 FL (ref 80–99.9)
MONOCYTES NFR BLD: 0.65 K/UL (ref 0.1–0.95)
MONOCYTES NFR BLD: 8 % (ref 2–12)
NEUTROPHILS NFR BLD: 70 % (ref 43–80)
NEUTS SEG NFR BLD: 5.92 K/UL (ref 1.8–7.3)
PHOSPHATE SERPL-MCNC: 3.3 MG/DL (ref 2.5–4.5)
PLATELET # BLD AUTO: 173 K/UL (ref 130–450)
PMV BLD AUTO: 10.6 FL (ref 7–12)
POTASSIUM SERPL-SCNC: 4.2 MMOL/L (ref 3.5–5)
RBC # BLD AUTO: 3.93 M/UL (ref 3.5–5.5)
SODIUM SERPL-SCNC: 145 MMOL/L (ref 132–146)
WBC OTHER # BLD: 8.4 K/UL (ref 4.5–11.5)

## 2023-11-22 PROCEDURE — 99238 HOSP IP/OBS DSCHRG MGMT 30/<: CPT | Performed by: INTERNAL MEDICINE

## 2023-11-22 PROCEDURE — 36415 COLL VENOUS BLD VENIPUNCTURE: CPT

## 2023-11-22 PROCEDURE — 94640 AIRWAY INHALATION TREATMENT: CPT

## 2023-11-22 PROCEDURE — 2580000003 HC RX 258

## 2023-11-22 PROCEDURE — 94660 CPAP INITIATION&MGMT: CPT

## 2023-11-22 PROCEDURE — 97165 OT EVAL LOW COMPLEX 30 MIN: CPT

## 2023-11-22 PROCEDURE — 97530 THERAPEUTIC ACTIVITIES: CPT

## 2023-11-22 PROCEDURE — 84100 ASSAY OF PHOSPHORUS: CPT

## 2023-11-22 PROCEDURE — 6360000002 HC RX W HCPCS

## 2023-11-22 PROCEDURE — 80048 BASIC METABOLIC PNL TOTAL CA: CPT

## 2023-11-22 PROCEDURE — 97535 SELF CARE MNGMENT TRAINING: CPT

## 2023-11-22 PROCEDURE — 85025 COMPLETE CBC W/AUTO DIFF WBC: CPT

## 2023-11-22 PROCEDURE — 6370000000 HC RX 637 (ALT 250 FOR IP)

## 2023-11-22 PROCEDURE — 2700000000 HC OXYGEN THERAPY PER DAY

## 2023-11-22 PROCEDURE — 83735 ASSAY OF MAGNESIUM: CPT

## 2023-11-22 RX ORDER — MAGNESIUM SULFATE 1 G/100ML
1000 INJECTION INTRAVENOUS ONCE
Status: COMPLETED | OUTPATIENT
Start: 2023-11-22 | End: 2023-11-22

## 2023-11-22 RX ORDER — AZITHROMYCIN 500 MG/1
500 TABLET, FILM COATED ORAL DAILY
Qty: 2 TABLET | Refills: 0 | Status: SHIPPED | OUTPATIENT
Start: 2023-11-22 | End: 2023-11-24

## 2023-11-22 RX ORDER — UMECLIDINIUM 62.5 UG/1
1 AEROSOL, POWDER ORAL DAILY
Qty: 7 EACH | Refills: 2 | Status: SHIPPED | OUTPATIENT
Start: 2023-11-22

## 2023-11-22 RX ORDER — ALBUTEROL SULFATE 90 UG/1
2 AEROSOL, METERED RESPIRATORY (INHALATION) EVERY 6 HOURS PRN
Qty: 18 G | Refills: 1 | Status: SHIPPED | OUTPATIENT
Start: 2023-11-22

## 2023-11-22 RX ADMIN — IPRATROPIUM BROMIDE AND ALBUTEROL SULFATE 1 DOSE: .5; 2.5 SOLUTION RESPIRATORY (INHALATION) at 07:54

## 2023-11-22 RX ADMIN — GUAIFENESIN 400 MG: 400 TABLET ORAL at 14:33

## 2023-11-22 RX ADMIN — PREDNISONE 40 MG: 20 TABLET ORAL at 08:49

## 2023-11-22 RX ADMIN — PANTOPRAZOLE SODIUM 40 MG: 40 TABLET, DELAYED RELEASE ORAL at 06:53

## 2023-11-22 RX ADMIN — MAGNESIUM SULFATE HEPTAHYDRATE 1000 MG: 1 INJECTION, SOLUTION INTRAVENOUS at 07:01

## 2023-11-22 RX ADMIN — HYDROXYZINE PAMOATE 25 MG: 25 CAPSULE ORAL at 10:53

## 2023-11-22 RX ADMIN — AZITHROMYCIN DIHYDRATE 500 MG: 250 TABLET, FILM COATED ORAL at 08:49

## 2023-11-22 RX ADMIN — GUAIFENESIN 400 MG: 400 TABLET ORAL at 08:49

## 2023-11-22 RX ADMIN — BUDESONIDE 500 MCG: 0.5 SUSPENSION RESPIRATORY (INHALATION) at 07:55

## 2023-11-22 RX ADMIN — DIVALPROEX SODIUM 250 MG: 250 TABLET, DELAYED RELEASE ORAL at 08:49

## 2023-11-22 RX ADMIN — ARFORMOTEROL TARTRATE 15 MCG: 15 SOLUTION RESPIRATORY (INHALATION) at 07:55

## 2023-11-22 RX ADMIN — IPRATROPIUM BROMIDE AND ALBUTEROL SULFATE 1 DOSE: .5; 2.5 SOLUTION RESPIRATORY (INHALATION) at 11:29

## 2023-11-22 RX ADMIN — ENOXAPARIN SODIUM 40 MG: 100 INJECTION SUBCUTANEOUS at 08:49

## 2023-11-22 RX ADMIN — Medication 10 ML: at 08:49

## 2023-11-22 ASSESSMENT — PAIN SCALES - GENERAL: PAINLEVEL_OUTOF10: 0

## 2023-11-22 NOTE — PROGRESS NOTES
[facility]  Internal Medicine Residency Program  Progress Note - House Team     Patient:  Constantino Woods 63 y.o. female MRN: [id number]     Date of Service: 11/22/2023     CC: COPD exacerbation  Overnight events:      Subjective     Constantino Woods is a 63 y.o. female with PMH of Hypertension, COPD on 2 L at home, BENJAMIN, GERD, esophageal stricture with irregular Z-line s/p balloon dilation 8 years ago, polysubstance use disorder, bipolar disorder, and depression. She presented to the ED 11/19 after chronic external hemorrhoids on her rectum that ruptured while urinating. Due to this bleeding she became anxious and noticed she started to become short of breath. She noticed increasing mucus production as well as increased cough for the past several days. She denies any sick contacts, chest pain, lighheadedness, palpitations, abdominal pain, and nausea. She says she is compliant with her inhalers and other medications at home. She admits to continuing to smoke. She says that she is trying to currently cut back on smoking and says that she is trying to limit her smoking from 5 ppd to less than 1 ppd. She says that at home she is not usually short of breath at rest but that minimal physical exertion causes her to have SOB. She does wear 2 L NC at home and uses CPAP at night. Overnight her Mg was replaced. Patient was seen and examined this morning at bedside in no acute distress. She states that her SOB is at her home baseline. She denies any chest pain, abdominal pain, headaches, chills, fevers, nausea and vomiting. She says that she slept well. She is more emotionally regulated today after being distraught yesterday over a home situation with her car. She states that she has no concerns this morning and is hoping to return home soon. She says that her CPAP machine is several hours away and she would like to try to be sent home with one if possible.  Our  is attempting to get home pending   Rectal bleeding 2/2 likely ruptured external hemorrhoid  Hgb  today was 11.6 from 13.4 on 11/20  CBC followed for changes to HgB  Topical cream for pain control if needed  History of obstructive sleep apnea  Continue CPAP in hospital at night  Plan for study to adjust CPAP settings and to obtain CPAP machine at home  History of bipolar disorder  Depakote resumed for bipolar disorder  Last admission 11/11 for suicidal ideation   Denies SI at this time  History of GERD  PPI  History of polysubstance abuse  Current Smoking history reports less than 1 pack per day use from previous 5 ppd.   Administer Nicotine patch 21 mg/ 24 hr for withdrawal prevention  Patient agreeable to taking smoking cessation classes   History of irregular Z-line s/p balloon dilatation 8 years ago  History of stress urinary incontinence  Unstable housing environment  Social work consultation     PT/OT evaluation: not indicated at this time  DVT prophylaxis: Lovenox  GI prophylaxis: Protonix  Diet: adult regular diet  Code Status: Full code  Disposition: ready for discharge today with outpatient follow up with pulmonary medicine    Jessica Whelan, [address]  Attending physician: Dr. Adolfo Lebron

## 2023-11-22 NOTE — DISCHARGE INSTRUCTIONS
[facility] Internal Medicine Resident Service    Activity as tolerated  Diet: common adult  Be compliant with your medications and take them as prescribed. Special Instructions:   - Take Azithromycin 500mg tablet once a day for the next 2 days, last dose will be on 11/24/23  - Continue to take your medication   - Follow up with primary care physician next week, appointment has been scheduled on 11/28/23 at 1 PM  - Follow up with your upcoming appointments    Hospital Follow up: [facility] with House Team   Call to confirm appointment Tel: [phone] ([address] [facility])     Other Follow-Ups:    Future Appointments   Date Time Provider [address]   11/28/2023  1:00 PM Agustin Douglas MD [facility]   12/11/2023  9:30 AM ALICIA Rodriguez CNP [facility]   12/18/2023 11:00 AM ALICIA Whitley CNP [facility]   12/18/2023  3:00 PM Anuel Fischer MD [facility]       Other than any new prescriptions given to you today, the list of home going meds on this After Visit Summary are based on information provided to us from you. This information, including the list, dose, and frequency of medications is only as accurate as the information you provided. If you have any questions or concerns about your home medications, please contact your Primary Care Physician for further clarification.       Emma Leung MD PGY-1  11/22/2023  11:31 AM

## 2023-11-22 NOTE — DISCHARGE SUMMARY
Medications:     Medication List        START taking these medications      azithromycin 500 MG tablet  Commonly known as: ZITHROMAX  Take 1 tablet by mouth daily for 2 doses            CONTINUE taking these medications      albuterol sulfate  (90 Base) MCG/ACT inhaler  Commonly known as: Ventolin HFA  Inhale 2 puffs into the lungs every 6 hours as needed for Wheezing or Shortness of Breath     budesonide-formoterol 160-4.5 MCG/ACT Aero  Commonly known as: Symbicort  Inhale 2 puffs into the lungs 2 times daily     divalproex 250 MG DR tablet  Commonly known as: DEPAKOTE  Take 1 tablet by mouth every 12 hours     guaiFENesin 400 MG tablet  Take 1 tablet by mouth in the morning, at noon, and at bedtime     hydrOXYzine pamoate 25 MG capsule  Commonly known as: VISTARIL     Incruse Ellipta 62.5 MCG/ACT inhaler  Generic drug: umeclidinium bromide  Inhale 1 puff into the lungs daily     OLANZapine 5 MG tablet  Commonly known as: ZYPREXA  Take 1 tablet by mouth nightly     OXYGEN     pantoprazole 40 MG tablet  Commonly known as: PROTONIX  Take 1 tablet by mouth every morning (before breakfast)               Where to Get Your Medications        These medications were sent to [address] [place] [address] [phone] - [phone]  [address] [place] [address], [address] [place] [address]      Phone: [phone]   albuterol sulfate  (90 Base) MCG/ACT inhaler  azithromycin 500 MG tablet  Incruse Ellipta 62.5 MCG/ACT inhaler          [facility] Internal Medicine Resident Service    Activity as tolerated  Diet: common adult  Be compliant with your medications and take them as prescribed.     Special Instructions:   - Take Azithromycin 500mg tablet once a day for the next 2 days, last dose will be on 11/24/23  - Continue to take your medication   - Follow up with primary care physician next week, appointment has been scheduled on 11/28/23 at 1 PM  - Follow up with your upcoming

## 2023-11-22 NOTE — PROGRESS NOTES
[facility]  Internal Medicine Department     Attending Physician Statement:  Talib Marin. MAX. I have discussed the case, including pertinent history and exam findings with the resident. I have reviewed all past medical history, past surgical history, family history, social history, medications, and allergies and updated as appropriate in the history section of the chart. I have seen and examined the patient and the key elements of the encounter have been performed by me. I agree with the assessment, plan and orders as documented by the resident. Acute Hypoxic Hypercapnic Respiratory Failure   -2/2 COPD exacerbation likely from medication noncompliance and tobacco use   -patient still actively smoking at least 0.5 ppd and marajuana  -plan to continue LABA/ICS and LAMA;   -conitnue oral corticosteroids and azithromycin  -wean patinet to home 2 L at rest and 4 L with activity   -symptoms improving but patient still actively wheezing;   -smoking cessation and pulm rehab  -patient unable to get a CPAP to house 2/2 insurance and need for outpatient sleep study; discussed with patient that if she develops wheezing or other symptoms to return to hospital for evaluation      Depression  -conitnue current regimen and confrim patient is taking her medications   -no SI/HI at this time      External Hemmorhoids   -no signs of infection; patinet is having active bleeding prior to admission;   -topical pain control     Remainder of medical problems as per resident note.

## 2023-11-22 NOTE — CARE COORDINATION
Patient a potential discharge home today. Ambulating pulse-ox testing completed and patient required 4L NC. O2 orders to be faxed to [address] [place] [address] at [phone]. Plan is for patient to return home once portable oxygen tank is delivered and family to transport.     Electronically signed by TIGIST Cheung on 11/22/2023 at 9:31 AM

## 2023-11-22 NOTE — PLAN OF CARE
Problem: ABCDS Injury Assessment  Goal: Absence of physical injury  11/22/2023 0943 by Nahun Campbell RN  Outcome: Progressing  11/22/2023 0031 by Quintin Hopson RN  Outcome: Progressing     Problem: Chronic Conditions and Co-morbidities  Goal: Patient's chronic conditions and co-morbidity symptoms are monitored and maintained or improved  11/22/2023 0943 by Nahun Campbell RN  Outcome: Progressing  11/22/2023 0031 by Quintin Hopson RN  Outcome: Progressing     Problem: Discharge Planning  Goal: Discharge to home or other facility with appropriate resources  11/22/2023 0943 by Nahun Campbell RN  Outcome: Progressing  11/22/2023 0031 by Quintin Hopson RN  Outcome: Progressing

## 2023-11-22 NOTE — PLAN OF CARE
Problem: ABCDS Injury Assessment  Goal: Absence of physical injury  Outcome: Progressing     Problem: Chronic Conditions and Co-morbidities  Goal: Patient's chronic conditions and co-morbidity symptoms are monitored and maintained or improved  Outcome: Progressing     Problem: Discharge Planning  Goal: Discharge to home or other facility with appropriate resources  Outcome: Progressing

## 2023-11-22 NOTE — CARE COORDINATION
11/22:  Update CM Note:  CM spoke with [facility] & they will delivered Nebulizer, 02 tank. CM spoke with pt to discuss DME & she has no preferences for wc & bsc. She declined a list & has no preferences. MAMADOU sent referral to [address] [place] [address] DME.   Electronically signed by Oscar Rios RN on 11/22/2023 at 10:26 AM

## 2023-11-22 NOTE — PROGRESS NOTES
Occupational Therapy  OCCUPATIONAL THERAPY INITIAL EVALUATION    MICAH [facility]   [address] [facility]                                                Patient Name: Caitlin Chong  MRN: [id number]  : 1965  Room: [address] [place], [place] [address]A    Evaluating OT: Ian Cervantes OTR/L #[id number]     Referring Provider: Ronak Charles,   Specific Provider Orders/Date: OT eval and treat 23    Diagnosis: COPD exacerbation ([address]) [J44.1]  Rectal bleed [K62.5]  Acute respiratory failure with hypoxia and hypercapnia ([address]) [J96.01, J96.02]  Acute on chronic respiratory failure with hypoxia and hypercapnia ([address]) [J96.21, J96.22]  Acute hypoxic respiratory failure ([address]) [J96.01]   Pt admitted to hospital with SOB. Pertinent Medical History:  has a past medical history of CHF (congestive heart failure) ([address]), COPD (chronic obstructive pulmonary disease) ([address]), Depression, and Hypertension.        Precautions:  Fall Risk, O2, continuous pulse ox    Assessment of current deficits    [x] Functional mobility  [x]ADLs  [x] Strength               [x]Cognition    [x] Functional transfers   [x] IADLs         [x] Safety Awareness   [x]Endurance    [] Fine Coordination              [x] Balance      [] Vision/perception   []Sensation     []Gross Motor Coordination  [] ROM  [] Delirium                   [] Motor Control     OT PLAN OF CARE   OT POC based on physician orders, patient diagnosis and results of clinical assessment    Frequency/Duration 1-3 days/wk for 2 weeks PRN   Specific OT Treatment Interventions to include:   * Instruction/training on adapted ADL techniques and AE recommendations to increase functional independence within precautions       * Training on energy conservation strategies, correct breathing pattern and techniques to improve independence/tolerance for self-care routine  * Functional transfer/mobility training/DME recommendations for

## 2023-11-22 NOTE — CARE COORDINATION
11/22:  Update CM Note:  [facility] will deliver [facility] & bsc to the home address. [facility] called back advise pt would have a co pay of $30 bsc & 65.00 a month for the wc. CM advise pt & gave [employer] information at 211. Pt states she can't afford. Cm explained that she needs to switch her insurance to [place].   Electronically signed by Yodit Ruffin RN on 11/22/2023 at 1:37 PM

## 2023-11-22 NOTE — PROGRESS NOTES
PULSE OX ON ROOM AIR SITTING 92%   PULSE OX ON 4 LITERS SITTING RECOVERY 96%   PULSE OX ON ROOM AIR AMBULATING 82%   PULSE OX ON 4 LITERS AMBULATING RECOVERY 92%

## 2023-11-24 RX ORDER — HYDROXYZINE PAMOATE 25 MG/1
25 CAPSULE ORAL 3 TIMES DAILY PRN
Qty: 30 CAPSULE | Refills: 1 | Status: SHIPPED
Start: 2023-11-24 | End: 2023-11-24

## 2023-11-24 RX ORDER — IPRATROPIUM BROMIDE AND ALBUTEROL SULFATE 2.5; .5 MG/3ML; MG/3ML
1 SOLUTION RESPIRATORY (INHALATION) EVERY 4 HOURS
Qty: 360 ML | Refills: 1 | Status: SHIPPED
Start: 2023-11-24 | End: 2023-11-24

## 2023-11-24 RX ORDER — IPRATROPIUM BROMIDE AND ALBUTEROL SULFATE 2.5; .5 MG/3ML; MG/3ML
1 SOLUTION RESPIRATORY (INHALATION) EVERY 4 HOURS
Qty: 360 ML | Refills: 1 | Status: SHIPPED | OUTPATIENT
Start: 2023-11-24

## 2023-11-24 RX ORDER — HYDROXYZINE PAMOATE 25 MG/1
25 CAPSULE ORAL 3 TIMES DAILY PRN
Qty: 30 CAPSULE | Refills: 1 | Status: SHIPPED | OUTPATIENT
Start: 2023-11-24

## 2023-11-24 NOTE — TELEPHONE ENCOUNTER
Pt called in stating she would like refill on her vistaril and she would like a medication for her nebulizer she states she was just discharged from the hospital. But did not receive any orders for a nebulizer solution. Please send both medications to [facility] on [address] if ok.

## 2023-11-24 NOTE — TELEPHONE ENCOUNTER
Marlin and vistaril sent to [facility] as requested.     Electronically signed by Coreen Khanna DO on 11/24/2023 at 12:18 PM

## 2023-11-25 LAB
MICROORGANISM SPEC CULT: NORMAL
MICROORGANISM SPEC CULT: NORMAL
SERVICE CMNT-IMP: NORMAL
SERVICE CMNT-IMP: NORMAL
SPECIMEN DESCRIPTION: NORMAL
SPECIMEN DESCRIPTION: NORMAL

## 2023-11-28 ENCOUNTER — APPOINTMENT (OUTPATIENT)
Dept: GENERAL RADIOLOGY | Age: 58
End: 2023-11-28
Payer: MEDICARE

## 2023-11-28 ENCOUNTER — OFFICE VISIT (OUTPATIENT)
Dept: INTERNAL MEDICINE | Age: 58
End: 2023-11-28
Payer: COMMERCIAL

## 2023-11-28 ENCOUNTER — HOSPITAL ENCOUNTER (OUTPATIENT)
Age: 58
Setting detail: OBSERVATION
Discharge: HOME | End: 2023-11-30
Attending: EMERGENCY MEDICINE | Admitting: INTERNAL MEDICINE
Payer: MEDICARE

## 2023-11-28 ENCOUNTER — SOCIAL WORK (OUTPATIENT)
Dept: INTERNAL MEDICINE | Age: 58
End: 2023-11-28

## 2023-11-28 ENCOUNTER — CLINICAL DOCUMENTATION (OUTPATIENT)
Dept: INTERNAL MEDICINE | Age: 58
End: 2023-11-28

## 2023-11-28 VITALS
HEART RATE: 98 BPM | DIASTOLIC BLOOD PRESSURE: 82 MMHG | HEIGHT: 67 IN | RESPIRATION RATE: 20 BRPM | WEIGHT: 235 LBS | OXYGEN SATURATION: 96 % | SYSTOLIC BLOOD PRESSURE: 116 MMHG | BODY MASS INDEX: 36.88 KG/M2 | TEMPERATURE: 98.1 F

## 2023-11-28 DIAGNOSIS — J44.1 COPD EXACERBATION (HCC): Primary | ICD-10-CM

## 2023-11-28 DIAGNOSIS — Z09 HOSPITAL DISCHARGE FOLLOW-UP: Primary | ICD-10-CM

## 2023-11-28 DIAGNOSIS — Z72.0 TOBACCO USE: ICD-10-CM

## 2023-11-28 DIAGNOSIS — J96.21 ACUTE ON CHRONIC RESPIRATORY FAILURE WITH HYPOXIA (HCC): ICD-10-CM

## 2023-11-28 DIAGNOSIS — F32.A DEPRESSION, UNSPECIFIED DEPRESSION TYPE: ICD-10-CM

## 2023-11-28 DIAGNOSIS — J44.1 COPD WITH ACUTE EXACERBATION (HCC): ICD-10-CM

## 2023-11-28 DIAGNOSIS — F41.9 ANXIETY: ICD-10-CM

## 2023-11-28 DIAGNOSIS — Z59.9 FINANCIAL DIFFICULTIES: ICD-10-CM

## 2023-11-28 LAB
ALBUMIN SERPL-MCNC: 3.9 G/DL (ref 3.5–5.2)
ALP SERPL-CCNC: 66 U/L (ref 35–104)
ALT SERPL-CCNC: 16 U/L (ref 0–32)
ANION GAP SERPL CALCULATED.3IONS-SCNC: 11 MMOL/L (ref 7–16)
ANION GAP SERPL CALCULATED.3IONS-SCNC: 13 MMOL/L (ref 7–16)
AST SERPL-CCNC: 25 U/L (ref 0–31)
BASOPHILS # BLD: 0.05 K/UL (ref 0–0.2)
BASOPHILS NFR BLD: 1 % (ref 0–2)
BILIRUB SERPL-MCNC: 0.3 MG/DL (ref 0–1.2)
BNP SERPL-MCNC: <36 PG/ML (ref 0–125)
BUN SERPL-MCNC: 20 MG/DL (ref 6–20)
BUN SERPL-MCNC: 24 MG/DL (ref 6–20)
CALCIUM SERPL-MCNC: 9.1 MG/DL (ref 8.6–10.2)
CALCIUM SERPL-MCNC: 9.5 MG/DL (ref 8.6–10.2)
CHLORIDE SERPL-SCNC: 100 MMOL/L (ref 98–107)
CHLORIDE SERPL-SCNC: 99 MMOL/L (ref 98–107)
CO2 SERPL-SCNC: 27 MMOL/L (ref 22–29)
CO2 SERPL-SCNC: 27 MMOL/L (ref 22–29)
CREAT SERPL-MCNC: 0.6 MG/DL (ref 0.5–1)
CREAT SERPL-MCNC: 0.6 MG/DL (ref 0.5–1)
EOSINOPHIL # BLD: 0.18 K/UL (ref 0.05–0.5)
EOSINOPHILS RELATIVE PERCENT: 2 % (ref 0–6)
ERYTHROCYTE [DISTWIDTH] IN BLOOD BY AUTOMATED COUNT: 13.4 % (ref 11.5–15)
FLUAV RNA RESP QL NAA+PROBE: NOT DETECTED
FLUBV RNA RESP QL NAA+PROBE: NOT DETECTED
GFR SERPL CREATININE-BSD FRML MDRD: >60 ML/MIN/1.73M2
GFR SERPL CREATININE-BSD FRML MDRD: >60 ML/MIN/1.73M2
GLUCOSE SERPL-MCNC: 251 MG/DL (ref 74–99)
GLUCOSE SERPL-MCNC: 80 MG/DL (ref 74–99)
HCT VFR BLD AUTO: 46.9 % (ref 34–48)
HGB BLD-MCNC: 14.2 G/DL (ref 11.5–15.5)
IMM GRANULOCYTES # BLD AUTO: 0.03 K/UL (ref 0–0.58)
IMM GRANULOCYTES NFR BLD: 0 % (ref 0–5)
LYMPHOCYTES NFR BLD: 2.17 K/UL (ref 1.5–4)
LYMPHOCYTES RELATIVE PERCENT: 28 % (ref 20–42)
MAGNESIUM SERPL-MCNC: 1.8 MG/DL (ref 1.6–2.6)
MCH RBC QN AUTO: 29.8 PG (ref 26–35)
MCHC RBC AUTO-ENTMCNC: 30.3 G/DL (ref 32–34.5)
MCV RBC AUTO: 98.3 FL (ref 80–99.9)
MONOCYTES NFR BLD: 0.65 K/UL (ref 0.1–0.95)
MONOCYTES NFR BLD: 8 % (ref 2–12)
NEUTROPHILS NFR BLD: 60 % (ref 43–80)
NEUTS SEG NFR BLD: 4.65 K/UL (ref 1.8–7.3)
PLATELET # BLD AUTO: 160 K/UL (ref 130–450)
PMV BLD AUTO: 10.5 FL (ref 7–12)
POTASSIUM SERPL-SCNC: 4.5 MMOL/L (ref 3.5–5)
POTASSIUM SERPL-SCNC: 5.4 MMOL/L (ref 3.5–5)
PROT SERPL-MCNC: 7 G/DL (ref 6.4–8.3)
RBC # BLD AUTO: 4.77 M/UL (ref 3.5–5.5)
SARS-COV-2 RNA RESP QL NAA+PROBE: NOT DETECTED
SODIUM SERPL-SCNC: 138 MMOL/L (ref 132–146)
SODIUM SERPL-SCNC: 139 MMOL/L (ref 132–146)
SOURCE: NORMAL
SPECIMEN DESCRIPTION: NORMAL
TROPONIN I SERPL HS-MCNC: 9 NG/L (ref 0–9)
WBC OTHER # BLD: 7.7 K/UL (ref 4.5–11.5)

## 2023-11-28 PROCEDURE — 36415 COLL VENOUS BLD VENIPUNCTURE: CPT

## 2023-11-28 PROCEDURE — 84484 ASSAY OF TROPONIN QUANT: CPT

## 2023-11-28 PROCEDURE — G0378 HOSPITAL OBSERVATION PER HR: HCPCS

## 2023-11-28 PROCEDURE — 99285 EMERGENCY DEPT VISIT HI MDM: CPT

## 2023-11-28 PROCEDURE — 80053 COMPREHEN METABOLIC PANEL: CPT

## 2023-11-28 PROCEDURE — 87636 SARSCOV2 & INF A&B AMP PRB: CPT

## 2023-11-28 PROCEDURE — 85025 COMPLETE CBC W/AUTO DIFF WBC: CPT

## 2023-11-28 PROCEDURE — 83880 ASSAY OF NATRIURETIC PEPTIDE: CPT

## 2023-11-28 PROCEDURE — 6360000002 HC RX W HCPCS

## 2023-11-28 PROCEDURE — 96374 THER/PROPH/DIAG INJ IV PUSH: CPT

## 2023-11-28 PROCEDURE — 2580000003 HC RX 258: Performed by: NURSE PRACTITIONER

## 2023-11-28 PROCEDURE — 93005 ELECTROCARDIOGRAM TRACING: CPT | Performed by: NURSE PRACTITIONER

## 2023-11-28 PROCEDURE — 99214 OFFICE O/P EST MOD 30 MIN: CPT

## 2023-11-28 PROCEDURE — 83735 ASSAY OF MAGNESIUM: CPT

## 2023-11-28 PROCEDURE — 2580000003 HC RX 258

## 2023-11-28 PROCEDURE — 6370000000 HC RX 637 (ALT 250 FOR IP)

## 2023-11-28 PROCEDURE — 6360000002 HC RX W HCPCS: Performed by: NURSE PRACTITIONER

## 2023-11-28 PROCEDURE — 96372 THER/PROPH/DIAG INJ SC/IM: CPT

## 2023-11-28 PROCEDURE — 94640 AIRWAY INHALATION TREATMENT: CPT

## 2023-11-28 PROCEDURE — 1111F DSCHRG MED/CURRENT MED MERGE: CPT

## 2023-11-28 PROCEDURE — 6370000000 HC RX 637 (ALT 250 FOR IP): Performed by: EMERGENCY MEDICINE

## 2023-11-28 PROCEDURE — 71046 X-RAY EXAM CHEST 2 VIEWS: CPT

## 2023-11-28 PROCEDURE — 6370000000 HC RX 637 (ALT 250 FOR IP): Performed by: NURSE PRACTITIONER

## 2023-11-28 PROCEDURE — 80048 BASIC METABOLIC PNL TOTAL CA: CPT

## 2023-11-28 RX ORDER — SODIUM CHLORIDE 0.9 % (FLUSH) 0.9 %
5-40 SYRINGE (ML) INJECTION EVERY 12 HOURS SCHEDULED
Status: DISCONTINUED | OUTPATIENT
Start: 2023-11-28 | End: 2023-11-30 | Stop reason: HOSPADM

## 2023-11-28 RX ORDER — ACETAMINOPHEN 650 MG/1
650 SUPPOSITORY RECTAL EVERY 6 HOURS PRN
Status: DISCONTINUED | OUTPATIENT
Start: 2023-11-28 | End: 2023-11-30 | Stop reason: HOSPADM

## 2023-11-28 RX ORDER — POLYETHYLENE GLYCOL 3350 17 G/17G
17 POWDER, FOR SOLUTION ORAL DAILY PRN
Status: DISCONTINUED | OUTPATIENT
Start: 2023-11-28 | End: 2023-11-30 | Stop reason: HOSPADM

## 2023-11-28 RX ORDER — SODIUM CHLORIDE 0.9 % (FLUSH) 0.9 %
5-40 SYRINGE (ML) INJECTION PRN
Status: DISCONTINUED | OUTPATIENT
Start: 2023-11-28 | End: 2023-11-30 | Stop reason: HOSPADM

## 2023-11-28 RX ORDER — PREDNISONE 20 MG/1
10 TABLET ORAL DAILY
Status: DISCONTINUED | OUTPATIENT
Start: 2023-12-07 | End: 2023-11-30 | Stop reason: HOSPADM

## 2023-11-28 RX ORDER — PREDNISONE 20 MG/1
20 TABLET ORAL DAILY
Status: DISCONTINUED | OUTPATIENT
Start: 2023-12-04 | End: 2023-11-30 | Stop reason: HOSPADM

## 2023-11-28 RX ORDER — ACETAMINOPHEN 325 MG/1
650 TABLET ORAL EVERY 6 HOURS PRN
Status: DISCONTINUED | OUTPATIENT
Start: 2023-11-28 | End: 2023-11-30 | Stop reason: HOSPADM

## 2023-11-28 RX ORDER — OLANZAPINE 5 MG/1
5 TABLET ORAL NIGHTLY
Status: DISCONTINUED | OUTPATIENT
Start: 2023-11-28 | End: 2023-11-30 | Stop reason: HOSPADM

## 2023-11-28 RX ORDER — PREDNISONE 20 MG/1
40 TABLET ORAL DAILY
Status: DISCONTINUED | OUTPATIENT
Start: 2023-11-29 | End: 2023-11-30 | Stop reason: HOSPADM

## 2023-11-28 RX ORDER — ONDANSETRON 4 MG/1
4 TABLET, ORALLY DISINTEGRATING ORAL EVERY 8 HOURS PRN
Status: DISCONTINUED | OUTPATIENT
Start: 2023-11-28 | End: 2023-11-30 | Stop reason: HOSPADM

## 2023-11-28 RX ORDER — HYDROXYZINE PAMOATE 25 MG/1
25 CAPSULE ORAL
Status: COMPLETED | OUTPATIENT
Start: 2023-11-28 | End: 2023-11-28

## 2023-11-28 RX ORDER — ENOXAPARIN SODIUM 100 MG/ML
40 INJECTION SUBCUTANEOUS DAILY
Status: DISCONTINUED | OUTPATIENT
Start: 2023-11-28 | End: 2023-11-29

## 2023-11-28 RX ORDER — IPRATROPIUM BROMIDE AND ALBUTEROL SULFATE 2.5; .5 MG/3ML; MG/3ML
1 SOLUTION RESPIRATORY (INHALATION)
Status: DISCONTINUED | OUTPATIENT
Start: 2023-11-29 | End: 2023-11-30 | Stop reason: HOSPADM

## 2023-11-28 RX ORDER — IPRATROPIUM BROMIDE AND ALBUTEROL SULFATE 2.5; .5 MG/3ML; MG/3ML
1 SOLUTION RESPIRATORY (INHALATION) ONCE
Status: COMPLETED | OUTPATIENT
Start: 2023-11-28 | End: 2023-11-28

## 2023-11-28 RX ORDER — PANTOPRAZOLE SODIUM 40 MG/1
40 TABLET, DELAYED RELEASE ORAL
Status: DISCONTINUED | OUTPATIENT
Start: 2023-11-29 | End: 2023-11-30 | Stop reason: HOSPADM

## 2023-11-28 RX ORDER — NICOTINE 21 MG/24HR
1 PATCH, TRANSDERMAL 24 HOURS TRANSDERMAL ONCE
Status: COMPLETED | OUTPATIENT
Start: 2023-11-28 | End: 2023-11-29

## 2023-11-28 RX ORDER — BUDESONIDE 0.5 MG/2ML
0.5 INHALANT ORAL
Status: DISCONTINUED | OUTPATIENT
Start: 2023-11-28 | End: 2023-11-30 | Stop reason: HOSPADM

## 2023-11-28 RX ORDER — PREDNISONE 5 MG/1
5 TABLET ORAL DAILY
Status: DISCONTINUED | OUTPATIENT
Start: 2023-12-10 | End: 2023-11-30 | Stop reason: HOSPADM

## 2023-11-28 RX ORDER — DIVALPROEX SODIUM 250 MG/1
250 TABLET, DELAYED RELEASE ORAL EVERY 12 HOURS SCHEDULED
Status: DISCONTINUED | OUTPATIENT
Start: 2023-11-28 | End: 2023-11-30 | Stop reason: HOSPADM

## 2023-11-28 RX ORDER — ARFORMOTEROL TARTRATE 15 UG/2ML
15 SOLUTION RESPIRATORY (INHALATION)
Status: DISCONTINUED | OUTPATIENT
Start: 2023-11-28 | End: 2023-11-30 | Stop reason: HOSPADM

## 2023-11-28 RX ORDER — NICOTINE 21 MG/24HR
1 PATCH, TRANSDERMAL 24 HOURS TRANSDERMAL DAILY
Status: DISCONTINUED | OUTPATIENT
Start: 2023-11-29 | End: 2023-11-30 | Stop reason: HOSPADM

## 2023-11-28 RX ORDER — SODIUM CHLORIDE 9 MG/ML
INJECTION, SOLUTION INTRAVENOUS PRN
Status: DISCONTINUED | OUTPATIENT
Start: 2023-11-28 | End: 2023-11-30 | Stop reason: HOSPADM

## 2023-11-28 RX ORDER — ALBUTEROL SULFATE 2.5 MG/3ML
2.5 SOLUTION RESPIRATORY (INHALATION) EVERY 6 HOURS PRN
Status: DISCONTINUED | OUTPATIENT
Start: 2023-11-28 | End: 2023-11-30 | Stop reason: HOSPADM

## 2023-11-28 RX ORDER — ONDANSETRON 2 MG/ML
4 INJECTION INTRAMUSCULAR; INTRAVENOUS EVERY 6 HOURS PRN
Status: DISCONTINUED | OUTPATIENT
Start: 2023-11-28 | End: 2023-11-30 | Stop reason: HOSPADM

## 2023-11-28 RX ADMIN — ALBUTEROL SULFATE 2.5 MG: 2.5 SOLUTION RESPIRATORY (INHALATION) at 21:36

## 2023-11-28 RX ADMIN — HYDROXYZINE PAMOATE 25 MG: 25 CAPSULE ORAL at 23:29

## 2023-11-28 RX ADMIN — WATER 125 MG: 1 INJECTION INTRAMUSCULAR; INTRAVENOUS; SUBCUTANEOUS at 16:34

## 2023-11-28 RX ADMIN — OLANZAPINE 5 MG: 5 TABLET, FILM COATED ORAL at 22:43

## 2023-11-28 RX ADMIN — Medication 10 ML: at 22:43

## 2023-11-28 RX ADMIN — IPRATROPIUM BROMIDE AND ALBUTEROL SULFATE 1 DOSE: .5; 2.5 SOLUTION RESPIRATORY (INHALATION) at 15:51

## 2023-11-28 RX ADMIN — BUDESONIDE INHALATION 500 MCG: 0.5 SUSPENSION RESPIRATORY (INHALATION) at 21:36

## 2023-11-28 RX ADMIN — ENOXAPARIN SODIUM 40 MG: 100 INJECTION SUBCUTANEOUS at 22:44

## 2023-11-28 RX ADMIN — DIVALPROEX SODIUM 250 MG: 250 TABLET, DELAYED RELEASE ORAL at 22:42

## 2023-11-28 RX ADMIN — ARFORMOTEROL TARTRATE 15 MCG: 15 SOLUTION RESPIRATORY (INHALATION) at 21:36

## 2023-11-28 SDOH — ECONOMIC STABILITY - INCOME SECURITY: PROBLEM RELATED TO HOUSING AND ECONOMIC CIRCUMSTANCES, UNSPECIFIED: Z59.9

## 2023-11-28 ASSESSMENT — ENCOUNTER SYMPTOMS
SHORTNESS OF BREATH: 1
CONSTIPATION: 0
DIARRHEA: 0
COUGH: 1
VOMITING: 0
WHEEZING: 1
NAUSEA: 0
ABDOMINAL PAIN: 0
PHOTOPHOBIA: 0
CHEST TIGHTNESS: 0

## 2023-11-28 ASSESSMENT — PAIN - FUNCTIONAL ASSESSMENT: PAIN_FUNCTIONAL_ASSESSMENT: NONE - DENIES PAIN

## 2023-11-28 NOTE — ED PROVIDER NOTES
(PROTONIX) tablet 40 mg (40 mg Oral Given 11/29/23 0513)   albuterol (PROVENTIL) (2.5 MG/3ML) 0.083% nebulizer solution 2.5 mg (2.5 mg Nebulization Given 11/29/23 0634)   ipratropium 0.5 mg-albuterol 2.5 mg (DUONEB) nebulizer solution 1 Dose (1 Dose Inhalation Given 11/29/23 0736)   budesonide (PULMICORT) nebulizer suspension 500 mcg (500 mcg Nebulization Given 11/29/23 0736)   arformoterol tartrate (BROVANA) nebulizer solution 15 mcg (15 mcg Nebulization Given 11/29/23 0736)   nicotine (NICODERM CQ) 21 MG/24HR 1 patch (has no administration in time range)   glucose chewable tablet 16 g (has no administration in time range)   dextrose bolus 10% 125 mL (has no administration in time range)     Or   dextrose bolus 10% 250 mL (has no administration in time range)   glucagon injection 1 mg (has no administration in time range)   dextrose 10 % infusion (has no administration in time range)   methylPREDNISolone sodium succ (SOLU-MEDROL) 125 mg in sterile water 2 mL injection (125 mg IntraVENous Given 11/28/23 1634)   ipratropium 0.5 mg-albuterol 2.5 mg (DUONEB) nebulizer solution 1 Dose (1 Dose Inhalation Given 11/28/23 1551)   hydrOXYzine pamoate (VISTARIL) capsule 25 mg (25 mg Oral Given 11/28/23 8189)             Medical Decision Making:    presenting to the ED for shortness of breath okay, beginning earlier today. The complaint has been persistent, mild in severity, and worsened by nothing. Presents via a wheelchair on oxygen which is chronic for her at 2 L nasal cannula states she was seen at PCP office earlier today after she was being evaluated for a follow-up from a recent admission for COPD exacerbation. States that she became short of breath while at the office to get a walking pulse ox and with her oxygen on her pulse oximetry dropped to 77%. She was subsequently sent here for evaluation of COPD exacerbation. She continues to smoke cigarettes on a daily basis.   States she has not required any additional telemetry  Patient condition is stable        NOTE: This report was transcribed using voice recognition software.  Every effort was made to ensure accuracy; however, inadvertent computerized transcription errors may be present          ALICIA Jimenez - OMEGA  11/29/23 9416

## 2023-11-28 NOTE — PROGRESS NOTES
LISW met with pt due to increased depression surrounding medical challenges. Pt reports she is overwhelmed due to her physical health challenges. Pt denies current thoughts of SI, denies plan, and denies intent. Pt reports in the past she has had fleeting thoughts but would never harm herself. Pt displayed insight and reports she wants to be able to be more independent. Pt has a prior substance use hx but has not used in over one month. Pt has follow up with [facility] scheduled for outpatient counseling. LISW to continue to follow pt as needed.

## 2023-11-28 NOTE — PROGRESS NOTES
Ambulated pt 50 feet. Starting sp02 90% on 2 liters of 02. Pt maintained sp02 of 76%-82% while ambulating. Pt had to stop and sit multiple times. Assisted pt back to room. After 3 minutes at rest pt sp02 was 92% on 2 liters 02.
Transport called to transfer pt to ED. ED contacted,nurse to nurse report given.
Psychiatry assessment   Outpatient [facility] follow-up will be essential with dual diagnosis of methamphetamine abuse in the recent past     Tobacco Use    Smoking cessation counseling     Remainder of medical problems as per resident note.     Micha Duncan MD, [place]   11/28/2023 1:57 PM
Physical Exam  Constitutional:       Appearance: Normal appearance. Eyes:      Pupils: Pupils are equal, round, and reactive to light. Cardiovascular:      Rate and Rhythm: Normal rate and regular rhythm. Pulses: Normal pulses. Heart sounds: Normal heart sounds. No murmur heard. Pulmonary:      Effort: Pulmonary effort is normal.      Breath sounds: Examination of the right-upper field reveals wheezing. Examination of the left-upper field reveals wheezing. Examination of the right-middle field reveals wheezing. Examination of the left-middle field reveals wheezing. Examination of the right-lower field reveals wheezing. Examination of the left-lower field reveals wheezing. Wheezing present. No rhonchi. Comments: Patient is profusely wheezing throughout all lung fields, sounds tight when auscultating air movement. Abdominal:      General: Abdomen is flat. There is no distension. Palpations: Abdomen is soft. Tenderness: There is no abdominal tenderness. Musculoskeletal:         General: Normal range of motion. Cervical back: Normal range of motion. Skin:     General: Skin is warm and dry. Capillary Refill: Capillary refill takes less than 2 seconds. Neurological:      General: No focal deficit present. Mental Status: She is alert and oriented to person, place, and time. Psychiatric:         Mood and Affect: Mood normal.         Behavior: Behavior normal.         An electronic signature was used to authenticate this note.   --Amelia Cavanaugh MD

## 2023-11-29 PROBLEM — F60.3 BORDERLINE PERSONALITY DISORDER (HCC): Status: ACTIVE | Noted: 2023-11-29

## 2023-11-29 LAB
ANION GAP SERPL CALCULATED.3IONS-SCNC: 8 MMOL/L (ref 7–16)
BASOPHILS # BLD: 0 K/UL (ref 0–0.2)
BASOPHILS NFR BLD: 0 % (ref 0–2)
BUN SERPL-MCNC: 17 MG/DL (ref 6–20)
CALCIUM SERPL-MCNC: 9.2 MG/DL (ref 8.6–10.2)
CHLORIDE SERPL-SCNC: 102 MMOL/L (ref 98–107)
CO2 SERPL-SCNC: 32 MMOL/L (ref 22–29)
CREAT SERPL-MCNC: 0.5 MG/DL (ref 0.5–1)
EKG ATRIAL RATE: 104 BPM
EKG P AXIS: 56 DEGREES
EKG P-R INTERVAL: 140 MS
EKG Q-T INTERVAL: 344 MS
EKG QRS DURATION: 76 MS
EKG QTC CALCULATION (BAZETT): 452 MS
EKG R AXIS: 23 DEGREES
EKG T AXIS: 65 DEGREES
EKG VENTRICULAR RATE: 104 BPM
EOSINOPHIL # BLD: 0 K/UL (ref 0.05–0.5)
EOSINOPHILS RELATIVE PERCENT: 0 % (ref 0–6)
ERYTHROCYTE [DISTWIDTH] IN BLOOD BY AUTOMATED COUNT: 12.6 % (ref 11.5–15)
GFR SERPL CREATININE-BSD FRML MDRD: >60 ML/MIN/1.73M2
GLUCOSE SERPL-MCNC: 138 MG/DL (ref 74–99)
HCT VFR BLD AUTO: 43.4 % (ref 34–48)
HGB BLD-MCNC: 13.5 G/DL (ref 11.5–15.5)
IMM GRANULOCYTES # BLD AUTO: 0.03 K/UL (ref 0–0.58)
IMM GRANULOCYTES NFR BLD: 1 % (ref 0–5)
LYMPHOCYTES NFR BLD: 0.76 K/UL (ref 1.5–4)
LYMPHOCYTES RELATIVE PERCENT: 12 % (ref 20–42)
MCH RBC QN AUTO: 29.9 PG (ref 26–35)
MCHC RBC AUTO-ENTMCNC: 31.1 G/DL (ref 32–34.5)
MCV RBC AUTO: 96 FL (ref 80–99.9)
MONOCYTES NFR BLD: 0.26 K/UL (ref 0.1–0.95)
MONOCYTES NFR BLD: 4 % (ref 2–12)
NEUTROPHILS NFR BLD: 84 % (ref 43–80)
NEUTS SEG NFR BLD: 5.49 K/UL (ref 1.8–7.3)
PLATELET # BLD AUTO: 182 K/UL (ref 130–450)
PMV BLD AUTO: 10.7 FL (ref 7–12)
POTASSIUM SERPL-SCNC: 4.3 MMOL/L (ref 3.5–5)
RBC # BLD AUTO: 4.52 M/UL (ref 3.5–5.5)
SODIUM SERPL-SCNC: 142 MMOL/L (ref 132–146)
WBC OTHER # BLD: 6.5 K/UL (ref 4.5–11.5)

## 2023-11-29 PROCEDURE — 97530 THERAPEUTIC ACTIVITIES: CPT

## 2023-11-29 PROCEDURE — 97165 OT EVAL LOW COMPLEX 30 MIN: CPT

## 2023-11-29 PROCEDURE — 80048 BASIC METABOLIC PNL TOTAL CA: CPT

## 2023-11-29 PROCEDURE — 93010 ELECTROCARDIOGRAM REPORT: CPT | Performed by: INTERNAL MEDICINE

## 2023-11-29 PROCEDURE — 6370000000 HC RX 637 (ALT 250 FOR IP)

## 2023-11-29 PROCEDURE — 96372 THER/PROPH/DIAG INJ SC/IM: CPT

## 2023-11-29 PROCEDURE — 85025 COMPLETE CBC W/AUTO DIFF WBC: CPT

## 2023-11-29 PROCEDURE — 6360000002 HC RX W HCPCS

## 2023-11-29 PROCEDURE — 36415 COLL VENOUS BLD VENIPUNCTURE: CPT

## 2023-11-29 PROCEDURE — 99221 1ST HOSP IP/OBS SF/LOW 40: CPT | Performed by: INTERNAL MEDICINE

## 2023-11-29 PROCEDURE — G0378 HOSPITAL OBSERVATION PER HR: HCPCS

## 2023-11-29 PROCEDURE — 80165 DIPROPYLACETIC ACID FREE: CPT

## 2023-11-29 PROCEDURE — 97161 PT EVAL LOW COMPLEX 20 MIN: CPT

## 2023-11-29 PROCEDURE — 99223 1ST HOSP IP/OBS HIGH 75: CPT | Performed by: INTERNAL MEDICINE

## 2023-11-29 PROCEDURE — 94640 AIRWAY INHALATION TREATMENT: CPT

## 2023-11-29 PROCEDURE — 99221 1ST HOSP IP/OBS SF/LOW 40: CPT | Performed by: NURSE PRACTITIONER

## 2023-11-29 PROCEDURE — 2700000000 HC OXYGEN THERAPY PER DAY

## 2023-11-29 PROCEDURE — 2580000003 HC RX 258

## 2023-11-29 RX ORDER — ENOXAPARIN SODIUM 100 MG/ML
30 INJECTION SUBCUTANEOUS 2 TIMES DAILY
Status: DISCONTINUED | OUTPATIENT
Start: 2023-11-29 | End: 2023-11-30 | Stop reason: HOSPADM

## 2023-11-29 RX ORDER — HYDRALAZINE HYDROCHLORIDE 20 MG/ML
10 INJECTION INTRAMUSCULAR; INTRAVENOUS EVERY 4 HOURS PRN
Status: DISCONTINUED | OUTPATIENT
Start: 2023-11-29 | End: 2023-11-30 | Stop reason: HOSPADM

## 2023-11-29 RX ORDER — DEXTROSE MONOHYDRATE 100 MG/ML
INJECTION, SOLUTION INTRAVENOUS CONTINUOUS PRN
Status: DISCONTINUED | OUTPATIENT
Start: 2023-11-29 | End: 2023-11-30 | Stop reason: HOSPADM

## 2023-11-29 RX ORDER — GUAIFENESIN 400 MG/1
400 TABLET ORAL 3 TIMES DAILY
Status: DISCONTINUED | OUTPATIENT
Start: 2023-11-29 | End: 2023-11-30 | Stop reason: HOSPADM

## 2023-11-29 RX ORDER — HYDROXYZINE PAMOATE 25 MG/1
25 CAPSULE ORAL 3 TIMES DAILY PRN
Status: DISCONTINUED | OUTPATIENT
Start: 2023-11-29 | End: 2023-11-30 | Stop reason: HOSPADM

## 2023-11-29 RX ORDER — AMLODIPINE BESYLATE 5 MG/1
5 TABLET ORAL DAILY
Status: DISCONTINUED | OUTPATIENT
Start: 2023-11-29 | End: 2023-11-30 | Stop reason: HOSPADM

## 2023-11-29 RX ADMIN — PANTOPRAZOLE SODIUM 40 MG: 40 TABLET, DELAYED RELEASE ORAL at 05:13

## 2023-11-29 RX ADMIN — IPRATROPIUM BROMIDE AND ALBUTEROL SULFATE 1 DOSE: .5; 2.5 SOLUTION RESPIRATORY (INHALATION) at 07:36

## 2023-11-29 RX ADMIN — DIVALPROEX SODIUM 250 MG: 250 TABLET, DELAYED RELEASE ORAL at 10:45

## 2023-11-29 RX ADMIN — BUDESONIDE INHALATION 500 MCG: 0.5 SUSPENSION RESPIRATORY (INHALATION) at 07:36

## 2023-11-29 RX ADMIN — BUDESONIDE INHALATION 500 MCG: 0.5 SUSPENSION RESPIRATORY (INHALATION) at 20:36

## 2023-11-29 RX ADMIN — HYDROXYZINE PAMOATE 25 MG: 25 CAPSULE ORAL at 12:21

## 2023-11-29 RX ADMIN — ENOXAPARIN SODIUM 40 MG: 100 INJECTION SUBCUTANEOUS at 10:45

## 2023-11-29 RX ADMIN — IPRATROPIUM BROMIDE AND ALBUTEROL SULFATE 1 DOSE: .5; 2.5 SOLUTION RESPIRATORY (INHALATION) at 16:59

## 2023-11-29 RX ADMIN — ENOXAPARIN SODIUM 30 MG: 100 INJECTION SUBCUTANEOUS at 20:27

## 2023-11-29 RX ADMIN — AMLODIPINE BESYLATE 5 MG: 5 TABLET ORAL at 15:59

## 2023-11-29 RX ADMIN — IPRATROPIUM BROMIDE AND ALBUTEROL SULFATE 1 DOSE: .5; 2.5 SOLUTION RESPIRATORY (INHALATION) at 11:52

## 2023-11-29 RX ADMIN — OLANZAPINE 5 MG: 5 TABLET, FILM COATED ORAL at 20:27

## 2023-11-29 RX ADMIN — GUAIFENESIN 400 MG: 400 TABLET ORAL at 20:27

## 2023-11-29 RX ADMIN — ARFORMOTEROL TARTRATE 15 MCG: 15 SOLUTION RESPIRATORY (INHALATION) at 20:36

## 2023-11-29 RX ADMIN — Medication 10 ML: at 10:46

## 2023-11-29 RX ADMIN — ALBUTEROL SULFATE 2.5 MG: 2.5 SOLUTION RESPIRATORY (INHALATION) at 06:34

## 2023-11-29 RX ADMIN — PREDNISONE 40 MG: 20 TABLET ORAL at 10:45

## 2023-11-29 RX ADMIN — DIVALPROEX SODIUM 250 MG: 250 TABLET, DELAYED RELEASE ORAL at 20:27

## 2023-11-29 RX ADMIN — ARFORMOTEROL TARTRATE 15 MCG: 15 SOLUTION RESPIRATORY (INHALATION) at 07:36

## 2023-11-29 RX ADMIN — GUAIFENESIN 400 MG: 400 TABLET ORAL at 12:21

## 2023-11-29 RX ADMIN — Medication 10 ML: at 20:28

## 2023-11-29 RX ADMIN — HYDROXYZINE PAMOATE 25 MG: 25 CAPSULE ORAL at 20:35

## 2023-11-29 RX ADMIN — GUAIFENESIN 400 MG: 400 TABLET ORAL at 15:59

## 2023-11-29 RX ADMIN — IPRATROPIUM BROMIDE AND ALBUTEROL SULFATE 1 DOSE: .5; 2.5 SOLUTION RESPIRATORY (INHALATION) at 20:36

## 2023-11-29 ASSESSMENT — PAIN SCALES - GENERAL
PAINLEVEL_OUTOF10: 0

## 2023-11-29 NOTE — PROGRESS NOTES
Positioning to prevent skin breakdown and contractures  [x] Safety and Education Training   [] Patient/Caregiver Education   [] HEP  [] Gait Team to be added to POC  [] Other     PT long term treatment goals are located in above grid    Frequency of treatments: 2-5x/week x 1-2 weeks. Time in  0800  Time out  0825    Total Treatment Time  10 minutes     Evaluation Time includes thorough review of current medical information, gathering information on past medical history/social history and prior level of function, completion of standardized testing/informal observation of tasks, assessment of data and education on plan of care and goals.     CPT codes:  [x] Low Complexity PT evaluation 53193  [] Moderate Complexity PT evaluation 28168  [] High Complexity PT evaluation 37950  [] PT Re-evaluation 57901  [] Gait training 06458 - minutes  [] Manual therapy 17380  minutes  [x] Therapeutic activities 13879 -10 minutes  [] Therapeutic exercises 81483 - minutes  [] Neuromuscular reeducation [id number] minutes     Nilda Cabot[address]

## 2023-11-29 NOTE — PROGRESS NOTES
Occupational Therapy          OCCUPATIONAL THERAPY INITIAL EVALUATION    MICAH Solis [address] [facility]   [address], [place], [address] [facility]      Date:2023                 Patient Name: Alejandra Younger  MRN: [id number]  : 1965  Room: [address] [place] [address]    Referring Provider:   Landen Agrawal MD              Specific Provider Orders/Date: OT evaluation and treat 23    Evaluating OT: Jessica Gaming, OTR/L #[id number]    Diagnosis: COPD exacerbation ([address]) [J44.1]      Surgery:   Past Surgical History:   Procedure Laterality Date     SECTION      HYSTERECTOMY (CERVIX STATUS UNKNOWN)            Pertinent Medical History:  has a past medical history of CHF (congestive heart failure) ([address]), COPD (chronic obstructive pulmonary disease) ([address]), Depression, and Hypertension.      Precautions:  Fall Risk, 2.5 L O2, poor endurance, anxious    Assessment of current deficits   [x] Functional mobility  [x]ADLs  [] Strength               []Cognition   [x] Functional transfers   [x] IADLs         [x] Safety Awareness   [x]Endurance   [] Fine Coordination              [] Balance      [] Vision/perception   []Sensation    []Gross Motor Coordination  [] ROM  [] Delirium                   [] Motor Control     OT PLAN OF CARE   OT POC based on physician orders, patient diagnosis and results of clinical assessment    Frequency/Duration   1-3 days/wk for 1 week PRN   Specific OT Treatment Interventions to include:   * Instruction/training on adapted ADL techniques and AE recommendations to increase functional independence within precautions       * Training on energy conservation strategies, correct breathing pattern and techniques to improve independence/tolerance for self-care routine  * Functional transfer/mobility training/DME recommendations for increased independence, safety, and fall prevention  * Patient/Family education to increase follow through with safety techniques and Activity Tolerance Poor Lowest 77% on 2.5 L returned to 94% on 3L encouraged pacing and deep breathing with rest breaks as needed  good   Visual/  Perceptual Glasses: reading [employer]          Safety Good-                                  Good      Hand Dominance R    AROM (PROM) Strength Additional Info:    RUE  WFL 4/5 good  and wfl FMC/dexterity noted during ADL tasks       LUE WFL 4/5 good  and wfl FMC/dexterity noted during ADL tasks     Hearing: WFL   Sensation:   No c/o numbness or tingling   Tone: WFL   Edema: none noted    Comments: Upon arrival patient supine and agreeable to evaluation. Performed OT evaluation with education on pacing , EC and safety with ADL completion with modified techniques as well as use of rollator to aid in endurance and rest breaks as well as assist for carrying objects. Case management alerted to no bathroom facility on main floor. Patient does not go upstairs and has to void in a bucket. At end of session, patient sitting up in chair and  with call light and phone within reach, all lines and tubes intact. Nursing notified. Overall patient demonstrated  decreased independence and safety during completion of ADL/functional transfer/mobility tasks. Pt would benefit from continued skilled OT to increase safety and independence with completion of ADL/IADL tasks for functional independence and quality of life.     Treatment: OT treatment provided this date includes:   Instruction/training on safety and adapted techniques for completion of ADLs: to increase [place] in self care within precautions  with AE/DME prn   Functional transfer/mobility training/DME recommendations for increased  independence, safety, and fall prevention with  rollator   Instruction/training on energy conservation/work simplification for completion of ADLs: techniques to increase [place] with self care ADLs & iADLs, work simplification to improve endurance as well as pacing education   Proper

## 2023-11-29 NOTE — PROGRESS NOTES
[address] [facility]  Internal Medicine Residency / [address]    Attending Physician Statement  I have discussed the case, including pertinent history and exam findings with the resident and the team.  I have seen and examined the patient and the key elements of the encounter have been performed by me. I agree with the assessment, plan and orders as documented by the resident. Admitted with exacerbation of COPD  Around smoke and smoking  A&O  VS stable   C/O dysuria  Plan; Workup in progress  Steroids to continue    Remainder of medical problems as per resident note.       Francisca Fletcher MD [facility]  Internal Medicine Residency Faculty

## 2023-11-29 NOTE — CONSULTS
Department of Psychiatry  Behavioral Health Consult    REASON FOR CONSULT: suicidal ideations at home     CONSULTING PHYSICIAN: Miladys Ellis DO    History obtained from: patient interview and chart review     HISTORY OF PRESENT ILLNESS:      The patient is a 63 y.o. female with significant past psychiatric history of borderline personality disorder, interpersonal relationship conflicts, history of substance abuse (meth, heroin, LSD, crack) who presents with COPD exacerbation. Psychiatry was consulted for suicidal ideation at home. Patient is known to these providers, her primary problems are social dysfunction, her personality disorder, physical health complaints and recent sobriety. She was recently admitted to behavioral health, and was doing well on the medications. She is now reporting that the medications are no longer helping her. She is agreeable to medication adjustment. She states that she is not suicidal and has no plan or intention to harm herself. She tells me that she says these things out of frustration but is not going to act on it. She states that she has felt this way her whole life \"since I was 12years old\" Through out conversation she is very goal directed and future focused. She tells me that she is relying on her daughter to get a one-floor home so she can start doing things on her own again. She states that she is currently residing in a home with 12 people and has a small living space on the first floor, she states that due to her respiratory issues she cannot climb the stairs and is forced to urinate in a container. She states that she cannot tolerate climbing the stairs. She then talks about her daughter not having any patience for her medical problems and her physical limitation. She states that she wants to be able to do things for herself again and that it has been difficult having physical limitation.  She states that everything is hard for her and she is constantly out of  182     Recent Labs     11/28/23  1545 11/28/23  2142 11/29/23  0633    139 142   K 5.4* 4.5 4.3    99 102   CO2 27 27 32*   BUN 20 24* 17   CREATININE 0.6 0.6 0.5   GLUCOSE 80 251* 138*     Recent Labs     11/28/23  1545   BILITOT 0.3   ALKPHOS 66   AST 25   ALT 16     Lab Results   Component Value Date/Time    BARBSCNU NEGATIVE 11/19/2023 09:22 PM    LABBENZ NEGATIVE 11/19/2023 09:22 PM    LABMETH NEGATIVE 11/19/2023 09:22 PM     No results found for: \"TSH\", \"FREET4\"  No results found for: \"LITHIUM\"  No results found for: \"VALPROATE\", \"CBMZ\"  No results found for: \"LITHIUM\", \"VALPROATE\"    FURTHER LABS ORDERED :      Radiology   XR CHEST (2 VW)    Result Date: 11/28/2023  EXAMINATION: TWO XRAY VIEWS OF THE CHEST 11/28/2023 6:32 pm COMPARISON: 11/19/2023 HISTORY: ORDERING SYSTEM PROVIDED HISTORY: cp TECHNOLOGIST PROVIDED HISTORY: Reason for exam:->cp What reading provider will be dictating this exam?->CRC FINDINGS: Cardiac silhouette is within normal limits. Pulmonary vasculature is within normal limits. The lungs are clear. No pneumothorax is identified. Bony structures are unremarkable. No acute cardiopulmonary process. XR CHEST PORTABLE    Result Date: 11/19/2023  EXAMINATION: ONE XRAY VIEW OF THE CHEST PORTABLE UPRIGHT 11/19/2023 4:19 pm COMPARISON: 11/06/2023 HISTORY: ORDERING SYSTEM PROVIDED HISTORY: shortness of breath TECHNOLOGIST PROVIDED HISTORY: Reason for exam:->shortness of breath What reading provider will be dictating this exam?->CRC FINDINGS: Limited portable technique. No significant change. Normal heart size. No acute pulmonary infiltrate. No vascular congestion or significant pleural effusion. No pneumothorax. No acute cardiopulmonary disease. XR CHEST PORTABLE    Result Date: 11/6/2023  EXAMINATION: ONE XRAY VIEW OF THE CHEST 11/6/2023 5:00 pm COMPARISON: None.  HISTORY: ORDERING SYSTEM PROVIDED HISTORY: sob TECHNOLOGIST PROVIDED HISTORY: Reason for

## 2023-11-29 NOTE — PLAN OF CARE
PS received regarding patient request for Vistaril. This medication, per her med rec, is ordered for itching. Verified patient is not having any itching at this time, but is requesting the Vistaril \"to help her relax\" and that she uses it at home when she is anxious instead of for itching. Further review shows prior orders for anxiety, but current med rec shows itching. Will be sure this is clarified in the morning. Will order 1x dose this evening and monitor for tolerance as patient is admitted for COPD exacerbation.      Electronically signed by Jeanine Pierce DO on 11/28/2023 at 11:12 PM

## 2023-11-29 NOTE — PROGRESS NOTES
Pulse ox on 2____ liters, sitting recovery _92___%  Pulse ox on _2__ liters, ambulating recovery __89___%     Takes patient about 2 min to recover from ambulating with 2L O2 with a saturation of 89%. After two min O2 comes up to 92%.

## 2023-11-29 NOTE — H&P
[facility]  Internal Medicine Residency Program  History and Physical    Patient:  Tatiana Jones 63 y.o. female MRN: [id number]     Date of Service: 11/28/2023    Hospital Day: 1      Chief complaint: had concerns including Shortness of Breath (At PCP for follow up and did walking pulse ox with baseline 2LNC and dropped to 77%. ). History of Present Illness   The patient is a 63 y.o. female w/ PMHx of COPD  w mult recent hospitalization for COPD exacerbations, CHF per patient, BENJAMIN, and tobacco abuse who presented to the ED for worsening SOB. Following her most recent hospital discharge on 11/22, she completed her azithromycin course but was not discharged with any steroids. Patient had worsening SOB over the past 3 days and presented to her PCP clinic today. At clinic, her O2 saturation dropped to 77% and patient advised to come to the ED. In ED, patient complained of SOB associated with sensation of tight, crampy diaphragm area, and a moist cough beginning yesterday but is not expectorating anything. She reported that her SOB has been better than it was prior to her most recent hospital admission, but she remains unable to use the stairs, ambulate without getting SOB, or do work around the house. Her home O2 is 2L NC. She also complained of slight burning with urination. She states she does not drink EtOH and has been clean from illicit substances for 1.5 months. She denied any sick contacts    Patient also explained that her current, difficult living situation is the cause of her persistent smoking of 10-20 cigarettes per day. She explained that she lives at home w her daughter b/c her daughter prevents her from being homeless and helps her stay clean from meth. She reported that 12 people live at home and the adults within that group are mad at her that she is unable to use the stairs or work around the house.  Because of this, the patient has been voluntarily holding her urine and stool to light, extraocular eye movements intact, conjunctivae normal  Pulmonary/Chest: wheezing present on expiration, inspiratory phase without coarse sounds  Cardiovascular: normal rate, normal S1 and S2, no gallops, and intact distal pulses  Abdomen: soft, non-tender, non-distended, normal bowel sounds, no masses or organomegaly  Extremities: no cyanosis, no clubbing, and trace edema that she reports is chronic  Neurologic:   Mental status: ANOx4, attention and concentration appropriate, thought content appropriate, affect full, no aphsia  Motor: moves all extremities spontaneously and follows commmands  Labs and Imaging Studies   Basic Labs  Recent Labs     11/28/23  1545      K 5.4*      CO2 27   BUN 20   CREATININE 0.6   GLUCOSE 80   CALCIUM 9.1       Recent Labs     11/28/23  1545   WBC 7.7   RBC 4.77   HGB 14.2   HCT 46.9   MCV 98.3   MCH 29.8   MCHC 30.3*   RDW 13.4      MPV 10.5       Imaging Studies:     XR CHEST (2 VW)    Result Date: 11/28/2023  EXAMINATION: TWO XRAY VIEWS OF THE CHEST 11/28/2023 6:32 pm COMPARISON: 11/19/2023 HISTORY: ORDERING SYSTEM PROVIDED HISTORY: cp TECHNOLOGIST PROVIDED HISTORY: Reason for exam:->cp What reading provider will be dictating this exam?->CRC FINDINGS: Cardiac silhouette is within normal limits. Pulmonary vasculature is within normal limits. The lungs are clear. No pneumothorax is identified. Bony structures are unremarkable. No acute cardiopulmonary process. XR CHEST PORTABLE    Result Date: 11/19/2023  EXAMINATION: ONE XRAY VIEW OF THE CHEST PORTABLE UPRIGHT 11/19/2023 4:19 pm COMPARISON: 11/06/2023 HISTORY: ORDERING SYSTEM PROVIDED HISTORY: shortness of breath TECHNOLOGIST PROVIDED HISTORY: Reason for exam:->shortness of breath What reading provider will be dictating this exam?->CRC FINDINGS: Limited portable technique. No significant change. Normal heart size. No acute pulmonary infiltrate.   No vascular congestion or significant pleural

## 2023-11-29 NOTE — PROGRESS NOTES
4 Eyes Skin Assessment     NAME:  Bam Phillips OF BIRTH:  1965  MEDICAL RECORD NUMBER:  [id number]    The patient is being assessed for  Admission    I agree that at least one RN has performed a thorough Head to Toe Skin Assessment on the patient. ALL assessment sites listed below have been assessed. Areas assessed by both nurses:    Head, Face, Ears, Shoulders, Back, Chest, Arms, Elbows, Hands, Sacrum. Buttock, Coccyx, Ischium, and Legs. Feet and Heels        Does the Patient have a Wound?  No noted wound(s)       Gabe Prevention initiated by RN: No  Wound Care Orders initiated by RN: No    Pressure Injury (Stage 3,4, Unstageable, DTI, NWPT, and Complex wounds) if present, place Wound referral order by RN under : No    New Ostomies, if present place, Ostomy referral order under : No     Nurse 1 eSignature: Electronically signed by Nigel Lai RN on 11/28/23 at 10:36 PM EST    **SHARE this note so that the co-signing nurse can place an eSignature**    Nurse 2 eSignature: {Esignature:[id number]}

## 2023-11-30 VITALS
DIASTOLIC BLOOD PRESSURE: 82 MMHG | SYSTOLIC BLOOD PRESSURE: 138 MMHG | WEIGHT: 235.01 LBS | TEMPERATURE: 97.3 F | OXYGEN SATURATION: 94 % | BODY MASS INDEX: 36.89 KG/M2 | RESPIRATION RATE: 22 BRPM | HEIGHT: 67 IN | HEART RATE: 129 BPM

## 2023-11-30 LAB
ANION GAP SERPL CALCULATED.3IONS-SCNC: 9 MMOL/L (ref 7–16)
BASOPHILS # BLD: 0.03 K/UL (ref 0–0.2)
BASOPHILS NFR BLD: 0 % (ref 0–2)
BUN SERPL-MCNC: 17 MG/DL (ref 6–20)
CALCIUM SERPL-MCNC: 9.3 MG/DL (ref 8.6–10.2)
CHLORIDE SERPL-SCNC: 102 MMOL/L (ref 98–107)
CO2 SERPL-SCNC: 33 MMOL/L (ref 22–29)
CREAT SERPL-MCNC: 0.7 MG/DL (ref 0.5–1)
EOSINOPHIL # BLD: 0.01 K/UL (ref 0.05–0.5)
EOSINOPHILS RELATIVE PERCENT: 0 % (ref 0–6)
ERYTHROCYTE [DISTWIDTH] IN BLOOD BY AUTOMATED COUNT: 12.6 % (ref 11.5–15)
GFR SERPL CREATININE-BSD FRML MDRD: >60 ML/MIN/1.73M2
GLUCOSE SERPL-MCNC: 99 MG/DL (ref 74–99)
HCT VFR BLD AUTO: 41.8 % (ref 34–48)
HGB BLD-MCNC: 12.9 G/DL (ref 11.5–15.5)
IMM GRANULOCYTES # BLD AUTO: <0.03 K/UL (ref 0–0.58)
IMM GRANULOCYTES NFR BLD: 0 % (ref 0–5)
LYMPHOCYTES NFR BLD: 2.14 K/UL (ref 1.5–4)
LYMPHOCYTES RELATIVE PERCENT: 29 % (ref 20–42)
MCH RBC QN AUTO: 29.9 PG (ref 26–35)
MCHC RBC AUTO-ENTMCNC: 30.9 G/DL (ref 32–34.5)
MCV RBC AUTO: 97 FL (ref 80–99.9)
MONOCYTES NFR BLD: 0.68 K/UL (ref 0.1–0.95)
MONOCYTES NFR BLD: 9 % (ref 2–12)
NEUTROPHILS NFR BLD: 62 % (ref 43–80)
NEUTS SEG NFR BLD: 4.6 K/UL (ref 1.8–7.3)
PLATELET # BLD AUTO: 163 K/UL (ref 130–450)
PMV BLD AUTO: 10.4 FL (ref 7–12)
POTASSIUM SERPL-SCNC: 4 MMOL/L (ref 3.5–5)
RBC # BLD AUTO: 4.31 M/UL (ref 3.5–5.5)
SODIUM SERPL-SCNC: 144 MMOL/L (ref 132–146)
VALPROATE FREE SERPL-MCNC: 3 UG/ML (ref 7–23)
WBC OTHER # BLD: 7.5 K/UL (ref 4.5–11.5)

## 2023-11-30 PROCEDURE — 96372 THER/PROPH/DIAG INJ SC/IM: CPT

## 2023-11-30 PROCEDURE — 6370000000 HC RX 637 (ALT 250 FOR IP)

## 2023-11-30 PROCEDURE — 6360000002 HC RX W HCPCS

## 2023-11-30 PROCEDURE — 2580000003 HC RX 258

## 2023-11-30 PROCEDURE — 94640 AIRWAY INHALATION TREATMENT: CPT

## 2023-11-30 PROCEDURE — 36415 COLL VENOUS BLD VENIPUNCTURE: CPT

## 2023-11-30 PROCEDURE — 2700000000 HC OXYGEN THERAPY PER DAY

## 2023-11-30 PROCEDURE — G0378 HOSPITAL OBSERVATION PER HR: HCPCS

## 2023-11-30 PROCEDURE — 85025 COMPLETE CBC W/AUTO DIFF WBC: CPT

## 2023-11-30 PROCEDURE — 99238 HOSP IP/OBS DSCHRG MGMT 30/<: CPT | Performed by: INTERNAL MEDICINE

## 2023-11-30 PROCEDURE — 99232 SBSQ HOSP IP/OBS MODERATE 35: CPT | Performed by: INTERNAL MEDICINE

## 2023-11-30 PROCEDURE — 80048 BASIC METABOLIC PNL TOTAL CA: CPT

## 2023-11-30 RX ORDER — ROFLUMILAST 250 UG/1
250 TABLET ORAL DAILY
Qty: 28 TABLET | Refills: 0 | Status: SHIPPED | OUTPATIENT
Start: 2023-11-30

## 2023-11-30 RX ORDER — PREDNISONE 5 MG/1
5 TABLET ORAL DAILY
Qty: 3 TABLET | Refills: 0 | Status: SHIPPED | OUTPATIENT
Start: 2023-12-10 | End: 2023-12-13

## 2023-11-30 RX ORDER — PREDNISONE 20 MG/1
40 TABLET ORAL DAILY
Qty: 6 TABLET | Refills: 0 | Status: SHIPPED | OUTPATIENT
Start: 2023-12-01 | End: 2023-12-04

## 2023-11-30 RX ORDER — PREDNISONE 10 MG/1
10 TABLET ORAL DAILY
Qty: 3 TABLET | Refills: 0 | Status: SHIPPED | OUTPATIENT
Start: 2023-12-07 | End: 2023-12-10

## 2023-11-30 RX ORDER — PREDNISONE 20 MG/1
20 TABLET ORAL DAILY
Qty: 3 TABLET | Refills: 0 | Status: SHIPPED | OUTPATIENT
Start: 2023-12-04 | End: 2023-12-07

## 2023-11-30 RX ADMIN — PANTOPRAZOLE SODIUM 40 MG: 40 TABLET, DELAYED RELEASE ORAL at 06:10

## 2023-11-30 RX ADMIN — IPRATROPIUM BROMIDE AND ALBUTEROL SULFATE 1 DOSE: .5; 2.5 SOLUTION RESPIRATORY (INHALATION) at 11:48

## 2023-11-30 RX ADMIN — DIVALPROEX SODIUM 250 MG: 250 TABLET, DELAYED RELEASE ORAL at 09:49

## 2023-11-30 RX ADMIN — BUDESONIDE INHALATION 500 MCG: 0.5 SUSPENSION RESPIRATORY (INHALATION) at 09:00

## 2023-11-30 RX ADMIN — PREDNISONE 40 MG: 20 TABLET ORAL at 09:48

## 2023-11-30 RX ADMIN — ARFORMOTEROL TARTRATE 15 MCG: 15 SOLUTION RESPIRATORY (INHALATION) at 09:00

## 2023-11-30 RX ADMIN — ENOXAPARIN SODIUM 30 MG: 100 INJECTION SUBCUTANEOUS at 09:47

## 2023-11-30 RX ADMIN — IPRATROPIUM BROMIDE AND ALBUTEROL SULFATE 1 DOSE: .5; 2.5 SOLUTION RESPIRATORY (INHALATION) at 09:00

## 2023-11-30 RX ADMIN — AMLODIPINE BESYLATE 5 MG: 5 TABLET ORAL at 09:47

## 2023-11-30 RX ADMIN — Medication 10 ML: at 09:53

## 2023-11-30 RX ADMIN — IPRATROPIUM BROMIDE AND ALBUTEROL SULFATE 1 DOSE: .5; 2.5 SOLUTION RESPIRATORY (INHALATION) at 16:04

## 2023-11-30 RX ADMIN — GUAIFENESIN 400 MG: 400 TABLET ORAL at 09:48

## 2023-11-30 ASSESSMENT — PAIN SCALES - GENERAL
PAINLEVEL_OUTOF10: 0
PAINLEVEL_OUTOF10: 0

## 2023-11-30 NOTE — DISCHARGE INSTRUCTIONS
Internal medicine    Follow ups  Please follow up with the internal medicine clinic at [facility] appointment has been scheduled for    Please keep all other follow up appointments:  Future Appointments   Date Time Provider [address]   12/6/2023  8:00 AM Axel Amador. Homer Leal  [address] [facility]   12/11/2023  9:30 AM ALICIA Mcdaniel CNP [facility]   12/18/2023 11:00 AM ALICIA Harman CNP [facility]   12/18/2023  3:00 PM Nuria Givens MD [facility]        Changes in healthcare   Please take all medications as indicated  Diet: regular diet   Activity: activity as tolerated  New Medications started during this hospital stay  Prednisone taper   Take 2 tablets (20 mg) daily for 3 days  Then take 1 tablet (20 mg ) daily for 3 days  Then take 1 tablet (10 mg) daily for 3 days   Additional labs, testing or imaging needed after discharge   Full PFTS    Please contact us if you have any concerns, wish to change or make an appointment:  Internal medicine clinic   Phone: [phone]  Fax: [phone]  [address]  Should you have further questions in regards to this visit, you can review your clinical note and after visit summary document on your [employer] account. Other than any new prescriptions given to you today, the list of home medications on this After Visit Summary are based on information provided to us from you and your healthcare providers. This information, including the list, dose, and frequency of medications is only as accurate as the information you provided. If you have any questions or concerns about your home medications, please contact your Primary Care Physician for further clarification.

## 2023-11-30 NOTE — PLAN OF CARE
I went to see Ms. Brenda Nunn around 3:40 pm, that time she was saturating around 96 % on 6 L of oxygen, still same oxygen saturation, dropped down her oxygen to 4 L, and told patient to walk around near the bed, window, saturation was dropped to 91-94, then again oxygen level was dropped down to 2 L, and she was saturating to 90-92%, but patient was adamant to go home, informed her about the risk of getting hypoxic, she demonstrated about understanding the risks. As she have Oxygen DME supply at home, we agreed to discharge her on 2 L of Oxygen. Around 4:23 PM got perfect served from Delphine Laura RN about patients oxygen saturation dropped down to 67% and it took 6 mintues to back to 92%. I went to see the patient again with Dr. Emmy De La Torre, we spoke to patient, if her saturation drops down to below 90 on oxygen, she can't be discharged. Then patient was still adamant about discharge, then a decision was made to do ambulatory pulse oximeter to check how she does, her average oxygen saturation was above 90% while ambulation. Then spoke to patient, that she can be discharged and was educated to use 2 L of oxygen. Patient demonstrated the understanding.

## 2023-11-30 NOTE — PROGRESS NOTES
Late Entry:    Met with pt during HFU appt in IM office on 11.28.23. Pt was assessed by [address] prior to LSW contact. Reviewed previous inpatient care coordination and social work notes. Pt has follow up appts at [facility]. Moved recently from [place]. LSW observed pt walking independently without device with oxygen at 2 liters for pulse ox which dropped significantly to 76 while ambulating and pt had to sit multiple times. Pt was open, engaged and cooperative. Pt on oxygen and brought portable tank supplied [facility] residing in [address] [place] [address] with total of 12 people living at residence, including 6 children. Pt moved from [facility] for better environment; reports being drug free for over 30 days. Moved with her daughter Kaley Burns and daughter's wife  Pt reports continues to smoke 10-20 cigarettes per day. States she is independent with ADL' s; main priortity to get bedside commode and wheelchair. State contacted [employer] and need prescription for wheelchair as they are holding wheelchair and bedside commode for her    Pt also discussed need to apply for [facility] as she is still active with [employer]. Advised pt she will need to close her [facility] case in order to apply.   Also suggested she contact her [employer] re change of address; pt states she already completed and will have new insurance effective December 1 ; pt does not have card yet    Contact made with [employer] (Medical Equipment recycle program in [facility] ( [phone]) and verified [facility] and  available for pt; fax and confirmed wheelchair order completed by Dr Deloris Parish; [facility] did not require order    Pt being sent to ED for admission:    Provided pt with phone numbers to social security office ([phone] to inform of change of address); number to [address] ([phone]) to close case; offered to assist with [facility] application

## 2023-11-30 NOTE — CARE COORDINATION
11/30/23, Call placed to [facility] at [place] on wanting to know 02. Patient is on 2L 02. Per [facility] patient is on the watch list for the non invasive vent. [facility] from [place] can bring over the form for the Dr to sign if Dr feels it would be appropriate for patient to have. RN did the pulse ox testing on patient -who will need 4L 02. Will need a DME order for the 4L 02 and pulse ox testing. Discharge plan remains home with daughter when medically cleared for discharge. Call placed to [facility] to see patient for further supportive service information for patient. SW to follow.       Kevin Mane, W  [facility] Case Management  [phone]

## 2023-11-30 NOTE — CONSULTS
[address] [facility]  Department of Internal Medicine  Division of Pulmonary, Critical Care and Sleep Medicine  Consult Note    Beba Carr DO, MPH, Rachel Sosa, ALKA ALTAMIRANOCONFLANS-SAINTE-HONORINE DO, Olena Patrick MD, MS Tawnya Rubin, APRN-CNP    Patient: Tashia Sanchez  MRN: [id number]  : 1965    Encounter Time: 7:24 PM     Date of Admission: 2023  6:18 PM    Primary Care Physician: Jalyn Larose MD    Reason for Consultation: COPD      HISTORY OF PRESENT ILLNESS : Tashia Sanchez 63 y.o. female was seen in consultation regarding the above chief compliant. Raoul Atkinson is a 61-year-old woman that I first got acquainted with while on the house medicine team.  She was or came to [facility] to live with her daughter about 30 days ago from [place]. She is struggled with drug addiction for some period of time and needed to remove herself from [facility] environment\". She seems to be doing well from that regard but was admitted shortly after moving here with COPD and difficulty breathing with CT scan evidence of emphysema. She did leave AMA but was still hypoxic her daughter told her to get back to the hospital which she did was in the hospital for an additional 3 to 4 days placed with steroids antibiotics and bronchodilators with improvement and is post to follow-up in the pulmonary office. She was on oxygen as it was with her previously while she was in [place]. Urine drug screen was negative although positive for cannabis.     Patient past medical history includes chronic struct of pulmonary disease on chronic oxygen therapy, amphetamine and heroin and cannabinoid use disorder,  past psychiatric history of borderline personality disorder, interpersonal relationship conflicts, history of substance abuse (meth, heroin, LSD, crack), hypertension, obstructive sleep apnea plan history of dysphagia with reflux and a \"Z-line esophagus\" status post esophageal dilatation 03:45 PM    LABALBU 3.9 11/28/2023 03:45 PM    CALCIUM 9.2 11/29/2023 06:33 AM    BILITOT 0.3 11/28/2023 03:45 PM    ALKPHOS 66 11/28/2023 03:45 PM    AST 25 11/28/2023 03:45 PM    ALT 16 11/28/2023 03:45 PM     Ionized Calcium:  No results found for: \"IONCA\"  Magnesium:    Lab Results   Component Value Date/Time    MG 1.8 11/28/2023 03:45 PM     Phosphorus:    Lab Results   Component Value Date/Time    PHOS 3.3 11/22/2023 04:45 AM     PT/INR:    Lab Results   Component Value Date/Time    PROTIME 10.2 11/19/2023 04:28 PM    INR 0.9 11/19/2023 04:28 PM     ABG:    Lab Results   Component Value Date/Time    PH 7.318 11/20/2023 07:57 AM    PCO2 52.6 11/20/2023 07:57 AM    PO2 103.2 11/20/2023 07:57 AM    HCO3 26.4 11/20/2023 07:57 AM    BE -0.6 11/20/2023 07:57 AM    O2SAT 97.5 11/20/2023 07:57 AM     TSH:  No results found for: \"TSH\"     PRO-BNP:   Lab Results   Component Value Date    PROBNP <36 11/28/2023    PROBNP 83 11/19/2023      ABGs:   Lab Results   Component Value Date/Time    PH 7.318 11/20/2023 07:57 AM    PO2 103.2 11/20/2023 07:57 AM    PCO2 52.6 11/20/2023 07:57 AM     Hemoglobin A1C: No components found for: \"HGBA1C\"    IMAGING:  Imaging tests were completed and reviewed and discussed radiology and care team involved and reveals   XR CHEST (2 VW)  Result Date: 11/28/2023  FINDINGS: Cardiac silhouette is within normal limits. Pulmonary vasculature is within normal limits. The lungs are clear. No pneumothorax is identified. Bony structures are unremarkable. No acute cardiopulmonary process. Assessment: Ms. Kim Lawson is a 81-year-old active smoker with known chronic obstructive pulmonary disease who presents with hypoxic acute on chronic respiratory failure likely secondary to continued tobacco abuse with possibly acute bronchitis.    COPD  Chronic toxic and hypercapnic respiratory failure  Tobacco abuse disorder  Depression and anxiety  Bladder dysfunction  Social determinants in social situations

## 2023-11-30 NOTE — PROGRESS NOTES
Pt given and explained all discharge instructions, all questions answered to satisfaction. Stressed the importance of all medication administration and maintaining adequate Spo2 levels and doctor follow ups.  Pt in understanding

## 2023-11-30 NOTE — PROGRESS NOTES
Messaged Dr Carrie Segura that the patient was stable doing the walking pulse ox with the 4 L O2 however once the patient was getting cleaned up and exerting herself, she was dropping in the 70's for O2 She was then having a coughing fit. Called Respiratory to give treatment.

## 2023-11-30 NOTE — DISCHARGE SUMMARY
[address]  Discharge Summary    PCP: Kavon Warner MD    Admit Date:11/28/2023  Discharge Date: 11/30/2023    Admission Diagnosis:   Acute on chronic hypoxic respiratory insufficiency in the setting of COPD Concern for UTI  Rectal bleeding 2/2 most likely polyps vs hemorrhoids   History of BENJAMIN  History of bipolar disorder  History of GERD  History of polysubstance abuse  History of irregular Z-line s/p balloon dilation 8 years ago  History of stress urinary incontinence  Stress at home      Discharge Diagnosis:  Acute on chronic hypoxic respiratory insufficiency in the setting of COPD and tobacco use - improved   Rectal bleeding 2/2 most likely polyps vs hemorrhoids stable  History of BENJAMIN  History of bipolar disorder  History of GERD  History of polysubstance abuse  History of irregular Z-line s/p balloon dilation 8 years ago  History of stress urinary incontinence  Stress at home    Hospital Course: The patient is a 63 y.o. female w/ PMHx of COPD  w mult recent hospitalization for COPD exacerbations, BENJAMIN, and tobacco abuse who presented to the ED for worsening SOB. Following her most recent hospital discharge on 11/22, she completed her azithromycin course but was not discharged with any steroids. Patient presented to her PCP clinic today. At clinic, her O2 saturation dropped to 77% and patient advised to come to the ED. In ED, patient complained of SOB associated with sensation of tight, crampy diaphragm area, and a moist cough beginning yesterday but is not expectorating anything. She reported that her SOB has been better than it was prior to her most recent hospital admission, but she remains unable to use the stairs, ambulate without getting SOB, or do work around the house. Her home O2 is 2L NC. She also complained of slight burning with urination. She states she does not drink EtOH and has been clean from illicit substances for 1.5 months.  She denied

## 2023-11-30 NOTE — PROGRESS NOTES
Notified Jewell Pope MD about unstable Spo2 stats dropping to 77% with SOB and dizziness at 10:17 am when getting washed up and changing clothes, Yandel Roberson Md at 3:30 pm for Spo2 still dropping into 80's with 5 liters oxygen/SOB and dizziness, and Jewell Pope MD again at 4:23pm for an  Spo2 drop to 67% while sitting in bed looking through her purse. Discussed with pt and Dr. Carrie Segura at length about  concerns. Pt is adamant that she still wants to go home. Dr. Carrie Segura to discuss with attending and make a decision about discharge. Will continue to monitor until I hear back.

## 2023-11-30 NOTE — PROGRESS NOTES
Roflumilast (DALIRESP) 250 MCG tablet Take 1 tablet by mouth daily 11/30/23  Yes John Chung MD   hydrOXYzine pamoate (VISTARIL) 25 MG capsule Take 1 capsule by mouth 3 times daily as needed for Itching 11/24/23   Gumaro Soni DO   ipratropium 0.5 mg-albuterol 2.5 mg (DUONEB) 0.5-2.5 (3) MG/3ML SOLN nebulizer solution Inhale 3 mLs into the lungs every 4 hours 11/24/23   Gumaro Soni DO   albuterol sulfate HFA (VENTOLIN HFA) 108 (90 Base) MCG/ACT inhaler Inhale 2 puffs into the lungs every 6 hours as needed for Wheezing or Shortness of Breath 11/22/23   Joey Witt MD   umeclidinium bromide (INCRUSE ELLIPTA) 62.5 MCG/ACT inhaler Inhale 1 puff into the lungs daily 11/22/23   Joey Witt MD   OXYGEN Inhale 2 L into the lungs continuous    Provider, MD Otilio   pantoprazole (PROTONIX) 40 MG tablet Take 1 tablet by mouth every morning (before breakfast) 11/17/23   Angie Torre MD   divalproex (DEPAKOTE) 250 MG DR tablet Take 1 tablet by mouth every 12 hours 11/15/23 12/15/23  ALICIA Crawley CNP   OLANZapine (ZYPREXA) 5 MG tablet Take 1 tablet by mouth nightly 11/15/23 12/15/23  ALICIA Crawley CNP   guaiFENesin 400 MG tablet Take 1 tablet by mouth in the morning, at noon, and at bedtime 11/15/23   ALICIA Crawley CNP   budesonide-formoterol [facility]) 160-4.5 MCG/ACT AERO Inhale 2 puffs into the lungs 2 times daily 11/15/23 5/13/24  ALICIA Crawley CNP       ALLERGIES: Pcn [penicillins]       REVIEW OF SYSTEMS:  Otherwise negative if not reported or listed below  Constitutional:  No unanticipated weight loss. No change in sleep pattern or activity. No fevers, chills or rigors. Eyes:    No visual changes or diplopia. No scleral icterus. ENT:    No Headaches, hearing loss or vertigo. No mouth sores or sore throat. Cardiovascular:  + chest discomfort, palpitations.   Respiratory:  + cough, No wheezing      No sputum AM    HGB 12.9 11/30/2023 05:23 AM    HCT 41.8 11/30/2023 05:23 AM    MCV 97.0 11/30/2023 05:23 AM    MCH 29.9 11/30/2023 05:23 AM    MCHC 30.9 11/30/2023 05:23 AM    RDW 12.6 11/30/2023 05:23 AM     11/30/2023 05:23 AM    MPV 10.4 11/30/2023 05:23 AM     CMP:    Lab Results   Component Value Date/Time     11/30/2023 05:23 AM    K 4.0 11/30/2023 05:23 AM     11/30/2023 05:23 AM    CO2 33 11/30/2023 05:23 AM    BUN 17 11/30/2023 05:23 AM    CREATININE 0.7 11/30/2023 05:23 AM    LABGLOM >60 11/30/2023 05:23 AM    GLUCOSE 99 11/30/2023 05:23 AM    PROT 7.0 11/28/2023 03:45 PM    LABALBU 3.9 11/28/2023 03:45 PM    CALCIUM 9.3 11/30/2023 05:23 AM    BILITOT 0.3 11/28/2023 03:45 PM    ALKPHOS 66 11/28/2023 03:45 PM    AST 25 11/28/2023 03:45 PM    ALT 16 11/28/2023 03:45 PM     Ionized Calcium:  No results found for: \"IONCA\"  Magnesium:    Lab Results   Component Value Date/Time    MG 1.8 11/28/2023 03:45 PM     Phosphorus:    Lab Results   Component Value Date/Time    PHOS 3.3 11/22/2023 04:45 AM     PT/INR:    Lab Results   Component Value Date/Time    PROTIME 10.2 11/19/2023 04:28 PM    INR 0.9 11/19/2023 04:28 PM     ABG:    Lab Results   Component Value Date/Time    PH 7.318 11/20/2023 07:57 AM    PCO2 52.6 11/20/2023 07:57 AM    PO2 103.2 11/20/2023 07:57 AM    HCO3 26.4 11/20/2023 07:57 AM    BE -0.6 11/20/2023 07:57 AM    O2SAT 97.5 11/20/2023 07:57 AM     TSH:  No results found for: \"TSH\"     PRO-BNP:   Lab Results   Component Value Date    PROBNP <36 11/28/2023    PROBNP 83 11/19/2023      ABGs:   Lab Results   Component Value Date/Time    PH 7.318 11/20/2023 07:57 AM    PO2 103.2 11/20/2023 07:57 AM    PCO2 52.6 11/20/2023 07:57 AM     Hemoglobin A1C: No components found for: \"HGBA1C\"    IMAGING:  Imaging tests were completed and reviewed and discussed radiology and care team involved and reveals   XR CHEST (2 VW)  Result Date: 11/28/2023  FINDINGS: Cardiac silhouette is within normal limits.

## 2023-11-30 NOTE — PROGRESS NOTES
[address] [facility]  Internal Medicine Residency / [address]    Attending Physician Statement  I have discussed the case, including pertinent history and exam findings with the resident and the team.  I have seen and examined the patient and the key elements of the encounter have been performed by me. I agree with the assessment, plan and orders as documented by the resident. A&O  VS stable   Lungs clearing  Remains on O2 since she desaturates  with activities  Labs  and meds reviewed  Educate patient regarding COPD  Buprion trial to help with cessation  Discharge planning    Remainder of medical problems as per resident note.       Oren Kirby MD [facility]  Internal Medicine Residency Faculty

## 2023-11-30 NOTE — CARE COORDINATION
Peer Recovery Support Note    Name: Albin Navarro  Date: 11/30/2023    Chief Complaint   Patient presents with    Shortness of Breath     At PCP for follow up and did walking pulse ox with baseline 2LNC and dropped to 77%. Peer Support met with patient. [x] Support and education provided  [x] Resources provided   [] Treatment referral:   [] Other:   [] Patient declined peer recovery services     Referred By: Nils Pascoag (EVY)    Notes: Patient was sad, irritable and anxious. She did stress that she is not in a good place but declined treatment. Support and resources provided.      Signed: Nu Terry, 11/30/2023

## 2023-12-01 ENCOUNTER — TELEPHONE (OUTPATIENT)
Dept: INTERNAL MEDICINE | Age: 58
End: 2023-12-01

## 2023-12-01 NOTE — TELEPHONE ENCOUNTER
Care Transitions Initial Follow Up Call    Outreach made within 2 business days of discharge: Yes    Patient: Lydia Franco Patient : 1965   MRN: [id number]  Reason for Admission: There are no discharge diagnoses documented for the most recent discharge. Discharge Date: 23       Spoke with: VM message left for patient to return call. Date and time of follow up appt left on pt's VM. Discharge department/facility: [facility]      Scheduled appointment with PCP within 7-14 days    Follow Up  Future Appointments   Date Time Provider [address] [place] [address]   2023  8:00 AM Zenobia Chow MD [facility]   2023  9:30 AM ALICIA Casper CNP [facility]   2023 11:00 AM [facility]   2023  3:00 PM Fer Nelson MD [facility] Mj Hare RN

## 2023-12-04 ENCOUNTER — TELEPHONE (OUTPATIENT)
Dept: INTERNAL MEDICINE | Age: 58
End: 2023-12-04

## 2023-12-04 NOTE — TELEPHONE ENCOUNTER
Care Transitions Initial Follow Up Call    Outreach made within 2 business days of discharge: Yes    Patient: Claudeen Caldwell Patient : 1965   MRN: [id number]  Reason for Admission: There are no discharge diagnoses documented for the most recent discharge. Discharge Date: 23       Spoke with: Patient    Discharge department/facility: [address] [place] [address] states she went to [place] to  her vehicle. While there she developed SOB. Pt thinks her meds are straightened out now. Pt reminded of appt 23 if discharged by then, to keep this appt. Requested she call office is she is still admitted. Pt verbalizes understanding. Scheduled appointment with PCP within 7-14 days    Follow Up  Future Appointments   Date Time Provider [address]   2023  8:00 AM Dina Araiza.  Chioma Givens MD [facility]   2023  9:30 AM ALCIIA Peacock CNP [facility]   2023 11:00 AM [facility]   2023  3:00 PM Anna Beyer MD [facility] Geno Merchant RN

## 2023-12-06 ENCOUNTER — TELEPHONE (OUTPATIENT)
Dept: INTERNAL MEDICINE | Age: 58
End: 2023-12-06

## 2023-12-06 NOTE — TELEPHONE ENCOUNTER
Phone call to pt as no showed HFU IM appt today. Sirisha Chung from [facility] dropped off paperwork which needs completed (prescription form for DANISH noninvasive ventilator) as well as face to face documentation supporting need.  [facility] folder with form and documentation request placed in bottom attending mailbox

## 2023-12-16 PROBLEM — L30.4 INTERTRIGO: Status: ACTIVE | Noted: 2023-12-16

## 2023-12-16 PROBLEM — Z72.0 TOBACCO USE: Status: ACTIVE | Noted: 2023-12-16

## 2023-12-16 PROBLEM — B37.31 VAGINAL CANDIDIASIS: Status: ACTIVE | Noted: 2023-12-16

## 2023-12-16 PROBLEM — J96.02 ACUTE RESPIRATORY FAILURE WITH HYPERCAPNIA (HCC): Status: ACTIVE | Noted: 2023-12-16

## 2023-12-17 PROBLEM — J96.01 ACUTE RESPIRATORY FAILURE WITH HYPOXIA AND HYPERCAPNIA (HCC): Status: ACTIVE | Noted: 2023-12-17

## 2023-12-17 PROBLEM — J96.02 ACUTE RESPIRATORY FAILURE WITH HYPOXIA AND HYPERCAPNIA (HCC): Status: ACTIVE | Noted: 2023-12-17

## 2023-12-22 ENCOUNTER — OFFICE VISIT (OUTPATIENT)
Dept: INTERNAL MEDICINE | Age: 58
End: 2023-12-22

## 2023-12-22 VITALS
RESPIRATION RATE: 20 BRPM | WEIGHT: 252.1 LBS | DIASTOLIC BLOOD PRESSURE: 73 MMHG | TEMPERATURE: 98.2 F | SYSTOLIC BLOOD PRESSURE: 101 MMHG | HEIGHT: 67 IN | OXYGEN SATURATION: 98 % | HEART RATE: 98 BPM | BODY MASS INDEX: 39.57 KG/M2

## 2023-12-22 DIAGNOSIS — J44.9 CHRONIC OBSTRUCTIVE PULMONARY DISEASE, UNSPECIFIED COPD TYPE (HCC): ICD-10-CM

## 2023-12-22 DIAGNOSIS — G47.33 OSA (OBSTRUCTIVE SLEEP APNEA): ICD-10-CM

## 2023-12-22 DIAGNOSIS — F41.9 ANXIETY: ICD-10-CM

## 2023-12-22 DIAGNOSIS — J44.1 COPD EXACERBATION (HCC): Primary | ICD-10-CM

## 2023-12-22 RX ORDER — HYDROXYZINE PAMOATE 25 MG/1
25 CAPSULE ORAL 2 TIMES DAILY PRN
Qty: 10 CAPSULE | Refills: 1 | Status: SHIPPED | OUTPATIENT
Start: 2023-12-22 | End: 2024-01-01

## 2023-12-22 NOTE — PROGRESS NOTES
Post-Discharge Transitional Care  Follow Up      Brenda Nunn   YOB: 1965    Date of Office Visit:  12/22/2023  Date of Hospital Admission: 12/15/23  Date of Hospital Discharge: 12/19/23  Risk of hospital readmission (high >=14%. Medium >=10%) :Readmission Risk Score: 19.9      Care management risk score Rising risk (score 2-5) and Complex Care (Scores >=6): No Risk Score On File     Non face to face  following discharge, date last encounter closed (first attempt may have been earlier): 12/20/2023    Call initiated 2 business days of discharge: Yes    ASSESSMENT/PLAN:     Chronic obstructive pulmonary disease, unspecified COPD type (HCC)  Full PFT Study With Bronchodilator; Future  Patient requires NON-invasiive ventilator to improve quality of life, reduce hospital admissions,, and avoid death   Her chroinc home o2 2-3LNC and use of BiPAP deemed ineffective due to severity of disease for COPD and chronic respiratory failure home use- NIV requested     Jose J Thomas DO, Pulmonary, [facility]  I called Dr Vo regarding this referral>>>recommends ordering sleep study to assess for BENJAMIN    Anxiety  -     hydrOXYzine pamoate (VISTARIL) 25 MG capsule; Take 1 capsule by mouth 2 times daily as needed for Anxiety, Disp-10 capsule, R-1Normal    BENJAMIN (obstructive sleep apnea)  -     [facility]      Medical Decision Making: moderate complexity  Return in about 1 month (around 1/22/2024) for PCP follow up.    On this date 12/22/2023 I have spent 40 minutes reviewing previous notes, test results and face to face with the patient discussing the diagnosis and importance of compliance with the treatment plan as well as documenting on the day of the visit.       Subjective:   HPI:  Follow up of Hospital problems/diagnosis(es): admitted 12/15 to 12/19 for acute on chronic hypercapnic respiratory failure 2/2 COPD exacerbation due medication noncompliance and ongoing smoking despite 
more oxygen than her baseline.      In the ED, she was tachypneic and had to be started on AVAPS. At some point, she required 15 L via nonrebreather mask. ABG showed respiratory acidosis with metabolic alkalosis.  Other labs were unremarkable including proBNP at less than 36, troponin 8.  EKG showed normal sinus rhythm.  CBC showed no leukocytosis.  CXR COPD changes and atelectasis in the lung bases. She was given IV methylprednisolone and DuoNebs.     On admission to the medical unit, she was started on azithromycin, methylprednisolone, roflumilast, breathing treatments (LABA, LAMA, ICS), and BiPAP at night. She was started on bupropion but could not tolerate it, so nicotine patch was used instead. Home meds (olanzapine, depakote, and hydroxyzine) were resumed. At this point she was no longer having SOB at rest but still had significant SOB on minimal exertion. On the second day of admission, she only complained of a dry cough overnight but reported that her SOB is back at baseline (on 2 L via NC). She denied chest pian, abdominal pain, nausea, vomiting, diarrhea or constipation. Labs were significant for lactate of 4>>1.6, CRP 3, ESR 24, ProBNP <36\"          Outpatient Recommendations:  She needs outpatient PFT for insurance approval of BiPAP  Follow up on intertrigo (she received a single dose of fluconazole and topical antifungal)-- this is improved now  Discuss smoking cessation- not interested  4.  She wants more high dose of hydroxyzine, we will defer restarting/filing pior depakote Rx by someone else in past     Chronic social issues-- also she is NOT currently willing to quit  Noted at risk for decompensation: and quick rehospitalizaion   Noted in house:  \"Transition of care: Patient is admitted due to not taking her diuretics and smoking which caused her to be short of breath and wheezing. She states she had to put her home oxygen up to 4lnc (normal is 2lnc) due to shortness of breath (thru [employer]). ..She

## 2023-12-22 NOTE — PATIENT INSTRUCTIONS
Dear Brenda Nunn,      Thank you for coming to your appointment today. I hope we have addressed all of your needs.     Please make sure to do the following:  Continue your medications as listed.  In this visit, the following additional orders/instructions were discussed:  Continue medications as prescribed  Please you are encouraged to quit smoking, can use your nicotine patch once you quit smoking  Lung function test has been ordered and referral to lung doctor has been placed, you will receive a call to schedule appointment for this procedure and visits.  Get ordered labs drawn before our next follow up.  Continue to exercise to maintain good health      Other Follow-Ups:    No future appointments.    If a referral was placed on your behalf during your visit, you should expect to receive information about the date and time of your referral appointment within 7-10 days. If not, please call the office at [phone] and ask to speak to the .    Should you have further questions in regards to this visit, you can review your clinical note and after visit summary document on your [employer] account.  Other questions can be directed to our nurse line at [phone].     Discharge instruction reviewed with Patient. Patient verbalizes understanding.      Sincerely,  Andriy Lu M.D PGY-2  12/22/2023  11:07 AM T

## 2023-12-26 ENCOUNTER — TELEPHONE (OUTPATIENT)
Dept: INTERNAL MEDICINE | Age: 58
End: 2023-12-26

## 2023-12-26 DIAGNOSIS — F41.9 ANXIETY: Primary | ICD-10-CM

## 2023-12-26 DIAGNOSIS — F32.A DEPRESSION, UNSPECIFIED DEPRESSION TYPE: ICD-10-CM

## 2023-12-26 RX ORDER — DIVALPROEX SODIUM 250 MG/1
250 TABLET, DELAYED RELEASE ORAL 2 TIMES DAILY
Status: ON HOLD | COMMUNITY
End: 2024-01-12

## 2023-12-26 NOTE — TELEPHONE ENCOUNTER
Patient's daughter called stating patient is having anxiety and a panic attack before office visit. Patient daughter was not with patient at her recent office visit. Patient is going through manic depression. She is laying down screaming and crying.  Patient daughter wants her medication changed from Depakote 250 mg bid to Lamictal 20 mg and Topamax  10 mg as a mood stabilizer with her bipolar disorder. Daughter states that this combination worked. Also want a psychology consult.

## 2023-12-26 NOTE — TELEPHONE ENCOUNTER
Attempted to call patient's daughter back to discuss patient.  There was not any answer but a message was left. Patient was last seen by Psychiatrist in the hospital and patient was to followup with an [facility] psychiatrist with [facility] after her 11/19/23 admission for COPD exacerbation and inpatient psychiatry exacerbation.  Jason has never been on either lamictal or topamax from our records but Dr. Urrutia suggested and discharged patient with depakote 250 mg BID.   At this time plan to continue current medications, and place another psychiatry consult for patient.  If patient continues to have exacerbation of manic depression, she should go to ED for immediate psychiatric evaluation.      Electronically signed by Lorenzo Carroll Jr, DO on 12/26/2023 at 2:40 PM

## 2024-01-03 ENCOUNTER — APPOINTMENT (OUTPATIENT)
Dept: GENERAL RADIOLOGY | Age: 59
End: 2024-01-03
Payer: MEDICARE

## 2024-01-03 ENCOUNTER — HOSPITAL ENCOUNTER (INPATIENT)
Age: 59
LOS: 9 days | Discharge: NURSING, REHAB OR LONG-TERM CARE | End: 2024-01-12
Attending: EMERGENCY MEDICINE | Admitting: INTERNAL MEDICINE
Payer: MEDICARE

## 2024-01-03 DIAGNOSIS — J44.1 CHRONIC OBSTRUCTIVE PULMONARY DISEASE WITH ACUTE EXACERBATION (HCC): ICD-10-CM

## 2024-01-03 DIAGNOSIS — K21.00 GASTROESOPHAGEAL REFLUX DISEASE WITH ESOPHAGITIS, UNSPECIFIED WHETHER HEMORRHAGE: ICD-10-CM

## 2024-01-03 DIAGNOSIS — J44.1 COPD EXACERBATION (HCC): Primary | ICD-10-CM

## 2024-01-03 DIAGNOSIS — R09.02 HYPOXIA: ICD-10-CM

## 2024-01-03 DIAGNOSIS — K22.9 IRREGULAR Z LINE OF ESOPHAGUS: ICD-10-CM

## 2024-01-03 LAB
ALBUMIN SERPL-MCNC: 3.9 G/DL (ref 3.5–5.2)
ALP SERPL-CCNC: 69 U/L (ref 35–104)
ALT SERPL-CCNC: 10 U/L (ref 0–32)
ANION GAP SERPL CALCULATED.3IONS-SCNC: 8 MMOL/L (ref 7–16)
AST SERPL-CCNC: 11 U/L (ref 0–31)
B PARAP IS1001 DNA NPH QL NAA+NON-PROBE: NOT DETECTED
B PERT DNA SPEC QL NAA+PROBE: NOT DETECTED
B.E.: 4.9 MMOL/L (ref -3–3)
B.E.: 8.3 MMOL/L (ref -3–3)
BASOPHILS # BLD: 0.04 K/UL (ref 0–0.2)
BASOPHILS NFR BLD: 1 % (ref 0–2)
BILIRUB SERPL-MCNC: <0.2 MG/DL (ref 0–1.2)
BUN SERPL-MCNC: 14 MG/DL (ref 6–20)
C PNEUM DNA NPH QL NAA+NON-PROBE: NOT DETECTED
CALCIUM SERPL-MCNC: 9.1 MG/DL (ref 8.6–10.2)
CHLORIDE SERPL-SCNC: 99 MMOL/L (ref 98–107)
CO2 SERPL-SCNC: 36 MMOL/L (ref 22–29)
COHB: 1 % (ref 0–1.5)
COHB: 1.5 % (ref 0–1.5)
COMMENT: ABNORMAL
CREAT SERPL-MCNC: 0.6 MG/DL (ref 0.5–1)
CRITICAL: ABNORMAL
CRITICAL: ABNORMAL
DATE ANALYZED: ABNORMAL
DATE ANALYZED: ABNORMAL
DATE OF COLLECTION: ABNORMAL
DATE OF COLLECTION: ABNORMAL
EKG ATRIAL RATE: 93 BPM
EKG P AXIS: 72 DEGREES
EKG P-R INTERVAL: 156 MS
EKG Q-T INTERVAL: 352 MS
EKG QRS DURATION: 76 MS
EKG QTC CALCULATION (BAZETT): 437 MS
EKG R AXIS: 46 DEGREES
EKG T AXIS: 61 DEGREES
EKG VENTRICULAR RATE: 93 BPM
EOSINOPHIL # BLD: 0.14 K/UL (ref 0.05–0.5)
EOSINOPHILS RELATIVE PERCENT: 2 % (ref 0–6)
ERYTHROCYTE [DISTWIDTH] IN BLOOD BY AUTOMATED COUNT: 12.6 % (ref 11.5–15)
FLUAV RNA NPH QL NAA+NON-PROBE: NOT DETECTED
FLUBV RNA NPH QL NAA+NON-PROBE: NOT DETECTED
GFR SERPL CREATININE-BSD FRML MDRD: >60 ML/MIN/1.73M2
GLUCOSE SERPL-MCNC: 95 MG/DL (ref 74–99)
HADV DNA NPH QL NAA+NON-PROBE: NOT DETECTED
HCO3: 33.7 MMOL/L (ref 22–26)
HCO3: 35 MMOL/L (ref 22–26)
HCOV 229E RNA NPH QL NAA+NON-PROBE: NOT DETECTED
HCOV HKU1 RNA NPH QL NAA+NON-PROBE: NOT DETECTED
HCOV NL63 RNA NPH QL NAA+NON-PROBE: NOT DETECTED
HCOV OC43 RNA NPH QL NAA+NON-PROBE: NOT DETECTED
HCT VFR BLD AUTO: 40.3 % (ref 34–48)
HGB BLD-MCNC: 12.4 G/DL (ref 11.5–15.5)
HHB: 5.1 % (ref 0–5)
HHB: 8.2 % (ref 0–5)
HMPV RNA NPH QL NAA+NON-PROBE: NOT DETECTED
HPIV1 RNA NPH QL NAA+NON-PROBE: NOT DETECTED
HPIV2 RNA NPH QL NAA+NON-PROBE: NOT DETECTED
HPIV3 RNA NPH QL NAA+NON-PROBE: NOT DETECTED
HPIV4 RNA NPH QL NAA+NON-PROBE: NOT DETECTED
IMM GRANULOCYTES # BLD AUTO: <0.03 K/UL (ref 0–0.58)
IMM GRANULOCYTES NFR BLD: 0 % (ref 0–5)
LAB: ABNORMAL
LAB: ABNORMAL
LYMPHOCYTES NFR BLD: 2.28 K/UL (ref 1.5–4)
LYMPHOCYTES RELATIVE PERCENT: 33 % (ref 20–42)
Lab: 1210
Lab: 2238
M PNEUMO DNA NPH QL NAA+NON-PROBE: NOT DETECTED
MCH RBC QN AUTO: 29.7 PG (ref 26–35)
MCHC RBC AUTO-ENTMCNC: 30.8 G/DL (ref 32–34.5)
MCV RBC AUTO: 96.6 FL (ref 80–99.9)
METHB: 0.3 % (ref 0–1.5)
METHB: 0.3 % (ref 0–1.5)
MODE: ABNORMAL
MODE: ABNORMAL
MONOCYTES NFR BLD: 0.64 K/UL (ref 0.1–0.95)
MONOCYTES NFR BLD: 9 % (ref 2–12)
NEUTROPHILS NFR BLD: 54 % (ref 43–80)
NEUTS SEG NFR BLD: 3.71 K/UL (ref 1.8–7.3)
O2 CONTENT: 17.5 ML/DL
O2 SATURATION: 91.7 % (ref 92–98.5)
O2 SATURATION: 94.8 % (ref 92–98.5)
O2HB: 90.5 % (ref 94–97)
O2HB: 93.1 % (ref 94–97)
OPERATOR ID: 421
OPERATOR ID: ABNORMAL
PATIENT TEMP: 37 C
PATIENT TEMP: 37 C
PCO2: 57.4 MMHG (ref 35–45)
PCO2: 70.4 MMHG (ref 35–45)
PH BLOOD GAS: 7.3 (ref 7.35–7.45)
PH BLOOD GAS: 7.4 (ref 7.35–7.45)
PLATELET # BLD AUTO: 189 K/UL (ref 130–450)
PMV BLD AUTO: 10.7 FL (ref 7–12)
PO2: 58.9 MMHG (ref 75–100)
PO2: 77.8 MMHG (ref 75–100)
POTASSIUM SERPL-SCNC: 4.3 MMOL/L (ref 3.5–5)
PROT SERPL-MCNC: 6.4 G/DL (ref 6.4–8.3)
RBC # BLD AUTO: 4.17 M/UL (ref 3.5–5.5)
RSV RNA NPH QL NAA+NON-PROBE: NOT DETECTED
RV+EV RNA NPH QL NAA+NON-PROBE: NOT DETECTED
SARS-COV-2 RNA NPH QL NAA+NON-PROBE: NOT DETECTED
SODIUM SERPL-SCNC: 143 MMOL/L (ref 132–146)
SOURCE, BLOOD GAS: ABNORMAL
SOURCE, BLOOD GAS: ABNORMAL
SPECIMEN DESCRIPTION: NORMAL
THB: 13.8 G/DL (ref 11.5–16.5)
THB: 13.8 G/DL (ref 11.5–16.5)
TIME ANALYZED: 1223
TIME ANALYZED: 2240
TROPONIN I SERPL HS-MCNC: 10 NG/L (ref 0–9)
TROPONIN I SERPL HS-MCNC: 11 NG/L (ref 0–9)
WBC OTHER # BLD: 6.8 K/UL (ref 4.5–11.5)

## 2024-01-03 PROCEDURE — 2140000000 HC CCU INTERMEDIATE R&B

## 2024-01-03 PROCEDURE — 6360000002 HC RX W HCPCS: Performed by: STUDENT IN AN ORGANIZED HEALTH CARE EDUCATION/TRAINING PROGRAM

## 2024-01-03 PROCEDURE — 71045 X-RAY EXAM CHEST 1 VIEW: CPT

## 2024-01-03 PROCEDURE — 80053 COMPREHEN METABOLIC PANEL: CPT

## 2024-01-03 PROCEDURE — 82805 BLOOD GASES W/O2 SATURATION: CPT

## 2024-01-03 PROCEDURE — 2580000003 HC RX 258

## 2024-01-03 PROCEDURE — 80307 DRUG TEST PRSMV CHEM ANLYZR: CPT

## 2024-01-03 PROCEDURE — 2580000003 HC RX 258: Performed by: STUDENT IN AN ORGANIZED HEALTH CARE EDUCATION/TRAINING PROGRAM

## 2024-01-03 PROCEDURE — 6360000002 HC RX W HCPCS

## 2024-01-03 PROCEDURE — 6370000000 HC RX 637 (ALT 250 FOR IP): Performed by: STUDENT IN AN ORGANIZED HEALTH CARE EDUCATION/TRAINING PROGRAM

## 2024-01-03 PROCEDURE — 84484 ASSAY OF TROPONIN QUANT: CPT

## 2024-01-03 PROCEDURE — 0202U NFCT DS 22 TRGT SARS-COV-2: CPT

## 2024-01-03 PROCEDURE — 96367 TX/PROPH/DG ADDL SEQ IV INF: CPT

## 2024-01-03 PROCEDURE — 99223 1ST HOSP IP/OBS HIGH 75: CPT | Performed by: INTERNAL MEDICINE

## 2024-01-03 PROCEDURE — 96365 THER/PROPH/DIAG IV INF INIT: CPT

## 2024-01-03 PROCEDURE — 93010 ELECTROCARDIOGRAM REPORT: CPT | Performed by: INTERNAL MEDICINE

## 2024-01-03 PROCEDURE — 5A09357 ASSISTANCE WITH RESPIRATORY VENTILATION, LESS THAN 24 CONSECUTIVE HOURS, CONTINUOUS POSITIVE AIRWAY PRESSURE: ICD-10-PCS

## 2024-01-03 PROCEDURE — 99285 EMERGENCY DEPT VISIT HI MDM: CPT

## 2024-01-03 PROCEDURE — 94660 CPAP INITIATION&MGMT: CPT

## 2024-01-03 PROCEDURE — 85025 COMPLETE CBC W/AUTO DIFF WBC: CPT

## 2024-01-03 PROCEDURE — 94640 AIRWAY INHALATION TREATMENT: CPT

## 2024-01-03 PROCEDURE — 93005 ELECTROCARDIOGRAM TRACING: CPT

## 2024-01-03 PROCEDURE — 6370000000 HC RX 637 (ALT 250 FOR IP)

## 2024-01-03 RX ORDER — ENOXAPARIN SODIUM 100 MG/ML
30 INJECTION SUBCUTANEOUS 2 TIMES DAILY
Status: DISCONTINUED | OUTPATIENT
Start: 2024-01-03 | End: 2024-01-12 | Stop reason: HOSPADM

## 2024-01-03 RX ORDER — HYDROXYZINE PAMOATE 25 MG/1
25 CAPSULE ORAL 3 TIMES DAILY PRN
Status: ON HOLD | COMMUNITY
End: 2024-01-12

## 2024-01-03 RX ORDER — PANTOPRAZOLE SODIUM 40 MG/1
40 TABLET, DELAYED RELEASE ORAL
Status: DISCONTINUED | OUTPATIENT
Start: 2024-01-04 | End: 2024-01-12 | Stop reason: HOSPADM

## 2024-01-03 RX ORDER — NICOTINE 21 MG/24HR
1 PATCH, TRANSDERMAL 24 HOURS TRANSDERMAL DAILY
Status: DISCONTINUED | OUTPATIENT
Start: 2024-01-03 | End: 2024-01-12 | Stop reason: HOSPADM

## 2024-01-03 RX ORDER — SODIUM CHLORIDE 0.9 % (FLUSH) 0.9 %
5-40 SYRINGE (ML) INJECTION EVERY 12 HOURS SCHEDULED
Status: DISCONTINUED | OUTPATIENT
Start: 2024-01-03 | End: 2024-01-12 | Stop reason: HOSPADM

## 2024-01-03 RX ORDER — ACETAMINOPHEN 650 MG/1
650 SUPPOSITORY RECTAL EVERY 6 HOURS PRN
Status: DISCONTINUED | OUTPATIENT
Start: 2024-01-03 | End: 2024-01-12 | Stop reason: HOSPADM

## 2024-01-03 RX ORDER — SODIUM CHLORIDE 9 MG/ML
INJECTION, SOLUTION INTRAVENOUS PRN
Status: DISCONTINUED | OUTPATIENT
Start: 2024-01-03 | End: 2024-01-12 | Stop reason: HOSPADM

## 2024-01-03 RX ORDER — SODIUM CHLORIDE 0.9 % (FLUSH) 0.9 %
5-40 SYRINGE (ML) INJECTION PRN
Status: DISCONTINUED | OUTPATIENT
Start: 2024-01-03 | End: 2024-01-12 | Stop reason: HOSPADM

## 2024-01-03 RX ORDER — OLANZAPINE 5 MG/1
5 TABLET ORAL NIGHTLY
Status: DISCONTINUED | OUTPATIENT
Start: 2024-01-03 | End: 2024-01-12 | Stop reason: HOSPADM

## 2024-01-03 RX ORDER — IPRATROPIUM BROMIDE AND ALBUTEROL SULFATE 2.5; .5 MG/3ML; MG/3ML
3 SOLUTION RESPIRATORY (INHALATION) ONCE
Status: COMPLETED | OUTPATIENT
Start: 2024-01-03 | End: 2024-01-03

## 2024-01-03 RX ORDER — GUAIFENESIN 400 MG/1
400 TABLET ORAL 3 TIMES DAILY
Status: DISCONTINUED | OUTPATIENT
Start: 2024-01-03 | End: 2024-01-12 | Stop reason: HOSPADM

## 2024-01-03 RX ORDER — ACETAMINOPHEN 500 MG
1000 TABLET ORAL ONCE
Status: COMPLETED | OUTPATIENT
Start: 2024-01-03 | End: 2024-01-03

## 2024-01-03 RX ORDER — ONDANSETRON 2 MG/ML
4 INJECTION INTRAMUSCULAR; INTRAVENOUS EVERY 6 HOURS PRN
Status: DISCONTINUED | OUTPATIENT
Start: 2024-01-03 | End: 2024-01-12 | Stop reason: HOSPADM

## 2024-01-03 RX ORDER — MAGNESIUM SULFATE IN WATER 40 MG/ML
2000 INJECTION, SOLUTION INTRAVENOUS ONCE
Status: COMPLETED | OUTPATIENT
Start: 2024-01-03 | End: 2024-01-03

## 2024-01-03 RX ORDER — ARFORMOTEROL TARTRATE 15 UG/2ML
15 SOLUTION RESPIRATORY (INHALATION)
Status: DISCONTINUED | OUTPATIENT
Start: 2024-01-03 | End: 2024-01-12 | Stop reason: HOSPADM

## 2024-01-03 RX ORDER — BUDESONIDE 0.5 MG/2ML
0.5 INHALANT ORAL
Status: DISCONTINUED | OUTPATIENT
Start: 2024-01-03 | End: 2024-01-12 | Stop reason: HOSPADM

## 2024-01-03 RX ORDER — ONDANSETRON 4 MG/1
4 TABLET, ORALLY DISINTEGRATING ORAL EVERY 8 HOURS PRN
Status: DISCONTINUED | OUTPATIENT
Start: 2024-01-03 | End: 2024-01-12 | Stop reason: HOSPADM

## 2024-01-03 RX ORDER — HYDROXYZINE PAMOATE 25 MG/1
25 CAPSULE ORAL 3 TIMES DAILY PRN
Status: DISCONTINUED | OUTPATIENT
Start: 2024-01-03 | End: 2024-01-12 | Stop reason: HOSPADM

## 2024-01-03 RX ORDER — ACETAMINOPHEN 325 MG/1
650 TABLET ORAL EVERY 6 HOURS PRN
Status: DISCONTINUED | OUTPATIENT
Start: 2024-01-03 | End: 2024-01-12 | Stop reason: HOSPADM

## 2024-01-03 RX ORDER — DIVALPROEX SODIUM 250 MG/1
250 TABLET, DELAYED RELEASE ORAL 2 TIMES DAILY
Status: DISCONTINUED | OUTPATIENT
Start: 2024-01-03 | End: 2024-01-12 | Stop reason: HOSPADM

## 2024-01-03 RX ORDER — POLYETHYLENE GLYCOL 3350 17 G/17G
17 POWDER, FOR SOLUTION ORAL DAILY PRN
Status: DISCONTINUED | OUTPATIENT
Start: 2024-01-03 | End: 2024-01-12 | Stop reason: HOSPADM

## 2024-01-03 RX ADMIN — ACETAMINOPHEN 1000 MG: 500 TABLET ORAL at 07:45

## 2024-01-03 RX ADMIN — BUDESONIDE INHALATION 500 MCG: 0.5 SUSPENSION RESPIRATORY (INHALATION) at 21:16

## 2024-01-03 RX ADMIN — OLANZAPINE 5 MG: 5 TABLET, FILM COATED ORAL at 20:57

## 2024-01-03 RX ADMIN — SODIUM CHLORIDE, PRESERVATIVE FREE 10 ML: 5 INJECTION INTRAVENOUS at 20:57

## 2024-01-03 RX ADMIN — AZITHROMYCIN MONOHYDRATE 500 MG: 500 INJECTION, POWDER, LYOPHILIZED, FOR SOLUTION INTRAVENOUS at 06:18

## 2024-01-03 RX ADMIN — ARFORMOTEROL TARTRATE 15 MCG: 15 SOLUTION RESPIRATORY (INHALATION) at 21:16

## 2024-01-03 RX ADMIN — GUAIFENESIN 400 MG: 400 TABLET ORAL at 20:57

## 2024-01-03 RX ADMIN — VANCOMYCIN HYDROCHLORIDE 2000 MG: 10 INJECTION, POWDER, LYOPHILIZED, FOR SOLUTION INTRAVENOUS at 07:46

## 2024-01-03 RX ADMIN — MAGNESIUM SULFATE HEPTAHYDRATE 2000 MG: 40 INJECTION, SOLUTION INTRAVENOUS at 05:28

## 2024-01-03 RX ADMIN — GUAIFENESIN 400 MG: 400 TABLET ORAL at 17:12

## 2024-01-03 RX ADMIN — CEFEPIME 2000 MG: 2 INJECTION, POWDER, FOR SOLUTION INTRAVENOUS at 05:42

## 2024-01-03 RX ADMIN — IPRATROPIUM BROMIDE AND ALBUTEROL SULFATE 3 DOSE: 2.5; .5 SOLUTION RESPIRATORY (INHALATION) at 05:24

## 2024-01-03 RX ADMIN — ENOXAPARIN SODIUM 30 MG: 100 INJECTION SUBCUTANEOUS at 20:56

## 2024-01-03 RX ADMIN — METHYLPREDNISOLONE SODIUM SUCCINATE 125 MG: 125 INJECTION INTRAMUSCULAR; INTRAVENOUS at 17:11

## 2024-01-03 RX ADMIN — DIVALPROEX SODIUM 250 MG: 250 TABLET, DELAYED RELEASE ORAL at 20:57

## 2024-01-03 ASSESSMENT — LIFESTYLE VARIABLES
HOW MANY STANDARD DRINKS CONTAINING ALCOHOL DO YOU HAVE ON A TYPICAL DAY: PATIENT DOES NOT DRINK
HOW OFTEN DO YOU HAVE A DRINK CONTAINING ALCOHOL: NEVER

## 2024-01-03 ASSESSMENT — ENCOUNTER SYMPTOMS
CHEST TIGHTNESS: 1
WHEEZING: 1
SHORTNESS OF BREATH: 1

## 2024-01-03 ASSESSMENT — PAIN DESCRIPTION - LOCATION: LOCATION: HEAD

## 2024-01-03 ASSESSMENT — PAIN SCALES - GENERAL
PAINLEVEL_OUTOF10: 0
PAINLEVEL_OUTOF10: 6

## 2024-01-03 ASSESSMENT — PAIN DESCRIPTION - DESCRIPTORS: DESCRIPTORS: ACHING;DISCOMFORT;PRESSURE

## 2024-01-03 ASSESSMENT — PAIN - FUNCTIONAL ASSESSMENT: PAIN_FUNCTIONAL_ASSESSMENT: NONE - DENIES PAIN

## 2024-01-03 NOTE — H&P
[facility]  Internal Medicine Residency Clinic  Attending Physician Statement:  Kevin Lopez M.D., F.A.C.P.  Patient is seen for fu visit today.  -- acute and chronic problems addressed  I have seen/discussed the case, including pertinent history and exam findings with the resident, review of last visit medical records/labs/imaging if applicable-      Addressed when applicable- Health maintenance issues of vaccinations, social determinants/depression/CA screening, tobacco cessation etc...     I agree with the assessment, plan and orders as documented by the resident.    -Billiing assessed by medical complexity of case  My assessment of high points of today's visit as follows:     Brenda Nunn is a 58 y.o. female with PMH of COPD (2 L O2 at baseline), BENJAMIN not on CPAP, bipolar disorder, GERD, polysubstance abuse, and stress urinary incontinence        Recurrent issues of decompensation at home  Still no HOME vent  We have documented need for home ventilator cpap/AVAPs/bipap?  Insurance tells her she needs sleep study  She missed calls when attempts to set up outpatient    Currently bronchospastic  again  Off steroids since last DC on 12/25  Add steroids again  Duonebs help but only temporarily  - imaging reviewed  Viral panel ordered-- neg  Doubt pneumonia- low wbc    - check procal  Defer abx      Labs similar to her baseline prior  proBNP at less than 36, troponin 8.  EKG showed normal sinus rhythm.  Last admission CRP 3, ESR 24, ProBNP <36\"   CBC showed no leukocytosis.    CXR COPD changes and atelectasis in the lung bases.  Steroids, duonebs  Admit to attempt to get inpatient authorization to get home vent.  Home meds (olanzapine, depakote, and hydroxyzine)     High risk and recurrent admisssions!!!!  Admitted from 12/15/19/23 - to 12/25/23   Acute exacerbation of COPD likely 2/2 medication noncompliance, smoking    She was recently discharged on 11/30 for acute on chronic hypoxic  respiratory insufficiency in the setting of COPD and UTI.  After hospital discharge from [facility], she traveled to [place] to see her daughter; however, she had another COPD exacerbation and was admitted and discharged on prednisone taper that she could not afford and did not get.+ smoking 1 pack a day for >15 years.  No recent change in medication but she hasn't been compliant with her medication due to insurance/financial issues.         Plan inpatient/ outpatient PFT for insurance approval of BiPAP  Chronic social issues-- also she is NOT currently willing to quit  Noted at risk for decompensation: and quick rehospitalizaion   Noted in house:  \"Transition of care: Patient is admitted due to not taking her diuretics and smoking which caused her to be short of breath and wheezing. She states she had to put her home oxygen up to 4lnc (normal is 2lnc) due to shortness of breath (thru [employer]). ..She has a wheelchair and bsc. She follows with [facility]. She is noncompliant. She has been educated on taking her meds and keeping her dr hines as scheduled \"     Long discussions with - regarding paper work flling out- requested forms for piror authorization  See Dr Colón- pulmonary docs prior notes has specifically requested NIV ventilation - \"step up\" from bipap-- we called him to discuss  Requesting settings-- he will adjust on fu- do futher paperwork if need     I did face to face again today  Patient requires NON-invasiive ventilator to improve quality of life, reduce hospital admissions,, and avoid death.  NIV 5flow rate, 400ml, IPAP8 initally - prior EPAP max 20 over 5min settings- filled out forms  Her [facility] home o2 2-3LNC and use of Bipap deemed ineffective due to severity of disease for copd  and chronic respiratory failure home use- NIV requested

## 2024-01-03 NOTE — H&P
[facility]  Internal Medicine Residency Program  History and Physical    Patient:  Brenad Nunn 58 y.o. female MRN: [id number]     Date of Service: 1/3/2024    Hospital Day: 1      Chief complaint: had concerns including Shortness of Breath (SOB x 2 weeks. Patient states it has gotten increasingly worse over the past 24 hours. Patient also states she was to start using a CPAP and never got one.).    History of Present Illness   The patient is a 58 y.o. female with a past medical history of COPD on home oxygen, depression, anxiety, remote drug abuse, CAD, HTN who presents to [facility] on 1/3/24 with cc shortness of breath. She called EMS, was found to be hypoxic to 85% on 4L NC.     Recently admitted for COPD exacerbation with hypercapnia for which she was to get a BIPAP on discharge. Did not get BIPAP, was told by insurance she has to get a sleep study first to see if she qualifies. Continues to smoke 1ppd.     This morning, patient is lying supine in ED bed with 3L NC O2 on. She is breathing with normal effort but has audible wheezing. Able to speak in complete sentences. She is still feeling shortness of breath. Denies chest pain, fever, chills, n/v.       Past Medical History:      Diagnosis Date    CHF (congestive heart failure) (HCC)     COPD (chronic obstructive pulmonary disease) (HCC)     Depression     Hypertension        Past Surgical History:        Procedure Laterality Date     SECTION      HYSTERECTOMY (CERVIX STATUS UNKNOWN)         Medications Prior to Admission:    Prior to Admission medications    Medication Sig Start Date End Date Taking? Authorizing Provider   divalproex (DEPAKOTE) 250 MG DR tablet Take 1 tablet by mouth 2 times daily    ProviderOtilio MD   OLANZapine (ZYPREXA) 5 MG tablet Take 1 tablet by mouth nightly 23   Jane Jiménez MD   ipratropium 0.5 mg-albuterol 2.5 mg (DUONEB) 0.5-2.5 (3) MG/3ML SOLN nebulizer solution Inhale 3 mLs into the lungs  every 4 hours 11/24/23   Gumaro Soni DO   albuterol sulfate HFA (VENTOLIN HFA) 108 (90 Base) MCG/ACT inhaler Inhale 2 puffs into the lungs every 6 hours as needed for Wheezing or Shortness of Breath 11/22/23   Israel Vazquez MD   umeclidinium bromide (INCRUSE ELLIPTA) 62.5 MCG/ACT inhaler Inhale 1 puff into the lungs daily 11/22/23   Israel Vazquez MD   pantoprazole (PROTONIX) 40 MG tablet Take 1 tablet by mouth every morning (before breakfast) 11/17/23   Chapin Siddiqui MD   guaiFENesin 400 MG tablet Take 1 tablet by mouth in the morning, at noon, and at bedtime 11/15/23   Laila Aceves APRN - CNP   budesonide-formoterol (SYMBICORT) 160-4.5 MCG/ACT AERO Inhale 2 puffs into the lungs 2 times daily 11/15/23 5/13/24  Laila Aceves APRN - CNP       Allergies:  Pcn [penicillins]    Social History:   TOBACCO:   reports that she has been smoking cigarettes. She started smoking about 46 years ago. She has a 46.0 pack-year smoking history. She has never used smokeless tobacco.  ETOH:   reports no history of alcohol use.    Family History:   No family history on file.    REVIEW OF SYSTEMS:    Constitutional: No fever, no chills, no change in weight; good appetite  HEENT: No blurred vision, no ear problems, no sore throat, no rhinorrhea.  Respiratory: No cough, no sputum production, no pleuritic chest pain, +shortness of breath  Cardiology: No angina, no dyspnea on exertion, no paroxysmal nocturnal dyspnea, no orthopnea, no palpitation, no leg swelling.   Gastroenterology: No dysphagia, no reflux; no abdominal pain, no nausea or vomiting; no constipation or diarrhea. No hematochezia   Genitourinary: No dysuria, no frequency, hesitancy; no hematuria  Musculoskeletal: no joint pain, no myalgia, no change in range of movement  Neurology: no focal weakness in extremities, no slurred speech, no double vision, no tingling or numbness sensation  Endocrinology: no temperature intolerance, no polyphagia, polydipsia

## 2024-01-03 NOTE — ED NOTES
[facility] EMERGENCY DEPARTMENT  EMERGENCY DEPARTMENT ENCOUNTER      Pt Name: Brenda Nunn  MRN: [id number]  Birthdate 1965  Date of evaluation: 1/3/2024  PCP: Chapin Siddiqui MD    7:09 AM EST  Vitals:    01/03/24 0545   BP: 130/86   Pulse: 86   Resp: 22   Temp:    SpO2: 100%      Sign-out received from out-going resident, assuming care of patient at this time. See outgoing resident note for further information obtained prior to signout.   Abbreviated history per resident: Patient presented due to SOB. Found to be hypoxic at 85% on 4 L via EMS. Patient did not use CPAP last night. Has history of COPD.   Pending for disposition: Labs and imaging   Planned disposition: Admission   Reevaluations:  ED Course as of 01/03/24 1619   Wed Jan 03, 2024   0532 House Medicine Patient  [JR]   0552 EKG  EKG interpreted by emergency physician  EKG shows normal sinus rhythm 93 bpm.  Normal axis.  Normal QRS.  No STEMI [CB]   0619 Patient signed out to me by Dr. Rudolph.  Patient is coming in for COPD exacerbation.  Covered with antibiotics as well.    Patient to be admitted to [facility]. [PP]   0710 Patient re-evaluated. She is currently saturating in the mid 90s on 4 L via nasal canula. Baseline on 2L.  [PP]   0750 Patient was accepted for admission by Dr. Fong. [PP]   1244 Blood Gas, Arterial(!!):    Date Analyzed 20240103   Time Analyzed 1223   Source: Blood Arterial   pH, Blood Gas 7.298(!)   PCO2 70.4(!!)   pO2 77.8   HCO3 33.7(!)   Base Excess 4.9(!)   O2 Sat 94.8   O2Hb 93.1(!)   Carboxy-Hgb 1.5   METHB,METHB 0.3   HHb 5.1(!)   THB,THB 13.8   Mode NC-  3.5 L   COMMENT, [id number] edited form no critical value, to handed to   Date Of Collection 20240103   Time Collected 1210   Pt Temp 37.0    ID [id number]   Lab [id number]   Critical(s) Notified Handed report to Dr/RN [PP]   1244 Respiratory acidosis with hypercapnia.  pCO2 70.4.  Patient does have chronic respiratory failure [PP]       ED Course User Index  [CB] Sonu Hart DO  [JR] Haider Rudolph DO  [PP] Melissa Mayfield DO     Final disposition:  ED Course as of 24 1619      0532 [facility] Patient  [JR]   0552 EKG  EKG interpreted by emergency physician  EKG shows normal sinus rhythm 93 bpm.  Normal axis.  Normal QRS.  No STEMI [CB]   0619 Patient signed out to me by Dr. Rudolph.  Patient is coming in for COPD exacerbation.  Covered with antibiotics as well.    Patient to be admitted to [facility]. [PP]   0710 Patient re-evaluated. She is currently saturating in the mid 90s on 4 L via nasal canula. Baseline on 2L.  [PP]   0750 Patient was accepted for admission by Dr. Fong. [PP]   1244 Blood Gas, Arterial(!!):    Date Analyzed 2024   Time Analyzed 1223   Source: Blood Arterial   pH, Blood Gas 7.298(!)   PCO2 70.4(!!)   pO2 77.8   HCO3 33.7(!)   Base Excess 4.9(!)   O2 Sat 94.8   O2Hb 93.1(!)   Carboxy-Hgb 1.5   METHB,METHB 0.3   HHb 5.1(!)   THB,THB 13.8   Mode NC-  3.5 L   COMMENT, [id number] edited form no critical value, to handed to   Date Of Collection 2024   Time Collected 1210   Pt Temp 37.0    ID [id number]   Lab [id number]   Critical(s) Notified Handed report to Dr/RN [PP]   1248 Respiratory acidosis with hypercapnia.  pCO2 70.4.  Patient does have chronic respiratory failure [PP]      ED Course User Index  [CB] Sonu Hart DO  [JR] Haider Rudolph DO  [PP] Melissa Mayfield DO       --------------------------------------------- PAST HISTORY ---------------------------------------------  Past Medical History:  has a past medical history of CHF (congestive heart failure) ([facility]), COPD (chronic obstructive pulmonary disease) (HCC), Depression, and Hypertension.    Past Surgical History:  has a past surgical history that includes  section and Hysterectomy.    Social History:  reports that she has been smoking cigarettes. She started smoking about 46 years ago. She has

## 2024-01-03 NOTE — ED PROVIDER NOTES
[facility]  Department of Emergency Medicine   EM Physician Brenda Dunn 58 y.o. female PMHx of COPD, bipolar 1 disorder, BENJAMIN, chronic tobacco use, still smoking tobacco, anxiety, recent hospitalization presents to the ED c/o shortness of breath, wheezing, concern for COPD exacerbation with sputum production. Onset: Ongoing for the past 2 weeks progressively worsening over the past day. Location/Radiation: Pulmonary. Duration: Chronic persistent issue but has progressively worsened for the over 24 hours. Characterization: Shortness of breath, wheezing, tightness. Aggravating Factors: Tobacco use, cold dry weather, change in weather, infections. Relieving Factors: None. Severity: Moderate to severe.  EMS reports the patient was 80% on 4 L nonrebreather.  She was given 125 mg Solu-Medrol by EMS and 1 breathing treatment.  Patient was reports she never received a CPAP machine at home..       Assx Sxs: Shortness of breath, tightness, wheezing, cough, sputum production.     She Denies: Chest pain, abdominal pain, nausea/vomiting.           Review of Systems   Constitutional:  Negative for activity change, appetite change, chills, fatigue and fever.   Respiratory:  Positive for chest tightness, shortness of breath and wheezing.    Cardiovascular:  Negative for chest pain, palpitations and leg swelling.        Physical Exam  Constitutional:       Appearance: She is ill-appearing. She is not diaphoretic.   HENT:      Head: Normocephalic and atraumatic.      Mouth/Throat:      Mouth: Mucous membranes are moist.      Pharynx: Oropharynx is clear.   Eyes:      Extraocular Movements: Extraocular movements intact.      Pupils: Pupils are equal, round, and reactive to light.   Cardiovascular:      Rate and Rhythm: Normal rate and regular rhythm.   Pulmonary:      Effort: Tachypnea and accessory muscle usage present.      Breath sounds: Examination of the right-upper field  93 bpm.  Normal axis.  Normal QRS.  No STEMI [CB]   0619 Patient signed out to me by Dr. Rudolph.  Patient is coming in for COPD exacerbation.  Covered with antibiotics as well.    Patient to be admitted to house medicine. [PP]   0710 Patient re-evaluated. She is currently saturating in the mid 90s on 4 L via nasal canula. Baseline on 2L.  [PP]   0750 Patient was accepted for admission by Dr. Fong. [PP]   1244 Blood Gas, Arterial(!!):    Date Analyzed 20240103   Time Analyzed 1223   Source: Blood Arterial   pH, Blood Gas 7.298(!)   PCO2 70.4(!!)   pO2 77.8   HCO3 33.7(!)   Base Excess 4.9(!)   O2 Sat 94.8   O2Hb 93.1(!)   Carboxy-Hgb 1.5   METHB,METHB 0.3   HHb 5.1(!)   THB,THB 13.8   Mode NC-  3.5 L   COMMENT, [id number] edited form no critical value, to handed to   Date Of Collection 20240103   Time Collected 1210   Pt Temp 37.0    ID 0421   Lab [id number]   Critical(s) Notified Handed report to Dr/RN [PP]   1244 Respiratory acidosis with hypercapnia.  pCO2 70.4.  Patient does have chronic respiratory failure [PP]      ED Course User Index  [CB] Sonu Hart DO  [JR] Haider Rudolph DO  [PP] Melissa Mayfield DO       ED Course as of 01/03/24 2004 Wed Jan 03, 2024   0532 House Medicine Patient  [JR]   0552 EKG  EKG interpreted by emergency physician  EKG shows normal sinus rhythm 93 bpm.  Normal axis.  Normal QRS.  No STEMI [CB]   0619 Patient signed out to me by Dr. Rudolph.  Patient is coming in for COPD exacerbation.  Covered with antibiotics as well.    Patient to be admitted to house medicine. [PP]   0710 Patient re-evaluated. She is currently saturating in the mid 90s on 4 L via nasal canula. Baseline on 2L.  [PP]   0750 Patient was accepted for admission by Dr. Fong. [PP]   1244 Blood Gas, Arterial(!!):    Date Analyzed 20240103   Time Analyzed 1223   Source: Blood Arterial   pH, Blood Gas 7.298(!)   PCO2 70.4(!!)   pO2 77.8   HCO3 33.7(!)   Base Excess 4.9(!)   O2 Sat 94.8   O2Hb 93.1(!)

## 2024-01-04 LAB
AMPHET UR QL SCN: NEGATIVE
ANION GAP SERPL CALCULATED.3IONS-SCNC: 10 MMOL/L (ref 7–16)
BARBITURATES UR QL SCN: NEGATIVE
BASOPHILS # BLD: 0.01 K/UL (ref 0–0.2)
BASOPHILS NFR BLD: 0 % (ref 0–2)
BENZODIAZ UR QL: NEGATIVE
BUN SERPL-MCNC: 15 MG/DL (ref 6–20)
BUPRENORPHINE UR QL: NEGATIVE
CALCIUM SERPL-MCNC: 9.3 MG/DL (ref 8.6–10.2)
CANNABINOIDS UR QL SCN: POSITIVE
CHLORIDE SERPL-SCNC: 98 MMOL/L (ref 98–107)
CO2 SERPL-SCNC: 32 MMOL/L (ref 22–29)
COCAINE UR QL SCN: NEGATIVE
CREAT SERPL-MCNC: 0.5 MG/DL (ref 0.5–1)
EOSINOPHIL # BLD: 0 K/UL (ref 0.05–0.5)
EOSINOPHILS RELATIVE PERCENT: 0 % (ref 0–6)
ERYTHROCYTE [DISTWIDTH] IN BLOOD BY AUTOMATED COUNT: 12.2 % (ref 11.5–15)
FENTANYL UR QL: NEGATIVE
GFR SERPL CREATININE-BSD FRML MDRD: >60 ML/MIN/1.73M2
GLUCOSE BLD-MCNC: 249 MG/DL (ref 74–99)
GLUCOSE SERPL-MCNC: 123 MG/DL (ref 74–99)
HCT VFR BLD AUTO: 39.9 % (ref 34–48)
HGB BLD-MCNC: 12.3 G/DL (ref 11.5–15.5)
IMM GRANULOCYTES # BLD AUTO: <0.03 K/UL (ref 0–0.58)
IMM GRANULOCYTES NFR BLD: 0 % (ref 0–5)
LYMPHOCYTES NFR BLD: 0.91 K/UL (ref 1.5–4)
LYMPHOCYTES RELATIVE PERCENT: 18 % (ref 20–42)
MCH RBC QN AUTO: 29.4 PG (ref 26–35)
MCHC RBC AUTO-ENTMCNC: 30.8 G/DL (ref 32–34.5)
MCV RBC AUTO: 95.5 FL (ref 80–99.9)
METHADONE UR QL: NEGATIVE
MONOCYTES NFR BLD: 0.53 K/UL (ref 0.1–0.95)
MONOCYTES NFR BLD: 10 % (ref 2–12)
NEUTROPHILS NFR BLD: 71 % (ref 43–80)
NEUTS SEG NFR BLD: 3.61 K/UL (ref 1.8–7.3)
OPIATES UR QL SCN: NEGATIVE
OXYCODONE UR QL SCN: NEGATIVE
PCP UR QL SCN: NEGATIVE
PLATELET # BLD AUTO: 184 K/UL (ref 130–450)
PMV BLD AUTO: 11.1 FL (ref 7–12)
POTASSIUM SERPL-SCNC: 4.4 MMOL/L (ref 3.5–5)
RBC # BLD AUTO: 4.18 M/UL (ref 3.5–5.5)
SODIUM SERPL-SCNC: 140 MMOL/L (ref 132–146)
TEST INFORMATION: ABNORMAL
WBC OTHER # BLD: 5.1 K/UL (ref 4.5–11.5)

## 2024-01-04 PROCEDURE — 6370000000 HC RX 637 (ALT 250 FOR IP): Performed by: STUDENT IN AN ORGANIZED HEALTH CARE EDUCATION/TRAINING PROGRAM

## 2024-01-04 PROCEDURE — 6370000000 HC RX 637 (ALT 250 FOR IP)

## 2024-01-04 PROCEDURE — 82962 GLUCOSE BLOOD TEST: CPT

## 2024-01-04 PROCEDURE — 2580000003 HC RX 258: Performed by: STUDENT IN AN ORGANIZED HEALTH CARE EDUCATION/TRAINING PROGRAM

## 2024-01-04 PROCEDURE — 6360000002 HC RX W HCPCS: Performed by: STUDENT IN AN ORGANIZED HEALTH CARE EDUCATION/TRAINING PROGRAM

## 2024-01-04 PROCEDURE — 36415 COLL VENOUS BLD VENIPUNCTURE: CPT

## 2024-01-04 PROCEDURE — 85025 COMPLETE CBC W/AUTO DIFF WBC: CPT

## 2024-01-04 PROCEDURE — 2140000000 HC CCU INTERMEDIATE R&B

## 2024-01-04 PROCEDURE — 80048 BASIC METABOLIC PNL TOTAL CA: CPT

## 2024-01-04 PROCEDURE — 94640 AIRWAY INHALATION TREATMENT: CPT

## 2024-01-04 PROCEDURE — 99223 1ST HOSP IP/OBS HIGH 75: CPT | Performed by: INTERNAL MEDICINE

## 2024-01-04 PROCEDURE — 94660 CPAP INITIATION&MGMT: CPT

## 2024-01-04 PROCEDURE — 2700000000 HC OXYGEN THERAPY PER DAY

## 2024-01-04 PROCEDURE — 99232 SBSQ HOSP IP/OBS MODERATE 35: CPT | Performed by: INTERNAL MEDICINE

## 2024-01-04 RX ORDER — PREDNISONE 20 MG/1
40 TABLET ORAL
Status: COMPLETED | OUTPATIENT
Start: 2024-01-05 | End: 2024-01-06

## 2024-01-04 RX ORDER — PREDNISONE 20 MG/1
20 TABLET ORAL
Status: COMPLETED | OUTPATIENT
Start: 2024-01-07 | End: 2024-01-07

## 2024-01-04 RX ORDER — DEXTROSE MONOHYDRATE 100 MG/ML
INJECTION, SOLUTION INTRAVENOUS CONTINUOUS PRN
Status: DISCONTINUED | OUTPATIENT
Start: 2024-01-04 | End: 2024-01-05 | Stop reason: SDUPTHER

## 2024-01-04 RX ORDER — IPRATROPIUM BROMIDE AND ALBUTEROL SULFATE 2.5; .5 MG/3ML; MG/3ML
1 SOLUTION RESPIRATORY (INHALATION)
Status: DISCONTINUED | OUTPATIENT
Start: 2024-01-04 | End: 2024-01-12 | Stop reason: HOSPADM

## 2024-01-04 RX ADMIN — OLANZAPINE 5 MG: 5 TABLET, FILM COATED ORAL at 22:09

## 2024-01-04 RX ADMIN — IPRATROPIUM BROMIDE AND ALBUTEROL SULFATE 1 DOSE: .5; 2.5 SOLUTION RESPIRATORY (INHALATION) at 16:28

## 2024-01-04 RX ADMIN — SODIUM CHLORIDE, PRESERVATIVE FREE 10 ML: 5 INJECTION INTRAVENOUS at 22:13

## 2024-01-04 RX ADMIN — WATER 40 MG: 1 INJECTION INTRAMUSCULAR; INTRAVENOUS; SUBCUTANEOUS at 09:16

## 2024-01-04 RX ADMIN — HYDROXYZINE PAMOATE 25 MG: 25 CAPSULE ORAL at 01:01

## 2024-01-04 RX ADMIN — BUDESONIDE INHALATION 500 MCG: 0.5 SUSPENSION RESPIRATORY (INHALATION) at 20:42

## 2024-01-04 RX ADMIN — DIVALPROEX SODIUM 250 MG: 250 TABLET, DELAYED RELEASE ORAL at 09:16

## 2024-01-04 RX ADMIN — ARFORMOTEROL TARTRATE 15 MCG: 15 SOLUTION RESPIRATORY (INHALATION) at 10:04

## 2024-01-04 RX ADMIN — DIVALPROEX SODIUM 250 MG: 250 TABLET, DELAYED RELEASE ORAL at 22:10

## 2024-01-04 RX ADMIN — SODIUM CHLORIDE, PRESERVATIVE FREE 10 ML: 5 INJECTION INTRAVENOUS at 09:18

## 2024-01-04 RX ADMIN — IPRATROPIUM BROMIDE AND ALBUTEROL SULFATE 1 DOSE: .5; 2.5 SOLUTION RESPIRATORY (INHALATION) at 10:04

## 2024-01-04 RX ADMIN — HYDROXYZINE PAMOATE 25 MG: 25 CAPSULE ORAL at 15:50

## 2024-01-04 RX ADMIN — HYDROXYZINE PAMOATE 25 MG: 25 CAPSULE ORAL at 22:10

## 2024-01-04 RX ADMIN — GUAIFENESIN 400 MG: 400 TABLET ORAL at 15:50

## 2024-01-04 RX ADMIN — ENOXAPARIN SODIUM 30 MG: 100 INJECTION SUBCUTANEOUS at 09:23

## 2024-01-04 RX ADMIN — ARFORMOTEROL TARTRATE 15 MCG: 15 SOLUTION RESPIRATORY (INHALATION) at 20:42

## 2024-01-04 RX ADMIN — GUAIFENESIN 400 MG: 400 TABLET ORAL at 09:16

## 2024-01-04 RX ADMIN — GUAIFENESIN 400 MG: 400 TABLET ORAL at 22:10

## 2024-01-04 RX ADMIN — PANTOPRAZOLE SODIUM 40 MG: 40 TABLET, DELAYED RELEASE ORAL at 06:17

## 2024-01-04 RX ADMIN — ENOXAPARIN SODIUM 30 MG: 100 INJECTION SUBCUTANEOUS at 22:13

## 2024-01-04 RX ADMIN — IPRATROPIUM BROMIDE AND ALBUTEROL SULFATE 1 DOSE: .5; 2.5 SOLUTION RESPIRATORY (INHALATION) at 20:41

## 2024-01-04 RX ADMIN — BUDESONIDE INHALATION 500 MCG: 0.5 SUSPENSION RESPIRATORY (INHALATION) at 10:04

## 2024-01-04 NOTE — PLAN OF CARE
Problem: Skin/Tissue Integrity  Goal: Absence of new skin breakdown  Description: 1.  Monitor for areas of redness and/or skin breakdown  2.  Assess vascular access sites hourly  3.  Every 4-6 hours minimum:  Change oxygen saturation probe site  4.  Every 4-6 hours:  If on nasal continuous positive airway pressure, respiratory therapy assess nares and determine need for appliance change or resting period.  Outcome: Progressing     Problem: Chronic Conditions and Co-morbidities  Goal: Patient's chronic conditions and co-morbidity symptoms are monitored and maintained or improved  Outcome: Progressing     Problem: Discharge Planning  Goal: Discharge to home or other facility with appropriate resources  Outcome: Progressing     Problem: Pain  Goal: Verbalizes/displays adequate comfort level or baseline comfort level  Outcome: Progressing     Problem: Safety - Adult  Goal: Free from fall injury  Outcome: Progressing

## 2024-01-04 NOTE — PROGRESS NOTES
[facility]  Internal Medicine Department    Attending Physician Statement:  Lorenzo Carroll Jr. D.O.    I have discussed the case, including pertinent history and exam findings with the resident. I have reviewed all past medical history, past surgical history, family history, social history, medications, and allergies and updated as appropriate in the history section of the chart. I have seen and examined the patient and the key elements of the encounter have been performed by me. I agree with the assessment, plan and orders as documented by the resident.      Acute Hypoxic Respiratory Failure   -2/2 exacerbation of COPD 2/2 combination of noncompliance, absence of NIV, tobacco abuse, and environmental factors   -donna is attempting to quit smoking but does not want to quit outright   -donna continues to have bronchospasms   -donna requires NIV for multiple hospitalizations during the last 2 months; this will improve quality of life, reduce hospitalization and avoid death   -donna SpO2 target is 92%; donna does not need to have FiO2 increased 2/2 concerns for hypercapnia   -continue pulmonary toilet and hygiene; cotninue IV steroids with transition to oral tomorrow   -discussed with patient that 2/2 her frequent exacerbations it may be benefitcial for her to stay at [facility] until she can obtain an NIV as outpatient to prevent hospitalizations and stabilize her life; patient considering this and will discuss with SW     Remainder of medical problems as per resident note.

## 2024-01-04 NOTE — CARE COORDINATION
Patient presented  to ED for C/O SOB. Placed on 5 L of oxygen   Using Bipap 4 hrs on and 4 hrs off. Currently on 3 L with pox 92%  CO2 36  now 32. Pulmonary consulted. Met with patient at bedside to discuss transition of care. Patient is living in a 2 story home and sleeps in living room on first level. There are 11 people that live there.  She has a walker from [facility]. She also has a walker and BSC.  Order for SW to see regarding Bipap at home. This CM spoke with Moises from [place] [phone] they are waiting for insurance authorization and will contact CM with updates.  Patient also is on oxygen from [facility] at 2 Liters. Spoke with Patricia from [employer] [place] but it would cost patient 50 dollars/month to rent. Nik from public benefits Patient goes to Internal medicine clinic and uses [facility] on [address]. Patient has no h/o HHC or [facility] but is receptive to HHC and had no preference of agency and [facility] Referral called to Ángela from [place]. Decling [facility] at this time. Await acceptance.. Order for sn/pt/ot/sw in Epic. Patient 's daughter will transport home when medically cleared. CM/SW will continue to follow.

## 2024-01-04 NOTE — PROGRESS NOTES
[facility]  Internal Medicine Residency Program  Progress Note - House Team       Patient:  Brenda Nunn 58 y.o. female   MRN: [id number]       Date of Service: 1/4/2024    CC: SOB   Overnight events: Pt feeling anxious, home vistaril restarted. pCO2 improved to 58.9.  Subjective     Pt sitting in bed, no distress, eating breakfast. Tolerating PO intake well. Remains on 3L NC saturating 94%, baseline 2-3L NC. Pt states she was only able to wear her bipap for a couple hours overnight 2/2 mask uncomfortable. Continues to have chronic cough, sputum color and amount remain at baseline. Denies fever/chills, HA, CP, increasing SOB, abdominal pain, nausea.     Objective       Physical Exam  Vitals: /75   Pulse 86   Temp 97.9 °F (36.6 °C) (Oral)   Resp 20   Ht 1.702 m (5' 7\")   Wt 113.4 kg (250 lb)   SpO2 96%   BMI 39.16 kg/m²     I & O - 24hr: No intake/output data recorded.   General Appearance: alert, appears stated age, and cooperative  HEENT:  Head: Normal, normocephalic, atraumatic.  Neck: supple, symmetrical, trachea midline  Lung: b/l expiratory wheezing, decreased air movement throughout, 3L NC 94%, no accessory muscle use, no increased WOB   Heart: RRRR  Abdomen: soft, BS+, nontender   Extremities:  no edema, no cyanosis  Neurologic: Mental status: Alert, oriented, thought content appropriate    Diet:   ADULT DIET; Regular    Pertinent Labs & Imaging Studies     Labs    CBC with Differential:    Lab Results   Component Value Date/Time    WBC 5.1 01/04/2024 05:34 AM    RBC 4.18 01/04/2024 05:34 AM    HGB 12.3 01/04/2024 05:34 AM    HCT 39.9 01/04/2024 05:34 AM     01/04/2024 05:34 AM    MCV 95.5 01/04/2024 05:34 AM    MCH 29.4 01/04/2024 05:34 AM    MCHC 30.8 01/04/2024 05:34 AM    RDW 12.2 01/04/2024 05:34 AM    LYMPHOPCT 18 01/04/2024 05:34 AM    MONOPCT 10 01/04/2024 05:34 AM    BASOPCT 0 01/04/2024 05:34 AM    MONOSABS 0.53 01/04/2024 05:34 AM    LYMPHSABS 0.91

## 2024-01-04 NOTE — PLAN OF CARE
Problem: Skin/Tissue Integrity  Goal: Absence of new skin breakdown  Description: 1.  Monitor for areas of redness and/or skin breakdown  2.  Assess vascular access sites hourly  3.  Every 4-6 hours minimum:  Change oxygen saturation probe site  4.  Every 4-6 hours:  If on nasal continuous positive airway pressure, respiratory therapy assess nares and determine need for appliance change or resting period.  1/4/2024 1311 by Aubree Begum RN  Outcome: Progressing     Problem: Chronic Conditions and Co-morbidities  Goal: Patient's chronic conditions and co-morbidity symptoms are monitored and maintained or improved  1/4/2024 1311 by Aubree Begum, RN  Outcome: Progressing  Flowsheets (Taken 1/4/2024 0900)  Care Plan - Patient's Chronic Conditions and Co-Morbidity Symptoms are Monitored and Maintained or Improved: Monitor and assess patient's chronic conditions and comorbid symptoms for stability, deterioration, or improvement     Problem: Discharge Planning  Goal: Discharge to home or other facility with appropriate resources  1/4/2024 1311 by Aubree Begum RN  Outcome: Progressing  Flowsheets (Taken 1/4/2024 0900)  Discharge to home or other facility with appropriate resources:   Identify barriers to discharge with patient and caregiver   Arrange for needed discharge resources and transportation as appropriate   Identify discharge learning needs (meds, wound care, etc)   Refer to discharge planning if patient needs post-hospital services based on physician order or complex needs related to functional status, cognitive ability or social support system     Problem: Pain  Goal: Verbalizes/displays adequate comfort level or baseline comfort level  1/4/2024 1311 by Aubree Begum, RN  Outcome: Progressing  Flowsheets (Taken 1/4/2024 0900)  Verbalizes/displays adequate comfort level or baseline comfort level: Encourage patient to monitor pain and request assistance

## 2024-01-04 NOTE — CONSULTS
[facility]  Department of Internal Medicine  Division of Pulmonary, Critical Care and Sleep Medicine  Consult Note    Patient: Brenda Nunn  MRN: [id number]  : 1965    Encounter Time: 5:55 PM     Date of Admission: 1/3/2024  5:10 AM    Primary Care Physician: Chapin Siddiqui MD    Reason for Consultation: COPD      HISTORY OF PRESENT ILLNESS : Brenad Nunn 58 y.o. female was seen in consultation regarding the above chief compliant.    Brenda is a 58-year-old woman that I first got acquainted with while on the house medicine team.  She was or came to [place] to live with her daughter about 30 days ago from [place].  She is struggled with drug addiction for some period of time and needed to remove herself from \"her environment\".  She seems to be doing well from that regard but was admitted shortly after moving here with COPD and difficulty breathing with CT scan evidence of emphysema.  She did leave AMA but was still hypoxic her daughter told her to get back to the hospital which she did was in the hospital for an additional 3 to 4 days placed with steroids antibiotics and bronchodilators with improvement and is post to follow-up in the pulmonary office.  She was on oxygen as it was with her previously while she was in [place].  Urine drug screen was negative although positive for cannabis.    Patient past medical history includes chronic struct of pulmonary disease on chronic oxygen therapy, amphetamine and heroin and cannabinoid use disorder,  past psychiatric history of borderline personality disorder, interpersonal relationship conflicts, history of substance abuse (meth, heroin, LSD, crack), hypertension, obstructive sleep apnea plan history of dysphagia with reflux and a \"Z-line esophagus\" status post esophageal dilatation colon polyps bipolar and schizoaffective disorder dimension recently diagnosed vitamin D deficiency.  She presents to the

## 2024-01-04 NOTE — PROGRESS NOTES
4 Eyes Skin Assessment     NAME:  Brenda Nunn  YOB: 1965  MEDICAL RECORD NUMBER:  [id number]    The patient is being assessed for  Admission    I agree that at least one RN has performed a thorough Head to Toe Skin Assessment on the patient. ALL assessment sites listed below have been assessed.      Areas assessed by both nurses:    Head, Face, Ears, Shoulders, Back, Chest, Arms, Elbows, Hands, Sacrum. Buttock, Coccyx, Ischium, Legs. Feet and Heels, and Under Medical Devices         Does the Patient have a Wound? No noted wound(s)       Gabe Prevention initiated by RN: No  Wound Care Orders initiated by RN: No    Pressure Injury (Stage 3,4, Unstageable, DTI, NWPT, and Complex wounds) if present, place Wound referral order by RN under : No    New Ostomies, if present place, Ostomy referral order under : No     Nurse 1 eSignature: Electronically signed by Maria C Noe RN on 1/4/24 at 12:36 AM EST    **SHARE this note so that the co-signing nurse can place an eSignature**    Nurse 2 eSignature: Electronically signed by Rachel Valadez RN on 1/4/24 at 12:44 AM EST

## 2024-01-04 NOTE — PROGRESS NOTES
Date: 1/3/2024    Time: 11:26 PM    Patient Placed On BIPAP/CPAP/ Non-Invasive Ventilation?  Yes    If no must comment.  Facial area red/color change? No           If YES are Blister/Lesion present?No   If yes must notify nursing staff  BIPAP/CPAP skin barrier?  Yes    Skin barrier type:mepilexlite       Comments:        Master Oscar RCP

## 2024-01-05 LAB
ANION GAP SERPL CALCULATED.3IONS-SCNC: 7 MMOL/L (ref 7–16)
BASOPHILS # BLD: 0.04 K/UL (ref 0–0.2)
BASOPHILS NFR BLD: 1 % (ref 0–2)
BUN SERPL-MCNC: 23 MG/DL (ref 6–20)
CALCIUM SERPL-MCNC: 9.1 MG/DL (ref 8.6–10.2)
CHLORIDE SERPL-SCNC: 98 MMOL/L (ref 98–107)
CO2 SERPL-SCNC: 37 MMOL/L (ref 22–29)
CREAT SERPL-MCNC: 0.6 MG/DL (ref 0.5–1)
EOSINOPHIL # BLD: 0.01 K/UL (ref 0.05–0.5)
EOSINOPHILS RELATIVE PERCENT: 0 % (ref 0–6)
ERYTHROCYTE [DISTWIDTH] IN BLOOD BY AUTOMATED COUNT: 12.4 % (ref 11.5–15)
GFR SERPL CREATININE-BSD FRML MDRD: >60 ML/MIN/1.73M2
GLUCOSE BLD-MCNC: 120 MG/DL (ref 74–99)
GLUCOSE BLD-MCNC: 124 MG/DL (ref 74–99)
GLUCOSE BLD-MCNC: 161 MG/DL (ref 74–99)
GLUCOSE BLD-MCNC: 215 MG/DL (ref 74–99)
GLUCOSE SERPL-MCNC: 104 MG/DL (ref 74–99)
HCT VFR BLD AUTO: 38.2 % (ref 34–48)
HGB BLD-MCNC: 11.9 G/DL (ref 11.5–15.5)
IMM GRANULOCYTES # BLD AUTO: 0.03 K/UL (ref 0–0.58)
IMM GRANULOCYTES NFR BLD: 0 % (ref 0–5)
LYMPHOCYTES NFR BLD: 2.24 K/UL (ref 1.5–4)
LYMPHOCYTES RELATIVE PERCENT: 33 % (ref 20–42)
MCH RBC QN AUTO: 29.9 PG (ref 26–35)
MCHC RBC AUTO-ENTMCNC: 31.2 G/DL (ref 32–34.5)
MCV RBC AUTO: 96 FL (ref 80–99.9)
MONOCYTES NFR BLD: 0.67 K/UL (ref 0.1–0.95)
MONOCYTES NFR BLD: 10 % (ref 2–12)
NEUTROPHILS NFR BLD: 56 % (ref 43–80)
NEUTS SEG NFR BLD: 3.74 K/UL (ref 1.8–7.3)
PLATELET # BLD AUTO: 195 K/UL (ref 130–450)
PMV BLD AUTO: 11.1 FL (ref 7–12)
POTASSIUM SERPL-SCNC: 4.1 MMOL/L (ref 3.5–5)
RBC # BLD AUTO: 3.98 M/UL (ref 3.5–5.5)
SODIUM SERPL-SCNC: 142 MMOL/L (ref 132–146)
WBC OTHER # BLD: 6.7 K/UL (ref 4.5–11.5)

## 2024-01-05 PROCEDURE — 6360000002 HC RX W HCPCS: Performed by: STUDENT IN AN ORGANIZED HEALTH CARE EDUCATION/TRAINING PROGRAM

## 2024-01-05 PROCEDURE — 6370000000 HC RX 637 (ALT 250 FOR IP): Performed by: STUDENT IN AN ORGANIZED HEALTH CARE EDUCATION/TRAINING PROGRAM

## 2024-01-05 PROCEDURE — 94660 CPAP INITIATION&MGMT: CPT

## 2024-01-05 PROCEDURE — 82962 GLUCOSE BLOOD TEST: CPT

## 2024-01-05 PROCEDURE — 80048 BASIC METABOLIC PNL TOTAL CA: CPT

## 2024-01-05 PROCEDURE — 36415 COLL VENOUS BLD VENIPUNCTURE: CPT

## 2024-01-05 PROCEDURE — 99232 SBSQ HOSP IP/OBS MODERATE 35: CPT | Performed by: INTERNAL MEDICINE

## 2024-01-05 PROCEDURE — 85025 COMPLETE CBC W/AUTO DIFF WBC: CPT

## 2024-01-05 PROCEDURE — 2580000003 HC RX 258: Performed by: STUDENT IN AN ORGANIZED HEALTH CARE EDUCATION/TRAINING PROGRAM

## 2024-01-05 PROCEDURE — 94640 AIRWAY INHALATION TREATMENT: CPT

## 2024-01-05 PROCEDURE — 97165 OT EVAL LOW COMPLEX 30 MIN: CPT

## 2024-01-05 PROCEDURE — 6370000000 HC RX 637 (ALT 250 FOR IP)

## 2024-01-05 PROCEDURE — 2140000000 HC CCU INTERMEDIATE R&B

## 2024-01-05 PROCEDURE — 97530 THERAPEUTIC ACTIVITIES: CPT

## 2024-01-05 PROCEDURE — 97161 PT EVAL LOW COMPLEX 20 MIN: CPT

## 2024-01-05 PROCEDURE — 2700000000 HC OXYGEN THERAPY PER DAY

## 2024-01-05 PROCEDURE — 97535 SELF CARE MNGMENT TRAINING: CPT

## 2024-01-05 RX ORDER — BENZONATATE 100 MG/1
100 CAPSULE ORAL 3 TIMES DAILY PRN
Status: DISCONTINUED | OUTPATIENT
Start: 2024-01-05 | End: 2024-01-12 | Stop reason: HOSPADM

## 2024-01-05 RX ORDER — DEXTROSE MONOHYDRATE 100 MG/ML
INJECTION, SOLUTION INTRAVENOUS CONTINUOUS PRN
Status: DISCONTINUED | OUTPATIENT
Start: 2024-01-05 | End: 2024-01-12 | Stop reason: HOSPADM

## 2024-01-05 RX ORDER — VITAMIN B COMPLEX
1000 TABLET ORAL DAILY
Status: DISCONTINUED | OUTPATIENT
Start: 2024-01-05 | End: 2024-01-12 | Stop reason: HOSPADM

## 2024-01-05 RX ORDER — INSULIN LISPRO 100 [IU]/ML
0-4 INJECTION, SOLUTION INTRAVENOUS; SUBCUTANEOUS NIGHTLY
Status: DISCONTINUED | OUTPATIENT
Start: 2024-01-05 | End: 2024-01-08

## 2024-01-05 RX ORDER — INSULIN LISPRO 100 [IU]/ML
0-4 INJECTION, SOLUTION INTRAVENOUS; SUBCUTANEOUS
Status: DISCONTINUED | OUTPATIENT
Start: 2024-01-05 | End: 2024-01-08

## 2024-01-05 RX ORDER — HYDROXYZINE PAMOATE 25 MG/1
25 CAPSULE ORAL ONCE
Status: COMPLETED | OUTPATIENT
Start: 2024-01-05 | End: 2024-01-05

## 2024-01-05 RX ADMIN — PREDNISONE 40 MG: 20 TABLET ORAL at 12:22

## 2024-01-05 RX ADMIN — DIVALPROEX SODIUM 250 MG: 250 TABLET, DELAYED RELEASE ORAL at 10:23

## 2024-01-05 RX ADMIN — BUDESONIDE INHALATION 500 MCG: 0.5 SUSPENSION RESPIRATORY (INHALATION) at 18:09

## 2024-01-05 RX ADMIN — HYDROXYZINE PAMOATE 25 MG: 25 CAPSULE ORAL at 15:18

## 2024-01-05 RX ADMIN — Medication 1000 UNITS: at 10:23

## 2024-01-05 RX ADMIN — ENOXAPARIN SODIUM 30 MG: 100 INJECTION SUBCUTANEOUS at 10:23

## 2024-01-05 RX ADMIN — BENZONATATE 100 MG: 100 CAPSULE ORAL at 16:55

## 2024-01-05 RX ADMIN — HYDROXYZINE PAMOATE 25 MG: 25 CAPSULE ORAL at 10:28

## 2024-01-05 RX ADMIN — SODIUM CHLORIDE, PRESERVATIVE FREE 10 ML: 5 INJECTION INTRAVENOUS at 10:29

## 2024-01-05 RX ADMIN — SODIUM CHLORIDE, PRESERVATIVE FREE 10 ML: 5 INJECTION INTRAVENOUS at 21:42

## 2024-01-05 RX ADMIN — IPRATROPIUM BROMIDE AND ALBUTEROL SULFATE 1 DOSE: .5; 2.5 SOLUTION RESPIRATORY (INHALATION) at 07:50

## 2024-01-05 RX ADMIN — GUAIFENESIN 400 MG: 400 TABLET ORAL at 14:48

## 2024-01-05 RX ADMIN — IPRATROPIUM BROMIDE AND ALBUTEROL SULFATE 1 DOSE: .5; 2.5 SOLUTION RESPIRATORY (INHALATION) at 13:01

## 2024-01-05 RX ADMIN — ARFORMOTEROL TARTRATE 15 MCG: 15 SOLUTION RESPIRATORY (INHALATION) at 07:50

## 2024-01-05 RX ADMIN — IPRATROPIUM BROMIDE AND ALBUTEROL SULFATE 1 DOSE: .5; 2.5 SOLUTION RESPIRATORY (INHALATION) at 16:44

## 2024-01-05 RX ADMIN — ARFORMOTEROL TARTRATE 15 MCG: 15 SOLUTION RESPIRATORY (INHALATION) at 18:09

## 2024-01-05 RX ADMIN — OLANZAPINE 5 MG: 5 TABLET, FILM COATED ORAL at 21:41

## 2024-01-05 RX ADMIN — ENOXAPARIN SODIUM 30 MG: 100 INJECTION SUBCUTANEOUS at 21:41

## 2024-01-05 RX ADMIN — DIVALPROEX SODIUM 250 MG: 250 TABLET, DELAYED RELEASE ORAL at 21:41

## 2024-01-05 RX ADMIN — BUDESONIDE INHALATION 500 MCG: 0.5 SUSPENSION RESPIRATORY (INHALATION) at 07:50

## 2024-01-05 RX ADMIN — IPRATROPIUM BROMIDE AND ALBUTEROL SULFATE 1 DOSE: .5; 2.5 SOLUTION RESPIRATORY (INHALATION) at 18:09

## 2024-01-05 RX ADMIN — HYDROXYZINE PAMOATE 25 MG: 25 CAPSULE ORAL at 21:47

## 2024-01-05 RX ADMIN — GUAIFENESIN 400 MG: 400 TABLET ORAL at 21:41

## 2024-01-05 RX ADMIN — PANTOPRAZOLE SODIUM 40 MG: 40 TABLET, DELAYED RELEASE ORAL at 05:04

## 2024-01-05 RX ADMIN — GUAIFENESIN 400 MG: 400 TABLET ORAL at 10:23

## 2024-01-05 ASSESSMENT — PAIN SCALES - GENERAL: PAINLEVEL_OUTOF10: 0

## 2024-01-05 NOTE — PROGRESS NOTES
Physical Therapy  Physical Therapy Initial Assessment     Name: Brenda Nunn  : 1965  MRN: [id number]      Date of Service: 2024    Evaluating PT:  Kimi Hurst, PT, DPT, [id number]    Room #:  6420/6420-A  Diagnosis:  Hypoxia [R09.02]  COPD exacerbation (HCC) [J44.1]  PMHx/PSHx:    Past Medical History:   Diagnosis Date    CHF (congestive heart failure) (HCC)     COPD (chronic obstructive pulmonary disease) (HCC)     Depression     Hypertension       Past Surgical History:   Procedure Laterality Date     SECTION      HYSTERECTOMY (CERVIX STATUS UNKNOWN)        Procedure/Surgery:  none this admission  Precautions:  Fall Risk, O2, Continuous Pulse-Ox,   Equipment Needs:  Rollator (if returning home upon d/c)    SUBJECTIVE:    Pt lives with 11 people in a 2 story home with 3 stairs to enter and 1 rail.  Bed is on 2 floor and bath is on 2 floor with 1 flight and 1 rail to access. Pt is unable to access 2nd floor and now sleeps on the couch and uses a bsc. Pt reports using w/c within the home for mobility d/t decreased endurance. Ambulates house <>car with no AD with increased difficultly. Pt is on 2-3 L/min O2 at baseline.     OBJECTIVE:   Initial Evaluation  Date: 24 Treatment Short Term/ Long Term   Goals   AM-PAC 6 Clicks      Was pt agreeable to Eval/treatment? yes     Does pt have pain? No c/o pain     Bed Mobility  Rolling: NT  Supine to sit: SBA with HOB elevated   Sit to supine: NT  Scooting: SBA  Rolling: Independent   Supine to sit: Independent   Sit to supine: Independent   Scooting: Independent    Transfers Sit to stand: Linda  Stand to sit: Linda  Stand pivot: Linda with no AD  Sit to stand: Independent   Stand to sit: Independent   Stand pivot: mod Independent with AD   Ambulation    25'x2 with no AD Linda  150+ feet with AD mod Independent    Stair negotiation: ascended and descended  NT  4+ steps with 1 rail mod Independent    ROM BUE:  Refer to OT note  BLE:  WFL     Strength

## 2024-01-05 NOTE — PLAN OF CARE
Pt OK to participate in PT/OT with supplemental NC oxygen as needed, baseline 2-3L.     Electronically signed by Mariana Maldonado MD on 1/5/2024 at 9:38 AM

## 2024-01-05 NOTE — PROGRESS NOTES
Occupational Therapy  OCCUPATIONAL THERAPY INITIAL EVALUATION    [facility]  [address], [place]      Date:2024                                                Patient Name: Brenda Nunn  MRN: [id number]  : 1965  Room: [address] [place] [address]    Evaluating OT: Rehan Turcios OTR/L #[id number]     Referring Provider: Mariana Maldonado MD   Specific Provider Orders/Date: OT eval and treat 24    Diagnosis: Hypoxia [R09.02]  COPD exacerbation (HCC) [J44.1]   Pt admitted to hospital with SOB, O2 sat 77%     Pertinent Medical History:  has a past medical history of CHF (congestive heart failure) (HCC), COPD (chronic obstructive pulmonary disease) (HCC), Depression, and Hypertension.       Precautions:  Fall Risk, O2, continuous pulse ox    Assessment of current deficits    [x] Functional mobility  [x]ADLs  [x] Strength               []Cognition    [x] Functional transfers   [x] IADLs         [x] Safety Awareness   [x]Endurance    [] Fine Coordination              [x] Balance      [] Vision/perception   []Sensation     []Gross Motor Coordination  [] ROM  [] Delirium                   [] Motor Control     OT PLAN OF CARE   OT POC based on physician orders, patient diagnosis and results of clinical assessment    Frequency/Duration 1-3 days/wk for 2 weeks PRN   Specific OT Treatment Interventions to include:   * Instruction/training on adapted ADL techniques and AE recommendations to increase functional independence within precautions       * Training on energy conservation strategies, correct breathing pattern and techniques to improve independence/tolerance for self-care routine  * Functional transfer/mobility training/DME recommendations for increased independence, safety, and fall prevention  * Patient/Family education to increase follow through with safety techniques and functional independence  * Recommendation of environmental modifications for

## 2024-01-05 NOTE — PROGRESS NOTES
[facility]  Internal Medicine Residency Program  Progress Note - House Team       Patient:  Brenda Nunn 58 y.o. female   MRN: [id number]       Date of Service: 1/5/2024    CC: SOB   Overnight events: No events.   Subjective     Pt seen and examined bedside. Was able to tolerate bipap for longer period overnight. Cough is subsiding, no longer producing much/if any sputum. Eat and drinking well. Discussed LISSETH, she would like to talk to SW about options-still open to idea.     Objective       Physical Exam  Vitals: /67   Pulse 77   Temp 98.1 °F (36.7 °C) (Oral)   Resp 20   Ht 1.702 m (5' 7\")   Wt 113.4 kg (250 lb)   SpO2 100%   BMI 39.16 kg/m²     I & O - 24hr: No intake/output data recorded.   General Appearance: alert, appears stated age, and cooperative  HEENT:  Head: Normal, normocephalic, atraumatic.  Neck: supple, symmetrical, trachea midline  Lung: b/l upper wheezing remains but improved, decreased air movement throughout but improved, 3L NC 94%, no accessory muscle use, no increased WOB   Heart: RRRR  Abdomen: soft, BS+, nontender   Extremities:  no edema, no cyanosis  Neurologic: Mental status: Alert, oriented, thought content appropriate    Diet:   ADULT DIET; Regular    Pertinent Labs & Imaging Studies     Labs    CBC with Differential:    Lab Results   Component Value Date/Time    WBC 6.7 01/05/2024 04:47 AM    RBC 3.98 01/05/2024 04:47 AM    HGB 11.9 01/05/2024 04:47 AM    HCT 38.2 01/05/2024 04:47 AM     01/05/2024 04:47 AM    MCV 96.0 01/05/2024 04:47 AM    MCH 29.9 01/05/2024 04:47 AM    MCHC 31.2 01/05/2024 04:47 AM    RDW 12.4 01/05/2024 04:47 AM    LYMPHOPCT 33 01/05/2024 04:47 AM    MONOPCT 10 01/05/2024 04:47 AM    BASOPCT 1 01/05/2024 04:47 AM    MONOSABS 0.67 01/05/2024 04:47 AM    LYMPHSABS 2.24 01/05/2024 04:47 AM    EOSABS 0.01 01/05/2024 04:47 AM    BASOSABS 0.04 01/05/2024 04:47 AM     BMP:    Lab Results   Component Value Date/Time

## 2024-01-05 NOTE — DISCHARGE INSTR - COC
Continuity of Care Form    Patient Name: Brenda Nunn   :  1965  MRN:  [id number]    Admit date:  1/3/2024  Discharge date:  ***    Code Status Order: Full Code   Advance Directives:     Admitting Physician:  Jens Fong MD  PCP: Chapin Siddiqui MD    Discharging Nurse: ***  Discharging Hospital Unit/Room#: 6420/6420-A  Discharging Unit Phone Number: ***    Emergency Contact:   Extended Emergency Contact Information  Primary Emergency Contact: BRAIN SCHREIBER  Mobile Phone: [phone]  Relation: Child  Preferred language: English   needed? No    Past Surgical History:  Past Surgical History:   Procedure Laterality Date     SECTION      HYSTERECTOMY (CERVIX STATUS UNKNOWN)         Immunization History:     There is no immunization history on file for this patient.    Active Problems:  Patient Active Problem List   Diagnosis Code    COPD with acute exacerbation ([facility]) J44.1    COPD exacerbation ([facility]) J44.1    Mood disorder ([facility]) F39    Bipolar 1 disorder ([facility]) F31.9    Bipolar disorder, current episode mixed, severe ([facility]) F31.63    Stress incontinence (female) (male) N39.3    Irregular Z line of esophagus K22.9    Polysubstance use disorder F19.90    BENJAMIN (obstructive sleep apnea) G47.33    Anxiety F41.9    Acute hypoxic respiratory failure ([facility]) J96.01    Acute on chronic respiratory failure with hypoxia and hypercapnia ([facility]) J96.21, J96.22    Borderline personality disorder ([facility]) F60.3    Acute respiratory failure with hypercapnia ([facility]) J96.02    Tobacco use Z72.0    Intertrigo L30.4    Vaginal candidiasis B37.31    Acute respiratory failure with hypoxia and hypercapnia ([facility]) J96.01, J96.02       Isolation/Infection:   Isolation            No Isolation          Patient Infection Status       None to display                     Nurse Assessment:  Last Vital Signs: /67   Pulse 77   Temp 98.1 °F (36.7 °C) (Oral)   Resp 20   Ht 1.702 m (5' 7\")   Wt 113.4 kg (250 lb)   SpO2  personal belongings (please select all that are sent with patient):  {CHP DME Belongings:[id number]}    RN SIGNATURE:  {Esignature:[id number]}    CASE MANAGEMENT/SOCIAL WORK SECTION    Inpatient Status Date: ***    Readmission Risk Assessment Score:  Readmission Risk              Risk of Unplanned Readmission:  40           Discharging to Facility/ Agency   Name:  [facility]  Address:  [address] [place] [address]  Phone: [phone]  Fax: [phone]    Dialysis Facility (if applicable)   Name:  Address:  Dialysis Schedule:  Phone:  Fax:    / signature: Electronically signed by VIOLETTE Dunn on 1/12/24 at 9:48 AM EST    PHYSICIAN SECTION    Prognosis: Good    Condition at Discharge: Stable    Rehab Potential (if transferring to Rehab): Good    Recommended Labs or Other Treatments After Discharge: oxygen therapy  PT OT     Physician Certification: I certify the above information and transfer of Brenda Nunn  is necessary for the continuing treatment of the diagnosis listed and that she requires Skilled Nursing Facility for less 30 days.     Update Admission H&P: No change in H&P. Pulmonology was consulted. Patient was placed on BIPAP and transitioned to nasal cannula after hypercapnia resolved. She was placed on BIPAP at night. Steroids for 5 days was started and was placed on breathing treatments. She was on her baseline oxygen and she was on Buspar, xanax and vistaril for anxiety. She was planned to discharge home, her family members contracted RSV, she was discharged to SNF.   Discharge instructions:  Continue taking your medication as listed  START to take Daliresp 250mcg daily after discharge.  Please use BiPAP during night time and naps  Please follow up appointments with Pulmonary and rehab  Please follow up with primary care physician on upon discharge, appointment has been scheduled on 1/22/24  Please follow PCP for anxiety medications adjustment as patient has had multiple

## 2024-01-05 NOTE — PROGRESS NOTES
[facility]  Department of Internal Medicine  Division of Pulmonary, Critical Care and Sleep Medicine  Consult Note    Patient: Brenda Nunn  MRN: [id number]  : 1965    Encounter Time: 2:14 PM     Date of Admission: 1/3/2024  5:10 AM    Primary Care Physician: Chapin Siddiqui MD    Reason for Consultation: COPD      HISTORY OF PRESENT ILLNESS : Brenda Nunn 58 y.o. female was seen in consultation regarding the above chief compliant.    Brenda is a 58-year-old woman that I first got acquainted with while on the house medicine team.  She was or came to [place] to live with her daughter about 30 days ago from [place].  She is struggled with drug addiction for some period of time and needed to remove herself from \"her environment\".  She seems to be doing well from that regard but was admitted shortly after moving here with COPD and difficulty breathing with CT scan evidence of emphysema.  She did leave AMA but was still hypoxic her daughter told her to get back to the hospital which she did was in the hospital for an additional 3 to 4 days placed with steroids antibiotics and bronchodilators with improvement and is post to follow-up in the pulmonary office.  She was on oxygen as it was with her previously while she was in [place].  Urine drug screen was negative although positive for cannabis.    Patient past medical history includes chronic struct of pulmonary disease on chronic oxygen therapy, amphetamine and heroin and cannabinoid use disorder,  past psychiatric history of borderline personality disorder, interpersonal relationship conflicts, history of substance abuse (meth, heroin, LSD, crack), hypertension, obstructive sleep apnea plan history of dysphagia with reflux and a \"Z-line esophagus\" status post esophageal dilatation colon polyps bipolar and schizoaffective disorder dimension recently diagnosed vitamin D deficiency.  She presents to the  emergency room with 3 days of shortness of breath she went to her PCP her oxygen saturation was 77% she was advised to come to the emergency room.  She she denies any sick contacts.  She has been smoking at least half a pack of cigarettes a day and continues with cannabis use but denies alcohol and methamphetamines.  EKG was nonsuggestive of any acute coronary disease or ischemia.  A chest x-ray was unremarkable and she was admitted to the hospital with pulmonary consultation requested.     Recently admitted for COPD exacerbation with hypercapnia for which she was to get a BIPAP on discharge. Did not get BIPAP, was told by insurance she has to get a sleep study first to see if she qualifies. Continues to smoke 1ppd.      This morning, patient is lying supine in ED bed with 3L NC O2 on. She is breathing with normal effort but has audible wheezing. Able to speak in complete sentences. She is still feeling shortness of breath. Denies chest pain, fever, chills, n/v.     PAST MEDICAL HISTORY:  has a past medical history of CHF (congestive heart failure) ([facility]), COPD (chronic obstructive pulmonary disease) ([facility]), Depression, and Hypertension.    SURGICAL HISTORY:  has a past surgical history that includes  section and Hysterectomy.     SOCIAL HISTORY:  reports that she has been smoking cigarettes. She started smoking about 46 years ago. She has a 46.0 pack-year smoking history. She has never used smokeless tobacco. She reports that she does not currently use drugs after having used the following drugs: Marijuana (Weed) and Methamphetamines (Crystal Meth). She reports that she does not drink alcohol.     FAMILY  HISTORY: family history is not on file. + COPD    MEDICATIONS:    Prior to Admission medications    Medication Sig Start Date End Date Taking? Authorizing Provider   hydrOXYzine pamoate (VISTARIL) 25 MG capsule Take 1 capsule by mouth 3 times daily as needed for Anxiety   Yes Provider, MD Otilio

## 2024-01-05 NOTE — CARE COORDINATION
1/5:Update CM Note:  Pt presented to the ER for SOB from home.  Pt is on 3L/Nc at 100% & SQ Lovenox.  Pt has hx of schizoaffective disorder & depression.  Cm spoke with pt to discuss SNF vs home with HHC.  Cm is pending PT/OT evals.  Pt at this point is pretty adamant about returning home & would like [facility].  CM sent referral to [facility].  Previous CM sent referral to [facility].  Per Ángela/Mariana they can accept - order placed.  Pt is active with [facility] -2L/NC.  Will need family to bring in a tank when dc.  Will need 02 testing to verify 02 needs.  Pt is order a NIV with [facility] & Moises has submitted the paper work pending insurances approval.  If pt is dc home her daughter can transport.  Sw/CM will continue to follow.  Electronically signed by Monika Ding RN on 1/5/2024 at 11:08 AM    The Plan for Transition of Care is related to the following treatment goals: HHC/DME    The Patient and/or patient representative  was provided with a choice of provider and agrees   with the discharge plan. [x] Yes [] No    Freedom of choice list was provided with basic dialogue that supports the patient's individualized plan of care/goals, treatment preferences and shares the quality data associated with the providers. [x] Yes [] No

## 2024-01-06 LAB
ANION GAP SERPL CALCULATED.3IONS-SCNC: 6 MMOL/L (ref 7–16)
BUN SERPL-MCNC: 16 MG/DL (ref 6–20)
CALCIUM SERPL-MCNC: 9.3 MG/DL (ref 8.6–10.2)
CHLORIDE SERPL-SCNC: 97 MMOL/L (ref 98–107)
CO2 SERPL-SCNC: 35 MMOL/L (ref 22–29)
CREAT SERPL-MCNC: 0.5 MG/DL (ref 0.5–1)
GFR SERPL CREATININE-BSD FRML MDRD: >60 ML/MIN/1.73M2
GLUCOSE BLD-MCNC: 133 MG/DL (ref 74–99)
GLUCOSE BLD-MCNC: 197 MG/DL (ref 74–99)
GLUCOSE BLD-MCNC: 208 MG/DL (ref 74–99)
GLUCOSE BLD-MCNC: 81 MG/DL (ref 74–99)
GLUCOSE SERPL-MCNC: 107 MG/DL (ref 74–99)
POTASSIUM SERPL-SCNC: 4.3 MMOL/L (ref 3.5–5)
SODIUM SERPL-SCNC: 138 MMOL/L (ref 132–146)

## 2024-01-06 PROCEDURE — 2700000000 HC OXYGEN THERAPY PER DAY

## 2024-01-06 PROCEDURE — 36415 COLL VENOUS BLD VENIPUNCTURE: CPT

## 2024-01-06 PROCEDURE — 99232 SBSQ HOSP IP/OBS MODERATE 35: CPT | Performed by: INTERNAL MEDICINE

## 2024-01-06 PROCEDURE — 6370000000 HC RX 637 (ALT 250 FOR IP)

## 2024-01-06 PROCEDURE — 2580000003 HC RX 258: Performed by: STUDENT IN AN ORGANIZED HEALTH CARE EDUCATION/TRAINING PROGRAM

## 2024-01-06 PROCEDURE — 80048 BASIC METABOLIC PNL TOTAL CA: CPT

## 2024-01-06 PROCEDURE — 6370000000 HC RX 637 (ALT 250 FOR IP): Performed by: STUDENT IN AN ORGANIZED HEALTH CARE EDUCATION/TRAINING PROGRAM

## 2024-01-06 PROCEDURE — 94640 AIRWAY INHALATION TREATMENT: CPT

## 2024-01-06 PROCEDURE — 82785 ASSAY OF IGE: CPT

## 2024-01-06 PROCEDURE — 6360000002 HC RX W HCPCS: Performed by: STUDENT IN AN ORGANIZED HEALTH CARE EDUCATION/TRAINING PROGRAM

## 2024-01-06 PROCEDURE — 2140000000 HC CCU INTERMEDIATE R&B

## 2024-01-06 PROCEDURE — 94660 CPAP INITIATION&MGMT: CPT

## 2024-01-06 PROCEDURE — 82962 GLUCOSE BLOOD TEST: CPT

## 2024-01-06 RX ORDER — LANOLIN ALCOHOL/MO/W.PET/CERES
3 CREAM (GRAM) TOPICAL ONCE
Status: COMPLETED | OUTPATIENT
Start: 2024-01-06 | End: 2024-01-06

## 2024-01-06 RX ORDER — CHOLECALCIFEROL (VITAMIN D3) 25 MCG
1000 TABLET ORAL DAILY
Qty: 90 TABLET | Refills: 1 | Status: CANCELLED | OUTPATIENT
Start: 2024-01-07 | End: 2024-07-05

## 2024-01-06 RX ORDER — PREDNISONE 20 MG/1
20 TABLET ORAL
Qty: 1 TABLET | Refills: 0 | Status: CANCELLED | OUTPATIENT
Start: 2024-01-07 | End: 2024-01-08

## 2024-01-06 RX ORDER — LANOLIN ALCOHOL/MO/W.PET/CERES
3 CREAM (GRAM) TOPICAL NIGHTLY PRN
Status: DISCONTINUED | OUTPATIENT
Start: 2024-01-06 | End: 2024-01-06

## 2024-01-06 RX ORDER — PREDNISONE 20 MG/1
40 TABLET ORAL
Qty: 2 TABLET | Refills: 0 | Status: CANCELLED | OUTPATIENT
Start: 2024-01-06 | End: 2024-01-07

## 2024-01-06 RX ADMIN — PANTOPRAZOLE SODIUM 40 MG: 40 TABLET, DELAYED RELEASE ORAL at 08:55

## 2024-01-06 RX ADMIN — ENOXAPARIN SODIUM 30 MG: 100 INJECTION SUBCUTANEOUS at 08:55

## 2024-01-06 RX ADMIN — ENOXAPARIN SODIUM 30 MG: 100 INJECTION SUBCUTANEOUS at 19:37

## 2024-01-06 RX ADMIN — IPRATROPIUM BROMIDE AND ALBUTEROL SULFATE 1 DOSE: .5; 2.5 SOLUTION RESPIRATORY (INHALATION) at 09:55

## 2024-01-06 RX ADMIN — IPRATROPIUM BROMIDE AND ALBUTEROL SULFATE 1 DOSE: .5; 2.5 SOLUTION RESPIRATORY (INHALATION) at 16:52

## 2024-01-06 RX ADMIN — HYDROXYZINE PAMOATE 25 MG: 25 CAPSULE ORAL at 18:50

## 2024-01-06 RX ADMIN — PREDNISONE 40 MG: 20 TABLET ORAL at 14:45

## 2024-01-06 RX ADMIN — Medication 1000 UNITS: at 08:55

## 2024-01-06 RX ADMIN — BUDESONIDE INHALATION 500 MCG: 0.5 SUSPENSION RESPIRATORY (INHALATION) at 09:55

## 2024-01-06 RX ADMIN — SODIUM CHLORIDE, PRESERVATIVE FREE 10 ML: 5 INJECTION INTRAVENOUS at 08:56

## 2024-01-06 RX ADMIN — GUAIFENESIN 400 MG: 400 TABLET ORAL at 08:55

## 2024-01-06 RX ADMIN — ARFORMOTEROL TARTRATE 15 MCG: 15 SOLUTION RESPIRATORY (INHALATION) at 09:55

## 2024-01-06 RX ADMIN — SODIUM CHLORIDE, PRESERVATIVE FREE 10 ML: 5 INJECTION INTRAVENOUS at 19:37

## 2024-01-06 RX ADMIN — HYDROXYZINE PAMOATE 25 MG: 25 CAPSULE ORAL at 11:23

## 2024-01-06 RX ADMIN — DIVALPROEX SODIUM 250 MG: 250 TABLET, DELAYED RELEASE ORAL at 19:37

## 2024-01-06 RX ADMIN — GUAIFENESIN 400 MG: 400 TABLET ORAL at 14:45

## 2024-01-06 RX ADMIN — BUDESONIDE INHALATION 500 MCG: 0.5 SUSPENSION RESPIRATORY (INHALATION) at 20:17

## 2024-01-06 RX ADMIN — IPRATROPIUM BROMIDE AND ALBUTEROL SULFATE 1 DOSE: .5; 2.5 SOLUTION RESPIRATORY (INHALATION) at 20:17

## 2024-01-06 RX ADMIN — IPRATROPIUM BROMIDE AND ALBUTEROL SULFATE 1 DOSE: .5; 2.5 SOLUTION RESPIRATORY (INHALATION) at 13:14

## 2024-01-06 RX ADMIN — DIVALPROEX SODIUM 250 MG: 250 TABLET, DELAYED RELEASE ORAL at 08:55

## 2024-01-06 RX ADMIN — MELATONIN 3 MG ORAL TABLET 3 MG: 3 TABLET ORAL at 19:36

## 2024-01-06 RX ADMIN — ARFORMOTEROL TARTRATE 15 MCG: 15 SOLUTION RESPIRATORY (INHALATION) at 20:17

## 2024-01-06 RX ADMIN — GUAIFENESIN 400 MG: 400 TABLET ORAL at 19:37

## 2024-01-06 RX ADMIN — OLANZAPINE 5 MG: 5 TABLET, FILM COATED ORAL at 19:37

## 2024-01-06 NOTE — PROGRESS NOTES
Patient reporting 9 out of 10 anxiety. Notified Dr. Vazquez with Dr. Fong team.    Rosalinda Glass RN

## 2024-01-06 NOTE — PLAN OF CARE
Got PS from RN, pt reported 9/10 of anxiety. Saw and examined the patient at bedside within 5 mins. Pt stated that the reasons causing her anxiety were being in hospital, and ongoing relationship issues with her daughter. She asked \"something stronger than Vistaril\". She stated she usually smoke to calm herself down and relax. I explained that she has nicotin patch on, and it is not allowed to smoke in the hospital. Talked to her that medication wouldn't solve the reasons that causing her anxiety. She did say \"the best solution would be one less person in the world\". She denied any plan to kill herself. I listened to her complaints, and told her that the staff here care about her and taking care of her. She then calmed down, and stated will try to do some painting or drawing stuff that she used to. She was emotionally stable after assessment. Updated the bedside RN.

## 2024-01-06 NOTE — PROGRESS NOTES
[facility]  Internal Medicine Residency Program  Progress Note - House Team       Patient:  Brenda Nunn 58 y.o. female   MRN: [id number]       Date of Service: 1/6/2024    CC: SOB   Overnight events: Pt requesting to go outside to smoke or \"something stronger\" for her anxiety, additional dose of vistaril was given. Tessalon ordered for cough.   Subjective     Per chart review pt remained on NC overnight, BiPAP placed this morning 0500.   Pt seen and examined bedside, remains on 4L NC saturating 99%. Resting comfortably.     Objective       Physical Exam  Vitals: BP (!) 156/60   Pulse 92   Temp 98.6 °F (37 °C) (Oral)   Resp 20   Ht 1.702 m (5' 7\")   Wt 113.4 kg (250 lb)   SpO2 100%   BMI 39.16 kg/m²     I & O - 24hr: No intake/output data recorded.   General Appearance: alert, appears stated age, and cooperative  HEENT:  Head: Normal, normocephalic, atraumatic.  Neck: supple, symmetrical, trachea midline  Lung: b/l upper wheezing remains but improved, decreased air movement throughout but improved, no accessory muscle use, no increased WOB   Heart: RRRR  Abdomen: soft, BS+, nontender   Extremities:  no edema     Diet:   ADULT DIET; Regular    Pertinent Labs & Imaging Studies     Labs    CBC with Differential:    Lab Results   Component Value Date/Time    WBC 6.7 01/05/2024 04:47 AM    RBC 3.98 01/05/2024 04:47 AM    HGB 11.9 01/05/2024 04:47 AM    HCT 38.2 01/05/2024 04:47 AM     01/05/2024 04:47 AM    MCV 96.0 01/05/2024 04:47 AM    MCH 29.9 01/05/2024 04:47 AM    MCHC 31.2 01/05/2024 04:47 AM    RDW 12.4 01/05/2024 04:47 AM    LYMPHOPCT 33 01/05/2024 04:47 AM    MONOPCT 10 01/05/2024 04:47 AM    BASOPCT 1 01/05/2024 04:47 AM    MONOSABS 0.67 01/05/2024 04:47 AM    LYMPHSABS 2.24 01/05/2024 04:47 AM    EOSABS 0.01 01/05/2024 04:47 AM    BASOSABS 0.04 01/05/2024 04:47 AM     BMP:    Lab Results   Component Value Date/Time     01/06/2024 05:12 AM    K 4.3 01/06/2024 05:12 AM

## 2024-01-06 NOTE — PROGRESS NOTES
[facility]  Department of Internal Medicine  Division of Pulmonary, Critical Care and Sleep Medicine  Consult Note    Patient: Brenda Nunn  MRN: [id number]  : 1965    Encounter Time: 12:05 PM     Date of Admission: 1/3/2024  5:10 AM    Primary Care Physician: Chapin Siddiqui MD    Reason for Consultation: COPD      SUBJECTIVE:    Improved  Anxiety noted  Requesting Visteril      OBJECTIVE:     PHYSICAL EXAM:   VITALS:   Vitals:    24 0500 24 0815 24 0955 24 1154   BP: 132/80 (!) 145/91  136/70   Pulse: 80 62  84   Resp: 22 20  20   Temp: 98.2 °F (36.8 °C) 97.7 °F (36.5 °C)  98.1 °F (36.7 °C)   TempSrc: Temporal Oral  Oral   SpO2: 99% 100% 99% 99%   Weight:       Height:            Intake/Output Summary (Last 24 hours) at 2024 1205  Last data filed at 2024 1715  Gross per 24 hour   Intake 240 ml   Output 1000 ml   Net -760 ml          CONSTITUTIONAL:   A&O x 3, NAD  SKIN:     No rash, No suspicious lesions, No skin discoloration  HEENT:     EOMI, MMM, No thrush  NECK:    No bruits, No JVP appreciated  CV:      Sinus,  No murmur, No rubs, No gallops  PULMONARY:   Couse BS,  No Wheezing, No Rales, No Rhonchi      No noted egophony  ABDOMEN:     Soft, non-tender. BS normal. No R/R/G  EXT:    No deformities .  No clubbing.       No lower extremity edema, No venous stasis  PULSE:   Appears equal and palpable.  PSYCHIATRIC:  Seems appropriate, No acute psychosis  MS:    No fractures, No gross weakness  NEUROLOGIC:   The clinical assessment is non-focal     DATA: IMAGING & TESTING:     LABORATORY TESTS:    CBC:   Lab Results   Component Value Date/Time    WBC 6.7 2024 04:47 AM    RBC 3.98 2024 04:47 AM    HGB 11.9 2024 04:47 AM    HCT 38.2 2024 04:47 AM    MCV 96.0 2024 04:47 AM    MCH 29.9 2024 04:47 AM    MCHC 31.2 2024 04:47 AM    RDW 12.4 2024 04:47 AM     2024 04:47 AM    MPV

## 2024-01-07 PROBLEM — J96.11 CHRONIC HYPOXIC RESPIRATORY FAILURE (HCC): Status: ACTIVE | Noted: 2024-01-07

## 2024-01-07 PROBLEM — J44.9 CHRONIC OBSTRUCTIVE PULMONARY DISEASE (HCC): Status: ACTIVE | Noted: 2024-01-07

## 2024-01-07 LAB
ANION GAP SERPL CALCULATED.3IONS-SCNC: 14 MMOL/L (ref 7–16)
BASOPHILS # BLD: 0.03 K/UL (ref 0–0.2)
BASOPHILS NFR BLD: 1 % (ref 0–2)
BUN SERPL-MCNC: 19 MG/DL (ref 6–20)
CALCIUM SERPL-MCNC: 9.4 MG/DL (ref 8.6–10.2)
CHLORIDE SERPL-SCNC: 96 MMOL/L (ref 98–107)
CO2 SERPL-SCNC: 31 MMOL/L (ref 22–29)
CREAT SERPL-MCNC: 0.6 MG/DL (ref 0.5–1)
EOSINOPHIL # BLD: 0 K/UL (ref 0.05–0.5)
EOSINOPHILS RELATIVE PERCENT: 0 % (ref 0–6)
ERYTHROCYTE [DISTWIDTH] IN BLOOD BY AUTOMATED COUNT: 12.3 % (ref 11.5–15)
GFR SERPL CREATININE-BSD FRML MDRD: >60 ML/MIN/1.73M2
GLUCOSE BLD-MCNC: 122 MG/DL (ref 74–99)
GLUCOSE BLD-MCNC: 172 MG/DL (ref 74–99)
GLUCOSE BLD-MCNC: 200 MG/DL (ref 74–99)
GLUCOSE BLD-MCNC: 225 MG/DL (ref 74–99)
GLUCOSE BLD-MCNC: 236 MG/DL (ref 74–99)
GLUCOSE SERPL-MCNC: 198 MG/DL (ref 74–99)
HCT VFR BLD AUTO: 39.4 % (ref 34–48)
HGB BLD-MCNC: 12.1 G/DL (ref 11.5–15.5)
IMM GRANULOCYTES # BLD AUTO: 0.06 K/UL (ref 0–0.58)
IMM GRANULOCYTES NFR BLD: 1 % (ref 0–5)
LYMPHOCYTES NFR BLD: 0.67 K/UL (ref 1.5–4)
LYMPHOCYTES RELATIVE PERCENT: 13 % (ref 20–42)
MCH RBC QN AUTO: 29.6 PG (ref 26–35)
MCHC RBC AUTO-ENTMCNC: 30.7 G/DL (ref 32–34.5)
MCV RBC AUTO: 96.3 FL (ref 80–99.9)
MONOCYTES NFR BLD: 0.21 K/UL (ref 0.1–0.95)
MONOCYTES NFR BLD: 4 % (ref 2–12)
NEUTROPHILS NFR BLD: 82 % (ref 43–80)
NEUTS SEG NFR BLD: 4.32 K/UL (ref 1.8–7.3)
PLATELET # BLD AUTO: 185 K/UL (ref 130–450)
PMV BLD AUTO: 10.8 FL (ref 7–12)
POTASSIUM SERPL-SCNC: 4.9 MMOL/L (ref 3.5–5)
RBC # BLD AUTO: 4.09 M/UL (ref 3.5–5.5)
SODIUM SERPL-SCNC: 141 MMOL/L (ref 132–146)
WBC OTHER # BLD: 5.3 K/UL (ref 4.5–11.5)

## 2024-01-07 PROCEDURE — 6360000002 HC RX W HCPCS: Performed by: STUDENT IN AN ORGANIZED HEALTH CARE EDUCATION/TRAINING PROGRAM

## 2024-01-07 PROCEDURE — 82962 GLUCOSE BLOOD TEST: CPT

## 2024-01-07 PROCEDURE — 99232 SBSQ HOSP IP/OBS MODERATE 35: CPT | Performed by: INTERNAL MEDICINE

## 2024-01-07 PROCEDURE — 2140000000 HC CCU INTERMEDIATE R&B

## 2024-01-07 PROCEDURE — 6370000000 HC RX 637 (ALT 250 FOR IP)

## 2024-01-07 PROCEDURE — 6370000000 HC RX 637 (ALT 250 FOR IP): Performed by: STUDENT IN AN ORGANIZED HEALTH CARE EDUCATION/TRAINING PROGRAM

## 2024-01-07 PROCEDURE — 85025 COMPLETE CBC W/AUTO DIFF WBC: CPT

## 2024-01-07 PROCEDURE — 94660 CPAP INITIATION&MGMT: CPT

## 2024-01-07 PROCEDURE — 2700000000 HC OXYGEN THERAPY PER DAY

## 2024-01-07 PROCEDURE — 2580000003 HC RX 258: Performed by: STUDENT IN AN ORGANIZED HEALTH CARE EDUCATION/TRAINING PROGRAM

## 2024-01-07 PROCEDURE — 36415 COLL VENOUS BLD VENIPUNCTURE: CPT

## 2024-01-07 PROCEDURE — 94640 AIRWAY INHALATION TREATMENT: CPT

## 2024-01-07 PROCEDURE — 80048 BASIC METABOLIC PNL TOTAL CA: CPT

## 2024-01-07 RX ADMIN — ENOXAPARIN SODIUM 30 MG: 100 INJECTION SUBCUTANEOUS at 09:40

## 2024-01-07 RX ADMIN — ARFORMOTEROL TARTRATE 15 MCG: 15 SOLUTION RESPIRATORY (INHALATION) at 19:48

## 2024-01-07 RX ADMIN — IPRATROPIUM BROMIDE AND ALBUTEROL SULFATE 1 DOSE: .5; 2.5 SOLUTION RESPIRATORY (INHALATION) at 09:28

## 2024-01-07 RX ADMIN — SODIUM CHLORIDE, PRESERVATIVE FREE 10 ML: 5 INJECTION INTRAVENOUS at 20:47

## 2024-01-07 RX ADMIN — GUAIFENESIN 400 MG: 400 TABLET ORAL at 16:23

## 2024-01-07 RX ADMIN — GUAIFENESIN 400 MG: 400 TABLET ORAL at 09:39

## 2024-01-07 RX ADMIN — HYDROXYZINE PAMOATE 25 MG: 25 CAPSULE ORAL at 09:40

## 2024-01-07 RX ADMIN — BUDESONIDE INHALATION 500 MCG: 0.5 SUSPENSION RESPIRATORY (INHALATION) at 19:48

## 2024-01-07 RX ADMIN — BUDESONIDE INHALATION 500 MCG: 0.5 SUSPENSION RESPIRATORY (INHALATION) at 09:28

## 2024-01-07 RX ADMIN — PREDNISONE 20 MG: 20 TABLET ORAL at 11:07

## 2024-01-07 RX ADMIN — PANTOPRAZOLE SODIUM 40 MG: 40 TABLET, DELAYED RELEASE ORAL at 06:12

## 2024-01-07 RX ADMIN — Medication 1000 UNITS: at 09:39

## 2024-01-07 RX ADMIN — OLANZAPINE 5 MG: 5 TABLET, FILM COATED ORAL at 20:47

## 2024-01-07 RX ADMIN — DIVALPROEX SODIUM 250 MG: 250 TABLET, DELAYED RELEASE ORAL at 20:47

## 2024-01-07 RX ADMIN — ARFORMOTEROL TARTRATE 15 MCG: 15 SOLUTION RESPIRATORY (INHALATION) at 09:28

## 2024-01-07 RX ADMIN — BUSPIRONE HYDROCHLORIDE 15 MG: 5 TABLET ORAL at 09:39

## 2024-01-07 RX ADMIN — HYDROXYZINE PAMOATE 25 MG: 25 CAPSULE ORAL at 16:23

## 2024-01-07 RX ADMIN — INSULIN LISPRO 1 UNITS: 100 INJECTION, SOLUTION INTRAVENOUS; SUBCUTANEOUS at 18:16

## 2024-01-07 RX ADMIN — DIVALPROEX SODIUM 250 MG: 250 TABLET, DELAYED RELEASE ORAL at 09:39

## 2024-01-07 RX ADMIN — SODIUM CHLORIDE, PRESERVATIVE FREE 10 ML: 5 INJECTION INTRAVENOUS at 09:40

## 2024-01-07 RX ADMIN — GUAIFENESIN 400 MG: 400 TABLET ORAL at 20:47

## 2024-01-07 RX ADMIN — IPRATROPIUM BROMIDE AND ALBUTEROL SULFATE 1 DOSE: .5; 2.5 SOLUTION RESPIRATORY (INHALATION) at 19:48

## 2024-01-07 RX ADMIN — IPRATROPIUM BROMIDE AND ALBUTEROL SULFATE 1 DOSE: .5; 2.5 SOLUTION RESPIRATORY (INHALATION) at 16:08

## 2024-01-07 RX ADMIN — ENOXAPARIN SODIUM 30 MG: 100 INJECTION SUBCUTANEOUS at 20:47

## 2024-01-07 RX ADMIN — IPRATROPIUM BROMIDE AND ALBUTEROL SULFATE 1 DOSE: .5; 2.5 SOLUTION RESPIRATORY (INHALATION) at 12:39

## 2024-01-07 NOTE — PROGRESS NOTES
[facility]  Internal Medicine Residency Program  Progress Note - House Team       Patient:  Brenda Nunn 58 y.o. female   MRN: [id number]       Date of Service: 1/7/2024    CC: SOB   Overnight events: Increased anxiety.   Subjective     Pt seen and examined bedside. Feeling \"ok\". Did not tolerate bipap again overnight as it is uncomfortable. Complains of increased anxiety from life stressors and current housing with daughter. Still unsure of rehab or not. Denies cough, worsening SOB, abdominal pain, N/V/D.     Objective       Physical Exam  Vitals: BP (!) 157/93   Pulse 78   Temp 97.5 °F (36.4 °C) (Oral)   Resp 20   Ht 1.702 m (5' 7\")   Wt 113.4 kg (250 lb)   SpO2 99%   BMI 39.16 kg/m²     I & O - 24hr: No intake/output data recorded.   General Appearance: alert, appears stated age, and cooperative  HEENT:  Head: Normal, normocephalic, atraumatic.  Neck: supple, symmetrical, trachea midline  Lung: b/l upper wheezing remains but improved, decreased air movement throughout but improved, no accessory muscle use, no increased WOB, 3L NC 99%   Heart: RRRR  Abdomen: soft, BS+, nontender   Extremities:  no edema     Diet:   ADULT DIET; Regular    Pertinent Labs & Imaging Studies     Labs    CBC with Differential:    Lab Results   Component Value Date/Time    WBC 5.3 01/07/2024 05:12 AM    RBC 4.09 01/07/2024 05:12 AM    HGB 12.1 01/07/2024 05:12 AM    HCT 39.4 01/07/2024 05:12 AM     01/07/2024 05:12 AM    MCV 96.3 01/07/2024 05:12 AM    MCH 29.6 01/07/2024 05:12 AM    MCHC 30.7 01/07/2024 05:12 AM    RDW 12.3 01/07/2024 05:12 AM    LYMPHOPCT 13 01/07/2024 05:12 AM    MONOPCT 4 01/07/2024 05:12 AM    BASOPCT 1 01/07/2024 05:12 AM    MONOSABS 0.21 01/07/2024 05:12 AM    LYMPHSABS 0.67 01/07/2024 05:12 AM    EOSABS 0.00 01/07/2024 05:12 AM    BASOSABS 0.03 01/07/2024 05:12 AM     BMP:    Lab Results   Component Value Date/Time     01/07/2024 05:12 AM    K 4.9 01/07/2024 05:12 AM    CL  96 01/07/2024 05:12 AM    CO2 31 01/07/2024 05:12 AM    BUN 19 01/07/2024 05:12 AM    LABALBU 3.9 01/03/2024 05:15 AM    CREATININE 0.6 01/07/2024 05:12 AM    CALCIUM 9.4 01/07/2024 05:12 AM    LABGLOM >60 01/07/2024 05:12 AM    GLUCOSE 198 01/07/2024 05:12 AM     Imaging Studies:    XR CHEST PORTABLE   Final Result   Stable chronic changes seen in lung fields with no focal parenchymal   opacification present.            Resident's Assessment and Plan       Assessment and Plan:    Acute on chronic hypoxic hypercapnic respiratory failure 2/2 NIV noncompliance, COPD exacerbation, continue tobacco use   Currently wheelchair bound 2/2 SOB.   Imperative pt has NIV on discharge-SW following.   CO2 retention improved. Continue scheduled breathing treatments. On steroid taper-last dose today. No concern for infection, monitor off abx.   Continue supplemental oxygen as needed, baseline 2-3L for target spO2 no less than 89%, not to wean below current OP oxygen requirement.  Continue bipap at night and during naps.   Pulmonology recommendations appreciated; to add daliresp 250mcg daily on discharge, full PFTs OP, pulmonary rehab.   Tobacco use disorder  Discussed importance of smoking cessation and potential modalities, pt not willing to quit at this time. Continues w 1PPD, states this is greatly cut down from previous.      GERD  Continue home protonix.   Hx of bipolar 1 disorder   Continue home medications.   Added Buspar 15 BID.   Will need OP psych follow-up, poorly controlled anxiety.    Hx of polysubstance abuse, previous methamphetamine user   UDS+ cannabinoids - cessation of inhaled marijuana discussed.  Vitamin D deficiency   Last level 11/7/2023, low 15.7. Continue 1000U daily supplementation.       CM & SW following; discharge barrier- NIV equipment-no update per CM.   Pt no longer agreeable to rehab.       PT/OT evaluation: AM-PAC 16/24   DVT prophylaxis: lovenox   GI prophylaxis: protonix    Diet:   ADULT DIET;

## 2024-01-07 NOTE — PLAN OF CARE
3:50 PM - Patient complaining of 10/10 anxiety, went to see patient (tremors, inability to sit still due to anxiety), just received 1 dose of Buspar a few hours prior; should get Vistaril at 4:40PM, asked nurse to give it 4:00 PM. She reports that she does not want Buspar any more. Therefore, will discontinue Buspar. Patient reports that she can't pinpoint the cause of her anxiety. Med team will follow overnight.     Reji Love MD PGY-2  Internal Medicine Resident   [facility]

## 2024-01-08 LAB
ANION GAP SERPL CALCULATED.3IONS-SCNC: 6 MMOL/L (ref 7–16)
BUN SERPL-MCNC: 16 MG/DL (ref 6–20)
CALCIUM SERPL-MCNC: 9.5 MG/DL (ref 8.6–10.2)
CHLORIDE SERPL-SCNC: 97 MMOL/L (ref 98–107)
CO2 SERPL-SCNC: 38 MMOL/L (ref 22–29)
CREAT SERPL-MCNC: 0.6 MG/DL (ref 0.5–1)
GFR SERPL CREATININE-BSD FRML MDRD: >60 ML/MIN/1.73M2
GLUCOSE SERPL-MCNC: 71 MG/DL (ref 74–99)
POTASSIUM SERPL-SCNC: 4.4 MMOL/L (ref 3.5–5)
SODIUM SERPL-SCNC: 141 MMOL/L (ref 132–146)

## 2024-01-08 PROCEDURE — 6370000000 HC RX 637 (ALT 250 FOR IP)

## 2024-01-08 PROCEDURE — 6360000002 HC RX W HCPCS: Performed by: STUDENT IN AN ORGANIZED HEALTH CARE EDUCATION/TRAINING PROGRAM

## 2024-01-08 PROCEDURE — 99231 SBSQ HOSP IP/OBS SF/LOW 25: CPT | Performed by: INTERNAL MEDICINE

## 2024-01-08 PROCEDURE — 94640 AIRWAY INHALATION TREATMENT: CPT

## 2024-01-08 PROCEDURE — 36415 COLL VENOUS BLD VENIPUNCTURE: CPT

## 2024-01-08 PROCEDURE — 80048 BASIC METABOLIC PNL TOTAL CA: CPT

## 2024-01-08 PROCEDURE — 6370000000 HC RX 637 (ALT 250 FOR IP): Performed by: STUDENT IN AN ORGANIZED HEALTH CARE EDUCATION/TRAINING PROGRAM

## 2024-01-08 PROCEDURE — 94660 CPAP INITIATION&MGMT: CPT

## 2024-01-08 PROCEDURE — 2700000000 HC OXYGEN THERAPY PER DAY

## 2024-01-08 PROCEDURE — 2140000000 HC CCU INTERMEDIATE R&B

## 2024-01-08 PROCEDURE — 2580000003 HC RX 258: Performed by: STUDENT IN AN ORGANIZED HEALTH CARE EDUCATION/TRAINING PROGRAM

## 2024-01-08 RX ADMIN — HYDROXYZINE PAMOATE 25 MG: 25 CAPSULE ORAL at 00:14

## 2024-01-08 RX ADMIN — DIVALPROEX SODIUM 250 MG: 250 TABLET, DELAYED RELEASE ORAL at 21:28

## 2024-01-08 RX ADMIN — BUDESONIDE INHALATION 500 MCG: 0.5 SUSPENSION RESPIRATORY (INHALATION) at 20:21

## 2024-01-08 RX ADMIN — GUAIFENESIN 400 MG: 400 TABLET ORAL at 08:49

## 2024-01-08 RX ADMIN — BUDESONIDE INHALATION 500 MCG: 0.5 SUSPENSION RESPIRATORY (INHALATION) at 09:07

## 2024-01-08 RX ADMIN — OLANZAPINE 5 MG: 5 TABLET, FILM COATED ORAL at 21:28

## 2024-01-08 RX ADMIN — DIVALPROEX SODIUM 250 MG: 250 TABLET, DELAYED RELEASE ORAL at 08:50

## 2024-01-08 RX ADMIN — HYDROXYZINE PAMOATE 25 MG: 25 CAPSULE ORAL at 21:28

## 2024-01-08 RX ADMIN — IPRATROPIUM BROMIDE AND ALBUTEROL SULFATE 1 DOSE: .5; 2.5 SOLUTION RESPIRATORY (INHALATION) at 09:07

## 2024-01-08 RX ADMIN — Medication 1000 UNITS: at 08:49

## 2024-01-08 RX ADMIN — ARFORMOTEROL TARTRATE 15 MCG: 15 SOLUTION RESPIRATORY (INHALATION) at 20:21

## 2024-01-08 RX ADMIN — IPRATROPIUM BROMIDE AND ALBUTEROL SULFATE 1 DOSE: .5; 2.5 SOLUTION RESPIRATORY (INHALATION) at 15:55

## 2024-01-08 RX ADMIN — SODIUM CHLORIDE, PRESERVATIVE FREE 10 ML: 5 INJECTION INTRAVENOUS at 21:29

## 2024-01-08 RX ADMIN — PANTOPRAZOLE SODIUM 40 MG: 40 TABLET, DELAYED RELEASE ORAL at 08:49

## 2024-01-08 RX ADMIN — ENOXAPARIN SODIUM 30 MG: 100 INJECTION SUBCUTANEOUS at 08:49

## 2024-01-08 RX ADMIN — SODIUM CHLORIDE, PRESERVATIVE FREE 10 ML: 5 INJECTION INTRAVENOUS at 08:50

## 2024-01-08 RX ADMIN — ENOXAPARIN SODIUM 30 MG: 100 INJECTION SUBCUTANEOUS at 21:28

## 2024-01-08 RX ADMIN — HYDROXYZINE PAMOATE 25 MG: 25 CAPSULE ORAL at 15:00

## 2024-01-08 RX ADMIN — IPRATROPIUM BROMIDE AND ALBUTEROL SULFATE 1 DOSE: .5; 2.5 SOLUTION RESPIRATORY (INHALATION) at 20:21

## 2024-01-08 RX ADMIN — GUAIFENESIN 400 MG: 400 TABLET ORAL at 21:28

## 2024-01-08 RX ADMIN — ARFORMOTEROL TARTRATE 15 MCG: 15 SOLUTION RESPIRATORY (INHALATION) at 09:07

## 2024-01-08 RX ADMIN — GUAIFENESIN 400 MG: 400 TABLET ORAL at 15:00

## 2024-01-08 RX ADMIN — IPRATROPIUM BROMIDE AND ALBUTEROL SULFATE 1 DOSE: .5; 2.5 SOLUTION RESPIRATORY (INHALATION) at 12:09

## 2024-01-08 RX ADMIN — HYDROXYZINE PAMOATE 25 MG: 25 CAPSULE ORAL at 08:49

## 2024-01-08 NOTE — PROGRESS NOTES
01/07/24 2228   NIV Type   NIV Started/Stopped On   Equipment Type V60   Mode Bilevel   Mask Type Full face mask   Mask Size Medium   Assessment   Level of Consciousness 0   Comfort Level Good   Using Accessory Muscles No   Mask Compliance Good   Skin Assessment Clean, dry, & intact   Skin Protection for O2 Device Yes   Orientation Middle   Location Nose   Intervention(s) Skin Barrier   Settings/Measurements   IPAP 12 cmH20   CPAP/EPAP 8 cmH2O   Vt (Measured) 557 mL   Rate Ordered 14   FiO2  40 %   Minute Volume (L/min) 14 Liters   Mask Leak (lpm) 17 lpm   Patient's Home Machine No   Alarm Settings   Alarms On Y   Low Pressure (cmH2O) 10 cmH2O   High Pressure (cmH2O) 30 cmH2O   RR Low (bpm) 12   RR High (bpm) 40 br/min     Date: 1/7/2024    Time: 10:29 PM    Patient Placed On BIPAP/CPAP/ Non-Invasive Ventilation?  Yes    If no must comment.  Facial area red/color change? No           If YES are Blister/Lesion present?No   If yes must notify nursing staff  BIPAP/CPAP skin barrier?  Yes    Skin barrier type:mepilexlite       Comments:        Yessenia Coleman RCP

## 2024-01-08 NOTE — PROGRESS NOTES
[facility]  Department of Internal Medicine  Division of Pulmonary, Critical Care and Sleep Medicine  Consult Note    Patient: Brenda Nunn  MRN: [id number]  : 1965    Encounter Time: 2:29 PM     Date of Admission: 1/3/2024  5:10 AM    Primary Care Physician: Chapin Siddiqui MD    Reason for Consultation: COPD      SUBJECTIVE:    Improved  Anxiety noted  Requesting Visteril      OBJECTIVE:     PHYSICAL EXAM:   VITALS:   Vitals:    24 2030 24 2330 24 0445 24 0845   BP: 122/77 132/88 128/72 125/68   Pulse: 89 80 86 72   Resp: 20 22 22 20   Temp: 98 °F (36.7 °C) 97.8 °F (36.6 °C) 98 °F (36.7 °C) 97.4 °F (36.3 °C)   TempSrc: Oral Temporal Temporal Oral   SpO2: 97% 95% 98% 99%   Weight:       Height:          No intake or output data in the 24 hours ending 24 1429       CONSTITUTIONAL:   A&O x 3, NAD  SKIN:     No rash, No suspicious lesions, No skin discoloration  HEENT:     EOMI, MMM, No thrush  NECK:    No bruits, No JVP appreciated  CV:      Sinus,  No murmur, No rubs, No gallops  PULMONARY:   Couse BS,  No Wheezing, No Rales, No Rhonchi      No noted egophony  ABDOMEN:     Soft, non-tender. BS normal. No R/R/G  EXT:    No deformities .  No clubbing.       No lower extremity edema, No venous stasis  PULSE:   Appears equal and palpable.  PSYCHIATRIC:  Seems appropriate, No acute psychosis  MS:    No fractures, No gross weakness  NEUROLOGIC:   The clinical assessment is non-focal     DATA: IMAGING & TESTING:     LABORATORY TESTS:    CBC:   Lab Results   Component Value Date/Time    WBC 5.3 2024 05:12 AM    RBC 4.09 2024 05:12 AM    HGB 12.1 2024 05:12 AM    HCT 39.4 2024 05:12 AM    MCV 96.3 2024 05:12 AM    MCH 29.6 2024 05:12 AM    MCHC 30.7 2024 05:12 AM    RDW 12.3 2024 05:12 AM     2024 05:12 AM    MPV 10.8 2024 05:12 AM     CMP:    Lab Results   Component Value

## 2024-01-08 NOTE — CARE COORDINATION
1/8:  Update CM Note:  Pt presented to the ER for SOb from home.  Pt is on 3L/Nc at 98% & Sq Lovenox.  Mamadou called [facility] to verify status for NIV.  Per [facility] NIV is in process & in the insurances hands uncertain how long this can take from 24hrs to 7-10days.  Per [facility] the attending can write a letter for an urgent request to process NIV.  Pt's dc plan is home & her daughter can transport.  Pt is active with [facility] 2L/NC.  Will need family to bring in a tank when dc.  Will need 02 testing to verify 02 needs.  Mitul/MAMADOU will continue to follow.  Electronically signed by Monika Ding RN on 1/8/2024 at 9:54 AM

## 2024-01-08 NOTE — PROGRESS NOTES
[facility]  Internal Medicine Residency Program  Progress Note - House Team       Patient:  Brenda Nunn 58 y.o. female   MRN: [id number]       Date of Service: 2024  Admission date: 1/3/2024  5:10 AM ; Hospital day: 5   CC: Shortness of Breath (SOB x 2 weeks. Patient states it has gotten increasingly worse over the past 24 hours. Patient also states she was to start using a CPAP and never got one.)     Overnight events:   -3:50PM, patient complaining of 10/10, went to see patient tremors, inability to sit still due to anxiety, just received 1 dose of BuSpar should get Vistaril at 4:40 PM, asked nurse to give it at 4 PM. Reports that she dose not want BuSpar anymore, therefore we will discontinue BuSpar  ->225    Subjective     Patient was seen and examined at bedside this morning, not in acute distress, she is saturating well on 3 L NC, she denied headache, dizziness, no CP or SOB, no nausea or vomiting      Objective   PHYSICAL EXAM  General Appearance: alert, not in acute distress  HEENT: Normocephalic, atraumatic  Neck: midline trachea, no carotid bruit  Lung: wheezing bilaterally when expiratory with improvement.   Heart: regular rate and rhythm, no murmur  Abdomen: soft, bowel sounds normal; no masses  Extremities: no cyanosis or edema  Musculokeletal: No joint swelling  Neurologic: Mental status: Alert and oriented x4    CARDIOVASCULAR  Vitals: /68   Pulse 72   Temp 97.4 °F (36.3 °C) (Oral)   Resp 20   Ht 1.702 m (5' 7\")   Wt 113.4 kg (250 lb)   SpO2 99%   BMI 39.16 kg/m²     Pulse rate range      : Pulse  Av.4  Min: 72  Max: 89  BP range                  : Systolic (24hrs), Av , Min:122 , Max:134   ; Diastolic (24hrs), Av, Min:68, Max:88       PULMONARY  Respiration range   : Resp  Av.4  Min: 18  Max: 22  Pulse Ox range : SpO2  Av %  Min: 95 %  Max: 99 %  No results for input(s): \"PH\", \"PCO2\", \"PO2\", \"HCO3\", \"BE\", \"O2SAT\" in the last 72

## 2024-01-09 LAB
ANION GAP SERPL CALCULATED.3IONS-SCNC: 7 MMOL/L (ref 7–16)
BASOPHILS # BLD: 0.06 K/UL (ref 0–0.2)
BASOPHILS NFR BLD: 1 % (ref 0–2)
BUN SERPL-MCNC: 25 MG/DL (ref 6–20)
CALCIUM SERPL-MCNC: 9.2 MG/DL (ref 8.6–10.2)
CHLORIDE SERPL-SCNC: 97 MMOL/L (ref 98–107)
CO2 SERPL-SCNC: 37 MMOL/L (ref 22–29)
CREAT SERPL-MCNC: 0.8 MG/DL (ref 0.5–1)
EOSINOPHIL # BLD: 0.15 K/UL (ref 0.05–0.5)
EOSINOPHILS RELATIVE PERCENT: 2 % (ref 0–6)
ERYTHROCYTE [DISTWIDTH] IN BLOOD BY AUTOMATED COUNT: 12.7 % (ref 11.5–15)
GFR SERPL CREATININE-BSD FRML MDRD: >60 ML/MIN/1.73M2
GLUCOSE SERPL-MCNC: 80 MG/DL (ref 74–99)
HCT VFR BLD AUTO: 39.4 % (ref 34–48)
HGB BLD-MCNC: 12.2 G/DL (ref 11.5–15.5)
IGE SERPL-ACNC: 27 IU/ML
IMM GRANULOCYTES # BLD AUTO: 0.06 K/UL (ref 0–0.58)
IMM GRANULOCYTES NFR BLD: 1 % (ref 0–5)
LYMPHOCYTES NFR BLD: 3.14 K/UL (ref 1.5–4)
LYMPHOCYTES RELATIVE PERCENT: 42 % (ref 20–42)
MCH RBC QN AUTO: 30 PG (ref 26–35)
MCHC RBC AUTO-ENTMCNC: 31 G/DL (ref 32–34.5)
MCV RBC AUTO: 96.8 FL (ref 80–99.9)
MONOCYTES NFR BLD: 0.7 K/UL (ref 0.1–0.95)
MONOCYTES NFR BLD: 9 % (ref 2–12)
NEUTROPHILS NFR BLD: 45 % (ref 43–80)
NEUTS SEG NFR BLD: 3.36 K/UL (ref 1.8–7.3)
PLATELET # BLD AUTO: 200 K/UL (ref 130–450)
PMV BLD AUTO: 10.5 FL (ref 7–12)
POTASSIUM SERPL-SCNC: 4.8 MMOL/L (ref 3.5–5)
RBC # BLD AUTO: 4.07 M/UL (ref 3.5–5.5)
SODIUM SERPL-SCNC: 141 MMOL/L (ref 132–146)
WBC OTHER # BLD: 7.5 K/UL (ref 4.5–11.5)

## 2024-01-09 PROCEDURE — 2700000000 HC OXYGEN THERAPY PER DAY

## 2024-01-09 PROCEDURE — 1200000000 HC SEMI PRIVATE

## 2024-01-09 PROCEDURE — 6370000000 HC RX 637 (ALT 250 FOR IP)

## 2024-01-09 PROCEDURE — 99231 SBSQ HOSP IP/OBS SF/LOW 25: CPT | Performed by: INTERNAL MEDICINE

## 2024-01-09 PROCEDURE — 6360000002 HC RX W HCPCS: Performed by: STUDENT IN AN ORGANIZED HEALTH CARE EDUCATION/TRAINING PROGRAM

## 2024-01-09 PROCEDURE — 94660 CPAP INITIATION&MGMT: CPT

## 2024-01-09 PROCEDURE — 94640 AIRWAY INHALATION TREATMENT: CPT

## 2024-01-09 PROCEDURE — 80048 BASIC METABOLIC PNL TOTAL CA: CPT

## 2024-01-09 PROCEDURE — 36415 COLL VENOUS BLD VENIPUNCTURE: CPT

## 2024-01-09 PROCEDURE — 2580000003 HC RX 258: Performed by: STUDENT IN AN ORGANIZED HEALTH CARE EDUCATION/TRAINING PROGRAM

## 2024-01-09 PROCEDURE — 85025 COMPLETE CBC W/AUTO DIFF WBC: CPT

## 2024-01-09 PROCEDURE — 2140000000 HC CCU INTERMEDIATE R&B

## 2024-01-09 PROCEDURE — 6370000000 HC RX 637 (ALT 250 FOR IP): Performed by: STUDENT IN AN ORGANIZED HEALTH CARE EDUCATION/TRAINING PROGRAM

## 2024-01-09 RX ORDER — ALPRAZOLAM 0.25 MG/1
0.25 TABLET ORAL NIGHTLY
Status: DISCONTINUED | OUTPATIENT
Start: 2024-01-09 | End: 2024-01-12 | Stop reason: HOSPADM

## 2024-01-09 RX ADMIN — BUDESONIDE INHALATION 500 MCG: 0.5 SUSPENSION RESPIRATORY (INHALATION) at 20:07

## 2024-01-09 RX ADMIN — SODIUM CHLORIDE, PRESERVATIVE FREE 10 ML: 5 INJECTION INTRAVENOUS at 22:42

## 2024-01-09 RX ADMIN — GUAIFENESIN 400 MG: 400 TABLET ORAL at 09:02

## 2024-01-09 RX ADMIN — IPRATROPIUM BROMIDE AND ALBUTEROL SULFATE 1 DOSE: .5; 2.5 SOLUTION RESPIRATORY (INHALATION) at 10:31

## 2024-01-09 RX ADMIN — IPRATROPIUM BROMIDE AND ALBUTEROL SULFATE 1 DOSE: .5; 2.5 SOLUTION RESPIRATORY (INHALATION) at 20:07

## 2024-01-09 RX ADMIN — ARFORMOTEROL TARTRATE 15 MCG: 15 SOLUTION RESPIRATORY (INHALATION) at 10:31

## 2024-01-09 RX ADMIN — ALPRAZOLAM 0.25 MG: 0.25 TABLET ORAL at 22:41

## 2024-01-09 RX ADMIN — HYDROXYZINE PAMOATE 25 MG: 25 CAPSULE ORAL at 22:41

## 2024-01-09 RX ADMIN — ENOXAPARIN SODIUM 30 MG: 100 INJECTION SUBCUTANEOUS at 22:41

## 2024-01-09 RX ADMIN — DIVALPROEX SODIUM 250 MG: 250 TABLET, DELAYED RELEASE ORAL at 22:41

## 2024-01-09 RX ADMIN — ENOXAPARIN SODIUM 30 MG: 100 INJECTION SUBCUTANEOUS at 09:02

## 2024-01-09 RX ADMIN — BUDESONIDE INHALATION 500 MCG: 0.5 SUSPENSION RESPIRATORY (INHALATION) at 10:31

## 2024-01-09 RX ADMIN — Medication 1000 UNITS: at 09:02

## 2024-01-09 RX ADMIN — SODIUM CHLORIDE, PRESERVATIVE FREE 10 ML: 5 INJECTION INTRAVENOUS at 09:03

## 2024-01-09 RX ADMIN — IPRATROPIUM BROMIDE AND ALBUTEROL SULFATE 1 DOSE: .5; 2.5 SOLUTION RESPIRATORY (INHALATION) at 12:40

## 2024-01-09 RX ADMIN — DIVALPROEX SODIUM 250 MG: 250 TABLET, DELAYED RELEASE ORAL at 09:02

## 2024-01-09 RX ADMIN — GUAIFENESIN 400 MG: 400 TABLET ORAL at 17:38

## 2024-01-09 RX ADMIN — GUAIFENESIN 400 MG: 400 TABLET ORAL at 22:41

## 2024-01-09 RX ADMIN — OLANZAPINE 5 MG: 5 TABLET, FILM COATED ORAL at 22:41

## 2024-01-09 RX ADMIN — ARFORMOTEROL TARTRATE 15 MCG: 15 SOLUTION RESPIRATORY (INHALATION) at 20:07

## 2024-01-09 RX ADMIN — PANTOPRAZOLE SODIUM 40 MG: 40 TABLET, DELAYED RELEASE ORAL at 09:02

## 2024-01-09 RX ADMIN — IPRATROPIUM BROMIDE AND ALBUTEROL SULFATE 1 DOSE: .5; 2.5 SOLUTION RESPIRATORY (INHALATION) at 15:56

## 2024-01-09 RX ADMIN — HYDROXYZINE PAMOATE 25 MG: 25 CAPSULE ORAL at 09:02

## 2024-01-09 RX ADMIN — HYDROXYZINE PAMOATE 25 MG: 25 CAPSULE ORAL at 14:48

## 2024-01-09 NOTE — PROGRESS NOTES
PCR Not Detected     SARS-CoV-2, PCR Not Detected     Human Metapneumovirus PCR Not Detected     Rhino/Enterovirus PCR Not Detected     Influenza A by PCR Not Detected     Influenza B by PCR Not Detected     Parainfluenza 1 PCR Not Detected     Parainfluenza 2 PCR Not Detected     Parainfluenza 3 PCR Not Detected     Parainfluenza 4 PCR Not Detected     Resp Syncytial Virus PCR Not Detected     Bordetella parapertussis by PCR Not Detected     B Pertussis by PCR Not Detected     Chlamydia pneumoniae By PCR Not Detected     Mycoplasma pneumo by PCR Not Detected     Comment: Performed by multiplexed nucleic acid assay.              MRSA scree No components found for: \"MRSACU\"  HSV No results found for: \"HSVSRC\"  FLU No results found for: \"FLUA\" No results found for: \"FLUB\"  COVID-19 Labs:  Lab Results   Component Value Date/Time    COVID19 Not Detected 01/03/2024 05:34 AM     Legionella No results found for: \"LABLEGI\"  C Diff PCR No results found for: \"CDIFPCR\"  Strep pneumo No results found for: \"SPNEUAGU\"  Acid fast No results found for: \"AFBSMEAR\"  Stool No results found for: \"CXST\"  Fungust No results found for: \"FUNGUSSMEAR\"  VRE screen No results found for: \"VRECX\"  Ecoli O051:H7 No results found for: \"UETWT001\"  Giardia No results found for: \"GIAAGS\"  Rotavirus No results found for: \"ROTA\"  Fecal leukocytes No results found for: \"FECLEU\"  -----------------------------------------------------  Fecal occult blood: .No results for input(s): \"OCCULTBLDFEC\" in the last 72 hours.  Occult blood screening: No results for input(s): \"OCCBS\" in the last 72 hours.  Occult blood QC: No results for input(s): \"OBQC\" in the last 72 hours.      Medications     Continuous Infusions:   dextrose      sodium chloride       Scheduled Meds:   Vitamin D  1,000 Units Oral Daily    ipratropium 0.5 mg-albuterol 2.5 mg  1 Dose Inhalation Q4H WA RT    budesonide  0.5 mg Nebulization BID RT    arformoterol tartrate  15 mcg Nebulization BID  prophylaxis: Lovenox  GI prophylaxis: Protonix  Diet:   ADULT DIET; Regular   Bowel regimen: Glycolax  Pain management: as needed  Code status: Full Code   Disposition: Continue Current Care  Family: updated as available    Israel Vazquez MD, PGY-1   Attending physician: Dr. Dalton

## 2024-01-09 NOTE — PLAN OF CARE
Got PS that patient has increasing anxiety. Saw and examined patient within 5 minutes. She was lying and resting in bed comfortable. Woke her up and asked how is she feeling, she stated she has anxiety, due to the reason of being here in hospital and onging issue with her family, her daughter won't bring her card to her, and there is money in card. She denied any suicide or homicidal idea. Talked to her and reassured, will give her dose of vistrail now, then one dose before bedtime, which is the same time schedule as she was taking at home. She understood and all her questions answered.

## 2024-01-09 NOTE — PROGRESS NOTES
Patient wants her blood sugar to be checked with lab work and not to be poked on her finger for blood sugar too.

## 2024-01-09 NOTE — PROGRESS NOTES
[facility]  Department of Internal Medicine  Division of Pulmonary, Critical Care and Sleep Medicine  Consult Note    Patient: Brenda Nunn  MRN: [id number]  : 1965    Encounter Time: 4:57 PM     Date of Admission: 1/3/2024  5:10 AM    Primary Care Physician: Chapin Siddiqui MD    Reason for Consultation: COPD      SUBJECTIVE:    Improved  Anxiety noted  Requesting Visteril      OBJECTIVE:     PHYSICAL EXAM:   VITALS:   Vitals:    24 2115 24 0400 24 1211 24 1241   BP: 135/78 128/64 119/73    Pulse: 96  87    Resp:     Temp: 98.8 °F (37.1 °C) 97.8 °F (36.6 °C) 97.7 °F (36.5 °C)    TempSrc: Oral Oral Oral    SpO2: 97% 98% 94% 90%   Weight:       Height:          No intake or output data in the 24 hours ending 24 1657       CONSTITUTIONAL:   A&O x 3, NAD  SKIN:     No rash, No suspicious lesions, No skin discoloration  HEENT:     EOMI, MMM, No thrush  NECK:    No bruits, No JVP appreciated  CV:      Sinus,  No murmur, No rubs, No gallops  PULMONARY:   Couse BS,  No Wheezing, No Rales, No Rhonchi      No noted egophony  ABDOMEN:     Soft, non-tender. BS normal. No R/R/G  EXT:    No deformities .  No clubbing.       No lower extremity edema, No venous stasis  PULSE:   Appears equal and palpable.  PSYCHIATRIC:  Seems appropriate, No acute psychosis  MS:    No fractures, No gross weakness  NEUROLOGIC:   The clinical assessment is non-focal     DATA: IMAGING & TESTING:     LABORATORY TESTS:    CBC:   Lab Results   Component Value Date/Time    WBC 7.5 2024 05:10 AM    RBC 4.07 2024 05:10 AM    HGB 12.2 2024 05:10 AM    HCT 39.4 2024 05:10 AM    MCV 96.8 2024 05:10 AM    MCH 30.0 2024 05:10 AM    MCHC 31.0 2024 05:10 AM    RDW 12.7 2024 05:10 AM     2024 05:10 AM    MPV 10.5 2024 05:10 AM     CMP:    Lab Results   Component Value Date/Time     2024 05:10 AM    K

## 2024-01-09 NOTE — CARE COORDINATION
1/9:  Update CM Note:  Pt presented to the ER for SOB from home.  Pt is on 3L/Nc at 98% & Sq Lovenox.  Mamadou called [facility] to verify status for NIV.  Per [facility] NIV is in process & in the insurances hands uncertain how long this can take from 24hrs to 7-10days.  Mamadou faxed urgent letter to [facility] 1/8.  Pt's dc plan is home & her daughter can transport.  Pt is active with [facility] 2L/NC.  Will need family to bring in a tank when dc.  Will need 02 testing to verify 02 needs.  Sw/MAMADOU will continue to follow.  Electronically signed by Monika Ding RN on 1/9/2024 at 10:55 AM

## 2024-01-09 NOTE — PROGRESS NOTES
Pt called this RN and asked to speak. Patient was tearful and upset, wanting to discuss options for treatment regarding her mental health. She stated that she has had increased anxiety, depression, and suicidal thoughts for the last month. Believes these symptoms are from her depakote prescription. She is aware these thoughts are wrong and states she has no desire to act on them, but wishes to get help. [employer] sent to Dr. Vazquez regarding this discussion. No new orders at this time.

## 2024-01-09 NOTE — PROGRESS NOTES
Progress  Note  Chief Complaint   Patient presents with    Shortness of Breath     SOB x 2 weeks. Patient states it has gotten increasingly worse over the past 24 hours. Patient also states she was to start using a CPAP and never got one.     Historical Issues:  Current Facility-Administered Medications   Medication Dose Route Frequency Provider Last Rate Last Admin    glucose chewable tablet 16 g  4 tablet Oral PRN Tiara Alfonso MD        dextrose bolus 10% 125 mL  125 mL IntraVENous PRN Tiara Alfonso MD        Or    dextrose bolus 10% 250 mL  250 mL IntraVENous PRN Tiara Alfonso MD        dextrose 10 % infusion   IntraVENous Continuous PRN Tiara Alfonso MD        Vitamin D (CHOLECALCIFEROL) tablet 1,000 Units  1,000 Units Oral Daily Mariana Maldonado MD   1,000 Units at 01/09/24 0902    benzonatate (TESSALON) capsule 100 mg  100 mg Oral TID PRN Maia Espinal MD   100 mg at 01/05/24 1655    glucagon injection 1 mg  1 mg SubCUTAneous PRN Mariana Maldonado MD        ipratropium 0.5 mg-albuterol 2.5 mg (DUONEB) nebulizer solution 1 Dose  1 Dose Inhalation Q4H WA RT Mariana Maldonado MD   1 Dose at 01/09/24 1240    budesonide (PULMICORT) nebulizer suspension 500 mcg  0.5 mg Nebulization BID RT Genesis Enriquez MD   500 mcg at 01/09/24 1031    arformoterol tartrate (BROVANA) nebulizer solution 15 mcg  15 mcg Nebulization BID RT Genesis Enriquez MD   15 mcg at 01/09/24 1031    guaiFENesin tablet 400 mg  400 mg Oral TID Genesis Enriquez MD   400 mg at 01/09/24 0902    pantoprazole (PROTONIX) tablet 40 mg  40 mg Oral QAM AC Genesis Enriquez MD   40 mg at 01/09/24 0902    sodium chloride flush 0.9 % injection 5-40 mL  5-40 mL IntraVENous 2 times per day Genesis Enriquez MD   10 mL at 01/09/24 0903    sodium chloride flush 0.9 % injection 5-40 mL  5-40 mL IntraVENous PRN Genesis Enriquez MD         0.9 % sodium chloride infusion   IntraVENous PRN Genesis Enriquez MD        enoxaparin Sodium (LOVENOX) injection 30 mg  30 mg SubCUTAneous BID Genesis Enriquez MD   30 mg at 01/09/24 0902    ondansetron (ZOFRAN-ODT) disintegrating tablet 4 mg  4 mg Oral Q8H PRN Genesis Enriquez MD        Or    ondansetron (ZOFRAN) injection 4 mg  4 mg IntraVENous Q6H PRN Genesis Enriquez MD        polyethylene glycol (GLYCOLAX) packet 17 g  17 g Oral Daily PRN Genesis Enriquez MD        acetaminophen (TYLENOL) tablet 650 mg  650 mg Oral Q6H PRN Genesis Enriquez MD        Or    acetaminophen (TYLENOL) suppository 650 mg  650 mg Rectal Q6H PRN Genesis Enriquez MD        divalproex (DEPAKOTE) DR tablet 250 mg  250 mg Oral BID Genesis Enriquez MD   250 mg at 01/09/24 0902    OLANZapine (ZYPREXA) tablet 5 mg  5 mg Oral Nightly Genesis Enriquez MD   5 mg at 01/08/24 2128    nicotine (NICODERM CQ) 21 MG/24HR 1 patch  1 patch TransDERmal Daily Genesis Enriquez MD   1 patch at 01/09/24 0902    hydrOXYzine pamoate (VISTARIL) capsule 25 mg  25 mg Oral TID PRN Andriy Lu MD   25 mg at 01/09/24 0902     Recent Complaints:  Review of Systems  Vitals:    01/09/24 1241   BP:    Pulse:    Resp:    Temp:    SpO2: 90%     Physical Exam  Cardiovascular:      Rate and Rhythm: Normal rate.   Pulmonary:      Effort: Pulmonary effort is normal.      Breath sounds: Wheezing present.      Comments: Nasal cannula  Musculoskeletal:      Right lower leg: No edema.      Left lower leg: No edema.       Recent Labs     01/07/24  0512 01/08/24  0726 01/09/24  0510    141 141   K 4.9 4.4 4.8   CL 96* 97* 97*   CO2 31* 38* 37*   BUN 19 16 25*   CREATININE 0.6 0.6 0.8   GLUCOSE 198* 71* 80   CALCIUM 9.4 9.5 9.2     Recent Labs     01/07/24  0512 01/09/24  0510   WBC 5.3 7.5   RBC 4.09 4.07   HGB 12.1 12.2   HCT 39.4 39.4   MCV 96.3 96.8   MCH 29.6 30.0   MCHC 30.7* 31.0*   RDW 12.3 12.7    200   MPV

## 2024-01-10 LAB
ANION GAP SERPL CALCULATED.3IONS-SCNC: 4 MMOL/L (ref 7–16)
BUN SERPL-MCNC: 18 MG/DL (ref 6–20)
CALCIUM SERPL-MCNC: 9.2 MG/DL (ref 8.6–10.2)
CHLORIDE SERPL-SCNC: 100 MMOL/L (ref 98–107)
CO2 SERPL-SCNC: 38 MMOL/L (ref 22–29)
CREAT SERPL-MCNC: 0.7 MG/DL (ref 0.5–1)
GFR SERPL CREATININE-BSD FRML MDRD: >60 ML/MIN/1.73M2
GLUCOSE BLD-MCNC: 121 MG/DL (ref 74–99)
GLUCOSE BLD-MCNC: 131 MG/DL (ref 74–99)
GLUCOSE BLD-MCNC: 135 MG/DL (ref 74–99)
GLUCOSE SERPL-MCNC: 84 MG/DL (ref 74–99)
POTASSIUM SERPL-SCNC: 4.2 MMOL/L (ref 3.5–5)
SODIUM SERPL-SCNC: 142 MMOL/L (ref 132–146)

## 2024-01-10 PROCEDURE — 82962 GLUCOSE BLOOD TEST: CPT

## 2024-01-10 PROCEDURE — 94640 AIRWAY INHALATION TREATMENT: CPT

## 2024-01-10 PROCEDURE — 1200000000 HC SEMI PRIVATE

## 2024-01-10 PROCEDURE — 2580000003 HC RX 258: Performed by: STUDENT IN AN ORGANIZED HEALTH CARE EDUCATION/TRAINING PROGRAM

## 2024-01-10 PROCEDURE — 6360000002 HC RX W HCPCS: Performed by: STUDENT IN AN ORGANIZED HEALTH CARE EDUCATION/TRAINING PROGRAM

## 2024-01-10 PROCEDURE — 6370000000 HC RX 637 (ALT 250 FOR IP)

## 2024-01-10 PROCEDURE — 80048 BASIC METABOLIC PNL TOTAL CA: CPT

## 2024-01-10 PROCEDURE — 94660 CPAP INITIATION&MGMT: CPT

## 2024-01-10 PROCEDURE — 36415 COLL VENOUS BLD VENIPUNCTURE: CPT

## 2024-01-10 PROCEDURE — 6370000000 HC RX 637 (ALT 250 FOR IP): Performed by: STUDENT IN AN ORGANIZED HEALTH CARE EDUCATION/TRAINING PROGRAM

## 2024-01-10 PROCEDURE — 99231 SBSQ HOSP IP/OBS SF/LOW 25: CPT | Performed by: INTERNAL MEDICINE

## 2024-01-10 PROCEDURE — 2700000000 HC OXYGEN THERAPY PER DAY

## 2024-01-10 RX ADMIN — SODIUM CHLORIDE, PRESERVATIVE FREE 10 ML: 5 INJECTION INTRAVENOUS at 08:14

## 2024-01-10 RX ADMIN — IPRATROPIUM BROMIDE AND ALBUTEROL SULFATE 1 DOSE: .5; 2.5 SOLUTION RESPIRATORY (INHALATION) at 17:52

## 2024-01-10 RX ADMIN — ENOXAPARIN SODIUM 30 MG: 100 INJECTION SUBCUTANEOUS at 21:50

## 2024-01-10 RX ADMIN — ENOXAPARIN SODIUM 30 MG: 100 INJECTION SUBCUTANEOUS at 08:13

## 2024-01-10 RX ADMIN — DIVALPROEX SODIUM 250 MG: 250 TABLET, DELAYED RELEASE ORAL at 08:13

## 2024-01-10 RX ADMIN — HYDROXYZINE PAMOATE 25 MG: 25 CAPSULE ORAL at 08:18

## 2024-01-10 RX ADMIN — GUAIFENESIN 400 MG: 400 TABLET ORAL at 21:48

## 2024-01-10 RX ADMIN — IPRATROPIUM BROMIDE AND ALBUTEROL SULFATE 1 DOSE: .5; 2.5 SOLUTION RESPIRATORY (INHALATION) at 13:25

## 2024-01-10 RX ADMIN — IPRATROPIUM BROMIDE AND ALBUTEROL SULFATE 1 DOSE: .5; 2.5 SOLUTION RESPIRATORY (INHALATION) at 21:38

## 2024-01-10 RX ADMIN — PANTOPRAZOLE SODIUM 40 MG: 40 TABLET, DELAYED RELEASE ORAL at 04:31

## 2024-01-10 RX ADMIN — Medication 1000 UNITS: at 08:13

## 2024-01-10 RX ADMIN — DIVALPROEX SODIUM 250 MG: 250 TABLET, DELAYED RELEASE ORAL at 21:48

## 2024-01-10 RX ADMIN — HYDROXYZINE PAMOATE 25 MG: 25 CAPSULE ORAL at 14:05

## 2024-01-10 RX ADMIN — HYDROXYZINE PAMOATE 25 MG: 25 CAPSULE ORAL at 21:49

## 2024-01-10 RX ADMIN — OLANZAPINE 5 MG: 5 TABLET, FILM COATED ORAL at 21:49

## 2024-01-10 RX ADMIN — GUAIFENESIN 400 MG: 400 TABLET ORAL at 14:05

## 2024-01-10 RX ADMIN — SODIUM CHLORIDE, PRESERVATIVE FREE 10 ML: 5 INJECTION INTRAVENOUS at 21:50

## 2024-01-10 RX ADMIN — ARFORMOTEROL TARTRATE 15 MCG: 15 SOLUTION RESPIRATORY (INHALATION) at 21:38

## 2024-01-10 RX ADMIN — BUDESONIDE INHALATION 500 MCG: 0.5 SUSPENSION RESPIRATORY (INHALATION) at 21:38

## 2024-01-10 RX ADMIN — ALPRAZOLAM 0.25 MG: 0.25 TABLET ORAL at 21:48

## 2024-01-10 RX ADMIN — GUAIFENESIN 400 MG: 400 TABLET ORAL at 08:13

## 2024-01-10 ASSESSMENT — PAIN SCALES - GENERAL
PAINLEVEL_OUTOF10: 0
PAINLEVEL_OUTOF10: 0

## 2024-01-10 NOTE — PLAN OF CARE
Problem: Chronic Conditions and Co-morbidities  Goal: Patient's chronic conditions and co-morbidity symptoms are monitored and maintained or improved  Outcome: Progressing  Flowsheets (Taken 1/9/2024 1950 by Rachel Valadez, RN)  Care Plan - Patient's Chronic Conditions and Co-Morbidity Symptoms are Monitored and Maintained or Improved: Monitor and assess patient's chronic conditions and comorbid symptoms for stability, deterioration, or improvement     Problem: Discharge Planning  Goal: Discharge to home or other facility with appropriate resources  Outcome: Progressing  Flowsheets (Taken 1/9/2024 1950 by Rachel Valadez, RN)  Discharge to home or other facility with appropriate resources: Identify barriers to discharge with patient and caregiver     Problem: Safety - Adult  Goal: Free from fall injury  Outcome: Progressing     Problem: Respiratory - Adult  Goal: Achieves optimal ventilation and oxygenation  Outcome: Progressing     Problem: Musculoskeletal - Adult  Goal: Return mobility to safest level of function  Outcome: Progressing  Goal: Return ADL status to a safe level of function  Outcome: Progressing     Problem: Anxiety  Goal: Will report anxiety at manageable levels  Description: INTERVENTIONS:  1. Administer medication as ordered  2. Teach and rehearse alternative coping skills  3. Provide emotional support with 1:1 interaction with staff  Outcome: Progressing  Flowsheets (Taken 1/9/2024 1950 by Rachel Valadez, RN)  Will report anxiety at manageable levels: Administer medication as ordered      Patient states that she was exposed to coronavirus and had to cancel her appointment.  Patient would like to get rescheduled for her procedure. Patient can be reached on mobile #.

## 2024-01-10 NOTE — PROGRESS NOTES
[facility]  Internal Medicine Residency Program  Progress Note - House Team       Patient:  Brenda Nunn 58 y.o. female   MRN: [id number]       Date of Service: 1/10/2024  Admission date: 1/3/2024  5:10 AM ; Hospital day: 7   CC: Shortness of Breath (SOB x 2 weeks. Patient states it has gotten increasingly worse over the past 24 hours. Patient also states she was to start using a CPAP and never got one.)     Overnight events:   no acute events    Subjective     Patient was seen and examined at bedside this morning, not in acute distress, she is saturating well on 3 L NC, she denied headache, dizziness, no CP or SOB, no nausea or vomiting      Objective   PHYSICAL EXAM  General Appearance: alert, not in acute distress  HEENT: Normocephalic, atraumatic  Neck: midline trachea, no carotid bruit  Lung: wheezing bilaterally when expiratory with improvement.   Heart: regular rate and rhythm, no murmur  Abdomen: soft, bowel sounds normal; no masses  Extremities: no cyanosis or edema  Musculokeletal: No joint swelling  Neurologic: Mental status: Alert and oriented x4    CARDIOVASCULAR  Vitals: /81   Pulse 73   Temp 96.9 °F (36.1 °C) (Temporal)   Resp 18   Ht 1.702 m (5' 7\")   Wt 113.4 kg (250 lb)   SpO2 98%   BMI 39.16 kg/m²     Pulse rate range      : Pulse  Av.8  Min: 73  Max: 89  BP range                  : Systolic (24hrs), Av , Min:111 , Max:127   ; Diastolic (24hrs), Av, Min:69, Max:81       PULMONARY  Respiration range   : Resp  Av.2  Min: 18  Max: 24  Pulse Ox range : SpO2  Av.3 %  Min: 90 %  Max: 98 %  No results for input(s): \"PH\", \"PCO2\", \"PO2\", \"HCO3\", \"BE\", \"O2SAT\" in the last 72 hours.    Invalid input(s): \"PFRATIO\"     NEPHROLOGY (FLUIDS/ELECTROLYTES & NUTRITION)  Urine: 500 mL   I & O - 24hr:    Intake/Output Summary (Last 24 hours) at 1/10/2024 1030  Last data filed at 1/10/2024 0000  Gross per 24 hour   Intake 480 ml   Output 500 ml   Net -20 ml  Coronavirus OC43 PCR Not Detected     SARS-CoV-2, PCR Not Detected     Human Metapneumovirus PCR Not Detected     Rhino/Enterovirus PCR Not Detected     Influenza A by PCR Not Detected     Influenza B by PCR Not Detected     Parainfluenza 1 PCR Not Detected     Parainfluenza 2 PCR Not Detected     Parainfluenza 3 PCR Not Detected     Parainfluenza 4 PCR Not Detected     Resp Syncytial Virus PCR Not Detected     Bordetella parapertussis by PCR Not Detected     B Pertussis by PCR Not Detected     Chlamydia pneumoniae By PCR Not Detected     Mycoplasma pneumo by PCR Not Detected     Comment: Performed by multiplexed nucleic acid assay.              MRSA scree No components found for: \"MRSACU\"  HSV No results found for: \"HSVSRC\"  FLU No results found for: \"FLUA\" No results found for: \"FLUB\"  COVID-19 Labs:  Lab Results   Component Value Date/Time    COVID19 Not Detected 01/03/2024 05:34 AM     Legionella No results found for: \"LABLEGI\"  C Diff PCR No results found for: \"CDIFPCR\"  Strep pneumo No results found for: \"SPNEUAGU\"  Acid fast No results found for: \"AFBSMEAR\"  Stool No results found for: \"CXST\"  Fungust No results found for: \"FUNGUSSMEAR\"  VRE screen No results found for: \"VRECX\"  Ecoli O051:H7 No results found for: \"PRCLZ398\"  Giardia No results found for: \"GIAAGS\"  Rotavirus No results found for: \"ROTA\"  Fecal leukocytes No results found for: \"FECLEU\"  -----------------------------------------------------  Fecal occult blood: .No results for input(s): \"OCCULTBLDFEC\" in the last 72 hours.  Occult blood screening: No results for input(s): \"OCCBS\" in the last 72 hours.  Occult blood QC: No results for input(s): \"OBQC\" in the last 72 hours.      Medications     Continuous Infusions:   dextrose      sodium chloride       Scheduled Meds:   ALPRAZolam  0.25 mg Oral Nightly    Vitamin D  1,000 Units Oral Daily    ipratropium 0.5 mg-albuterol 2.5 mg  1 Dose Inhalation Q4H WA RT    budesonide  0.5 mg Nebulization BID

## 2024-01-10 NOTE — PLAN OF CARE
Got PS reported patient has increased anxiety, tearful and upset, and stated she thinks it's the Depakote causing her symptoms. I have changed her Vistaril scheduled time the same as her used to take at home, however it appears not working for her. She has no plan of acting on negative thoughts based on my assessment. After discussion with Dr. Love, decision has been made to continue her Depakote and deferred it to psych evaluation as outpatient, meanwhile, Xanax has been ordered to control her increased anxiety.

## 2024-01-10 NOTE — PROGRESS NOTES
Progress  Note  Chief Complaint   Patient presents with    Shortness of Breath     SOB x 2 weeks. Patient states it has gotten increasingly worse over the past 24 hours. Patient also states she was to start using a CPAP and never got one.     Historical Issues:  Current Facility-Administered Medications   Medication Dose Route Frequency Provider Last Rate Last Admin    ALPRAZolam (XANAX) tablet 0.25 mg  0.25 mg Oral Nightly Reji Love MD   0.25 mg at 01/09/24 2241    glucose chewable tablet 16 g  4 tablet Oral PRN Tiara Alfonso MD        dextrose bolus 10% 125 mL  125 mL IntraVENous PRN Tiara Alfonso MD        Or    dextrose bolus 10% 250 mL  250 mL IntraVENous PRN Tiara Alfonso MD        dextrose 10 % infusion   IntraVENous Continuous PRN Tiara Alfonso MD        Vitamin D (CHOLECALCIFEROL) tablet 1,000 Units  1,000 Units Oral Daily Mariana Maldonado MD   1,000 Units at 01/10/24 0813    benzonatate (TESSALON) capsule 100 mg  100 mg Oral TID PRN Maia Espinal MD   100 mg at 01/05/24 1655    glucagon injection 1 mg  1 mg SubCUTAneous PRN Mariana Maldonado MD        ipratropium 0.5 mg-albuterol 2.5 mg (DUONEB) nebulizer solution 1 Dose  1 Dose Inhalation Q4H WA RT Mariana Maldonado MD   1 Dose at 01/09/24 2007    budesonide (PULMICORT) nebulizer suspension 500 mcg  0.5 mg Nebulization BID RT Genesis Enriquez MD   500 mcg at 01/09/24 2007    arformoterol tartrate (BROVANA) nebulizer solution 15 mcg  15 mcg Nebulization BID RT Genesis Enriquez MD   15 mcg at 01/09/24 2007    guaiFENesin tablet 400 mg  400 mg Oral TID Genesis Enriquez MD   400 mg at 01/10/24 0813    pantoprazole (PROTONIX) tablet 40 mg  40 mg Oral QAM AC Genesis Enriquez MD   40 mg at 01/10/24 0431    sodium chloride flush 0.9 % injection 5-40 mL  5-40 mL IntraVENous 2 times per day Genesis Enriquez MD   10 mL at 01/10/24 0811     sodium chloride flush 0.9 % injection 5-40 mL  5-40 mL IntraVENous PRN Genesis Enriquez MD        0.9 % sodium chloride infusion   IntraVENous PRN Genesis Enriquez MD        enoxaparin Sodium (LOVENOX) injection 30 mg  30 mg SubCUTAneous BID Genesis Enriquez MD   30 mg at 01/10/24 0813    ondansetron (ZOFRAN-ODT) disintegrating tablet 4 mg  4 mg Oral Q8H PRN Genesis Enriquez MD        Or    ondansetron (ZOFRAN) injection 4 mg  4 mg IntraVENous Q6H PRN Genesis Enriquez MD        polyethylene glycol (GLYCOLAX) packet 17 g  17 g Oral Daily PRN Genesis Enriquez MD        acetaminophen (TYLENOL) tablet 650 mg  650 mg Oral Q6H PRN Genesis Enriquez MD        Or    acetaminophen (TYLENOL) suppository 650 mg  650 mg Rectal Q6H PRN Genesis Enriquez MD        divalproex (DEPAKOTE) DR tablet 250 mg  250 mg Oral BID Genesis Enriquez MD   250 mg at 01/10/24 0813    OLANZapine (ZYPREXA) tablet 5 mg  5 mg Oral Nightly Genesis Enriquez MD   5 mg at 01/09/24 2241    nicotine (NICODERM CQ) 21 MG/24HR 1 patch  1 patch TransDERmal Daily Genesis Enriquez MD   1 patch at 01/10/24 0814    hydrOXYzine pamoate (VISTARIL) capsule 25 mg  25 mg Oral TID PRN Israel Vazquez MD   25 mg at 01/10/24 0818     Recent Complaints:  Review of Systems  Vitals:    01/10/24 0811   BP: 125/81   Pulse: 73   Resp: 18   Temp: 96.9 °F (36.1 °C)   SpO2: 98%     Physical Exam  Constitutional:       General: She is not in acute distress.  Cardiovascular:      Rate and Rhythm: Normal rate.   Pulmonary:      Effort: Pulmonary effort is normal.      Breath sounds: Wheezing present.      Comments: Increased AP diameter  Neurological:      Mental Status: She is alert.         Recent Labs     01/08/24  0726 01/09/24  0510 01/10/24  0714    141 142   K 4.4 4.8 4.2   CL 97* 97* 100   CO2 38* 37* 38*   BUN 16 25* 18   CREATININE 0.6 0.8 0.7   GLUCOSE 71* 80 84   CALCIUM 9.5 9.2 9.2     Recent Labs     01/09/24  0510   WBC

## 2024-01-10 NOTE — DISCHARGE INSTRUCTIONS
[facility] Internal Medicine Resident Service    Activity as tolerated  Diet: common adult  Be compliant with your medications and take them as prescribed.    Special Instructions:   Continue taking your medication as listed  START to take Daliresp 250mcg daily.  Please use BiPAP during nighttime and naps  Please follow up appointments with Pulmonary and rehab  Please follow up with primary care physician within 7-10 days upon discharge, appointment has been scheduled on 1/22/24      Hospital Follow up: [facility] with House Team   Call to confirm appointment Tel: [phone] ([facility])     Other Follow-Ups:    Future Appointments   Date Time Provider Department Center   1/22/2024 10:00 AM Cat Wooten MD ACC IM [facility]   1/22/2024 12:00 PM SEYZ PFT STRESS LAB ROOM [facility]FT [facility]   2/27/2024  2:00 PM Paulie Chavez MD EVELINA SLEEP [facility]   3/13/2024  9:00 AM Pedro Bedolla MD ACC Pulm [facility]       Other than any new prescriptions given to you today, the list of home going meds on this After Visit Summary are based on information provided to us from you. This information, including the list, dose, and frequency of medications is only as accurate as the information you provided. If you have any questions or concerns about your home medications, please contact your Primary Care Physician for further clarification.      Israel Vazquez MD PGY-1  1/10/2024  10:34 AM

## 2024-01-10 NOTE — PROGRESS NOTES
[facility]  Department of Internal Medicine  Division of Pulmonary, Critical Care and Sleep Medicine  Consult Note    Patient: Brenda Nunn  MRN: [id number]  : 1965    Encounter Time: 2:31 PM     Date of Admission: 1/3/2024  5:10 AM    Primary Care Physician: Chapin Siddiqui MD    Reason for Consultation: COPD      SUBJECTIVE:    Improved  Anxiety noted  Requesting Visteril      OBJECTIVE:     PHYSICAL EXAM:   VITALS:   Vitals:    24 1950 01/10/24 0000 01/10/24 0104 01/10/24 0811   BP: 118/69 127/78  125/81   Pulse:  86  73   Resp:    Temp: 98.4 °F (36.9 °C) 97.1 °F (36.2 °C)  96.9 °F (36.1 °C)   TempSrc: Oral Temporal  Temporal   SpO2: 95% 96%  98%   Weight:       Height:            Intake/Output Summary (Last 24 hours) at 1/10/2024 1431  Last data filed at 1/10/2024 1410  Gross per 24 hour   Intake 600 ml   Output 500 ml   Net 100 ml          CONSTITUTIONAL:   A&O x 3, NAD  SKIN:     No rash, No suspicious lesions, No skin discoloration  HEENT:     EOMI, MMM, No thrush  NECK:    No bruits, No JVP appreciated  CV:      Sinus,  No murmur, No rubs, No gallops  PULMONARY:   Couse BS,  No Wheezing, No Rales, No Rhonchi      No noted egophony  ABDOMEN:     Soft, non-tender. BS normal. No R/R/G  EXT:    No deformities .  No clubbing.       No lower extremity edema, No venous stasis  PULSE:   Appears equal and palpable.  PSYCHIATRIC:  Seems appropriate, No acute psychosis  MS:    No fractures, No gross weakness  NEUROLOGIC:   The clinical assessment is non-focal     DATA: IMAGING & TESTING:     LABORATORY TESTS:    CBC:   Lab Results   Component Value Date/Time    WBC 7.5 2024 05:10 AM    RBC 4.07 2024 05:10 AM    HGB 12.2 2024 05:10 AM    HCT 39.4 2024 05:10 AM    MCV 96.8 2024 05:10 AM    MCH 30.0 2024 05:10 AM    MCHC 31.0 2024 05:10 AM    RDW 12.7 2024 05:10 AM     2024 05:10 AM    MPV 10.5

## 2024-01-10 NOTE — PROGRESS NOTES
4 Eyes Skin Assessment     NAME:  Brenda Nunn  YOB: 1965  MEDICAL RECORD NUMBER:  [id number]    The patient is being assessed for  Transfer to New Unit    I agree that at least one RN has performed a thorough Head to Toe Skin Assessment on the patient. ALL assessment sites listed below have been assessed.      Areas assessed by both nurses:    Head, Face, Ears, Shoulders, Back, Chest, Arms, Elbows, Hands, Sacrum. Buttock, Coccyx, Ischium, Legs. Feet and Heels, and Under Medical Devices         Does the Patient have a Wound? No noted wound(s)       Gabe Prevention initiated by RN: Yes  Wound Care Orders initiated by RN: No    Pressure Injury (Stage 3,4, Unstageable, DTI, NWPT, and Complex wounds) if present, place Wound referral order by RN under : No    New Ostomies, if present place, Ostomy referral order under : No     Nurse 1 eSignature: Electronically signed by Cliff Morillo RN on 1/10/24 at 1:58 AM EST    **SHARE this note so that the co-signing nurse can place an eSignature**    Nurse 2 eSignature: Electronically signed by Jens Powell RN on 1/10/24 at 5:16 AM EST

## 2024-01-10 NOTE — CARE COORDINATION
Care coordination:  Following for discharge planning.  Chart reviewed.  Currently on 4L o2 baseline at home is 2.  Needs updated qualifying testing.  Note posted to sticky notes. Also followed with Moises at [facility] regarding  request for NIV.   (Moises )  Previous CM provided document  requesting expedited review of this request to facilitate discharge plan.  Per Moises, this expedited request was sent in yesterday and he anticipates a response no later than tomorrow.  Pulmonary note indicates request was denied.  This is not the case, the original request was closed when the new request was opened.  EVY updated attending via [employer] and patient.  Discharge plan remains home with [facility]. [place] following.  Will need orders.

## 2024-01-10 NOTE — PROGRESS NOTES
Nutrition Assessment    Type and Reason for Visit:  RD Nutrition Re-Screen/LOS    Nutrition Assessment:    RD re-screen negative as pt w/ mostly % oral intakes at meals and no wounds noted per doc flow; no indication for ONS at this time; will follow per policy/consult.    Current Nutrition Intake & Therapies:    Average Meal Intake: % (most)  Average Supplements Intake: None Ordered  ADULT DIET; Regular      Sulema Kelley RD, LD  Contact: [phone]

## 2024-01-11 LAB
ANION GAP SERPL CALCULATED.3IONS-SCNC: 7 MMOL/L (ref 7–16)
BASOPHILS # BLD: 0.05 K/UL (ref 0–0.2)
BASOPHILS NFR BLD: 1 % (ref 0–2)
BUN SERPL-MCNC: 17 MG/DL (ref 6–20)
CALCIUM SERPL-MCNC: 9.5 MG/DL (ref 8.6–10.2)
CHLORIDE SERPL-SCNC: 98 MMOL/L (ref 98–107)
CO2 SERPL-SCNC: 36 MMOL/L (ref 22–29)
CREAT SERPL-MCNC: 0.7 MG/DL (ref 0.5–1)
EOSINOPHIL # BLD: 0.22 K/UL (ref 0.05–0.5)
EOSINOPHILS RELATIVE PERCENT: 3 % (ref 0–6)
ERYTHROCYTE [DISTWIDTH] IN BLOOD BY AUTOMATED COUNT: 12.6 % (ref 11.5–15)
GFR SERPL CREATININE-BSD FRML MDRD: >60 ML/MIN/1.73M2
GLUCOSE BLD-MCNC: 108 MG/DL (ref 74–99)
GLUCOSE BLD-MCNC: 170 MG/DL (ref 74–99)
GLUCOSE BLD-MCNC: 82 MG/DL (ref 74–99)
GLUCOSE SERPL-MCNC: 90 MG/DL (ref 74–99)
HCT VFR BLD AUTO: 39.7 % (ref 34–48)
HGB BLD-MCNC: 12.2 G/DL (ref 11.5–15.5)
IMM GRANULOCYTES # BLD AUTO: 0.06 K/UL (ref 0–0.58)
IMM GRANULOCYTES NFR BLD: 1 % (ref 0–5)
LYMPHOCYTES NFR BLD: 2.54 K/UL (ref 1.5–4)
LYMPHOCYTES RELATIVE PERCENT: 39 % (ref 20–42)
MCH RBC QN AUTO: 29.8 PG (ref 26–35)
MCHC RBC AUTO-ENTMCNC: 30.7 G/DL (ref 32–34.5)
MCV RBC AUTO: 96.8 FL (ref 80–99.9)
MONOCYTES NFR BLD: 0.68 K/UL (ref 0.1–0.95)
MONOCYTES NFR BLD: 10 % (ref 2–12)
NEUTROPHILS NFR BLD: 46 % (ref 43–80)
NEUTS SEG NFR BLD: 3.04 K/UL (ref 1.8–7.3)
PLATELET # BLD AUTO: 189 K/UL (ref 130–450)
PMV BLD AUTO: 10.4 FL (ref 7–12)
POTASSIUM SERPL-SCNC: 4.4 MMOL/L (ref 3.5–5)
RBC # BLD AUTO: 4.1 M/UL (ref 3.5–5.5)
SODIUM SERPL-SCNC: 141 MMOL/L (ref 132–146)
WBC OTHER # BLD: 6.6 K/UL (ref 4.5–11.5)

## 2024-01-11 PROCEDURE — 99238 HOSP IP/OBS DSCHRG MGMT 30/<: CPT | Performed by: INTERNAL MEDICINE

## 2024-01-11 PROCEDURE — 6370000000 HC RX 637 (ALT 250 FOR IP)

## 2024-01-11 PROCEDURE — 6360000002 HC RX W HCPCS: Performed by: STUDENT IN AN ORGANIZED HEALTH CARE EDUCATION/TRAINING PROGRAM

## 2024-01-11 PROCEDURE — 94640 AIRWAY INHALATION TREATMENT: CPT

## 2024-01-11 PROCEDURE — 97530 THERAPEUTIC ACTIVITIES: CPT

## 2024-01-11 PROCEDURE — 2700000000 HC OXYGEN THERAPY PER DAY

## 2024-01-11 PROCEDURE — 85025 COMPLETE CBC W/AUTO DIFF WBC: CPT

## 2024-01-11 PROCEDURE — 6370000000 HC RX 637 (ALT 250 FOR IP): Performed by: STUDENT IN AN ORGANIZED HEALTH CARE EDUCATION/TRAINING PROGRAM

## 2024-01-11 PROCEDURE — 94660 CPAP INITIATION&MGMT: CPT

## 2024-01-11 PROCEDURE — 1200000000 HC SEMI PRIVATE

## 2024-01-11 PROCEDURE — 36415 COLL VENOUS BLD VENIPUNCTURE: CPT

## 2024-01-11 PROCEDURE — 82962 GLUCOSE BLOOD TEST: CPT

## 2024-01-11 PROCEDURE — 2580000003 HC RX 258: Performed by: STUDENT IN AN ORGANIZED HEALTH CARE EDUCATION/TRAINING PROGRAM

## 2024-01-11 PROCEDURE — 80048 BASIC METABOLIC PNL TOTAL CA: CPT

## 2024-01-11 PROCEDURE — 97535 SELF CARE MNGMENT TRAINING: CPT

## 2024-01-11 RX ADMIN — IPRATROPIUM BROMIDE AND ALBUTEROL SULFATE 1 DOSE: .5; 2.5 SOLUTION RESPIRATORY (INHALATION) at 05:09

## 2024-01-11 RX ADMIN — ARFORMOTEROL TARTRATE 15 MCG: 15 SOLUTION RESPIRATORY (INHALATION) at 21:53

## 2024-01-11 RX ADMIN — ONDANSETRON 4 MG: 4 TABLET, ORALLY DISINTEGRATING ORAL at 10:30

## 2024-01-11 RX ADMIN — SODIUM CHLORIDE, PRESERVATIVE FREE 10 ML: 5 INJECTION INTRAVENOUS at 21:22

## 2024-01-11 RX ADMIN — ENOXAPARIN SODIUM 30 MG: 100 INJECTION SUBCUTANEOUS at 21:21

## 2024-01-11 RX ADMIN — ALPRAZOLAM 0.25 MG: 0.25 TABLET ORAL at 21:22

## 2024-01-11 RX ADMIN — HYDROXYZINE PAMOATE 25 MG: 25 CAPSULE ORAL at 08:50

## 2024-01-11 RX ADMIN — DIVALPROEX SODIUM 250 MG: 250 TABLET, DELAYED RELEASE ORAL at 21:22

## 2024-01-11 RX ADMIN — Medication 1000 UNITS: at 08:47

## 2024-01-11 RX ADMIN — IPRATROPIUM BROMIDE AND ALBUTEROL SULFATE 1 DOSE: .5; 2.5 SOLUTION RESPIRATORY (INHALATION) at 17:18

## 2024-01-11 RX ADMIN — GUAIFENESIN 400 MG: 400 TABLET ORAL at 21:22

## 2024-01-11 RX ADMIN — GUAIFENESIN 400 MG: 400 TABLET ORAL at 08:47

## 2024-01-11 RX ADMIN — GUAIFENESIN 400 MG: 400 TABLET ORAL at 15:28

## 2024-01-11 RX ADMIN — OLANZAPINE 5 MG: 5 TABLET, FILM COATED ORAL at 21:22

## 2024-01-11 RX ADMIN — BUDESONIDE INHALATION 500 MCG: 0.5 SUSPENSION RESPIRATORY (INHALATION) at 21:53

## 2024-01-11 RX ADMIN — BUDESONIDE INHALATION 500 MCG: 0.5 SUSPENSION RESPIRATORY (INHALATION) at 05:09

## 2024-01-11 RX ADMIN — HYDROXYZINE PAMOATE 25 MG: 25 CAPSULE ORAL at 21:22

## 2024-01-11 RX ADMIN — IPRATROPIUM BROMIDE AND ALBUTEROL SULFATE 1 DOSE: .5; 2.5 SOLUTION RESPIRATORY (INHALATION) at 13:16

## 2024-01-11 RX ADMIN — ARFORMOTEROL TARTRATE 15 MCG: 15 SOLUTION RESPIRATORY (INHALATION) at 05:09

## 2024-01-11 RX ADMIN — DIVALPROEX SODIUM 250 MG: 250 TABLET, DELAYED RELEASE ORAL at 08:47

## 2024-01-11 RX ADMIN — SODIUM CHLORIDE, PRESERVATIVE FREE 10 ML: 5 INJECTION INTRAVENOUS at 08:47

## 2024-01-11 RX ADMIN — IPRATROPIUM BROMIDE AND ALBUTEROL SULFATE 1 DOSE: .5; 2.5 SOLUTION RESPIRATORY (INHALATION) at 21:53

## 2024-01-11 RX ADMIN — PANTOPRAZOLE SODIUM 40 MG: 40 TABLET, DELAYED RELEASE ORAL at 06:33

## 2024-01-11 RX ADMIN — ENOXAPARIN SODIUM 30 MG: 100 INJECTION SUBCUTANEOUS at 08:47

## 2024-01-11 RX ADMIN — HYDROXYZINE PAMOATE 25 MG: 25 CAPSULE ORAL at 13:41

## 2024-01-11 ASSESSMENT — PAIN SCALES - GENERAL: PAINLEVEL_OUTOF10: 0

## 2024-01-11 NOTE — CARE COORDINATION
Care Coordination:  Continue to wait auth for NIV.  .  Spoke to daughter .  Patient lives with daughter and grandchildren .  Daughter reports  that there is RSV in the house.  She does not want her mother to return home due to her high risk respiratory status.  Discussed options.  PT OT [facility] 17.  SW explained to daughter that nursing  home placement  would require approval of insurance.   She requested we proceed with a referral and provided choice for [facility].  Referral called to Lakshmi who will review and get back to us.  Will follow.   1219:   Patient accepted at [facility] pending pre cert.  Pre cert started today.  Informed attending of change in plan.  Patient agreeable .  Updated daughter on phone.  Envelop . Transport form and PASR on soft chart.

## 2024-01-11 NOTE — PROGRESS NOTES
OCCUPATIONAL THERAPY TREATMENT NOTE    [facility]      OT BEDSIDE TREATMENT NOTE      Date:2024  Patient Name: Brenda Nunn  MRN: [id number]  : 1965  Room: [address] [place], [place] [address]     Evaluating OT: Rehan Turcios OTR/L #[id number]      Referring Provider: Mariana Maldonado MD   Specific Provider Orders/Date: OT eval and treat 24     Diagnosis: Hypoxia [R09.02]  COPD exacerbation (HCC) [J44.1]   Pt admitted to hospital with SOB, O2 sat 77%      Pertinent Medical History:  has a past medical history of CHF (congestive heart failure) (HCC), COPD (chronic obstructive pulmonary disease) ([facility]), Depression, and Hypertension.         Precautions:  Fall Risk, O2, continuous pulse ox     Assessment of current deficits    [x] Functional mobility          [x]ADLs           [x] Strength                  []Cognition    [x] Functional transfers        [x] IADLs         [x] Safety Awareness   [x]Endurance    [] Fine Coordination                        [x] Balance      [] Vision/perception   []Sensation      []Gross Motor Coordination            [] ROM           [] Delirium                   [] Motor Control      OT PLAN OF CARE   OT POC based on physician orders, patient diagnosis and results of clinical assessment     Frequency/Duration 1-3 days/wk for 2 weeks PRN   Specific OT Treatment Interventions to include:   * Instruction/training on adapted ADL techniques and AE recommendations to increase functional independence within precautions       * Training on energy conservation strategies, correct breathing pattern and techniques to improve independence/tolerance for self-care routine  * Functional transfer/mobility training/DME recommendations for increased independence, safety, and fall prevention  * Patient/Family education to increase follow through with safety techniques and functional independence  * Recommendation of environmental modifications for increased safety with functional transfers/mobility  and ADLs  * Therapeutic exercise to improve motor endurance, ROM, and functional strength for ADLs/functional transfers  * Therapeutic activities to facilitate/challenge dynamic balance, stand tolerance for increased safety and independence with ADLs  * Therapeutic activities to facilitate gross/fine motor skills for increased independence with ADLs  * Neuro-muscular re-education: facilitation of righting/equilibrium reactions, midline orientation, scapular stability/mobility, normalization of muscle tone, and facilitation of volitional active controled movement     Recommended Adaptive Equipment:  TBD      Home Living: Pt lives with daughter and 10 other people in a 2 story home with 4 SEPIDEH and B hand rails. Pt sleeps on 1st floor with BSC; bathroom on 2nd floor    Bathroom setup: tub/shower combo    Equipment owned: w/w, w/c, Home O2     Prior Level of Function: mod I with ADLs , mod I with IADLs; ambulated without into house; w/c for mobility in house primarily due to SOB   Occupation: na     Pain Level: Pt denies pain this session     Cognition: A&O: 4/4; Follows 2 step directions             Memory:  good             Sequencing:  good             Problem solving:  fair             Judgement/safety:  fair               Functional Assessment:  AM-PAC Daily Activity Raw Score: 17/24    Initial Eval Status  Date: 1/5/24 Treatment Status  Date: 1-11-24 STGs = LTGs  Time frame: 10-14 days   Feeding Independent   Ind.     Grooming Stand by Assist      Standing   SBA in standing with rest breaks for endurance Modified [place]    UB Dressing Stand by Assist   set up to bala gown Modified [place]    LB Dressing Moderate Assist      Limited due to SOB, weakness and fatigue   SBA to bala B socks at EOB  Modified [place]    Bathing Moderate Assist  Min A sim. task  Instructed RE: rest breaks for endurance, E.C. techniques Modified [place]    Toileting Moderate Assist   NT Modified [place]    Bed

## 2024-01-11 NOTE — PROGRESS NOTES
Physical Therapy  Rx    Name: Brenda Nunn  : 1965  MRN: [id number]      Date of Service: 2024    Evaluating PT:  Kimi Hurst, PT, DPT, [id number]    Room #:  5417/5417-A  Diagnosis:  Hypoxia [R09.02]  COPD exacerbation (HCC) [J44.1]  PMHx/PSHx:    Past Medical History:   Diagnosis Date    CHF (congestive heart failure) (HCC)     COPD (chronic obstructive pulmonary disease) (HCC)     Depression     Hypertension       Past Surgical History:   Procedure Laterality Date     SECTION      HYSTERECTOMY (CERVIX STATUS UNKNOWN)        Procedure/Surgery:  none this admission  Precautions:  Fall Risk, O2, Continuous Pulse-Ox,   Equipment Needs:  Rollator (if returning home upon d/c)    SUBJECTIVE:    Pt lives with 11 people in a 2 story home with 3 stairs to enter and 1 rail.  Bed is on 2 floor and bath is on 2 floor with 1 flight and 1 rail to access. Pt is unable to access 2nd floor and now sleeps on the couch and uses a bsc. Pt reports using w/c within the home for mobility d/t decreased endurance. Ambulates house <>car with no AD with increased difficultly. Pt is on 2-3 L/min O2 at baseline.     OBJECTIVE:   Initial Evaluation  Date: 24 Treatment  24 Short Term/ Long Term   Goals   AM-PAC 6 Clicks     Was pt agreeable to Eval/treatment? yes yes    Does pt have pain? No c/o pain None however constant cues for pacing.     Bed Mobility  Rolling: NT  Supine to sit: SBA with HOB elevated   Sit to supine: NT  Scooting: SBA Rolling: Ind  Supine to sit:   Ind  HOB elevated  Sit to supine: Ind   Scooting: Ind   Rolling: Independent   Supine to sit: Independent   Sit to supine: Independent   Scooting: Independent    Transfers Sit to stand: [place]  Stand to sit: Linda  Stand pivot: [place] with no AD Sit to stand: SBA   Stand to sit: SBA  Stand pivot: Min   Sit to stand: Independent   Stand to sit: Independent   Stand pivot: mod Independent with AD   Ambulation    25'x2 with no AD [place]  40 feet

## 2024-01-11 NOTE — PROGRESS NOTES
[facility]  Department of Internal Medicine  Division of Pulmonary, Critical Care and Sleep Medicine  Consult Note    Patient: Brenda Nunn  MRN: [id number]  : 1965    Encounter Time: 3:59 PM     Date of Admission: 1/3/2024  5:10 AM    Primary Care Physician: Chapin Siddiqui MD    Reason for Consultation: COPD      SUBJECTIVE:    Overall awaiting device    OBJECTIVE:     PHYSICAL EXAM:   VITALS:   Vitals:    24 0510 24 0529 24 0806 24 1316   BP:   127/68    Pulse: 83 83 85    Resp: 28 26 20    Temp:   97.6 °F (36.4 °C)    TempSrc:   Oral    SpO2: 96% 96% 90% 94%   Weight:       Height:            Intake/Output Summary (Last 24 hours) at 2024 1559  Last data filed at 2024 1443  Gross per 24 hour   Intake 330 ml   Output --   Net 330 ml          CONSTITUTIONAL:   A&O x 3, NAD  SKIN:     No skin discoloration  HEENT:     EOMI, MMM, No thrush  NECK:    No bruits, No JVP appreciated  CV:      Sinus,  No murmur, No rubs, No gallops  PULMONARY:   Couse BS,  No Wheezing, No Rales, No Rhonchi      No noted egophony  ABDOMEN:     Soft, non-tender. BS normal. No R/R/G  EXT:    No deformities .  No clubbing.       No lower extremity edema, No venous stasis  PULSE:   Appears equal and palpable.  PSYCHIATRIC:  Seems appropriate, No acute psychosis  MS:    No fractures, No gross weakness  NEUROLOGIC:   The clinical assessment is non-focal     DATA: IMAGING & TESTING:     LABORATORY TESTS:    CBC:   Lab Results   Component Value Date/Time    WBC 6.6 2024 04:47 AM    RBC 4.10 2024 04:47 AM    HGB 12.2 2024 04:47 AM    HCT 39.7 2024 04:47 AM    MCV 96.8 2024 04:47 AM    MCH 29.8 2024 04:47 AM    MCHC 30.7 2024 04:47 AM    RDW 12.6 2024 04:47 AM     2024 04:47 AM    MPV 10.4 2024 04:47 AM     CMP:    Lab Results   Component Value Date/Time     2024 04:47 AM    K 4.4  01/11/2024 04:47 AM    CL 98 01/11/2024 04:47 AM    CO2 36 01/11/2024 04:47 AM    BUN 17 01/11/2024 04:47 AM    CREATININE 0.7 01/11/2024 04:47 AM    LABGLOM >60 01/11/2024 04:47 AM    GLUCOSE 90 01/11/2024 04:47 AM    PROT 6.4 01/03/2024 05:15 AM    LABALBU 3.9 01/03/2024 05:15 AM    CALCIUM 9.5 01/11/2024 04:47 AM    BILITOT <0.2 01/03/2024 05:15 AM    ALKPHOS 69 01/03/2024 05:15 AM    AST 11 01/03/2024 05:15 AM    ALT 10 01/03/2024 05:15 AM     Ionized Calcium:  No results found for: \"IONCA\"  Magnesium:    Lab Results   Component Value Date/Time    MG 1.6 12/16/2023 05:53 AM     Phosphorus:    Lab Results   Component Value Date/Time    PHOS 3.9 12/19/2023 05:21 AM     PT/INR:    Lab Results   Component Value Date/Time    PROTIME 10.2 11/19/2023 04:28 PM    INR 0.9 11/19/2023 04:28 PM     ABG:    Lab Results   Component Value Date/Time    PH 7.403 01/03/2024 10:38 PM    PCO2 57.4 01/03/2024 10:38 PM    PO2 58.9 01/03/2024 10:38 PM    HCO3 35.0 01/03/2024 10:38 PM    BE 8.3 01/03/2024 10:38 PM    O2SAT 91.7 01/03/2024 10:38 PM     TSH:  No results found for: \"TSH\"     PRO-BNP:   Lab Results   Component Value Date    PROBNP <36 12/18/2023    PROBNP <36 12/15/2023      ABGs:   Lab Results   Component Value Date/Time    PH 7.403 01/03/2024 10:38 PM    PO2 58.9 01/03/2024 10:38 PM    PCO2 57.4 01/03/2024 10:38 PM     Hemoglobin A1C: No components found for: \"HGBA1C\"    IMAGING:  Imaging tests were completed and reviewed and discussed radiology and care team involved and reveals   XR CHEST (2 VW)  Result Date: 11/28/2023  FINDINGS: Cardiac silhouette is within normal limits. Pulmonary vasculature is within normal limits. The lungs are clear. No pneumothorax is identified. Bony structures are unremarkable. No acute cardiopulmonary process.     Assessment: Ms. Nunn is a 58-year-old active smoker with known chronic obstructive pulmonary disease who presents with hypoxic acute on chronic respiratory failure likely

## 2024-01-11 NOTE — PROGRESS NOTES
While answering call lights pt stated she was having sever heartburn , she was requesting a medication that started with a p when this student nurse looked at med's noted Protonix was giving a 0633 . Offered to education on taking Zofran to see if it helps physician an resident at bed side nurse instructor spoke to resident agreed with Zofran and if that dint work the nurse can contact internal medicine this  student nurse educated pt on Zofran medication pt agreed tolerated sublingual well admin well provided therapeutic education .

## 2024-01-11 NOTE — PROGRESS NOTES
Progress  Note  Chief Complaint   Patient presents with    Shortness of Breath     SOB x 2 weeks. Patient states it has gotten increasingly worse over the past 24 hours. Patient also states she was to start using a CPAP and never got one.     Historical Issues:  Current Facility-Administered Medications   Medication Dose Route Frequency Provider Last Rate Last Admin    ALPRAZolam (XANAX) tablet 0.25 mg  0.25 mg Oral Nightly Reji Love MD   0.25 mg at 01/10/24 2148    glucose chewable tablet 16 g  4 tablet Oral PRN Tiara Alfonso MD        dextrose bolus 10% 125 mL  125 mL IntraVENous PRN Tiara Alfonso MD        Or    dextrose bolus 10% 250 mL  250 mL IntraVENous PRN Tiara Alfonso MD        dextrose 10 % infusion   IntraVENous Continuous PRN Tiara Alfonso MD        Vitamin D (CHOLECALCIFEROL) tablet 1,000 Units  1,000 Units Oral Daily Mariana Maldonado MD   1,000 Units at 01/11/24 0847    benzonatate (TESSALON) capsule 100 mg  100 mg Oral TID PRN Maia Espinal MD   100 mg at 01/05/24 1655    glucagon injection 1 mg  1 mg SubCUTAneous PRN Mariana Maldonado MD        ipratropium 0.5 mg-albuterol 2.5 mg (DUONEB) nebulizer solution 1 Dose  1 Dose Inhalation Q4H WA RT aMriana Maldonado MD   1 Dose at 01/11/24 0509    budesonide (PULMICORT) nebulizer suspension 500 mcg  0.5 mg Nebulization BID RT Genesis Enriquez MD   500 mcg at 01/11/24 0509    arformoterol tartrate (BROVANA) nebulizer solution 15 mcg  15 mcg Nebulization BID RT Genesis Enriquez MD   15 mcg at 01/11/24 0509    guaiFENesin tablet 400 mg  400 mg Oral TID Genesis Enriquez MD   400 mg at 01/11/24 0847    pantoprazole (PROTONIX) tablet 40 mg  40 mg Oral QAM AC Genesis Enriquez MD   40 mg at 01/11/24 0633    sodium chloride flush 0.9 % injection 5-40 mL  5-40 mL IntraVENous 2 times per day Genesis Enriquez MD   10 mL at 01/11/24 9263

## 2024-01-11 NOTE — PROGRESS NOTES
[facility]  Internal Medicine Residency Program  Progress Note - House Team       Patient:  Brenda Nunn 58 y.o. female   MRN: [id number]       Date of Service: 1/11/2024    CC: SOB  Overnight events: none     Subjective     Patient was seen this morning and was doing well. She was on 2 L oxygen , not in any acute respiratory distress. She denies headache, dizziness, cough, chest pain, SOB      Objective       Physical Exam  Vitals: /68   Pulse 85   Temp 97.6 °F (36.4 °C) (Oral)   Resp 20   Ht 1.702 m (5' 7\")   Wt 113.4 kg (250 lb)   SpO2 94%   BMI 39.16 kg/m²     I & O - 24hr: I/O this shift:  In: 180 [P.O.:180]  Out: -    General Appearance: alert and cooperative  HEENT:  Head: Normal, normocephalic, atraumatic.  Neck: no JVD and supple, symmetrical, trachea midline  Lung: wheezes bilaterally  Heart: regular rate and rhythm, S1, S2 normal, no murmur, click, rub or gallop  Abdomen: soft, non-tender; bowel sounds normal; no masses,  no organomegaly  Extremities:  extremities normal, atraumatic, no cyanosis or edema  Musculokeletal: No joint swelling, no muscle tenderness. ROM normal in all joints of extremities.   Neurologic: Mental status: Alert, oriented, thought content appropriate    Diet:   ADULT DIET; Regular      Pertinent Labs & Imaging Studies     Labs    CBC with Differential:    Lab Results   Component Value Date/Time    WBC 6.6 01/11/2024 04:47 AM    RBC 4.10 01/11/2024 04:47 AM    HGB 12.2 01/11/2024 04:47 AM    HCT 39.7 01/11/2024 04:47 AM     01/11/2024 04:47 AM    MCV 96.8 01/11/2024 04:47 AM    MCH 29.8 01/11/2024 04:47 AM    MCHC 30.7 01/11/2024 04:47 AM    RDW 12.6 01/11/2024 04:47 AM    LYMPHOPCT 39 01/11/2024 04:47 AM    MONOPCT 10 01/11/2024 04:47 AM    BASOPCT 1 01/11/2024 04:47 AM    MONOSABS 0.68 01/11/2024 04:47 AM    LYMPHSABS 2.54 01/11/2024 04:47 AM    EOSABS 0.22 01/11/2024 04:47 AM    BASOSABS 0.05 01/11/2024 04:47 AM     BMP:    Lab Results    Component Value Date/Time     01/11/2024 04:47 AM    K 4.4 01/11/2024 04:47 AM    CL 98 01/11/2024 04:47 AM    CO2 36 01/11/2024 04:47 AM    BUN 17 01/11/2024 04:47 AM    LABALBU 3.9 01/03/2024 05:15 AM    CREATININE 0.7 01/11/2024 04:47 AM    CALCIUM 9.5 01/11/2024 04:47 AM    LABGLOM >60 01/11/2024 04:47 AM    GLUCOSE 90 01/11/2024 04:47 AM     Magnesium:    Lab Results   Component Value Date/Time    MG 1.6 12/16/2023 05:53 AM     Phosphorus:    Lab Results   Component Value Date/Time    PHOS 3.9 12/19/2023 05:21 AM     Troponin:  No results found for: \"TROPONINI\"  HgBA1c:    Lab Results   Component Value Date/Time    LABA1C 5.7 11/14/2023 06:57 AM       Imaging Studies:    XR CHEST PORTABLE   Final Result   Stable chronic changes seen in lung fields with no focal parenchymal   opacification present.              EKG: normal sinus rhythm, unchanged from previous tracings.      Resident's Assessment and Plan       Assessment and Plan:    Acute on chronic hypoxic respiratory failure 2/2 COPD exaceracerbation, non compliance to NIV and tobacco use:    - on 2-3 L baseline at home   - on duonebs, albuterol, Symbicort, guanfacine, incruse ellipta, finished steroid    - no concerns for infection  Plan:   - BIPAP at night   - ambulatory pulse ox Q shift   - add daliresp 250 mcg on discharge    2.   Bipolar disorder:   - on vistaril, Depakote, olanzapine and buspar. Xanax for anxiety    3.   Hx of polysubstance abuse:  - UDS : cannbinoids +    4. Vitamin D deficiency:  - on vitamin d supplements       Discharge planning: CM & SW following: discharge barrier: NIV equipment  Pt no longer agreeable to rehab    PT/OT evaluation: not indicated at this time   DVT prophylaxis: lovenox  GI prophylaxis: protonix  Diet:   ADULT DIET; Regular   Bowel regimen: PRN  Pain management: as needed  Code status: Full Code   Disposition: Discharge waiting for precert  Family: updated as available    Faby Willard MD, PGY-1

## 2024-01-12 VITALS
OXYGEN SATURATION: 96 % | HEART RATE: 86 BPM | SYSTOLIC BLOOD PRESSURE: 135 MMHG | BODY MASS INDEX: 39.24 KG/M2 | DIASTOLIC BLOOD PRESSURE: 79 MMHG | TEMPERATURE: 98.2 F | HEIGHT: 67 IN | WEIGHT: 250 LBS | RESPIRATION RATE: 22 BRPM

## 2024-01-12 LAB
ANION GAP SERPL CALCULATED.3IONS-SCNC: 7 MMOL/L (ref 7–16)
BUN SERPL-MCNC: 22 MG/DL (ref 6–20)
CALCIUM SERPL-MCNC: 9.6 MG/DL (ref 8.6–10.2)
CHLORIDE SERPL-SCNC: 98 MMOL/L (ref 98–107)
CO2 SERPL-SCNC: 38 MMOL/L (ref 22–29)
CREAT SERPL-MCNC: 0.7 MG/DL (ref 0.5–1)
GFR SERPL CREATININE-BSD FRML MDRD: >60 ML/MIN/1.73M2
GLUCOSE BLD-MCNC: 114 MG/DL (ref 74–99)
GLUCOSE SERPL-MCNC: 99 MG/DL (ref 74–99)
POTASSIUM SERPL-SCNC: 4.7 MMOL/L (ref 3.5–5)
SODIUM SERPL-SCNC: 143 MMOL/L (ref 132–146)

## 2024-01-12 PROCEDURE — 2580000003 HC RX 258: Performed by: STUDENT IN AN ORGANIZED HEALTH CARE EDUCATION/TRAINING PROGRAM

## 2024-01-12 PROCEDURE — 6370000000 HC RX 637 (ALT 250 FOR IP)

## 2024-01-12 PROCEDURE — 6360000002 HC RX W HCPCS: Performed by: STUDENT IN AN ORGANIZED HEALTH CARE EDUCATION/TRAINING PROGRAM

## 2024-01-12 PROCEDURE — 99238 HOSP IP/OBS DSCHRG MGMT 30/<: CPT | Performed by: INTERNAL MEDICINE

## 2024-01-12 PROCEDURE — 94640 AIRWAY INHALATION TREATMENT: CPT

## 2024-01-12 PROCEDURE — 36415 COLL VENOUS BLD VENIPUNCTURE: CPT

## 2024-01-12 PROCEDURE — 80048 BASIC METABOLIC PNL TOTAL CA: CPT

## 2024-01-12 PROCEDURE — 82962 GLUCOSE BLOOD TEST: CPT

## 2024-01-12 PROCEDURE — 2700000000 HC OXYGEN THERAPY PER DAY

## 2024-01-12 PROCEDURE — 6370000000 HC RX 637 (ALT 250 FOR IP): Performed by: STUDENT IN AN ORGANIZED HEALTH CARE EDUCATION/TRAINING PROGRAM

## 2024-01-12 PROCEDURE — 94660 CPAP INITIATION&MGMT: CPT

## 2024-01-12 RX ORDER — GUAIFENESIN 400 MG/1
400 TABLET ORAL 3 TIMES DAILY
Qty: 56 TABLET | Refills: 0 | DISCHARGE
Start: 2024-01-12

## 2024-01-12 RX ORDER — ALBUTEROL SULFATE 90 UG/1
2 AEROSOL, METERED RESPIRATORY (INHALATION) EVERY 6 HOURS PRN
Qty: 18 G | Refills: 1 | DISCHARGE
Start: 2024-01-12

## 2024-01-12 RX ORDER — HYDROXYZINE PAMOATE 25 MG/1
25 CAPSULE ORAL 3 TIMES DAILY PRN
DISCHARGE
Start: 2024-01-12

## 2024-01-12 RX ORDER — OLANZAPINE 5 MG/1
5 TABLET ORAL NIGHTLY
Qty: 30 TABLET | Refills: 0 | DISCHARGE
Start: 2024-01-12

## 2024-01-12 RX ORDER — ERGOCALCIFEROL 1.25 MG/1
50000 CAPSULE ORAL WEEKLY
Qty: 12 CAPSULE | Refills: 1 | DISCHARGE
Start: 2024-01-12

## 2024-01-12 RX ORDER — BUDESONIDE AND FORMOTEROL FUMARATE DIHYDRATE 160; 4.5 UG/1; UG/1
2 AEROSOL RESPIRATORY (INHALATION) 2 TIMES DAILY
Qty: 3 EACH | Refills: 3 | DISCHARGE
Start: 2024-01-12 | End: 2024-07-10

## 2024-01-12 RX ORDER — UMECLIDINIUM 62.5 UG/1
1 AEROSOL, POWDER ORAL DAILY
Qty: 7 EACH | Refills: 2 | DISCHARGE
Start: 2024-01-12

## 2024-01-12 RX ORDER — IPRATROPIUM BROMIDE AND ALBUTEROL SULFATE 2.5; .5 MG/3ML; MG/3ML
1 SOLUTION RESPIRATORY (INHALATION) EVERY 4 HOURS
Qty: 360 ML | Refills: 1 | DISCHARGE
Start: 2024-01-12

## 2024-01-12 RX ORDER — ROFLUMILAST 250 UG/1
250 TABLET ORAL DAILY
Qty: 90 TABLET | Refills: 1 | DISCHARGE
Start: 2024-01-12 | End: 2024-07-10

## 2024-01-12 RX ORDER — DIVALPROEX SODIUM 250 MG/1
250 TABLET, DELAYED RELEASE ORAL 2 TIMES DAILY
Qty: 90 TABLET | Refills: 3 | DISCHARGE
Start: 2024-01-12

## 2024-01-12 RX ORDER — PANTOPRAZOLE SODIUM 40 MG/1
40 TABLET, DELAYED RELEASE ORAL
Qty: 90 TABLET | Refills: 1 | DISCHARGE
Start: 2024-01-12

## 2024-01-12 RX ADMIN — PANTOPRAZOLE SODIUM 40 MG: 40 TABLET, DELAYED RELEASE ORAL at 05:46

## 2024-01-12 RX ADMIN — Medication 1000 UNITS: at 08:03

## 2024-01-12 RX ADMIN — ENOXAPARIN SODIUM 30 MG: 100 INJECTION SUBCUTANEOUS at 08:03

## 2024-01-12 RX ADMIN — HYDROXYZINE PAMOATE 25 MG: 25 CAPSULE ORAL at 11:09

## 2024-01-12 RX ADMIN — SODIUM CHLORIDE, PRESERVATIVE FREE 10 ML: 5 INJECTION INTRAVENOUS at 08:03

## 2024-01-12 RX ADMIN — BUDESONIDE INHALATION 500 MCG: 0.5 SUSPENSION RESPIRATORY (INHALATION) at 09:51

## 2024-01-12 RX ADMIN — GUAIFENESIN 400 MG: 400 TABLET ORAL at 08:03

## 2024-01-12 RX ADMIN — IPRATROPIUM BROMIDE AND ALBUTEROL SULFATE 1 DOSE: .5; 2.5 SOLUTION RESPIRATORY (INHALATION) at 09:51

## 2024-01-12 RX ADMIN — HYDROXYZINE PAMOATE 25 MG: 25 CAPSULE ORAL at 08:03

## 2024-01-12 RX ADMIN — DIVALPROEX SODIUM 250 MG: 250 TABLET, DELAYED RELEASE ORAL at 08:03

## 2024-01-12 RX ADMIN — ARFORMOTEROL TARTRATE 15 MCG: 15 SOLUTION RESPIRATORY (INHALATION) at 09:51

## 2024-01-12 RX ADMIN — ACETAMINOPHEN 650 MG: 325 TABLET ORAL at 08:03

## 2024-01-12 NOTE — CARE COORDINATION
Care Coordination :  Pre cert approved.  Transfer to [facility] via facility van at 11:00. Resident to complete discharge orders.  Patient , family and RN informed of discharge arrangements.  Facility to provide bi-pap,  1142:  Spoke to Moises at [facility].  Auth for the NIV remains pending .He will follow at [facility].  When auth received if can be good for one year.

## 2024-01-12 NOTE — DISCHARGE SUMMARY
[facility]  Discharge Summary    PCP: Chapin Siddiqui MD    Admit Date:1/3/2024  Discharge Date: 1/12/2024    Admission Diagnosis:   Acute on chronic hypoxic respiratory failure 2/2 COPD exaceracerbation, non compliance to NIV and tobacco use   Bipolar disorder   Hx of polysubstance abuse   Vitamin D deficiency     Discharge Diagnosis:  Acute on chronic hypoxic respiratory failure 2/2 COPD exaceracerbation, non compliance to NIV and tobacco use   Bipolar disorder   Hx of polysubstance abuse   Vitamin D deficiency     Hospital Course:   The patient is a 58 y.o. female with a past medical history of COPD on home oxygen, depression, anxiety, remote drug abuse, CAD, HTN who presents to [facility] on 1/3/24 with cc shortness of breath. She called EMS, was found to be hypoxic to 85% on 4L NC.      Recently admitted for COPD exacerbation with hypercapnia for which she was to get a BIPAP on discharge. Did not get BIPAP, was told by insurance she has to get a sleep study first to see if she qualifies. Continues to smoke 1ppd.      In the ED , patient was on 3L NC O2 on. She was breathing with normal effort but had audible wheezing. Able to speak in complete sentences.  Denied chest pain, fever, chills, n/v.    In the ED, Patient's viral panel was negative, Pro BNP was at baseline, EKG showed NSR, inflammatory markers were WNL. No leukocytosis was seen, patient was placed on steroids, breathing treatments and home medications were resumed. Xanaz was prescribed for her anxiety.  Pulmonology as consulted for COPD exacerbation.     Patient was at her baseline , she was supposed to be discharged home but her family members contracted RSV, She was sent to a nursing facility. She will require re authorization for outpatient BIPAP.      Physical Exam  Constitutional:       General: She is not in acute distress.     Appearance: She is obese.   HENT:      Head: Normocephalic and atraumatic.

## 2024-01-12 NOTE — PROGRESS NOTES
Physician Progress Note      PATIENT:               ARUNA MADRID  CSN #:                  [id number]  :                       1965  ADMIT DATE:       1/3/2024 5:10 AM  DISCH DATE:  RESPONDING  PROVIDER #:        Parish Dalton MD          QUERY TEXT:    H&P \"Acute on chronic hypoxic and hypercapnic respiratory failure\".    progress note \"Chronic hypoxic respiratory failure\", then  Includes Acute   on Chronic Respiratory failure again. \"Per Dr. Vo notes, presented with   Acute on Chronic Respiratory failure, diagnoses below that stating only   Chronic Toxic and Hypercapnic Respiratory failure. Due to the changing of   acuities in notes, if possible, please document in progress notes and   discharge summary if you are evaluating and /or treating any of the following:    The medical record reflects the following:  Risk Factors: COPD, tobacco abuse, anxiety  Clinical Indicators: H&P \"Her chroinc home o2 2-3LNC and use of Bipap deemed   ineffective due to severity of disease for copd  and chronic respiratory   failure home use- NIV requested\", \"Acute on chronic hypoxic and hypercapnic   respiratory failure\". Per Dr Vo progress notes, \"Assessment: acute on   chronic respiratory failure likely secondary to continued tobacco abuse with   possibly acute bronchitis\". Then below that: \"COPD. Chronic toxic and   hypercapnic respiratory failure.\"  ABGs: pH 7.2. CO2 70.4, 57.4,  ED: Effort:   Tachypnea and accessory muscle usage present. Nursing:  Treatment: Initially on 5L NC, now 3-4L O2. Steroids, Buspar for Anxiety    Thank you,  Tory Jackson, RN, BSN, CRCR, Clinical Documentation Improvement  Options provided:  -- Acute on Chronic Respiratory Failure confirmed  -- Acute respiratory failure ruled out, chronic respiratory failure confirmed  -- Other - I will add my own diagnosis  -- Disagree - Not applicable / Not valid  -- Disagree - Clinically unable to determine / Unknown  -- Refer to Clinical  Documentation Reviewer    PROVIDER RESPONSE TEXT:    Acute on Chronic Respiratory Failure confirmed.    Query created by: Tory Jackson on 1/12/2024 11:56 AM      Electronically signed by:  Parish Dalton MD 1/12/2024 1:59 PM

## 2024-01-12 NOTE — PROGRESS NOTES
[facility]  Department of Internal Medicine  Division of Pulmonary, Critical Care and Sleep Medicine  Consult Note    Patient: Brenda Nunn  MRN: [id number]  : 1965    Encounter Time: 10:39 AM     Date of Admission: 1/3/2024  5:10 AM    Primary Care Physician: Chapin Siddiqui MD    Reason for Consultation: COPD      SUBJECTIVE:    Overall awaiting device    OBJECTIVE:     PHYSICAL EXAM:   VITALS:   Vitals:    24 2021 24 2154 24 0312 24 0745   BP: 104/62   135/79   Pulse: 79   86   Resp:    Temp: 97.3 °F (36.3 °C)   98.2 °F (36.8 °C)   TempSrc: Temporal   Oral   SpO2: 94% 93%  96%   Weight:       Height:            Intake/Output Summary (Last 24 hours) at 2024 1039  Last data filed at 2024 2125  Gross per 24 hour   Intake 100 ml   Output --   Net 100 ml          CONSTITUTIONAL:   A&O x 3, NAD  SKIN:     No skin discoloration  HEENT:     EOMI, MMM, No thrush  NECK:    No bruits, No JVP appreciated  CV:      Sinus,  No murmur, No rubs, No gallops  PULMONARY:   Couse BS,  No Wheezing, No Rales, No Rhonchi      No noted egophony  ABDOMEN:     Soft, non-tender. BS normal. No R/R/G  EXT:    No deformities .  No clubbing.       No lower extremity edema, No venous stasis  PULSE:   Appears equal and palpable.  PSYCHIATRIC:  Seems appropriate, No acute psychosis  MS:    No fractures, No gross weakness  NEUROLOGIC:   The clinical assessment is non-focal     DATA: IMAGING & TESTING:     LABORATORY TESTS:    CBC:   Lab Results   Component Value Date/Time    WBC 6.6 2024 04:47 AM    RBC 4.10 2024 04:47 AM    HGB 12.2 2024 04:47 AM    HCT 39.7 2024 04:47 AM    MCV 96.8 2024 04:47 AM    MCH 29.8 2024 04:47 AM    MCHC 30.7 2024 04:47 AM    RDW 12.6 2024 04:47 AM     2024 04:47 AM    MPV 10.4 2024 04:47 AM     CMP:    Lab Results   Component Value Date/Time     2024  04:34 AM    K 4.7 01/12/2024 04:34 AM    CL 98 01/12/2024 04:34 AM    CO2 38 01/12/2024 04:34 AM    BUN 22 01/12/2024 04:34 AM    CREATININE 0.7 01/12/2024 04:34 AM    LABGLOM >60 01/12/2024 04:34 AM    GLUCOSE 99 01/12/2024 04:34 AM    PROT 6.4 01/03/2024 05:15 AM    LABALBU 3.9 01/03/2024 05:15 AM    CALCIUM 9.6 01/12/2024 04:34 AM    BILITOT <0.2 01/03/2024 05:15 AM    ALKPHOS 69 01/03/2024 05:15 AM    AST 11 01/03/2024 05:15 AM    ALT 10 01/03/2024 05:15 AM     Ionized Calcium:  No results found for: \"IONCA\"  Magnesium:    Lab Results   Component Value Date/Time    MG 1.6 12/16/2023 05:53 AM     Phosphorus:    Lab Results   Component Value Date/Time    PHOS 3.9 12/19/2023 05:21 AM     PT/INR:    Lab Results   Component Value Date/Time    PROTIME 10.2 11/19/2023 04:28 PM    INR 0.9 11/19/2023 04:28 PM     ABG:    Lab Results   Component Value Date/Time    PH 7.403 01/03/2024 10:38 PM    PCO2 57.4 01/03/2024 10:38 PM    PO2 58.9 01/03/2024 10:38 PM    HCO3 35.0 01/03/2024 10:38 PM    BE 8.3 01/03/2024 10:38 PM    O2SAT 91.7 01/03/2024 10:38 PM     TSH:  No results found for: \"TSH\"     PRO-BNP:   Lab Results   Component Value Date    PROBNP <36 12/18/2023    PROBNP <36 12/15/2023      ABGs:   Lab Results   Component Value Date/Time    PH 7.403 01/03/2024 10:38 PM    PO2 58.9 01/03/2024 10:38 PM    PCO2 57.4 01/03/2024 10:38 PM     Hemoglobin A1C: No components found for: \"HGBA1C\"    IMAGING:  Imaging tests were completed and reviewed and discussed radiology and care team involved and reveals   XR CHEST (2 VW)  Result Date: 11/28/2023  FINDINGS: Cardiac silhouette is within normal limits. Pulmonary vasculature is within normal limits. The lungs are clear. No pneumothorax is identified. Bony structures are unremarkable. No acute cardiopulmonary process.     Assessment: Ms. Nunn is a 58-year-old active smoker with known chronic obstructive pulmonary disease who presents with hypoxic acute on chronic respiratory

## 2024-01-12 NOTE — PROGRESS NOTES
Progress  Note  Chief Complaint   Patient presents with    Shortness of Breath     SOB x 2 weeks. Patient states it has gotten increasingly worse over the past 24 hours. Patient also states she was to start using a CPAP and never got one.     Historical Issues:  Current Facility-Administered Medications   Medication Dose Route Frequency Provider Last Rate Last Admin    ALPRAZolam (XANAX) tablet 0.25 mg  0.25 mg Oral Nightly Reji Love MD   0.25 mg at 01/11/24 2122    glucose chewable tablet 16 g  4 tablet Oral PRN Tiara Alfonso MD        dextrose bolus 10% 125 mL  125 mL IntraVENous PRN Tiara Alfonso MD        Or    dextrose bolus 10% 250 mL  250 mL IntraVENous PRN Tiara Alfonso MD        dextrose 10 % infusion   IntraVENous Continuous PRN Tiara Alfonso MD        Vitamin D (CHOLECALCIFEROL) tablet 1,000 Units  1,000 Units Oral Daily Mariana Maldonado MD   1,000 Units at 01/12/24 0803    benzonatate (TESSALON) capsule 100 mg  100 mg Oral TID PRN Maia Espinal MD   100 mg at 01/05/24 1655    glucagon injection 1 mg  1 mg SubCUTAneous PRN Mariana Maldonado MD        ipratropium 0.5 mg-albuterol 2.5 mg (DUONEB) nebulizer solution 1 Dose  1 Dose Inhalation Q4H WA RT Mariana Maldonado MD   1 Dose at 01/12/24 0951    budesonide (PULMICORT) nebulizer suspension 500 mcg  0.5 mg Nebulization BID RT Genesis Enriquez MD   500 mcg at 01/12/24 0951    arformoterol tartrate (BROVANA) nebulizer solution 15 mcg  15 mcg Nebulization BID RT Genesis Enriquez MD   15 mcg at 01/12/24 0951    guaiFENesin tablet 400 mg  400 mg Oral TID Genesis Enriquez MD   400 mg at 01/12/24 0803    pantoprazole (PROTONIX) tablet 40 mg  40 mg Oral QAM AC Genesis Enriquez MD   40 mg at 01/12/24 0546    sodium chloride flush 0.9 % injection 5-40 mL  5-40 mL IntraVENous 2 times per day Genesis Enriquez MD   10 mL at 01/12/24 0803

## 2024-01-16 ENCOUNTER — CLINICAL DOCUMENTATION (OUTPATIENT)
Dept: PULMONOLOGY | Age: 59
End: 2024-01-16

## 2024-01-16 NOTE — PROGRESS NOTES
Referral for outpatient pulmonary rehab noted in workque. Prior to calling to schedule, pt needs PFT's completed for insurance purposes. Pt will be contacted ASAP once completed. Thank you for the referral.

## 2024-01-17 LAB
ALBUMIN SERPL-MCNC: 3.8 G/DL (ref 3.5–5.2)
ALP SERPL-CCNC: 59 U/L (ref 35–104)
ALT SERPL-CCNC: 17 U/L (ref 0–32)
ANION GAP SERPL CALCULATED.3IONS-SCNC: 8 MMOL/L (ref 7–16)
AST SERPL-CCNC: 15 U/L (ref 0–31)
BASOPHILS # BLD: 0.04 K/UL (ref 0–0.2)
BASOPHILS NFR BLD: 1 % (ref 0–2)
BILIRUB SERPL-MCNC: <0.2 MG/DL (ref 0–1.2)
BUN SERPL-MCNC: 14 MG/DL (ref 6–20)
CALCIUM SERPL-MCNC: 9.3 MG/DL (ref 8.6–10.2)
CHLORIDE SERPL-SCNC: 98 MMOL/L (ref 98–107)
CO2 SERPL-SCNC: 32 MMOL/L (ref 22–29)
CREAT SERPL-MCNC: 0.7 MG/DL (ref 0.5–1)
EOSINOPHIL # BLD: 0.23 K/UL (ref 0.05–0.5)
EOSINOPHILS RELATIVE PERCENT: 4 % (ref 0–6)
ERYTHROCYTE [DISTWIDTH] IN BLOOD BY AUTOMATED COUNT: 12.5 % (ref 11.5–15)
GFR SERPL CREATININE-BSD FRML MDRD: >60 ML/MIN/1.73M2
GLUCOSE P FAST SERPL-MCNC: 84 MG/DL (ref 74–99)
HCT VFR BLD AUTO: 38.6 % (ref 34–48)
HGB BLD-MCNC: 11.6 G/DL (ref 11.5–15.5)
IMM GRANULOCYTES # BLD AUTO: <0.03 K/UL (ref 0–0.58)
IMM GRANULOCYTES NFR BLD: 0 % (ref 0–5)
LYMPHOCYTES NFR BLD: 2.04 K/UL (ref 1.5–4)
LYMPHOCYTES RELATIVE PERCENT: 31 % (ref 20–42)
MCH RBC QN AUTO: 28.9 PG (ref 26–35)
MCHC RBC AUTO-ENTMCNC: 30.1 G/DL (ref 32–34.5)
MCV RBC AUTO: 96.3 FL (ref 80–99.9)
MONOCYTES NFR BLD: 0.72 K/UL (ref 0.1–0.95)
MONOCYTES NFR BLD: 11 % (ref 2–12)
NEUTROPHILS NFR BLD: 54 % (ref 43–80)
NEUTS SEG NFR BLD: 3.59 K/UL (ref 1.8–7.3)
PLATELET # BLD AUTO: 175 K/UL (ref 130–450)
PMV BLD AUTO: 10.9 FL (ref 7–12)
POTASSIUM SERPL-SCNC: 4 MMOL/L (ref 3.5–5)
PROT SERPL-MCNC: 6.3 G/DL (ref 6.4–8.3)
RBC # BLD AUTO: 4.01 M/UL (ref 3.5–5.5)
SODIUM SERPL-SCNC: 138 MMOL/L (ref 132–146)
WBC OTHER # BLD: 6.6 K/UL (ref 4.5–11.5)

## 2024-01-19 ENCOUNTER — TELEPHONE (OUTPATIENT)
Dept: INTERNAL MEDICINE | Age: 59
End: 2024-01-19

## 2024-01-22 ENCOUNTER — HOSPITAL ENCOUNTER (OUTPATIENT)
Dept: PULMONOLOGY | Age: 59
Discharge: HOME | End: 2024-01-22
Attending: INTERNAL MEDICINE
Payer: MEDICARE

## 2024-01-22 DIAGNOSIS — J44.9 CHRONIC OBSTRUCTIVE PULMONARY DISEASE, UNSPECIFIED COPD TYPE (HCC): ICD-10-CM

## 2024-01-22 LAB
AMMONIA PLAS-SCNC: 54 UMOL/L (ref 11–51)
ANION GAP SERPL CALCULATED.3IONS-SCNC: 10 MMOL/L (ref 7–16)
BUN SERPL-MCNC: 17 MG/DL (ref 6–20)
CALCIUM SERPL-MCNC: 9.1 MG/DL (ref 8.6–10.2)
CHLORIDE SERPL-SCNC: 103 MMOL/L (ref 98–107)
CO2 SERPL-SCNC: 31 MMOL/L (ref 22–29)
CREAT SERPL-MCNC: 0.8 MG/DL (ref 0.5–1)
DATE LAST DOSE: ABNORMAL
GFR SERPL CREATININE-BSD FRML MDRD: >60 ML/MIN/1.73M2
GLUCOSE SERPL-MCNC: 83 MG/DL (ref 74–99)
POTASSIUM SERPL-SCNC: 4 MMOL/L (ref 3.5–5)
SODIUM SERPL-SCNC: 144 MMOL/L (ref 132–146)
TME LAST DOSE: ABNORMAL H
VALPROATE SERPL-MCNC: 21 UG/ML (ref 50–100)
VANCOMYCIN DOSE: ABNORMAL MG

## 2024-01-22 PROCEDURE — 94726 PLETHYSMOGRAPHY LUNG VOLUMES: CPT

## 2024-01-22 PROCEDURE — 94729 DIFFUSING CAPACITY: CPT

## 2024-01-22 PROCEDURE — 94060 EVALUATION OF WHEEZING: CPT

## 2024-01-25 ENCOUNTER — TELEPHONE (OUTPATIENT)
Dept: PULMONOLOGY | Age: 59
End: 2024-01-25

## 2024-01-25 NOTE — TELEPHONE ENCOUNTER
Called pt. To schedule rehab.  She states that she is no able to attend for a couple weeks and will call us back to schedule

## 2024-02-07 ENCOUNTER — HOSPITAL ENCOUNTER (INPATIENT)
Age: 59
LOS: 1 days | Discharge: HOME | End: 2024-02-09
Attending: STUDENT IN AN ORGANIZED HEALTH CARE EDUCATION/TRAINING PROGRAM | Admitting: INTERNAL MEDICINE
Payer: MEDICARE

## 2024-02-07 ENCOUNTER — APPOINTMENT (OUTPATIENT)
Dept: GENERAL RADIOLOGY | Age: 59
End: 2024-02-07
Payer: MEDICARE

## 2024-02-07 DIAGNOSIS — K21.00 GASTROESOPHAGEAL REFLUX DISEASE WITH ESOPHAGITIS, UNSPECIFIED WHETHER HEMORRHAGE: ICD-10-CM

## 2024-02-07 DIAGNOSIS — K22.9 IRREGULAR Z LINE OF ESOPHAGUS: ICD-10-CM

## 2024-02-07 DIAGNOSIS — J96.21 ACUTE ON CHRONIC RESPIRATORY FAILURE WITH HYPOXIA (HCC): ICD-10-CM

## 2024-02-07 DIAGNOSIS — J44.1 COPD EXACERBATION (HCC): Primary | ICD-10-CM

## 2024-02-07 LAB
ALBUMIN SERPL-MCNC: 4 G/DL (ref 3.5–5.2)
ALP SERPL-CCNC: 71 U/L (ref 35–104)
ALT SERPL-CCNC: 14 U/L (ref 0–32)
ANION GAP SERPL CALCULATED.3IONS-SCNC: 8 MMOL/L (ref 7–16)
AST SERPL-CCNC: 29 U/L (ref 0–31)
B PARAP IS1001 DNA NPH QL NAA+NON-PROBE: NOT DETECTED
B PERT DNA SPEC QL NAA+PROBE: NOT DETECTED
BASOPHILS # BLD: 0.04 K/UL (ref 0–0.2)
BASOPHILS NFR BLD: 1 % (ref 0–2)
BILIRUB SERPL-MCNC: 0.3 MG/DL (ref 0–1.2)
BNP SERPL-MCNC: 51 PG/ML (ref 0–125)
BUN SERPL-MCNC: 10 MG/DL (ref 6–20)
C PNEUM DNA NPH QL NAA+NON-PROBE: NOT DETECTED
CALCIUM SERPL-MCNC: 9.1 MG/DL (ref 8.6–10.2)
CHLORIDE SERPL-SCNC: 102 MMOL/L (ref 98–107)
CO2 SERPL-SCNC: 32 MMOL/L (ref 22–29)
CREAT SERPL-MCNC: 0.7 MG/DL (ref 0.5–1)
EOSINOPHIL # BLD: 0.18 K/UL (ref 0.05–0.5)
EOSINOPHILS RELATIVE PERCENT: 3 % (ref 0–6)
ERYTHROCYTE [DISTWIDTH] IN BLOOD BY AUTOMATED COUNT: 12.1 % (ref 11.5–15)
FLUAV RNA NPH QL NAA+NON-PROBE: NOT DETECTED
FLUBV RNA NPH QL NAA+NON-PROBE: NOT DETECTED
GFR SERPL CREATININE-BSD FRML MDRD: >60 ML/MIN/1.73M2
GLUCOSE SERPL-MCNC: 100 MG/DL (ref 74–99)
HADV DNA NPH QL NAA+NON-PROBE: NOT DETECTED
HCOV 229E RNA NPH QL NAA+NON-PROBE: NOT DETECTED
HCOV HKU1 RNA NPH QL NAA+NON-PROBE: NOT DETECTED
HCOV NL63 RNA NPH QL NAA+NON-PROBE: NOT DETECTED
HCOV OC43 RNA NPH QL NAA+NON-PROBE: NOT DETECTED
HCT VFR BLD AUTO: 41.3 % (ref 34–48)
HGB BLD-MCNC: 13.1 G/DL (ref 11.5–15.5)
HMPV RNA NPH QL NAA+NON-PROBE: NOT DETECTED
HPIV1 RNA NPH QL NAA+NON-PROBE: NOT DETECTED
HPIV2 RNA NPH QL NAA+NON-PROBE: NOT DETECTED
HPIV3 RNA NPH QL NAA+NON-PROBE: NOT DETECTED
HPIV4 RNA NPH QL NAA+NON-PROBE: NOT DETECTED
IMM GRANULOCYTES # BLD AUTO: <0.03 K/UL (ref 0–0.58)
IMM GRANULOCYTES NFR BLD: 0 % (ref 0–5)
LYMPHOCYTES NFR BLD: 1.76 K/UL (ref 1.5–4)
LYMPHOCYTES RELATIVE PERCENT: 29 % (ref 20–42)
M PNEUMO DNA NPH QL NAA+NON-PROBE: NOT DETECTED
MAGNESIUM SERPL-MCNC: 1.6 MG/DL (ref 1.6–2.6)
MCH RBC QN AUTO: 29.6 PG (ref 26–35)
MCHC RBC AUTO-ENTMCNC: 31.7 G/DL (ref 32–34.5)
MCV RBC AUTO: 93.4 FL (ref 80–99.9)
MONOCYTES NFR BLD: 0.44 K/UL (ref 0.1–0.95)
MONOCYTES NFR BLD: 7 % (ref 2–12)
NEUTROPHILS NFR BLD: 60 % (ref 43–80)
NEUTS SEG NFR BLD: 3.57 K/UL (ref 1.8–7.3)
PLATELET # BLD AUTO: 237 K/UL (ref 130–450)
PMV BLD AUTO: 10.4 FL (ref 7–12)
POTASSIUM SERPL-SCNC: 4.9 MMOL/L (ref 3.5–5)
PROT SERPL-MCNC: 7 G/DL (ref 6.4–8.3)
RBC # BLD AUTO: 4.42 M/UL (ref 3.5–5.5)
RSV RNA NPH QL NAA+NON-PROBE: NOT DETECTED
RV+EV RNA NPH QL NAA+NON-PROBE: NOT DETECTED
SARS-COV-2 RNA NPH QL NAA+NON-PROBE: NOT DETECTED
SODIUM SERPL-SCNC: 142 MMOL/L (ref 132–146)
SPECIMEN DESCRIPTION: NORMAL
TROPONIN I SERPL HS-MCNC: 8 NG/L (ref 0–9)
TROPONIN I SERPL HS-MCNC: NORMAL NG/L (ref 0–14)
TROPONIN I SERPL HS-MCNC: NORMAL NG/L (ref 0–14)
TROPONIN INTERP: NORMAL
TROPONIN INTERP: NORMAL
TROPONIN T SERPL-MCNC: NORMAL NG/ML
TROPONIN T SERPL-MCNC: NORMAL NG/ML
WBC OTHER # BLD: 6 K/UL (ref 4.5–11.5)

## 2024-02-07 PROCEDURE — 6370000000 HC RX 637 (ALT 250 FOR IP): Performed by: STUDENT IN AN ORGANIZED HEALTH CARE EDUCATION/TRAINING PROGRAM

## 2024-02-07 PROCEDURE — 71046 X-RAY EXAM CHEST 2 VIEWS: CPT

## 2024-02-07 PROCEDURE — 2580000003 HC RX 258: Performed by: STUDENT IN AN ORGANIZED HEALTH CARE EDUCATION/TRAINING PROGRAM

## 2024-02-07 PROCEDURE — 84484 ASSAY OF TROPONIN QUANT: CPT

## 2024-02-07 PROCEDURE — 85025 COMPLETE CBC W/AUTO DIFF WBC: CPT

## 2024-02-07 PROCEDURE — 83735 ASSAY OF MAGNESIUM: CPT

## 2024-02-07 PROCEDURE — 83880 ASSAY OF NATRIURETIC PEPTIDE: CPT

## 2024-02-07 PROCEDURE — 6360000002 HC RX W HCPCS: Performed by: STUDENT IN AN ORGANIZED HEALTH CARE EDUCATION/TRAINING PROGRAM

## 2024-02-07 PROCEDURE — 99285 EMERGENCY DEPT VISIT HI MDM: CPT

## 2024-02-07 PROCEDURE — 80053 COMPREHEN METABOLIC PANEL: CPT

## 2024-02-07 PROCEDURE — 93005 ELECTROCARDIOGRAM TRACING: CPT | Performed by: STUDENT IN AN ORGANIZED HEALTH CARE EDUCATION/TRAINING PROGRAM

## 2024-02-07 PROCEDURE — 94640 AIRWAY INHALATION TREATMENT: CPT

## 2024-02-07 PROCEDURE — 96374 THER/PROPH/DIAG INJ IV PUSH: CPT

## 2024-02-07 PROCEDURE — 0202U NFCT DS 22 TRGT SARS-COV-2: CPT

## 2024-02-07 RX ORDER — IPRATROPIUM BROMIDE AND ALBUTEROL SULFATE 2.5; .5 MG/3ML; MG/3ML
2 SOLUTION RESPIRATORY (INHALATION) ONCE
Status: COMPLETED | OUTPATIENT
Start: 2024-02-07 | End: 2024-02-07

## 2024-02-07 RX ORDER — IPRATROPIUM BROMIDE AND ALBUTEROL SULFATE 2.5; .5 MG/3ML; MG/3ML
3 SOLUTION RESPIRATORY (INHALATION) ONCE
Status: COMPLETED | OUTPATIENT
Start: 2024-02-07 | End: 2024-02-07

## 2024-02-07 RX ORDER — BENZONATATE 100 MG/1
100 CAPSULE ORAL ONCE
Status: COMPLETED | OUTPATIENT
Start: 2024-02-07 | End: 2024-02-07

## 2024-02-07 RX ADMIN — IPRATROPIUM BROMIDE AND ALBUTEROL SULFATE 3 DOSE: 2.5; .5 SOLUTION RESPIRATORY (INHALATION) at 16:36

## 2024-02-07 RX ADMIN — IPRATROPIUM BROMIDE AND ALBUTEROL SULFATE 2 DOSE: .5; 3 SOLUTION RESPIRATORY (INHALATION) at 22:06

## 2024-02-07 RX ADMIN — BENZONATATE 100 MG: 100 CAPSULE ORAL at 18:58

## 2024-02-07 RX ADMIN — METHYLPREDNISOLONE SODIUM SUCCINATE 125 MG: 125 INJECTION, POWDER, LYOPHILIZED, FOR SOLUTION INTRAMUSCULAR; INTRAVENOUS at 18:58

## 2024-02-07 ASSESSMENT — PAIN - FUNCTIONAL ASSESSMENT: PAIN_FUNCTIONAL_ASSESSMENT: NONE - DENIES PAIN

## 2024-02-07 NOTE — ED PROVIDER NOTES
[facility] EMERGENCY DEPARTMENT  EMERGENCY DEPARTMENT ENCOUNTER        Pt Name: Brenda Nunn  MRN: [id number]  Birthdate 1965  Date of evaluation: 2/7/2024  Provider: Enid Coleman DO  PCP: Chapin Siddiqui MD  Note Started: 2:31 PM EST 2/7/24    CHIEF COMPLAINT       Chief Complaint   Patient presents with    Shortness of Breath     Patient c/o SOB x 3 days , hx CHF , COPD       HISTORY OF PRESENT ILLNESS: 1 or more Elements     Limitations to history : None    Brenda Nunn is a 58 y.o. female with PMHx of COPD, HTN, who presents for evaluation of shortness of breath.  Patient states her symptoms have been worsening for the past 3 to 4 days.  She was actually admitted to the hospital last month for COPD exacerbation, she was discharged to a nursing facility and underwent rehab but states that she was discharged home last week her symptoms have been worsening again.  She reports dry cough as well, fatigue, dyspnea on exertion.  She denies any chest pain or palpitations.  She does endorse daily tobacco use.  States there were some recent issues with her inhaler prescriptions and thinks one of them may have been discontinued in the process of her being admitted and discharged to the nursing facility and then back home, but it is difficult to follow this timeline and I am unsure exactly what inhalers the patient has been using at home.  She is on 3 L NC O2 at baseline.  She denies any lower extremity edema or tenderness.  No recent long travel, no history of DVT or PE.  She denies any known sick contacts.      Nursing Notes were all reviewed and agreed with or any disagreements were addressed in the HPI.      REVIEW OF EXTERNAL NOTE :       Reviewed discharge summary from internal medicine on 1/12/2024 after patient had been admitted for 9 days due to COPD exacerbation.    Chart Review/External Note Review    Last Echo reviewed by Me:  No results found for: \"LVEF\",  illiterate, history from another source, etc.) (If yes, which ones).  No       This patient's ED course included: a personal history and physicial examination, re-evaluation prior to disposition, multiple bedside re-evaluations, IV medications, cardiac monitoring, continuous pulse oximetry, and complex medical decision making and emergency management    This patient has remained hemodynamically stable during their ED course.    Counseling:   The emergency provider has spoken with the patient and discussed today’s results, in addition to providing specific details for the plan of care and counseling regarding the diagnosis and prognosis.  Questions are answered at this time and they are agreeable with the plan.    CONSULTS:   None       Please see ED course for any additional MDM documentation.       CRITICAL CARE TIME (.cct)     0 minutes            I am the Primary Clinician of Record.    FINAL IMPRESSION      1. COPD exacerbation (HCC)    2. Acute on chronic respiratory failure with hypoxia (HCC)          DISPOSITION/PLAN     DISPOSITION      Disposition: Admit to telemetry  Patient condition is stable      PATIENT REFERRED TO:  No follow-up provider specified.    DISCHARGE MEDICATIONS:  New Prescriptions    No medications on file       DISCONTINUED MEDICATIONS:  Discontinued Medications    No medications on file                   Enid Coleman D.O.     Emergency Medicine      2/7/2024 2:31 PM      NOTE: This report was transcribed using voice recognition software. Every effort was made to ensure accuracy; however, inadvertent computerized transcription errors may be present            Enid Coleman DO  02/09/24 0033

## 2024-02-08 LAB
ANION GAP SERPL CALCULATED.3IONS-SCNC: 10 MMOL/L (ref 7–16)
BASOPHILS # BLD: 0.02 K/UL (ref 0–0.2)
BASOPHILS NFR BLD: 0 % (ref 0–2)
BUN SERPL-MCNC: 11 MG/DL (ref 6–20)
CALCIUM SERPL-MCNC: 9.2 MG/DL (ref 8.6–10.2)
CHLORIDE SERPL-SCNC: 98 MMOL/L (ref 98–107)
CO2 SERPL-SCNC: 30 MMOL/L (ref 22–29)
CREAT SERPL-MCNC: 0.7 MG/DL (ref 0.5–1)
EKG ATRIAL RATE: 81 BPM
EKG P AXIS: 35 DEGREES
EKG P-R INTERVAL: 162 MS
EKG Q-T INTERVAL: 382 MS
EKG QRS DURATION: 84 MS
EKG QTC CALCULATION (BAZETT): 443 MS
EKG R AXIS: 38 DEGREES
EKG T AXIS: 66 DEGREES
EKG VENTRICULAR RATE: 81 BPM
EOSINOPHIL # BLD: 0 K/UL (ref 0.05–0.5)
EOSINOPHILS RELATIVE PERCENT: 0 % (ref 0–6)
ERYTHROCYTE [DISTWIDTH] IN BLOOD BY AUTOMATED COUNT: 11.8 % (ref 11.5–15)
GFR SERPL CREATININE-BSD FRML MDRD: >60 ML/MIN/1.73M2
GLUCOSE SERPL-MCNC: 146 MG/DL (ref 74–99)
HCT VFR BLD AUTO: 41.4 % (ref 34–48)
HGB BLD-MCNC: 13 G/DL (ref 11.5–15.5)
IMM GRANULOCYTES # BLD AUTO: <0.03 K/UL (ref 0–0.58)
IMM GRANULOCYTES NFR BLD: 0 % (ref 0–5)
LYMPHOCYTES NFR BLD: 0.67 K/UL (ref 1.5–4)
LYMPHOCYTES RELATIVE PERCENT: 14 % (ref 20–42)
MCH RBC QN AUTO: 29.7 PG (ref 26–35)
MCHC RBC AUTO-ENTMCNC: 31.4 G/DL (ref 32–34.5)
MCV RBC AUTO: 94.5 FL (ref 80–99.9)
MONOCYTES NFR BLD: 0.14 K/UL (ref 0.1–0.95)
MONOCYTES NFR BLD: 3 % (ref 2–12)
NEUTROPHILS NFR BLD: 82 % (ref 43–80)
NEUTS SEG NFR BLD: 3.91 K/UL (ref 1.8–7.3)
PLATELET # BLD AUTO: 248 K/UL (ref 130–450)
PMV BLD AUTO: 10 FL (ref 7–12)
POTASSIUM SERPL-SCNC: 4.6 MMOL/L (ref 3.5–5)
RBC # BLD AUTO: 4.38 M/UL (ref 3.5–5.5)
SODIUM SERPL-SCNC: 138 MMOL/L (ref 132–146)
WBC OTHER # BLD: 4.8 K/UL (ref 4.5–11.5)

## 2024-02-08 PROCEDURE — 2580000003 HC RX 258

## 2024-02-08 PROCEDURE — 80048 BASIC METABOLIC PNL TOTAL CA: CPT

## 2024-02-08 PROCEDURE — 6360000002 HC RX W HCPCS

## 2024-02-08 PROCEDURE — 6370000000 HC RX 637 (ALT 250 FOR IP)

## 2024-02-08 PROCEDURE — 94640 AIRWAY INHALATION TREATMENT: CPT

## 2024-02-08 PROCEDURE — 80307 DRUG TEST PRSMV CHEM ANLYZR: CPT

## 2024-02-08 PROCEDURE — 2700000000 HC OXYGEN THERAPY PER DAY

## 2024-02-08 PROCEDURE — 93010 ELECTROCARDIOGRAM REPORT: CPT | Performed by: INTERNAL MEDICINE

## 2024-02-08 PROCEDURE — 2140000000 HC CCU INTERMEDIATE R&B

## 2024-02-08 PROCEDURE — 36415 COLL VENOUS BLD VENIPUNCTURE: CPT

## 2024-02-08 PROCEDURE — 99223 1ST HOSP IP/OBS HIGH 75: CPT | Performed by: INTERNAL MEDICINE

## 2024-02-08 PROCEDURE — 85025 COMPLETE CBC W/AUTO DIFF WBC: CPT

## 2024-02-08 RX ORDER — PANTOPRAZOLE SODIUM 40 MG/1
40 TABLET, DELAYED RELEASE ORAL
Status: DISCONTINUED | OUTPATIENT
Start: 2024-02-08 | End: 2024-02-09 | Stop reason: HOSPADM

## 2024-02-08 RX ORDER — ACETAMINOPHEN 650 MG/1
650 SUPPOSITORY RECTAL EVERY 6 HOURS PRN
Status: DISCONTINUED | OUTPATIENT
Start: 2024-02-08 | End: 2024-02-09 | Stop reason: HOSPADM

## 2024-02-08 RX ORDER — LORAZEPAM 2 MG/ML
0.5 INJECTION INTRAMUSCULAR EVERY 6 HOURS PRN
Status: DISCONTINUED | OUTPATIENT
Start: 2024-02-08 | End: 2024-02-09 | Stop reason: HOSPADM

## 2024-02-08 RX ORDER — ARFORMOTEROL TARTRATE 15 UG/2ML
15 SOLUTION RESPIRATORY (INHALATION)
Status: DISCONTINUED | OUTPATIENT
Start: 2024-02-08 | End: 2024-02-09 | Stop reason: HOSPADM

## 2024-02-08 RX ORDER — LORAZEPAM 2 MG/ML
1 INJECTION INTRAMUSCULAR ONCE
Status: COMPLETED | OUTPATIENT
Start: 2024-02-08 | End: 2024-02-08

## 2024-02-08 RX ORDER — ARIPIPRAZOLE 5 MG/1
5 TABLET ORAL DAILY
Status: ON HOLD | COMMUNITY
End: 2024-02-09

## 2024-02-08 RX ORDER — IPRATROPIUM BROMIDE AND ALBUTEROL SULFATE 2.5; .5 MG/3ML; MG/3ML
1 SOLUTION RESPIRATORY (INHALATION)
Status: DISCONTINUED | OUTPATIENT
Start: 2024-02-08 | End: 2024-02-09 | Stop reason: HOSPADM

## 2024-02-08 RX ORDER — SODIUM CHLORIDE 0.9 % (FLUSH) 0.9 %
5-40 SYRINGE (ML) INJECTION EVERY 12 HOURS SCHEDULED
Status: DISCONTINUED | OUTPATIENT
Start: 2024-02-08 | End: 2024-02-09 | Stop reason: HOSPADM

## 2024-02-08 RX ORDER — PRAZOSIN HYDROCHLORIDE 1 MG/1
1 CAPSULE ORAL NIGHTLY
Status: ON HOLD | COMMUNITY
End: 2024-02-09

## 2024-02-08 RX ORDER — POLYETHYLENE GLYCOL 3350 17 G/17G
17 POWDER, FOR SOLUTION ORAL DAILY PRN
Status: DISCONTINUED | OUTPATIENT
Start: 2024-02-08 | End: 2024-02-09 | Stop reason: HOSPADM

## 2024-02-08 RX ORDER — SODIUM CHLORIDE 9 MG/ML
INJECTION, SOLUTION INTRAVENOUS PRN
Status: DISCONTINUED | OUTPATIENT
Start: 2024-02-08 | End: 2024-02-09 | Stop reason: HOSPADM

## 2024-02-08 RX ORDER — ROFLUMILAST 500 UG/1
250 TABLET ORAL DAILY
Status: DISCONTINUED | OUTPATIENT
Start: 2024-02-08 | End: 2024-02-09 | Stop reason: HOSPADM

## 2024-02-08 RX ORDER — HYDROXYZINE HYDROCHLORIDE 25 MG/1
25 TABLET, FILM COATED ORAL EVERY 6 HOURS PRN
Status: ON HOLD | COMMUNITY
End: 2024-02-09

## 2024-02-08 RX ORDER — BUDESONIDE 0.5 MG/2ML
0.5 INHALANT ORAL
Status: DISCONTINUED | OUTPATIENT
Start: 2024-02-08 | End: 2024-02-09 | Stop reason: HOSPADM

## 2024-02-08 RX ORDER — ACETAMINOPHEN 325 MG/1
650 TABLET ORAL EVERY 6 HOURS PRN
Status: DISCONTINUED | OUTPATIENT
Start: 2024-02-08 | End: 2024-02-09 | Stop reason: HOSPADM

## 2024-02-08 RX ORDER — MAGNESIUM SULFATE IN WATER 40 MG/ML
2000 INJECTION, SOLUTION INTRAVENOUS PRN
Status: DISCONTINUED | OUTPATIENT
Start: 2024-02-08 | End: 2024-02-09 | Stop reason: HOSPADM

## 2024-02-08 RX ORDER — RAMELTEON 8 MG/1
8 TABLET ORAL NIGHTLY
Status: ON HOLD | COMMUNITY
End: 2024-02-09

## 2024-02-08 RX ORDER — NICOTINE 21 MG/24HR
1 PATCH, TRANSDERMAL 24 HOURS TRANSDERMAL DAILY
Status: DISCONTINUED | OUTPATIENT
Start: 2024-02-08 | End: 2024-02-09 | Stop reason: HOSPADM

## 2024-02-08 RX ORDER — HYDROXYZINE PAMOATE 25 MG/1
25 CAPSULE ORAL 4 TIMES DAILY PRN
Status: DISCONTINUED | OUTPATIENT
Start: 2024-02-08 | End: 2024-02-09 | Stop reason: HOSPADM

## 2024-02-08 RX ORDER — POTASSIUM CHLORIDE 20 MEQ/1
40 TABLET, EXTENDED RELEASE ORAL PRN
Status: DISCONTINUED | OUTPATIENT
Start: 2024-02-08 | End: 2024-02-08

## 2024-02-08 RX ORDER — MIRTAZAPINE 15 MG/1
15 TABLET, FILM COATED ORAL NIGHTLY
Status: ON HOLD | COMMUNITY
End: 2024-02-09

## 2024-02-08 RX ORDER — POTASSIUM CHLORIDE 7.45 MG/ML
10 INJECTION INTRAVENOUS PRN
Status: DISCONTINUED | OUTPATIENT
Start: 2024-02-08 | End: 2024-02-08

## 2024-02-08 RX ORDER — ONDANSETRON 2 MG/ML
4 INJECTION INTRAMUSCULAR; INTRAVENOUS EVERY 6 HOURS PRN
Status: DISCONTINUED | OUTPATIENT
Start: 2024-02-08 | End: 2024-02-09 | Stop reason: HOSPADM

## 2024-02-08 RX ORDER — HYDROXYZINE PAMOATE 25 MG/1
25 CAPSULE ORAL 3 TIMES DAILY PRN
Status: DISCONTINUED | OUTPATIENT
Start: 2024-02-08 | End: 2024-02-08

## 2024-02-08 RX ORDER — DIVALPROEX SODIUM 250 MG/1
250 TABLET, DELAYED RELEASE ORAL 2 TIMES DAILY
Status: DISCONTINUED | OUTPATIENT
Start: 2024-02-08 | End: 2024-02-09 | Stop reason: HOSPADM

## 2024-02-08 RX ORDER — ENOXAPARIN SODIUM 100 MG/ML
40 INJECTION SUBCUTANEOUS DAILY
Status: DISCONTINUED | OUTPATIENT
Start: 2024-02-08 | End: 2024-02-08

## 2024-02-08 RX ORDER — SODIUM CHLORIDE 0.9 % (FLUSH) 0.9 %
5-40 SYRINGE (ML) INJECTION PRN
Status: DISCONTINUED | OUTPATIENT
Start: 2024-02-08 | End: 2024-02-09 | Stop reason: HOSPADM

## 2024-02-08 RX ORDER — ONDANSETRON 4 MG/1
4 TABLET, ORALLY DISINTEGRATING ORAL EVERY 8 HOURS PRN
Status: DISCONTINUED | OUTPATIENT
Start: 2024-02-08 | End: 2024-02-09 | Stop reason: HOSPADM

## 2024-02-08 RX ORDER — OLANZAPINE 5 MG/1
5 TABLET ORAL NIGHTLY
Status: DISCONTINUED | OUTPATIENT
Start: 2024-02-08 | End: 2024-02-09 | Stop reason: HOSPADM

## 2024-02-08 RX ORDER — ENOXAPARIN SODIUM 100 MG/ML
30 INJECTION SUBCUTANEOUS 2 TIMES DAILY
Status: DISCONTINUED | OUTPATIENT
Start: 2024-02-08 | End: 2024-02-09 | Stop reason: HOSPADM

## 2024-02-08 RX ORDER — BENZONATATE 100 MG/1
100 CAPSULE ORAL 3 TIMES DAILY PRN
Status: DISCONTINUED | OUTPATIENT
Start: 2024-02-08 | End: 2024-02-09 | Stop reason: HOSPADM

## 2024-02-08 RX ADMIN — HYDROXYZINE PAMOATE 25 MG: 25 CAPSULE ORAL at 16:15

## 2024-02-08 RX ADMIN — OLANZAPINE 5 MG: 5 TABLET, FILM COATED ORAL at 22:51

## 2024-02-08 RX ADMIN — BUDESONIDE 500 MCG: 0.5 SUSPENSION RESPIRATORY (INHALATION) at 09:09

## 2024-02-08 RX ADMIN — IPRATROPIUM BROMIDE AND ALBUTEROL SULFATE 1 DOSE: 2.5; .5 SOLUTION RESPIRATORY (INHALATION) at 12:04

## 2024-02-08 RX ADMIN — ROFLUMILAST 250 MCG: 500 TABLET ORAL at 08:32

## 2024-02-08 RX ADMIN — HYDROXYZINE PAMOATE 25 MG: 25 CAPSULE ORAL at 09:40

## 2024-02-08 RX ADMIN — IPRATROPIUM BROMIDE AND ALBUTEROL SULFATE 1 DOSE: 2.5; .5 SOLUTION RESPIRATORY (INHALATION) at 09:09

## 2024-02-08 RX ADMIN — ACETAMINOPHEN 650 MG: 325 TABLET ORAL at 16:15

## 2024-02-08 RX ADMIN — IPRATROPIUM BROMIDE AND ALBUTEROL SULFATE 1 DOSE: 2.5; .5 SOLUTION RESPIRATORY (INHALATION) at 19:38

## 2024-02-08 RX ADMIN — ARFORMOTEROL TARTRATE 15 MCG: 15 SOLUTION RESPIRATORY (INHALATION) at 19:38

## 2024-02-08 RX ADMIN — BUDESONIDE 500 MCG: 0.5 SUSPENSION RESPIRATORY (INHALATION) at 19:38

## 2024-02-08 RX ADMIN — ARFORMOTEROL TARTRATE 15 MCG: 15 SOLUTION RESPIRATORY (INHALATION) at 09:10

## 2024-02-08 RX ADMIN — IPRATROPIUM BROMIDE AND ALBUTEROL SULFATE 1 DOSE: 2.5; .5 SOLUTION RESPIRATORY (INHALATION) at 15:44

## 2024-02-08 RX ADMIN — PANTOPRAZOLE SODIUM 40 MG: 40 TABLET, DELAYED RELEASE ORAL at 08:32

## 2024-02-08 RX ADMIN — SODIUM CHLORIDE, PRESERVATIVE FREE 10 ML: 5 INJECTION INTRAVENOUS at 22:53

## 2024-02-08 RX ADMIN — BUDESONIDE 500 MCG: 0.5 SUSPENSION RESPIRATORY (INHALATION) at 03:32

## 2024-02-08 RX ADMIN — LORAZEPAM 1 MG: 2 INJECTION INTRAMUSCULAR; INTRAVENOUS at 15:19

## 2024-02-08 RX ADMIN — ARFORMOTEROL TARTRATE 15 MCG: 15 SOLUTION RESPIRATORY (INHALATION) at 03:31

## 2024-02-08 RX ADMIN — WATER 40 MG: 1 INJECTION INTRAMUSCULAR; INTRAVENOUS; SUBCUTANEOUS at 08:31

## 2024-02-08 ASSESSMENT — ENCOUNTER SYMPTOMS
BACK PAIN: 0
COUGH: 0
ABDOMINAL PAIN: 0
CONSTIPATION: 0
SHORTNESS OF BREATH: 1
DIARRHEA: 0
NAUSEA: 0
VOMITING: 0
BLOOD IN STOOL: 0

## 2024-02-08 ASSESSMENT — PAIN DESCRIPTION - LOCATION: LOCATION: HEAD

## 2024-02-08 ASSESSMENT — PAIN - FUNCTIONAL ASSESSMENT: PAIN_FUNCTIONAL_ASSESSMENT: ACTIVITIES ARE NOT PREVENTED

## 2024-02-08 ASSESSMENT — PAIN SCALES - GENERAL
PAINLEVEL_OUTOF10: 0
PAINLEVEL_OUTOF10: 3
PAINLEVEL_OUTOF10: 0

## 2024-02-08 ASSESSMENT — PAIN DESCRIPTION - DESCRIPTORS: DESCRIPTORS: ACHING;DISCOMFORT

## 2024-02-08 NOTE — CARE COORDINATION
Social Work /Transition of Care:    Pt presents to the ED secondary to shortness of breath from home.    Pt is admitted inpatient with COPD exacerbation.    SW met with pt who was alert and oriented x4, sitting up in bed, on 4L oxygen.  Pt was anxious and stated she was waiting for the nurse to get her medication.  Pt reports recently she was at [facility] for rehab and was sent to [place] [facility] unit for a medication adjustment.  Pt reports discharging on 2/1 but did not get her discharge medication until yesterday.  Pt reports she and her daughter live in a 2 story home but she stays on the main floor.  Pt reports there is not a bathroom on the main floor and she normally uses a bsc.  Pt has a wheel chair, bsc, and home oxygen (2-3L/[employer]).  Pt reports an NIV was ordered for her and she is waiting for it to come in but she is not sure which physician ordered it for her.  Pt reports she has been completing most ADLs at home but has been slow.  Pt's daughter assists at times.  Pt has no recent falls.  Pt has a 's license but both vehicles at the home are currently broke down.  Pt's PCP is at the Internal Medicine clinic and she uses [facility] on [address] to fill her prescriptions.  Pt plan is to return home.  Pt may have to use her insurance for transportation.  EVY/MAMADOU to follow up with pt for further discharge needs.

## 2024-02-08 NOTE — ED NOTES
Internal residents notified that patient requesting   
O2 tank replaced at this time.  
Report given to LANDON Fry.   
normal (ped)...

## 2024-02-08 NOTE — PLAN OF CARE
Problem: Discharge Planning  Goal: Discharge to home or other facility with appropriate resources  Outcome: Progressing  Flowsheets (Taken 2/8/2024 1230)  Discharge to home or other facility with appropriate resources:   Identify barriers to discharge with patient and caregiver   Arrange for needed discharge resources and transportation as appropriate   Identify discharge learning needs (meds, wound care, etc)     Problem: Safety - Adult  Goal: Free from fall injury  Outcome: Progressing     Problem: Chronic Conditions and Co-morbidities  Goal: Patient's chronic conditions and co-morbidity symptoms are monitored and maintained or improved  Outcome: Progressing  Flowsheets (Taken 2/8/2024 1230)  Care Plan - Patient's Chronic Conditions and Co-Morbidity Symptoms are Monitored and Maintained or Improved:   Monitor and assess patient's chronic conditions and comorbid symptoms for stability, deterioration, or improvement   Collaborate with multidisciplinary team to address chronic and comorbid conditions and prevent exacerbation or deterioration

## 2024-02-08 NOTE — H&P
[facility]  Internal Medicine Residency Program  History and Physical    Patient:  Brenda Nunn 58 y.o. female MRN: [id number]     Date of Service: 2024    Hospital Day: 2      Chief complaint: had concerns including Shortness of Breath (Patient c/o SOB x 3 days , hx CHF , COPD).    History Obtained From:  patient    Date of Admission:   History of Present Illness   Brenda Nunn is a 58 y.o. female  has a past medical history of  COPD, Depression, and Hypertension presented with CC of COPD exacerbation since last two days.    Patient states that since her discharge from the hospital last month she was feeling okay. She was discharged to the nursing facility, but the facility didn't send back her inhalers. She has not been taking her inhalers since her discharge from the facility. Patient was supposed to schedule with OP pulmonary rehab, which she didn't after her PFT confirms COPD. She is having SOB since Monday. Her baseline O2 requirement at home is 3 L. She denies any cough and cold, no fever, chills, chest pain. Not bringing up any phlegm. No bowel and bladder concern. No recent sick contact and travelling. She smokes 10 cigarettes per day, and methamphetamine remission.     In the ED, vitals are stable. Labs are not significant. 125 mg of solumedrol is given. After receiving one dose of duoneb patient's breathing didn't improve, saturation drops to 90s with 3L, requiring 4L of O2. Decision has been made to admit the patient for further management.       Past Medical History:       Diagnosis Date    CHF (congestive heart failure) (HCC)     COPD (chronic obstructive pulmonary disease) (HCC)     Depression     Hypertension        Past Surgical History:        Procedure Laterality Date     SECTION      HYSTERECTOMY (CERVIX STATUS UNKNOWN)         Medications Prior to Admission:    Prior to Admission medications    Medication Sig Start Date End Date Taking? Authorizing Provider  Negative for cough.    Cardiovascular:  Negative for chest pain, palpitations and leg swelling.   Gastrointestinal:  Negative for abdominal pain, blood in stool, constipation, diarrhea, nausea and vomiting.   Musculoskeletal:  Negative for back pain and myalgias.   Skin:  Negative for rash.   Neurological:  Negative for dizziness and headaches.   Psychiatric/Behavioral:  The patient is not nervous/anxious.         Physical Exam   Vitals: /66   Pulse 76   Temp 98.6 °F (37 °C)   Resp 24   Wt 113.4 kg (250 lb)   SpO2 94%   BMI 39.16 kg/m²     Physical Exam  Constitutional:       General: She is not in acute distress.     Appearance: She is well-developed.   HENT:      Head: Normocephalic and atraumatic.   Eyes:      General: No scleral icterus.     Conjunctiva/sclera: Conjunctivae normal.   Neck:      Trachea: No tracheal deviation.   Cardiovascular:      Rate and Rhythm: Normal rate.      Heart sounds: No murmur heard.  Pulmonary:      Effort: Pulmonary effort is normal. No respiratory distress.      Breath sounds: Wheezing present.   Abdominal:      General: Bowel sounds are normal. There is no distension.      Palpations: Abdomen is soft.      Tenderness: There is no abdominal tenderness.   Musculoskeletal:         General: Normal range of motion.      Cervical back: Normal range of motion.   Skin:     General: Skin is warm and dry.   Neurological:      Mental Status: She is alert and oriented to person, place, and time.   Psychiatric:         Behavior: Behavior normal.         Thought Content: Thought content normal.         Judgment: Judgment normal.        Labs and Imaging Studies   Basic Labs  Recent Labs     02/07/24  1605      K 4.9      CO2 32*   BUN 10   CREATININE 0.7   GLUCOSE 100*   CALCIUM 9.1       Recent Labs     02/07/24  1605   WBC 6.0   RBC 4.42   HGB 13.1   HCT 41.3   MCV 93.4   MCH 29.6   MCHC 31.7*   RDW 12.1      MPV 10.4       CBC:   Lab Results   Component Value

## 2024-02-09 VITALS
HEIGHT: 67 IN | DIASTOLIC BLOOD PRESSURE: 86 MMHG | RESPIRATION RATE: 22 BRPM | HEART RATE: 89 BPM | WEIGHT: 253.2 LBS | BODY MASS INDEX: 39.74 KG/M2 | SYSTOLIC BLOOD PRESSURE: 120 MMHG | OXYGEN SATURATION: 94 % | TEMPERATURE: 98.2 F

## 2024-02-09 PROBLEM — J44.1 COPD EXACERBATION (HCC): Status: RESOLVED | Noted: 2023-11-06 | Resolved: 2024-02-09

## 2024-02-09 LAB
AMPHET UR QL SCN: POSITIVE
BARBITURATES UR QL SCN: NEGATIVE
BENZODIAZ UR QL: NEGATIVE
BUPRENORPHINE UR QL: NEGATIVE
CANNABINOIDS UR QL SCN: POSITIVE
COCAINE UR QL SCN: NEGATIVE
FENTANYL UR QL: NEGATIVE
METHADONE UR QL: NEGATIVE
OPIATES UR QL SCN: NEGATIVE
OXYCODONE UR QL SCN: NEGATIVE
PCP UR QL SCN: NEGATIVE
TEST INFORMATION: ABNORMAL

## 2024-02-09 PROCEDURE — 2700000000 HC OXYGEN THERAPY PER DAY

## 2024-02-09 PROCEDURE — 6360000002 HC RX W HCPCS

## 2024-02-09 PROCEDURE — 6370000000 HC RX 637 (ALT 250 FOR IP)

## 2024-02-09 PROCEDURE — 2580000003 HC RX 258

## 2024-02-09 PROCEDURE — 94640 AIRWAY INHALATION TREATMENT: CPT

## 2024-02-09 PROCEDURE — 99238 HOSP IP/OBS DSCHRG MGMT 30/<: CPT | Performed by: INTERNAL MEDICINE

## 2024-02-09 RX ORDER — HYDROXYZINE HYDROCHLORIDE 25 MG/1
25 TABLET, FILM COATED ORAL EVERY 6 HOURS PRN
Qty: 120 TABLET | Refills: 0 | Status: SHIPPED | OUTPATIENT
Start: 2024-02-09 | End: 2024-03-10

## 2024-02-09 RX ORDER — IPRATROPIUM BROMIDE AND ALBUTEROL SULFATE 2.5; .5 MG/3ML; MG/3ML
1 SOLUTION RESPIRATORY (INHALATION) EVERY 4 HOURS
Qty: 540 ML | Refills: 0 | Status: SHIPPED | OUTPATIENT
Start: 2024-02-09 | End: 2024-03-10

## 2024-02-09 RX ORDER — ALBUTEROL SULFATE 90 UG/1
2 AEROSOL, METERED RESPIRATORY (INHALATION) EVERY 6 HOURS PRN
Qty: 18 G | Refills: 0 | Status: SHIPPED | OUTPATIENT
Start: 2024-02-09

## 2024-02-09 RX ORDER — ARIPIPRAZOLE 5 MG/1
5 TABLET ORAL DAILY
Status: CANCELLED | OUTPATIENT
Start: 2024-02-09 | End: 2024-02-10

## 2024-02-09 RX ORDER — PANTOPRAZOLE SODIUM 40 MG/1
40 TABLET, DELAYED RELEASE ORAL
Qty: 30 TABLET | Refills: 0 | Status: SHIPPED | OUTPATIENT
Start: 2024-02-09 | End: 2024-03-10

## 2024-02-09 RX ORDER — DIVALPROEX SODIUM 250 MG/1
250 TABLET, DELAYED RELEASE ORAL 2 TIMES DAILY
Qty: 60 TABLET | Refills: 0 | Status: ON HOLD | OUTPATIENT
Start: 2024-02-09 | End: 2024-02-20 | Stop reason: HOSPADM

## 2024-02-09 RX ORDER — ROFLUMILAST 250 UG/1
250 TABLET ORAL DAILY
Qty: 30 TABLET | Refills: 5 | Status: SHIPPED | OUTPATIENT
Start: 2024-02-09 | End: 2024-08-07

## 2024-02-09 RX ORDER — GUAIFENESIN 400 MG/1
400 TABLET ORAL 3 TIMES DAILY
Qty: 56 TABLET | Refills: 0 | Status: SHIPPED | OUTPATIENT
Start: 2024-02-09 | End: 2024-03-10

## 2024-02-09 RX ORDER — ERGOCALCIFEROL 1.25 MG/1
50000 CAPSULE ORAL WEEKLY
Qty: 12 CAPSULE | Refills: 1 | Status: ON HOLD | OUTPATIENT
Start: 2024-02-09 | End: 2024-02-20 | Stop reason: HOSPADM

## 2024-02-09 RX ORDER — ARIPIPRAZOLE 5 MG/1
5 TABLET ORAL DAILY
Status: DISCONTINUED | OUTPATIENT
Start: 2024-02-09 | End: 2024-02-09 | Stop reason: HOSPADM

## 2024-02-09 RX ORDER — RAMELTEON 8 MG/1
8 TABLET ORAL NIGHTLY
Qty: 30 TABLET | Refills: 0 | Status: SHIPPED | OUTPATIENT
Start: 2024-02-09 | End: 2024-03-10

## 2024-02-09 RX ORDER — PREDNISONE 20 MG/1
40 TABLET ORAL DAILY
Qty: 6 TABLET | Refills: 0 | Status: SHIPPED | OUTPATIENT
Start: 2024-02-09 | End: 2024-02-12

## 2024-02-09 RX ORDER — MIRTAZAPINE 15 MG/1
15 TABLET, FILM COATED ORAL NIGHTLY
Qty: 30 TABLET | Refills: 0 | Status: SHIPPED | OUTPATIENT
Start: 2024-02-09

## 2024-02-09 RX ORDER — ARIPIPRAZOLE 5 MG/1
5 TABLET ORAL DAILY
Qty: 30 TABLET | Refills: 0 | Status: SHIPPED | OUTPATIENT
Start: 2024-02-09

## 2024-02-09 RX ORDER — BUDESONIDE AND FORMOTEROL FUMARATE DIHYDRATE 160; 4.5 UG/1; UG/1
2 AEROSOL RESPIRATORY (INHALATION) 2 TIMES DAILY
Qty: 3 EACH | Refills: 0 | Status: SHIPPED | OUTPATIENT
Start: 2024-02-09 | End: 2024-03-25

## 2024-02-09 RX ORDER — ARIPIPRAZOLE 5 MG/1
5 TABLET ORAL DAILY
Status: COMPLETED | OUTPATIENT
Start: 2024-02-09 | End: 2024-02-09

## 2024-02-09 RX ADMIN — PANTOPRAZOLE SODIUM 40 MG: 40 TABLET, DELAYED RELEASE ORAL at 06:44

## 2024-02-09 RX ADMIN — LORAZEPAM 0.5 MG: 2 INJECTION INTRAMUSCULAR; INTRAVENOUS at 00:42

## 2024-02-09 RX ADMIN — HYDROXYZINE PAMOATE 25 MG: 25 CAPSULE ORAL at 14:53

## 2024-02-09 RX ADMIN — ARFORMOTEROL TARTRATE 15 MCG: 15 SOLUTION RESPIRATORY (INHALATION) at 07:20

## 2024-02-09 RX ADMIN — WATER 40 MG: 1 INJECTION INTRAMUSCULAR; INTRAVENOUS; SUBCUTANEOUS at 08:47

## 2024-02-09 RX ADMIN — ROFLUMILAST 250 MCG: 500 TABLET ORAL at 08:46

## 2024-02-09 RX ADMIN — SODIUM CHLORIDE, PRESERVATIVE FREE 10 ML: 5 INJECTION INTRAVENOUS at 08:48

## 2024-02-09 RX ADMIN — IPRATROPIUM BROMIDE AND ALBUTEROL SULFATE 1 DOSE: 2.5; .5 SOLUTION RESPIRATORY (INHALATION) at 11:17

## 2024-02-09 RX ADMIN — BUDESONIDE 500 MCG: 0.5 SUSPENSION RESPIRATORY (INHALATION) at 07:20

## 2024-02-09 RX ADMIN — IPRATROPIUM BROMIDE AND ALBUTEROL SULFATE 1 DOSE: 2.5; .5 SOLUTION RESPIRATORY (INHALATION) at 07:20

## 2024-02-09 RX ADMIN — HYDROXYZINE PAMOATE 25 MG: 25 CAPSULE ORAL at 08:46

## 2024-02-09 RX ADMIN — ARIPIPRAZOLE 5 MG: 5 TABLET ORAL at 11:44

## 2024-02-09 ASSESSMENT — ENCOUNTER SYMPTOMS
NAUSEA: 0
CONSTIPATION: 0
SHORTNESS OF BREATH: 1
VOMITING: 0
ABDOMINAL PAIN: 0
DIARRHEA: 0
COUGH: 1

## 2024-02-09 NOTE — CARE COORDINATION
Social Work/ Case Management Transition of Care Planning (Jennifer Malcolm, [phone]):     Discharge order noted, SW spoke with Pt who stated her daughter would be her ride home, SW called Pt's daughter, Farhana [phone]. Farhana stated they would be here to  the Pt around 3:00 and are bringing her portable O2 from home. RN notified and states he is checking on her Meds to beds needed for discharge.   Jennifer Malcolm, VIOLETTE  2/9/2024

## 2024-02-09 NOTE — ACP (ADVANCE CARE PLANNING)
Advance Care Planning   The patient has the following advanced directives on file:  Advance Directives       Power of  Living Will ACP-Advance Directive ACP-Power of     Not on File Not on File Not on File Not on File            The patient has appointed the following active healthcare agents:    Primary Decision Maker: BRAIN SCHREIBER - Child - [phone]    The Patient has the following current code status:    Code Status: Full Code      Jennifer Malcolm, VIOLETTE  2/9/2024

## 2024-02-09 NOTE — DISCHARGE SUMMARY
[facility]  Discharge Summary    PCP: Chapin Siddiqui MD    Admit Date:2/7/2024  Discharge Date: 2/9/2024    Chief Complaint   Patient presents with    Shortness of Breath     Patient c/o SOB x 3 days , hx CHF , COPD         Admission Diagnosis:   Acute on chronic hypoxic respiratory failure due to COPD exacerbation  COPD, 3 L O2 at home  Tobacco abuse disorder, smokes 10 cigarettes per day  Depression and anxiety  Hx of methamphetamine abuse  Hx of stress urinary incontinence   Hx of GERD  Vitamin D deficiency    Discharge Diagnosis:  GOLD Stage IV COPD, 3L O2 at home   Methamphetamine withdrawal  Tobacco abuse disorder, smokes 10 cigarettes per day  Depression and anxiety  Hx of methamphetamine abuse  Hx of stress urinary incontinence   Hx of GERD  Vitamin D deficiency    Hospital Course:    57 y/o male w/ Pmhx COPD, Depression, HTN, presented to ED 2/8 for SOB since last 2 days. She has hx of multiple admissions for COPD exacerbation. She was last admitted here last month for the same issue, and was discharged to a nursing facility. When she was discharged from the facility, however, her inhalers were not given to her. She was lost to follow up with OP pulmonary rehab, which she didn't after her PFT confirmed COPD. Her baseline O2 requirement is 3 L.    On ED arrival, she was hemodynamically stable and labs were insignificant. Solumedrol 125 mg given. After receiving 1 dose of Duoneb, pt's O2 saturation dropped to 90's with 3L, and was required to be on 4L. She was admitted for treatment of her GOLD stage IV COPD due to not being on her inhalers.   For GOLD Stage IV COPD , she was not in exacerbation at this time. We continued breathing treatment, steroid taper, home meds. We monitored her respiratory status. We advise an outpatient polysomnography.    For methamphetamine withdrawal, pt used methamphetamine 4 days ago intranasally. UDS confirmed positive  mg-albuterol 2.5 mg (DUONEB) 0.5-2.5 (3) MG/3ML SOLN nebulizer solution Inhale 3 mLs into the lungs every 4 hours  Qty: 360 mL, Refills: 1      umeclidinium bromide (INCRUSE ELLIPTA) 62.5 MCG/ACT inhaler Inhale 1 puff into the lungs daily  Qty: 7 each, Refills: 2      guaiFENesin 400 MG tablet Take 1 tablet by mouth in the morning, at noon, and at bedtime  Qty: 56 tablet, Refills: 0      pantoprazole (PROTONIX) 40 MG tablet Take 1 tablet by mouth every morning (before breakfast)  Qty: 90 tablet, Refills: 1    Associated Diagnoses: Irregular Z line of esophagus; Gastroesophageal reflux disease with esophagitis, unspecified whether hemorrhage      albuterol sulfate HFA (VENTOLIN HFA) 108 (90 Base) MCG/ACT inhaler Inhale 2 puffs into the lungs every 6 hours as needed for Wheezing or Shortness of Breath  Qty: 18 g, Refills: 1           STOP taking these medications       Roflumilast (DALIRESP) 250 MCG tablet Comments:   Reason for Stopping:         vitamin D (ERGOCALCIFEROL) 1.25 MG (86333 UT) CAPS capsule Comments:   Reason for Stopping:         divalproex (DEPAKOTE) 250 MG DR tablet Comments:   Reason for Stopping:         OLANZapine (ZYPREXA) 5 MG tablet Comments:   Reason for Stopping:         hydrOXYzine pamoate (VISTARIL) 25 MG capsule Comments:   Reason for Stopping:               Activity: activity as tolerated  Diet: regular diet    Follow-up appointments:   Internal Medicine clinic on 2/14 at 2:00pm.     Patient Instructions:   [facility] Internal Medicine Resident Service    Activity as tolerated  Diet: common adult  Be compliant with your medications and take them as prescribed.    Special Instructions:     Hospital Follow up: [facility] with House Team   Call to confirm appointment Tel: [phone] ([facility] Internal Medicine Clinic)     Other Follow-Ups:    Future Appointments   Date Time Provider Department Center   2/14/2024  2:00 PM Raisa Breen MD [facility]

## 2024-02-09 NOTE — DISCHARGE INSTRUCTIONS
[facility] Internal Medicine Resident Service    Activity as tolerated  Diet: common adult  Be compliant with your medications and take them as prescribed.    Special Instructions:     Hospital Follow up: [facility] with House Team   Call to confirm appointment Tel: [phone] ([facility])     Other Follow-Ups:    Future Appointments   Date Time Provider Department Center   2/14/2024  2:00 PM Raisa Breen MD [facility] IM [facility]   2/27/2024  2:00 PM Paulie Chavez MD [place] SLEEP [facility]   3/13/2024  9:00 AM [facility]       Other than any new prescriptions given to you today, the list of home going meds on this After Visit Summary are based on information provided to us from you. This information, including the list, dose, and frequency of medications is only as accurate as the information you provided. If you have any questions or concerns about your home medications, please contact your Primary Care Physician for further clarification.      Christophe Amaya, DO PGY-1  2/9/2024  1:21 PM

## 2024-02-09 NOTE — PLAN OF CARE
Problem: Chronic Conditions and Co-morbidities  Goal: Patient's chronic conditions and co-morbidity symptoms are monitored and maintained or improved  2/9/2024 0947 by Andriy Armenta, RN  Outcome: Progressing  Flowsheets (Taken 2/9/2024 0740)  Care Plan - Patient's Chronic Conditions and Co-Morbidity Symptoms are Monitored and Maintained or Improved:   Monitor and assess patient's chronic conditions and comorbid symptoms for stability, deterioration, or improvement   Collaborate with multidisciplinary team to address chronic and comorbid conditions and prevent exacerbation or deterioration  2/9/2024 0226 by Alysha Arzola RN  Outcome: Progressing     Problem: Discharge Planning  Goal: Discharge to home or other facility with appropriate resources  2/9/2024 0947 by Andriy Armenta RN  Outcome: Progressing  Flowsheets (Taken 2/9/2024 0740)  Discharge to home or other facility with appropriate resources:   Identify barriers to discharge with patient and caregiver   Arrange for needed discharge resources and transportation as appropriate   Identify discharge learning needs (meds, wound care, etc)  2/9/2024 0226 by Alysha Arzola, RN  Outcome: Progressing     Problem: Safety - Adult  Goal: Free from fall injury  2/9/2024 0947 by Andriy Armenta, RN  Outcome: Progressing  Flowsheets (Taken 2/9/2024 0947)  Free From Fall Injury:   Instruct family/caregiver on patient safety   Based on caregiver fall risk screen, instruct family/caregiver to ask for assistance with transferring infant if caregiver noted to have fall risk factors  2/9/2024 0226 by Alysha Arzola, RN  Outcome: Progressing

## 2024-02-09 NOTE — PLAN OF CARE
Problem: Chronic Conditions and Co-morbidities  Goal: Patient's chronic conditions and co-morbidity symptoms are monitored and maintained or improved  2/9/2024 0226 by Alysha Arzola RN  Outcome: Progressing     Problem: Discharge Planning  Goal: Discharge to home or other facility with appropriate resources  2/9/2024 0226 by Alysha Arzola RN  Outcome: Progressing     Problem: Safety - Adult  Goal: Free from fall injury  2/9/2024 0226 by Alysha Arzola RN  Outcome: Progressing

## 2024-02-10 ENCOUNTER — APPOINTMENT (OUTPATIENT)
Dept: GENERAL RADIOLOGY | Age: 59
End: 2024-02-10
Payer: MEDICARE

## 2024-02-10 ENCOUNTER — HOSPITAL ENCOUNTER (EMERGENCY)
Age: 59
Discharge: HOME | End: 2024-02-11
Attending: EMERGENCY MEDICINE
Payer: MEDICARE

## 2024-02-10 DIAGNOSIS — F15.10 METHAMPHETAMINE ABUSE (HCC): Primary | ICD-10-CM

## 2024-02-10 DIAGNOSIS — R06.02 CHRONIC SHORTNESS OF BREATH: ICD-10-CM

## 2024-02-10 LAB
ALBUMIN SERPL-MCNC: 4.1 G/DL (ref 3.5–5.2)
ALP SERPL-CCNC: 72 U/L (ref 35–104)
ALT SERPL-CCNC: 12 U/L (ref 0–32)
ANION GAP SERPL CALCULATED.3IONS-SCNC: 9 MMOL/L (ref 7–16)
APAP SERPL-MCNC: <5 UG/ML (ref 10–30)
AST SERPL-CCNC: 11 U/L (ref 0–31)
BASOPHILS # BLD: 0.14 K/UL (ref 0–0.2)
BASOPHILS NFR BLD: 2 % (ref 0–2)
BILIRUB SERPL-MCNC: 0.2 MG/DL (ref 0–1.2)
BNP SERPL-MCNC: 220 PG/ML (ref 0–125)
BUN SERPL-MCNC: 16 MG/DL (ref 6–20)
CALCIUM SERPL-MCNC: 9.4 MG/DL (ref 8.6–10.2)
CHLORIDE SERPL-SCNC: 100 MMOL/L (ref 98–107)
CO2 SERPL-SCNC: 30 MMOL/L (ref 22–29)
CREAT SERPL-MCNC: 0.8 MG/DL (ref 0.5–1)
EOSINOPHIL # BLD: 0 K/UL (ref 0.05–0.5)
EOSINOPHILS RELATIVE PERCENT: 0 % (ref 0–6)
ERYTHROCYTE [DISTWIDTH] IN BLOOD BY AUTOMATED COUNT: 11.9 % (ref 11.5–15)
ETHANOLAMINE SERPL-MCNC: <10 MG/DL
FLUAV RNA RESP QL NAA+PROBE: NOT DETECTED
FLUBV RNA RESP QL NAA+PROBE: NOT DETECTED
GFR SERPL CREATININE-BSD FRML MDRD: >60 ML/MIN/1.73M2
GLUCOSE SERPL-MCNC: 175 MG/DL (ref 74–99)
HCT VFR BLD AUTO: 43 % (ref 34–48)
HGB BLD-MCNC: 13.5 G/DL (ref 11.5–15.5)
LYMPHOCYTES NFR BLD: 0.34 K/UL (ref 1.5–4)
LYMPHOCYTES RELATIVE PERCENT: 4 % (ref 20–42)
MCH RBC QN AUTO: 29.2 PG (ref 26–35)
MCHC RBC AUTO-ENTMCNC: 31.4 G/DL (ref 32–34.5)
MCV RBC AUTO: 93.1 FL (ref 80–99.9)
MONOCYTES NFR BLD: 0.07 K/UL (ref 0.1–0.95)
MONOCYTES NFR BLD: 1 % (ref 2–12)
NEUTROPHILS NFR BLD: 93 % (ref 43–80)
NEUTS SEG NFR BLD: 7.35 K/UL (ref 1.8–7.3)
PLATELET # BLD AUTO: 252 K/UL (ref 130–450)
PMV BLD AUTO: 9.8 FL (ref 7–12)
POTASSIUM SERPL-SCNC: 4.4 MMOL/L (ref 3.5–5)
PROT SERPL-MCNC: 6.9 G/DL (ref 6.4–8.3)
RBC # BLD AUTO: 4.62 M/UL (ref 3.5–5.5)
RBC # BLD: NORMAL 10*6/UL
SALICYLATES SERPL-MCNC: <0.3 MG/DL (ref 0–30)
SARS-COV-2 RNA RESP QL NAA+PROBE: NOT DETECTED
SODIUM SERPL-SCNC: 139 MMOL/L (ref 132–146)
SOURCE: NORMAL
SPECIMEN DESCRIPTION: NORMAL
TOXIC TRICYCLIC SC,BLOOD: NEGATIVE
TROPONIN I SERPL HS-MCNC: 6 NG/L (ref 0–9)
WBC OTHER # BLD: 7.9 K/UL (ref 4.5–11.5)

## 2024-02-10 PROCEDURE — G0480 DRUG TEST DEF 1-7 CLASSES: HCPCS

## 2024-02-10 PROCEDURE — 84484 ASSAY OF TROPONIN QUANT: CPT

## 2024-02-10 PROCEDURE — 6370000000 HC RX 637 (ALT 250 FOR IP): Performed by: EMERGENCY MEDICINE

## 2024-02-10 PROCEDURE — 80143 DRUG ASSAY ACETAMINOPHEN: CPT

## 2024-02-10 PROCEDURE — 80179 DRUG ASSAY SALICYLATE: CPT

## 2024-02-10 PROCEDURE — 93005 ELECTROCARDIOGRAM TRACING: CPT | Performed by: EMERGENCY MEDICINE

## 2024-02-10 PROCEDURE — 80053 COMPREHEN METABOLIC PANEL: CPT

## 2024-02-10 PROCEDURE — 96374 THER/PROPH/DIAG INJ IV PUSH: CPT

## 2024-02-10 PROCEDURE — 87636 SARSCOV2 & INF A&B AMP PRB: CPT

## 2024-02-10 PROCEDURE — 6370000000 HC RX 637 (ALT 250 FOR IP): Performed by: STUDENT IN AN ORGANIZED HEALTH CARE EDUCATION/TRAINING PROGRAM

## 2024-02-10 PROCEDURE — 71045 X-RAY EXAM CHEST 1 VIEW: CPT

## 2024-02-10 PROCEDURE — 6360000002 HC RX W HCPCS: Performed by: EMERGENCY MEDICINE

## 2024-02-10 PROCEDURE — 85025 COMPLETE CBC W/AUTO DIFF WBC: CPT

## 2024-02-10 PROCEDURE — 99285 EMERGENCY DEPT VISIT HI MDM: CPT

## 2024-02-10 PROCEDURE — 83880 ASSAY OF NATRIURETIC PEPTIDE: CPT

## 2024-02-10 PROCEDURE — 80307 DRUG TEST PRSMV CHEM ANLYZR: CPT

## 2024-02-10 RX ORDER — LORAZEPAM 2 MG/ML
1 INJECTION INTRAMUSCULAR ONCE
Status: COMPLETED | OUTPATIENT
Start: 2024-02-10 | End: 2024-02-10

## 2024-02-10 RX ORDER — NICOTINE 21 MG/24HR
1 PATCH, TRANSDERMAL 24 HOURS TRANSDERMAL ONCE
Status: DISCONTINUED | OUTPATIENT
Start: 2024-02-10 | End: 2024-02-11 | Stop reason: HOSPADM

## 2024-02-10 RX ORDER — ONDANSETRON 4 MG/1
4 TABLET, ORALLY DISINTEGRATING ORAL ONCE
Status: COMPLETED | OUTPATIENT
Start: 2024-02-10 | End: 2024-02-10

## 2024-02-10 RX ADMIN — ONDANSETRON 4 MG: 4 TABLET, ORALLY DISINTEGRATING ORAL at 22:41

## 2024-02-10 RX ADMIN — LORAZEPAM 1 MG: 2 INJECTION INTRAMUSCULAR; INTRAVENOUS at 20:20

## 2024-02-11 VITALS
OXYGEN SATURATION: 97 % | TEMPERATURE: 98.5 F | RESPIRATION RATE: 25 BRPM | WEIGHT: 250 LBS | HEIGHT: 67 IN | HEART RATE: 75 BPM | DIASTOLIC BLOOD PRESSURE: 95 MMHG | BODY MASS INDEX: 39.24 KG/M2 | SYSTOLIC BLOOD PRESSURE: 134 MMHG

## 2024-02-11 NOTE — ED NOTES
Call to Laila at [facility] and she stated that they no longer have a rehab.  Call to [facility] and they stated that the pt would need he oxygen.  Call to Yudith at [facility] who also stated that she would need her oxygen.  The pt stated that she has a concentrator at home but she has no way of getting it to the hospital.  She stated that she has not used in 7 days.  She stated that if she was sent home with resources she could follow up tomorrow morning when she would have better access to other programs and her concentrator.      Spoke to the ED doctor who was in agreement with this plan.

## 2024-02-11 NOTE — ED NOTES
This RN called Pt's daughter who she lives with to transport Pt home. Pt's daughter has not answered her phone. I have called the daughter 3 times and have left a message each time. No return call at this time. Pt stated she has no one else she can call for a ride and does not know how she will get home. In order to get the Pt home safely a Taxi Voucher has been issued for the Pt. Pt ANO x 4 at this time.

## 2024-02-11 NOTE — ED TRIAGE NOTES
EMS states patient c/o withdrawal from Meth, last use 5 days ago. Pt was found to be \"congested\" with expiratory wheezing, EMS gave 1 duoneb en route. Patient does not appear to be in distress, and is on her baseline O2 3L NC

## 2024-02-11 NOTE — DISCHARGE INSTRUCTIONS
Chemical Dependence & Behavioral Health Resources    For Residents of [facility] and [facility]  Call [facility] [phone]    For a complete list of treatment centers in your area please visit:   “[facility] [place]” website at   [email or web address]    * Alcoholics Anonymous (777) 155-1507                *Narcotics Anonymous (169) 985-3213   * National Suicide Hotline 24/7   1-610.282.7398 or  dial 988      [facility]  [address] [place] [address]  Phone: [phone]    * Outpatient Mental Health Treatment   * Outpatient Substance Abuse Treatment    [facility] [phone] [address] [place] [address]  [email or web address]  *Payments Accepted: Sliding Scale for Fees.  *Services Offered: Call for Available Services.     [facility] [phone]  [address] [place] [address]  [email or web address]  *Residents of:  [facility] Residents Only for Private Insurance.     ALL [facility] Residents on Medicaid Accepted.   *Payments Accepted: Medicaid or Self-pay ONLY for In-Patient Services.         Private Insurance accepted for Outpatient Programs   *Services Offered: Outpatient Behavioral Health & Chemical Dependency.    [facility] [phone] or [phone]  [email or web address]     *Residents of:  All [facility] Residents   *Payments Accepted: Private Insurance, Medicaid or Self-Pay.  *Services Offered: Following Detox, Outpatient Substance Abuse and Behavioral Health Programs  [facility]  [address]    [address], [place] [address]   [place], [place] [address]    [facility]   [address]    [address]   [place], [place] [address]   [place] [id number]    [facility] [phone]

## 2024-02-11 NOTE — ED PROVIDER NOTES
Rate: 89  Rhythm: Sinus  Interpretation: Normal sinus rhythm, normal ID interval, normal QRS, normal axis, normal QT interval, no acute ST or T wave changes  Comparison: stable as compared to patient's most recent EKG   [JA]      ED Course User Index  [JA] Tala Andrade MD       Patient remained hemodynamically stable throughout ED course.     New Prescriptions    No medications on file     Counseling:   The emergency provider has spoken with the patient and discussed today’s results, in addition to providing specific details for the plan of care and counseling regarding the diagnosis and prognosis.  Questions are answered at this time and they are agreeable with the plan.      --------------------------------- IMPRESSION AND DISPOSITION ---------------------------------    IMPRESSION  1. Methamphetamine abuse (HCC)    2. Chronic shortness of breath        DISPOSITION  Disposition: Medically clear for social work evaluation  Patient condition is stable      NOTE: This report was transcribed using voice recognition software. Every effort was made to ensure accuracy; however, inadvertent computerized transcription errors may be present    Tala DAVIS MD, am the primary provider of this record        Tala Andrade MD  02/10/24 2034

## 2024-02-11 NOTE — ED NOTES
Call to [facility] , spoke Tiffany, reports they cannot accept pt without her own oxygen supply.

## 2024-02-11 NOTE — ED NOTES
Behavioral Health Crisis Assessment      Chief Complaint: \"I'm having withdrawals from meth.  I've done meth before but I never had withdrawals like this.  I want to go to rehab but not more than 7 or 8 days.\"      Mental Status Exam: Alert, oriented x4, mood depressed, affect is restricted, eye contact poor, speech normal in rate flow and volume, behavior is cooperative, appropriate, no signs of agitation, thought form appears organized, thought content clear, denies A/V hallucinations, delusions, paranoia, none noted in interview.  Denies suicidal ideation intent or plan at this time, reports hx of attempts by overdose, denies homicidal ideation intent or plan.      Legal Status:  [x] Voluntary:  [] Involuntary, Issued by:    Gender:  [] Male [x] Female [] Transgender  [] Other    Sexual Orientation:  [x] Heterosexual [] Homosexual [] Bisexual [] Other    Brief Clinical Summary:Pt is a 59 yo female who presents to the ER via ambulance.  Pt is not on a pink slip.  Pt reports she presents to the ED today because she wants to be referred to a rehab facility for meth use.  Reports she has been very stressed recently, states: \"... everything is stressful, life is stressful to me right now.\"  Pt reports she recently moved to [place] in October 2023, she is currently living with her daughter. Reports she feels limited by her need for oxygen, states she feels like she and her addiction are a burden on her daughter and daughter's family.  Reports hx of bipolar and PTSD dx, hx of dual diagnosis psych admissions in [place], hx on [facility]. Reports current Abilify, Depakote.      Collateral Information: None     Risk Factors:  Mental health dx: Depression, also PTSD per pt.  Hx of prior in-patient psych admission  Gender risk (female 35-65)  Chronic health issues  Substance abuse  No out patient provider     Protective Factors:   No access to firearms      Suicidal Ideations:  [x] Reports:    [x] Past [] Present   [] Denies    Suicide

## 2024-02-12 LAB
EKG ATRIAL RATE: 89 BPM
EKG P AXIS: 69 DEGREES
EKG P-R INTERVAL: 164 MS
EKG Q-T INTERVAL: 348 MS
EKG QRS DURATION: 82 MS
EKG QTC CALCULATION (BAZETT): 423 MS
EKG R AXIS: 52 DEGREES
EKG T AXIS: 69 DEGREES
EKG VENTRICULAR RATE: 89 BPM

## 2024-02-18 ENCOUNTER — HOSPITAL ENCOUNTER (INPATIENT)
Age: 59
LOS: 2 days | Discharge: HOME | DRG: 192 | End: 2024-02-20
Attending: STUDENT IN AN ORGANIZED HEALTH CARE EDUCATION/TRAINING PROGRAM | Admitting: INTERNAL MEDICINE
Payer: MEDICARE

## 2024-02-18 ENCOUNTER — APPOINTMENT (OUTPATIENT)
Dept: GENERAL RADIOLOGY | Age: 59
DRG: 192 | End: 2024-02-18
Payer: MEDICARE

## 2024-02-18 DIAGNOSIS — R07.9 CHEST PAIN, UNSPECIFIED TYPE: ICD-10-CM

## 2024-02-18 DIAGNOSIS — J44.1 COPD EXACERBATION (HCC): Primary | ICD-10-CM

## 2024-02-18 DIAGNOSIS — R06.02 SHORTNESS OF BREATH: ICD-10-CM

## 2024-02-18 PROBLEM — J44.9 COPD (CHRONIC OBSTRUCTIVE PULMONARY DISEASE) (HCC): Status: ACTIVE | Noted: 2024-02-18

## 2024-02-18 LAB
ALBUMIN SERPL-MCNC: 4 G/DL (ref 3.5–5.2)
ALP SERPL-CCNC: 71 U/L (ref 35–104)
ALT SERPL-CCNC: 11 U/L (ref 0–32)
ANION GAP SERPL CALCULATED.3IONS-SCNC: 6 MMOL/L (ref 7–16)
AST SERPL-CCNC: 12 U/L (ref 0–31)
BASOPHILS # BLD: 0.02 K/UL (ref 0–0.2)
BASOPHILS NFR BLD: 0 % (ref 0–2)
BILIRUB SERPL-MCNC: 0.3 MG/DL (ref 0–1.2)
BNP SERPL-MCNC: 57 PG/ML (ref 0–125)
BUN SERPL-MCNC: 16 MG/DL (ref 6–20)
CALCIUM SERPL-MCNC: 9.1 MG/DL (ref 8.6–10.2)
CHLORIDE SERPL-SCNC: 102 MMOL/L (ref 98–107)
CO2 SERPL-SCNC: 31 MMOL/L (ref 22–29)
CREAT SERPL-MCNC: 0.6 MG/DL (ref 0.5–1)
EKG ATRIAL RATE: 91 BPM
EKG P AXIS: 51 DEGREES
EKG P-R INTERVAL: 158 MS
EKG Q-T INTERVAL: 360 MS
EKG QRS DURATION: 76 MS
EKG QTC CALCULATION (BAZETT): 442 MS
EKG R AXIS: 30 DEGREES
EKG T AXIS: 56 DEGREES
EKG VENTRICULAR RATE: 91 BPM
EOSINOPHIL # BLD: 0.18 K/UL (ref 0.05–0.5)
EOSINOPHILS RELATIVE PERCENT: 2 % (ref 0–6)
ERYTHROCYTE [DISTWIDTH] IN BLOOD BY AUTOMATED COUNT: 11.9 % (ref 11.5–15)
FLUAV RNA RESP QL NAA+PROBE: NOT DETECTED
FLUBV RNA RESP QL NAA+PROBE: NOT DETECTED
GFR SERPL CREATININE-BSD FRML MDRD: >60 ML/MIN/1.73M2
GLUCOSE SERPL-MCNC: 90 MG/DL (ref 74–99)
HCT VFR BLD AUTO: 43.5 % (ref 34–48)
HGB BLD-MCNC: 13.4 G/DL (ref 11.5–15.5)
IMM GRANULOCYTES # BLD AUTO: <0.03 K/UL (ref 0–0.58)
IMM GRANULOCYTES NFR BLD: 0 % (ref 0–5)
LYMPHOCYTES NFR BLD: 2.22 K/UL (ref 1.5–4)
LYMPHOCYTES RELATIVE PERCENT: 28 % (ref 20–42)
MCH RBC QN AUTO: 29.1 PG (ref 26–35)
MCHC RBC AUTO-ENTMCNC: 30.8 G/DL (ref 32–34.5)
MCV RBC AUTO: 94.4 FL (ref 80–99.9)
MONOCYTES NFR BLD: 0.57 K/UL (ref 0.1–0.95)
MONOCYTES NFR BLD: 7 % (ref 2–12)
NEUTROPHILS NFR BLD: 62 % (ref 43–80)
NEUTS SEG NFR BLD: 4.84 K/UL (ref 1.8–7.3)
PLATELET # BLD AUTO: 212 K/UL (ref 130–450)
PMV BLD AUTO: 10.5 FL (ref 7–12)
POTASSIUM SERPL-SCNC: 4.1 MMOL/L (ref 3.5–5)
PROT SERPL-MCNC: 6.6 G/DL (ref 6.4–8.3)
RBC # BLD AUTO: 4.61 M/UL (ref 3.5–5.5)
SARS-COV-2 RNA RESP QL NAA+PROBE: NOT DETECTED
SODIUM SERPL-SCNC: 139 MMOL/L (ref 132–146)
SOURCE: NORMAL
SPECIMEN DESCRIPTION: NORMAL
TROPONIN I SERPL HS-MCNC: 10 NG/L (ref 0–9)
TROPONIN I SERPL HS-MCNC: 10 NG/L (ref 0–9)
WBC OTHER # BLD: 7.8 K/UL (ref 4.5–11.5)

## 2024-02-18 PROCEDURE — 6360000002 HC RX W HCPCS

## 2024-02-18 PROCEDURE — 93005 ELECTROCARDIOGRAM TRACING: CPT

## 2024-02-18 PROCEDURE — 96368 THER/DIAG CONCURRENT INF: CPT

## 2024-02-18 PROCEDURE — 83880 ASSAY OF NATRIURETIC PEPTIDE: CPT

## 2024-02-18 PROCEDURE — 2140000000 HC CCU INTERMEDIATE R&B

## 2024-02-18 PROCEDURE — 94640 AIRWAY INHALATION TREATMENT: CPT

## 2024-02-18 PROCEDURE — 6370000000 HC RX 637 (ALT 250 FOR IP)

## 2024-02-18 PROCEDURE — 93010 ELECTROCARDIOGRAM REPORT: CPT | Performed by: INTERNAL MEDICINE

## 2024-02-18 PROCEDURE — 96365 THER/PROPH/DIAG IV INF INIT: CPT

## 2024-02-18 PROCEDURE — 71045 X-RAY EXAM CHEST 1 VIEW: CPT

## 2024-02-18 PROCEDURE — 2580000003 HC RX 258

## 2024-02-18 PROCEDURE — 80053 COMPREHEN METABOLIC PANEL: CPT

## 2024-02-18 PROCEDURE — 84484 ASSAY OF TROPONIN QUANT: CPT

## 2024-02-18 PROCEDURE — 99285 EMERGENCY DEPT VISIT HI MDM: CPT

## 2024-02-18 PROCEDURE — 94664 DEMO&/EVAL PT USE INHALER: CPT

## 2024-02-18 PROCEDURE — 87636 SARSCOV2 & INF A&B AMP PRB: CPT

## 2024-02-18 PROCEDURE — 96375 TX/PRO/DX INJ NEW DRUG ADDON: CPT

## 2024-02-18 PROCEDURE — 85025 COMPLETE CBC W/AUTO DIFF WBC: CPT

## 2024-02-18 RX ORDER — IPRATROPIUM BROMIDE AND ALBUTEROL SULFATE 2.5; .5 MG/3ML; MG/3ML
1 SOLUTION RESPIRATORY (INHALATION)
Status: DISCONTINUED | OUTPATIENT
Start: 2024-02-18 | End: 2024-02-20 | Stop reason: HOSPADM

## 2024-02-18 RX ORDER — ONDANSETRON 4 MG/1
4 TABLET, ORALLY DISINTEGRATING ORAL EVERY 8 HOURS PRN
Status: DISCONTINUED | OUTPATIENT
Start: 2024-02-18 | End: 2024-02-20 | Stop reason: HOSPADM

## 2024-02-18 RX ORDER — PANTOPRAZOLE SODIUM 40 MG/1
40 TABLET, DELAYED RELEASE ORAL
Status: DISCONTINUED | OUTPATIENT
Start: 2024-02-19 | End: 2024-02-20 | Stop reason: HOSPADM

## 2024-02-18 RX ORDER — BUDESONIDE 0.5 MG/2ML
0.5 INHALANT ORAL
Status: DISCONTINUED | OUTPATIENT
Start: 2024-02-18 | End: 2024-02-20 | Stop reason: HOSPADM

## 2024-02-18 RX ORDER — ONDANSETRON 2 MG/ML
4 INJECTION INTRAMUSCULAR; INTRAVENOUS EVERY 6 HOURS PRN
Status: DISCONTINUED | OUTPATIENT
Start: 2024-02-18 | End: 2024-02-20 | Stop reason: HOSPADM

## 2024-02-18 RX ORDER — MIRTAZAPINE 15 MG/1
15 TABLET, FILM COATED ORAL NIGHTLY
Status: DISCONTINUED | OUTPATIENT
Start: 2024-02-18 | End: 2024-02-20 | Stop reason: HOSPADM

## 2024-02-18 RX ORDER — ROFLUMILAST 500 UG/1
250 TABLET ORAL DAILY
Status: DISCONTINUED | OUTPATIENT
Start: 2024-02-19 | End: 2024-02-20 | Stop reason: HOSPADM

## 2024-02-18 RX ORDER — ACETAMINOPHEN 325 MG/1
650 TABLET ORAL EVERY 6 HOURS PRN
Status: DISCONTINUED | OUTPATIENT
Start: 2024-02-18 | End: 2024-02-20 | Stop reason: HOSPADM

## 2024-02-18 RX ORDER — HYDROXYZINE PAMOATE 25 MG/1
25 CAPSULE ORAL EVERY 6 HOURS PRN
Status: DISCONTINUED | OUTPATIENT
Start: 2024-02-18 | End: 2024-02-20 | Stop reason: HOSPADM

## 2024-02-18 RX ORDER — LANOLIN ALCOHOL/MO/W.PET/CERES
3 CREAM (GRAM) TOPICAL NIGHTLY PRN
Status: DISCONTINUED | OUTPATIENT
Start: 2024-02-18 | End: 2024-02-20 | Stop reason: HOSPADM

## 2024-02-18 RX ORDER — SODIUM CHLORIDE 0.9 % (FLUSH) 0.9 %
5-40 SYRINGE (ML) INJECTION EVERY 12 HOURS SCHEDULED
Status: DISCONTINUED | OUTPATIENT
Start: 2024-02-18 | End: 2024-02-20 | Stop reason: HOSPADM

## 2024-02-18 RX ORDER — IPRATROPIUM BROMIDE AND ALBUTEROL SULFATE 2.5; .5 MG/3ML; MG/3ML
3 SOLUTION RESPIRATORY (INHALATION) ONCE
Status: COMPLETED | OUTPATIENT
Start: 2024-02-18 | End: 2024-02-18

## 2024-02-18 RX ORDER — SODIUM CHLORIDE 0.9 % (FLUSH) 0.9 %
5-40 SYRINGE (ML) INJECTION PRN
Status: DISCONTINUED | OUTPATIENT
Start: 2024-02-18 | End: 2024-02-20 | Stop reason: HOSPADM

## 2024-02-18 RX ORDER — GUAIFENESIN 400 MG/1
400 TABLET ORAL 3 TIMES DAILY PRN
Status: DISCONTINUED | OUTPATIENT
Start: 2024-02-18 | End: 2024-02-20 | Stop reason: HOSPADM

## 2024-02-18 RX ORDER — ARIPIPRAZOLE 5 MG/1
5 TABLET ORAL DAILY
Status: DISCONTINUED | OUTPATIENT
Start: 2024-02-19 | End: 2024-02-20 | Stop reason: HOSPADM

## 2024-02-18 RX ORDER — MAGNESIUM SULFATE IN WATER 40 MG/ML
2000 INJECTION, SOLUTION INTRAVENOUS ONCE
Status: COMPLETED | OUTPATIENT
Start: 2024-02-18 | End: 2024-02-18

## 2024-02-18 RX ORDER — POLYETHYLENE GLYCOL 3350 17 G/17G
17 POWDER, FOR SOLUTION ORAL DAILY PRN
Status: DISCONTINUED | OUTPATIENT
Start: 2024-02-18 | End: 2024-02-20 | Stop reason: HOSPADM

## 2024-02-18 RX ORDER — NICOTINE 21 MG/24HR
1 PATCH, TRANSDERMAL 24 HOURS TRANSDERMAL DAILY
Status: DISCONTINUED | OUTPATIENT
Start: 2024-02-19 | End: 2024-02-20 | Stop reason: HOSPADM

## 2024-02-18 RX ORDER — DIVALPROEX SODIUM 250 MG/1
250 TABLET, DELAYED RELEASE ORAL 2 TIMES DAILY
Status: DISCONTINUED | OUTPATIENT
Start: 2024-02-18 | End: 2024-02-19

## 2024-02-18 RX ORDER — ACETAMINOPHEN 650 MG/1
650 SUPPOSITORY RECTAL EVERY 6 HOURS PRN
Status: DISCONTINUED | OUTPATIENT
Start: 2024-02-18 | End: 2024-02-20 | Stop reason: HOSPADM

## 2024-02-18 RX ORDER — SODIUM CHLORIDE 9 MG/ML
INJECTION, SOLUTION INTRAVENOUS PRN
Status: DISCONTINUED | OUTPATIENT
Start: 2024-02-18 | End: 2024-02-20 | Stop reason: HOSPADM

## 2024-02-18 RX ORDER — ENOXAPARIN SODIUM 100 MG/ML
40 INJECTION SUBCUTANEOUS DAILY
Status: DISCONTINUED | OUTPATIENT
Start: 2024-02-19 | End: 2024-02-19

## 2024-02-18 RX ORDER — HYDROXYZINE HYDROCHLORIDE 10 MG/1
25 TABLET, FILM COATED ORAL EVERY 6 HOURS PRN
Status: DISCONTINUED | OUTPATIENT
Start: 2024-02-18 | End: 2024-02-18 | Stop reason: CLARIF

## 2024-02-18 RX ADMIN — MAGNESIUM SULFATE HEPTAHYDRATE 2000 MG: 40 INJECTION, SOLUTION INTRAVENOUS at 18:51

## 2024-02-18 RX ADMIN — IPRATROPIUM BROMIDE AND ALBUTEROL SULFATE 3 DOSE: 2.5; .5 SOLUTION RESPIRATORY (INHALATION) at 15:43

## 2024-02-18 RX ADMIN — AZITHROMYCIN MONOHYDRATE 500 MG: 500 INJECTION, POWDER, LYOPHILIZED, FOR SOLUTION INTRAVENOUS at 18:50

## 2024-02-18 RX ADMIN — IPRATROPIUM BROMIDE AND ALBUTEROL SULFATE 3 DOSE: 2.5; .5 SOLUTION RESPIRATORY (INHALATION) at 19:55

## 2024-02-18 RX ADMIN — WATER 125 MG: 1 INJECTION INTRAMUSCULAR; INTRAVENOUS; SUBCUTANEOUS at 18:03

## 2024-02-19 LAB
AMPHET UR QL SCN: NEGATIVE
ANION GAP SERPL CALCULATED.3IONS-SCNC: 11 MMOL/L (ref 7–16)
APAP SERPL-MCNC: <5 UG/ML (ref 10–30)
B PARAP IS1001 DNA NPH QL NAA+NON-PROBE: NOT DETECTED
B PERT DNA SPEC QL NAA+PROBE: NOT DETECTED
BARBITURATES UR QL SCN: NEGATIVE
BASOPHILS # BLD: 0.01 K/UL (ref 0–0.2)
BASOPHILS NFR BLD: 0 % (ref 0–2)
BENZODIAZ UR QL: NEGATIVE
BUN SERPL-MCNC: 14 MG/DL (ref 6–20)
BUPRENORPHINE UR QL: NEGATIVE
C PNEUM DNA NPH QL NAA+NON-PROBE: NOT DETECTED
CALCIUM SERPL-MCNC: 9.4 MG/DL (ref 8.6–10.2)
CANNABINOIDS UR QL SCN: POSITIVE
CHLORIDE SERPL-SCNC: 103 MMOL/L (ref 98–107)
CO2 SERPL-SCNC: 26 MMOL/L (ref 22–29)
COCAINE UR QL SCN: NEGATIVE
CREAT SERPL-MCNC: 0.5 MG/DL (ref 0.5–1)
EOSINOPHIL # BLD: 0 K/UL (ref 0.05–0.5)
EOSINOPHILS RELATIVE PERCENT: 0 % (ref 0–6)
ERYTHROCYTE [DISTWIDTH] IN BLOOD BY AUTOMATED COUNT: 11.8 % (ref 11.5–15)
ETHANOLAMINE SERPL-MCNC: <10 MG/DL
FENTANYL UR QL: NEGATIVE
FLUAV RNA NPH QL NAA+NON-PROBE: NOT DETECTED
FLUBV RNA NPH QL NAA+NON-PROBE: NOT DETECTED
GFR SERPL CREATININE-BSD FRML MDRD: >60 ML/MIN/1.73M2
GLUCOSE SERPL-MCNC: 125 MG/DL (ref 74–99)
HADV DNA NPH QL NAA+NON-PROBE: NOT DETECTED
HCOV 229E RNA NPH QL NAA+NON-PROBE: NOT DETECTED
HCOV HKU1 RNA NPH QL NAA+NON-PROBE: NOT DETECTED
HCOV NL63 RNA NPH QL NAA+NON-PROBE: NOT DETECTED
HCOV OC43 RNA NPH QL NAA+NON-PROBE: NOT DETECTED
HCT VFR BLD AUTO: 43 % (ref 34–48)
HGB BLD-MCNC: 13.6 G/DL (ref 11.5–15.5)
HMPV RNA NPH QL NAA+NON-PROBE: NOT DETECTED
HPIV1 RNA NPH QL NAA+NON-PROBE: NOT DETECTED
HPIV2 RNA NPH QL NAA+NON-PROBE: NOT DETECTED
HPIV3 RNA NPH QL NAA+NON-PROBE: NOT DETECTED
HPIV4 RNA NPH QL NAA+NON-PROBE: NOT DETECTED
IMM GRANULOCYTES # BLD AUTO: 0.04 K/UL (ref 0–0.58)
IMM GRANULOCYTES NFR BLD: 1 % (ref 0–5)
LYMPHOCYTES NFR BLD: 0.71 K/UL (ref 1.5–4)
LYMPHOCYTES RELATIVE PERCENT: 9 % (ref 20–42)
M PNEUMO DNA NPH QL NAA+NON-PROBE: NOT DETECTED
MCH RBC QN AUTO: 29.1 PG (ref 26–35)
MCHC RBC AUTO-ENTMCNC: 31.6 G/DL (ref 32–34.5)
MCV RBC AUTO: 92.1 FL (ref 80–99.9)
METHADONE UR QL: NEGATIVE
MONOCYTES NFR BLD: 0.39 K/UL (ref 0.1–0.95)
MONOCYTES NFR BLD: 5 % (ref 2–12)
NEUTROPHILS NFR BLD: 86 % (ref 43–80)
NEUTS SEG NFR BLD: 6.97 K/UL (ref 1.8–7.3)
OPIATES UR QL SCN: NEGATIVE
OXYCODONE UR QL SCN: NEGATIVE
PCP UR QL SCN: NEGATIVE
PLATELET # BLD AUTO: 226 K/UL (ref 130–450)
PMV BLD AUTO: 10.4 FL (ref 7–12)
POTASSIUM SERPL-SCNC: 4.8 MMOL/L (ref 3.5–5)
RBC # BLD AUTO: 4.67 M/UL (ref 3.5–5.5)
RSV RNA NPH QL NAA+NON-PROBE: NOT DETECTED
RV+EV RNA NPH QL NAA+NON-PROBE: NOT DETECTED
SALICYLATES SERPL-MCNC: <0.3 MG/DL (ref 0–30)
SARS-COV-2 RNA NPH QL NAA+NON-PROBE: NOT DETECTED
SODIUM SERPL-SCNC: 140 MMOL/L (ref 132–146)
SPECIMEN DESCRIPTION: NORMAL
TEST INFORMATION: ABNORMAL
TOXIC TRICYCLIC SC,BLOOD: NEGATIVE
TROPONIN I SERPL HS-MCNC: 7 NG/L (ref 0–9)
WBC OTHER # BLD: 8.1 K/UL (ref 4.5–11.5)

## 2024-02-19 PROCEDURE — 85025 COMPLETE CBC W/AUTO DIFF WBC: CPT

## 2024-02-19 PROCEDURE — 6370000000 HC RX 637 (ALT 250 FOR IP)

## 2024-02-19 PROCEDURE — 99221 1ST HOSP IP/OBS SF/LOW 40: CPT | Performed by: INTERNAL MEDICINE

## 2024-02-19 PROCEDURE — 6360000002 HC RX W HCPCS

## 2024-02-19 PROCEDURE — 2140000000 HC CCU INTERMEDIATE R&B

## 2024-02-19 PROCEDURE — 0202U NFCT DS 22 TRGT SARS-COV-2: CPT

## 2024-02-19 PROCEDURE — 36415 COLL VENOUS BLD VENIPUNCTURE: CPT

## 2024-02-19 PROCEDURE — 80048 BASIC METABOLIC PNL TOTAL CA: CPT

## 2024-02-19 PROCEDURE — G0480 DRUG TEST DEF 1-7 CLASSES: HCPCS

## 2024-02-19 PROCEDURE — 80179 DRUG ASSAY SALICYLATE: CPT

## 2024-02-19 PROCEDURE — 94640 AIRWAY INHALATION TREATMENT: CPT

## 2024-02-19 PROCEDURE — 2580000003 HC RX 258

## 2024-02-19 PROCEDURE — 80143 DRUG ASSAY ACETAMINOPHEN: CPT

## 2024-02-19 PROCEDURE — 80307 DRUG TEST PRSMV CHEM ANLYZR: CPT

## 2024-02-19 PROCEDURE — 84484 ASSAY OF TROPONIN QUANT: CPT

## 2024-02-19 RX ORDER — FUROSEMIDE 10 MG/ML
40 INJECTION INTRAMUSCULAR; INTRAVENOUS ONCE
Status: COMPLETED | OUTPATIENT
Start: 2024-02-19 | End: 2024-02-19

## 2024-02-19 RX ORDER — HYDROXYZINE HYDROCHLORIDE 50 MG/ML
25 INJECTION, SOLUTION INTRAMUSCULAR ONCE
Status: COMPLETED | OUTPATIENT
Start: 2024-02-19 | End: 2024-02-19

## 2024-02-19 RX ORDER — ARFORMOTEROL TARTRATE 15 UG/2ML
15 SOLUTION RESPIRATORY (INHALATION)
Status: DISCONTINUED | OUTPATIENT
Start: 2024-02-19 | End: 2024-02-20 | Stop reason: HOSPADM

## 2024-02-19 RX ORDER — ENOXAPARIN SODIUM 100 MG/ML
30 INJECTION SUBCUTANEOUS 2 TIMES DAILY
Status: DISCONTINUED | OUTPATIENT
Start: 2024-02-19 | End: 2024-02-20 | Stop reason: HOSPADM

## 2024-02-19 RX ADMIN — ARIPIPRAZOLE 5 MG: 5 TABLET ORAL at 12:16

## 2024-02-19 RX ADMIN — Medication 10 ML: at 10:00

## 2024-02-19 RX ADMIN — MIRTAZAPINE 15 MG: 15 TABLET, FILM COATED ORAL at 00:01

## 2024-02-19 RX ADMIN — IPRATROPIUM BROMIDE AND ALBUTEROL SULFATE 1 DOSE: 2.5; .5 SOLUTION RESPIRATORY (INHALATION) at 09:16

## 2024-02-19 RX ADMIN — ENOXAPARIN SODIUM 40 MG: 100 INJECTION SUBCUTANEOUS at 09:54

## 2024-02-19 RX ADMIN — Medication 10 ML: at 23:32

## 2024-02-19 RX ADMIN — FUROSEMIDE 40 MG: 10 INJECTION, SOLUTION INTRAMUSCULAR; INTRAVENOUS at 11:36

## 2024-02-19 RX ADMIN — HYDROXYZINE HYDROCHLORIDE 25 MG: 50 INJECTION, SOLUTION INTRAMUSCULAR at 16:43

## 2024-02-19 RX ADMIN — IPRATROPIUM BROMIDE AND ALBUTEROL SULFATE 1 DOSE: 2.5; .5 SOLUTION RESPIRATORY (INHALATION) at 12:53

## 2024-02-19 RX ADMIN — BUDESONIDE 500 MCG: 0.5 SUSPENSION RESPIRATORY (INHALATION) at 19:33

## 2024-02-19 RX ADMIN — BUDESONIDE 500 MCG: 0.5 SUSPENSION RESPIRATORY (INHALATION) at 09:16

## 2024-02-19 RX ADMIN — MIRTAZAPINE 15 MG: 15 TABLET, FILM COATED ORAL at 23:30

## 2024-02-19 RX ADMIN — WATER 40 MG: 1 INJECTION INTRAMUSCULAR; INTRAVENOUS; SUBCUTANEOUS at 09:54

## 2024-02-19 RX ADMIN — IPRATROPIUM BROMIDE AND ALBUTEROL SULFATE 1 DOSE: 2.5; .5 SOLUTION RESPIRATORY (INHALATION) at 19:33

## 2024-02-19 RX ADMIN — HYDROXYZINE PAMOATE 25 MG: 25 CAPSULE ORAL at 00:02

## 2024-02-19 RX ADMIN — ARFORMOTEROL TARTRATE 15 MCG: 15 SOLUTION RESPIRATORY (INHALATION) at 19:33

## 2024-02-19 RX ADMIN — ARFORMOTEROL TARTRATE 15 MCG: 15 SOLUTION RESPIRATORY (INHALATION) at 12:53

## 2024-02-19 RX ADMIN — HYDROXYZINE PAMOATE 25 MG: 25 CAPSULE ORAL at 12:16

## 2024-02-19 RX ADMIN — ROFLUMILAST 250 MCG: 500 TABLET ORAL at 15:19

## 2024-02-19 RX ADMIN — IPRATROPIUM BROMIDE AND ALBUTEROL SULFATE 1 DOSE: 2.5; .5 SOLUTION RESPIRATORY (INHALATION) at 15:35

## 2024-02-19 NOTE — PROGRESS NOTES
[facility]  Internal Medicine Residency / House Medicine Service    Attending Physician Statement  I have discussed the case, including pertinent history and exam findings with the resident and the team.  I have seen and examined the patient and the key elements of the encounter have been performed by me.  I agree with the assessment, plan and orders as documented by the resident.      A&O  VS Stable   Meds reviewed   Still with diffuse wheezing  Smokes 1/2 PPD  Lives with grancjhildren , young ones with URI's  Viral panel negative  Considering mycoplasma  Plan:Continue same for now           Therapeutic/Diagnostic challenge with Lasix and follow     Remainder of medical problems as per resident note.      Jens Fong MD FRCP [facility]  Internal Medicine Residency Faculty

## 2024-02-19 NOTE — H&P
[facility]  Internal Medicine Residency Program  History and Physical    Patient:  Brenda Nunn 58 y.o. female MRN: [id number]     Date of Service: 2024    Hospital Day: 1    History of Present Illness   Brenda Nunn is a 58 y.o. female with PMHx of COPD, tobacco abuse, depression, anxiety and substance abuse came in with a CC of SOB.    Patient presented from home due to worsening SOB and dyspnea on exertion over the past day. Patient states she was started to get worse SOB yesterday but would do her home breathing treatments with some improvements. Today she slowly was feeling worse and could barely walk across the room due to the dyspnea. She is on 3L at baseline and has not changed how much oxygen she is currently on. Patient does admit to smoking a few cigarettes today and is not currently interested in quitting. Denies any fever, abdominal pain, constipation or diarrhea.  Patient does endorse chills for the past 4-5 days that will just come out of no where. This is not withdrawals because she had that in the past and denies any current drug use since her last admission on 2/10 for drug use. She states her daughter, who she lives with along with grandchildren, have had a cold for the past few days. Denies any recent travel. Patient did received Duoneb treatments and steroids in the ED and continues to have shortness of breath despite intervention. Her vitals have remained stable. Patient is being admitted for observation due to COPD exacerbation.       Previous Hospital Admission:Last hospital admission from 24 - 24 and ED vision on 2/10/24 with COPD exacerbations and methamphetamine withdrawal.     Past Medical History:       Diagnosis Date    CHF (congestive heart failure) (HCC)     COPD (chronic obstructive pulmonary disease) (HCC)     Depression     Hypertension        Past Surgical History:        Procedure Laterality Date     SECTION      HYSTERECTOMY (CERVIX

## 2024-02-19 NOTE — ED NOTES
Patient ambulating in hallway around nurses station well, Spo2  95% on 4L nasal cannula. Stopped to take a break once due to exhaustion Spo2 steady at 95-93% on 4L.

## 2024-02-19 NOTE — ACP (ADVANCE CARE PLANNING)
Advance Care Planning   Healthcare Decision Maker:    Primary Decision Maker: BRAIN SCHREIBER - Child - [phone]    Today we documented Decision Maker(s) consistent with Legal Next of Kin hierarchy.    Electronically signed by TIGIST Vigil on 2/19/2024 at 11:37 AM

## 2024-02-19 NOTE — ED PROVIDER NOTES
Department of Emergency Medicine   ED Provider Note  Admit Date/RoomTime: 2024  2:57 PM  ED Room: 10/10          History of Present Illness:  24, Time: 7:54 PM SCOTT Nunn is a 58 y.o. female presenting to the ED for shortness breath.  She notes it started last night.  She reports she took her home breathing treatment without significant improvement.  She notes some mid, retrosternal chest tightness.  No other chest pain.  Denies any palpitations.  No leg swelling.  Denies any fevers or chills, cough, congestion, sinus pain or pressure, sore throat, abdominal pain, nausea, vomiting, diarrhea, dysuria, hematuria.  No rashes.  Reports she does have a history of COPD and CHF.  States she was recently admitted for COPD.  Notes her shortness of breath significantly worse with exertion noting that just walking to and from the restroom she gets winded and has increased work of breathing.      Review of Systems:     Pertinent positives and negatives are stated within HPI.      --------------------------------------------- PAST HISTORY ---------------------------------------------  Past Medical History:  has a past medical history of CHF (congestive heart failure) (HCC), COPD (chronic obstructive pulmonary disease) (HCC), Depression, and Hypertension.    Past Surgical History:  has a past surgical history that includes  section and Hysterectomy.    Social History:  reports that she has been smoking cigarettes. She started smoking about 46 years ago. She has a 46.1 pack-year smoking history. She has never used smokeless tobacco. She reports that she does not currently use drugs after having used the following drugs: Marijuana (Weed) and Methamphetamines (Crystal Meth). She reports that she does not drink alcohol.    Family History: family history is not on file.     The patient’s home medications have been reviewed.    Allergies: Pcn  to medicine for further evaluation management.  Patient understanding and agreeable to plan.  Spoke with internal medicine who agreed to admit.    Amount and/or Complexity of Data Reviewed  Independent Historian: EMS  Labs: ordered. Decision-making details documented in ED Course.  Radiology: ordered and independent interpretation performed.  ECG/medicine tests: ordered and independent interpretation performed.    Risk  Prescription drug management.  Decision regarding hospitalization.        History From: patient    Social Determinants of Health : None    Chronic Conditions affecting care: Brenda is a 58-year-old female who   has a past medical history of CHF (congestive heart failure) ([facility]), COPD (chronic obstructive pulmonary disease) ([facility]), Depression, and Hypertension.     Records Reviewed (Source) internal medicine note reviewed from recent admission-Dr. Carroll 2/8/24    CONSULTS: (Who and What was discussed)  IP CONSULT HOUSE MEDICINE     See ED COURSE    Disposition Considerations (Tests not ordered but considered, Shared Decision Making, Pt Expectation of Test or Tx.): Spoke with patient regarding plans for testing and treatment.  She is understanding and agreeable to plan    Diagnoses considered include (but not limited to): CHF versus COPD versus pneumonia versus electrolyte abnormality versus arrhythmia versus ACS, etc.    See ED COURSE for additional MDM. Labs & imaging reviewed and interpreted, see RESULTS above.     Re-Evaluations:           See ED Course above.       This patient's ED course included: a personal history and physicial examination, multiple bedside re-evaluations, IV medications, cardiac monitoring, and continuous pulse oximetry    This patient has remained hemodynamically stable during their ED course.      Consultations:  See ED Course    Counseling:   The emergency physician has spoken with the patient and discussed today’s results, in addition to providing specific details for the

## 2024-02-19 NOTE — PROGRESS NOTES
[facility]  Internal Medicine Residency Program  Progress Note  - House Team 1    Patient:  Brenda Nunn 58 y.o. female MRN: [id number]     Date of Service: 2/19/2024     CC: SOB  Overnight events: Admitted for COPD exacerbation    Subjective       On 4L NC and saturating appropriately. Baseline home O2 is 3L. Reports some improvement in SOB following nebulizer Tmt. Denies F/C/N/V/CP/abd pain    Objective     Physical Exam:  Vitals: BP (!) 151/96   Pulse 73   Temp 97.5 °F (36.4 °C) (Oral)   Resp 28   Wt 113.4 kg (250 lb)   SpO2 97%   BMI 39.16 kg/m²     I & O - 24hr: No intake/output data recorded.   General Appearance: alert and cooperative  HEENT:  Head: Normocephalic, no lesions, without obvious abnormality.  Neck: no adenopathy and no JVD  Lung:    , mild expiratory wheezing  Heart: regular rate and rhythm, S1, S2 normal, no murmur, click, rub or gallop  Abdomen: soft, non-tender; bowel sounds normal; no masses,  no organomegaly  Extremities:  extremities normal, atraumatic, no cyanosis or edema  Musculokeletal: No joint swelling, no muscle tenderness. ROM normal in all joints of extremities.   Neurologic: Mental status: Alert, oriented, thought content appropriate    Pertinent Labs & Imaging Studies     CBC:   Lab Results   Component Value Date/Time    WBC 8.1 02/19/2024 08:23 AM    RBC 4.67 02/19/2024 08:23 AM    HGB 13.6 02/19/2024 08:23 AM    HCT 43.0 02/19/2024 08:23 AM    MCV 92.1 02/19/2024 08:23 AM    MCH 29.1 02/19/2024 08:23 AM    MCHC 31.6 02/19/2024 08:23 AM    RDW 11.8 02/19/2024 08:23 AM     02/19/2024 08:23 AM    MPV 10.4 02/19/2024 08:23 AM     CMP:    Lab Results   Component Value Date/Time     02/19/2024 08:23 AM    K 4.8 02/19/2024 08:23 AM     02/19/2024 08:23 AM    CO2 26 02/19/2024 08:23 AM    BUN 14 02/19/2024 08:23 AM    CREATININE 0.5 02/19/2024 08:23 AM    LABGLOM >60 02/19/2024 08:23 AM    GLUCOSE 125 02/19/2024 08:23 AM    PROT 6.6

## 2024-02-19 NOTE — PROGRESS NOTES
4 Eyes Skin Assessment     NAME:  Brenda Nunn  YOB: 1965  MEDICAL RECORD NUMBER:  [id number]    The patient is being assessed for  Admission    I agree that at least one RN has performed a thorough Head to Toe Skin Assessment on the patient. ALL assessment sites listed below have been assessed.      Areas assessed by both nurses:    Head, Face, Ears, Shoulders, Back, Chest, Arms, Elbows, Hands, Sacrum. Buttock, Coccyx, Ischium, and Legs. Feet and Heels        Does the Patient have a Wound? No noted wound(s)       Gabe Prevention initiated by RN: No  Wound Care Orders initiated by RN: No    Pressure Injury (Stage 3,4, Unstageable, DTI, NWPT, and Complex wounds) if present, place Wound referral order by RN under : No    New Ostomies, if present place, Ostomy referral order under : No     Nurse 1 eSignature: Electronically signed by Carmen Baez RN on 2/19/24 at 4:31 PM EST    **SHARE this note so that the co-signing nurse can place an eSignature**    Nurse 2 eSignature: {Esignature:[id number]}

## 2024-02-19 NOTE — PROGRESS NOTES
[facility]  Internal Medicine Residency Program  Progress Note - House Team       Patient:  Brenda Nunn 58 y.o. female   MRN: [id number]       Date of Service: 2024  Admission date: 2024  2:57 PM ; Hospital day: 1   CC: Shortness of Breath (Hx emphysema and CHF, worsening sob with exertion, wears 3 L chronic- given 1 Duoneb and 25 Solumedrol in route)     Overnight events: negative viral panel     Subjective     Brenda Nunn was seen this morning reclining in her bed in no acute distress. She admitted to shortness of breath, nonproductive cough, and soreness in her chest, and headache. She also said that she gets shakey whenever she gets sick. She stated that the swelling in her legs was near her baseline. She stated she slept fine last night and has normal bowels and urination. She stated the breathing treatments she has received have helped a little bit. She denied nausea, vomiting, dizziness.      Objective   PHYSICAL EXAM  General Appearance: Well appearing, mild distress  HEENT: Normocephalic, atraumatic  Neck: midline trachea, no carotid bruit  Lung: wheezing throughout  Heart: regular rate and rhythm, no murmur  Abdomen: soft, bowel sounds normal; no masses  Extremities: mild edema bilateral legs  Neurologic: Mental status: AOx3    CARDIOVASCULAR  Vitals: /84   Pulse 89   Temp 98.2 °F (36.8 °C) (Oral)   Resp 21   Wt 113.4 kg (250 lb)   SpO2 94%   BMI 39.16 kg/m²     Pulse rate range      : Pulse  Av.7  Min: 73  Max: 106  BP range                  : Systolic (24hrs), Av , Min:103 , Max:152   ; Diastolic (24hrs), Av, Min:63, Max:96       PULMONARY  Respiration range   : Resp  Av.3  Min: 11  Max: 39  Pulse Ox range : SpO2  Av.3 %  Min: 90 %  Max: 99 %  No results for input(s): \"PH\", \"PCO2\", \"PO2\", \"HCO3\", \"BE\", \"O2SAT\" in the last 72 hours.    Invalid input(s): \"PFRATIO\"     NEPHROLOGY (FLUIDS/ELECTROLYTES & NUTRITION)      I & O - 24hr:  No

## 2024-02-19 NOTE — CARE COORDINATION
Patient presented to the ED due to shortness of breath, is a re-admit; admitted for COPD. Patient reports she lives with her daughter, Brain in a 2-story home, stays on the main floor, ambulates short distances without a device; has a w/c, bedside commode and on 3L NC at home through [facility]. Uses [facility] ([facility] and PCP is Dr. Chapin Siddiqui. Patient plans to return home, reports no home going needs and states her daughter will transport home. During assessment, patient tearful. Inquired about home situation, patient reports feeling safe at home and acknowledged she is not happy with her living situation. Patient guarded; no other details shared. Patient started on daily Iv Solu-medrol, echo ordered.     Case Management Assessment  Initial Evaluation    Date/Time of Evaluation: 2/19/2024 11:36 AM  Assessment Completed by: TIGIST Vigil    If patient is discharged prior to next notation, then this note serves as note for discharge by case management.    Patient Name: Brenda Nunn                   YOB: 1965  Diagnosis: Shortness of breath [R06.02]  COPD (chronic obstructive pulmonary disease) (HCC) [J44.9]  COPD exacerbation (HCC) [J44.1]  Chest pain, unspecified type [R07.9]                   Date / Time: 2/18/2024  2:57 PM    Patient Admission Status: Inpatient   Readmission Risk (Low < 19, Mod (19-27), High > 27): Readmission Risk Score: 32.2    Current PCP: Chapin Siddiqui MD  PCP verified by ? Yes    Chart Reviewed: Yes      History Provided by: Patient  Patient Orientation: Alert and Oriented    Patient Cognition: Alert    Hospitalization in the last 30 days (Readmission):  Yes    If yes, Readmission Assessment in  Navigator will be completed.    Advance Directives:      Code Status: Full Code   Patient's Primary Decision Maker is: Legal Next of Kin    Primary Decision Maker: BRAIN SCHREIBER - Child - [phone]    Discharge Planning:    Patient lives with:   Type

## 2024-02-20 ENCOUNTER — APPOINTMENT (OUTPATIENT)
Age: 59
DRG: 192 | End: 2024-02-20
Payer: MEDICARE

## 2024-02-20 VITALS
RESPIRATION RATE: 20 BRPM | HEIGHT: 67 IN | OXYGEN SATURATION: 94 % | SYSTOLIC BLOOD PRESSURE: 136 MMHG | DIASTOLIC BLOOD PRESSURE: 76 MMHG | TEMPERATURE: 98.7 F | BODY MASS INDEX: 38.93 KG/M2 | HEART RATE: 96 BPM | WEIGHT: 248.02 LBS

## 2024-02-20 LAB
ANION GAP SERPL CALCULATED.3IONS-SCNC: 13 MMOL/L (ref 7–16)
BASOPHILS # BLD: 0.02 K/UL (ref 0–0.2)
BASOPHILS NFR BLD: 0 % (ref 0–2)
BUN SERPL-MCNC: 24 MG/DL (ref 6–20)
CALCIUM SERPL-MCNC: 9.6 MG/DL (ref 8.6–10.2)
CHLORIDE SERPL-SCNC: 100 MMOL/L (ref 98–107)
CO2 SERPL-SCNC: 29 MMOL/L (ref 22–29)
CREAT SERPL-MCNC: 0.7 MG/DL (ref 0.5–1)
ECHO AO ASC DIAM: 3.5 CM
ECHO AO ASCENDING AORTA INDEX: 1.58 CM/M2
ECHO AV AREA PEAK VELOCITY: 2 CM2
ECHO AV AREA VTI: 2.1 CM2
ECHO AV AREA/BSA PEAK VELOCITY: 0.9 CM2/M2
ECHO AV AREA/BSA VTI: 0.9 CM2/M2
ECHO AV CUSP MM: 1.6 CM
ECHO AV MEAN GRADIENT: 6 MMHG
ECHO AV MEAN VELOCITY: 1.2 M/S
ECHO AV PEAK GRADIENT: 10 MMHG
ECHO AV PEAK VELOCITY: 1.6 M/S
ECHO AV VELOCITY RATIO: 0.63
ECHO AV VTI: 31 CM
ECHO BSA: 2.32 M2
ECHO EST RA PRESSURE: 3 MMHG
ECHO LA DIAMETER INDEX: 1.67 CM/M2
ECHO LA DIAMETER: 3.7 CM
ECHO LA VOL A-L A2C: 79 ML (ref 22–52)
ECHO LA VOL A-L A4C: 54 ML (ref 22–52)
ECHO LA VOL MOD A2C: 74 ML (ref 22–52)
ECHO LA VOL MOD A4C: 50 ML (ref 22–52)
ECHO LA VOLUME AREA LENGTH: 66 ML
ECHO LA VOLUME INDEX A-L A2C: 36 ML/M2 (ref 16–34)
ECHO LA VOLUME INDEX A-L A4C: 24 ML/M2 (ref 16–34)
ECHO LA VOLUME INDEX AREA LENGTH: 30 ML/M2 (ref 16–34)
ECHO LA VOLUME INDEX MOD A2C: 33 ML/M2 (ref 16–34)
ECHO LA VOLUME INDEX MOD A4C: 23 ML/M2 (ref 16–34)
ECHO LV EJECTION FRACTION A2C: 55 %
ECHO LV EJECTION FRACTION A4C: 49 %
ECHO LV FRACTIONAL SHORTENING: 30 % (ref 28–44)
ECHO LV INTERNAL DIMENSION DIASTOLE INDEX: 2.07 CM/M2
ECHO LV INTERNAL DIMENSION DIASTOLIC: 4.6 CM (ref 3.9–5.3)
ECHO LV INTERNAL DIMENSION SYSTOLIC INDEX: 1.44 CM/M2
ECHO LV INTERNAL DIMENSION SYSTOLIC: 3.2 CM
ECHO LV ISOVOLUMETRIC RELAXATION TIME (IVRT): 96.9 MS
ECHO LV IVSD: 1 CM (ref 0.6–0.9)
ECHO LV MASS 2D: 158.8 G (ref 67–162)
ECHO LV MASS INDEX 2D: 71.5 G/M2 (ref 43–95)
ECHO LV POSTERIOR WALL DIASTOLIC: 1 CM (ref 0.6–0.9)
ECHO LV RELATIVE WALL THICKNESS RATIO: 0.43
ECHO LVOT AREA: 3.1 CM2
ECHO LVOT AV VTI INDEX: 0.71
ECHO LVOT DIAM: 2 CM
ECHO LVOT MEAN GRADIENT: 2 MMHG
ECHO LVOT PEAK GRADIENT: 4 MMHG
ECHO LVOT PEAK VELOCITY: 1 M/S
ECHO LVOT STROKE VOLUME INDEX: 31.1 ML/M2
ECHO LVOT SV: 69.1 ML
ECHO LVOT VTI: 22 CM
ECHO MV "A" WAVE DURATION: 115.3 MSEC
ECHO MV A VELOCITY: 1.02 M/S
ECHO MV AREA PHT: 5.9 CM2
ECHO MV AREA VTI: 2.8 CM2
ECHO MV E DECELERATION TIME (DT): 146.8 MS
ECHO MV E VELOCITY: 0.63 M/S
ECHO MV E/A RATIO: 0.62
ECHO MV LVOT VTI INDEX: 1.13
ECHO MV MAX VELOCITY: 1.3 M/S
ECHO MV MEAN GRADIENT: 3 MMHG
ECHO MV MEAN VELOCITY: 0.8 M/S
ECHO MV PEAK GRADIENT: 6 MMHG
ECHO MV PRESSURE HALF TIME (PHT): 37.2 MS
ECHO MV VTI: 24.8 CM
ECHO PV MAX VELOCITY: 1.4 M/S
ECHO PV MEAN GRADIENT: 5 MMHG
ECHO PV MEAN VELOCITY: 1 M/S
ECHO PV PEAK GRADIENT: 8 MMHG
ECHO PV VTI: 24.5 CM
ECHO PVEIN A DURATION: 41.5 MS
ECHO PVEIN A VELOCITY: 0.3 M/S
ECHO PVEIN PEAK D VELOCITY: 0.5 M/S
ECHO PVEIN PEAK S VELOCITY: 0.5 M/S
ECHO PVEIN S/D RATIO: 1
ECHO RIGHT VENTRICULAR SYSTOLIC PRESSURE (RVSP): 41 MMHG
ECHO RV INTERNAL DIMENSION: 2.9 CM
ECHO TV REGURGITANT MAX VELOCITY: 3.1 M/S
ECHO TV REGURGITANT PEAK GRADIENT: 38 MMHG
EOSINOPHIL # BLD: 0.02 K/UL (ref 0.05–0.5)
EOSINOPHILS RELATIVE PERCENT: 0 % (ref 0–6)
ERYTHROCYTE [DISTWIDTH] IN BLOOD BY AUTOMATED COUNT: 11.8 % (ref 11.5–15)
GFR SERPL CREATININE-BSD FRML MDRD: >60 ML/MIN/1.73M2
GLUCOSE SERPL-MCNC: 121 MG/DL (ref 74–99)
HCT VFR BLD AUTO: 41.7 % (ref 34–48)
HGB BLD-MCNC: 13.3 G/DL (ref 11.5–15.5)
IMM GRANULOCYTES # BLD AUTO: 0.03 K/UL (ref 0–0.58)
IMM GRANULOCYTES NFR BLD: 0 % (ref 0–5)
LYMPHOCYTES NFR BLD: 2.67 K/UL (ref 1.5–4)
LYMPHOCYTES RELATIVE PERCENT: 28 % (ref 20–42)
MCH RBC QN AUTO: 30 PG (ref 26–35)
MCHC RBC AUTO-ENTMCNC: 31.9 G/DL (ref 32–34.5)
MCV RBC AUTO: 93.9 FL (ref 80–99.9)
MONOCYTES NFR BLD: 0.82 K/UL (ref 0.1–0.95)
MONOCYTES NFR BLD: 9 % (ref 2–12)
NEUTROPHILS NFR BLD: 63 % (ref 43–80)
NEUTS SEG NFR BLD: 6.06 K/UL (ref 1.8–7.3)
PLATELET # BLD AUTO: 223 K/UL (ref 130–450)
PMV BLD AUTO: 10.6 FL (ref 7–12)
POTASSIUM SERPL-SCNC: 3.9 MMOL/L (ref 3.5–5)
RBC # BLD AUTO: 4.44 M/UL (ref 3.5–5.5)
SODIUM SERPL-SCNC: 142 MMOL/L (ref 132–146)
WBC OTHER # BLD: 9.6 K/UL (ref 4.5–11.5)

## 2024-02-20 PROCEDURE — 85025 COMPLETE CBC W/AUTO DIFF WBC: CPT

## 2024-02-20 PROCEDURE — 6360000002 HC RX W HCPCS

## 2024-02-20 PROCEDURE — 94640 AIRWAY INHALATION TREATMENT: CPT

## 2024-02-20 PROCEDURE — 2580000003 HC RX 258

## 2024-02-20 PROCEDURE — 93306 TTE W/DOPPLER COMPLETE: CPT

## 2024-02-20 PROCEDURE — 80048 BASIC METABOLIC PNL TOTAL CA: CPT

## 2024-02-20 PROCEDURE — 36415 COLL VENOUS BLD VENIPUNCTURE: CPT

## 2024-02-20 PROCEDURE — 99231 SBSQ HOSP IP/OBS SF/LOW 25: CPT | Performed by: INTERNAL MEDICINE

## 2024-02-20 PROCEDURE — 93306 TTE W/DOPPLER COMPLETE: CPT | Performed by: INTERNAL MEDICINE

## 2024-02-20 PROCEDURE — 6370000000 HC RX 637 (ALT 250 FOR IP)

## 2024-02-20 PROCEDURE — 2700000000 HC OXYGEN THERAPY PER DAY

## 2024-02-20 RX ORDER — PREDNISONE 20 MG/1
40 TABLET ORAL DAILY
Status: DISCONTINUED | OUTPATIENT
Start: 2024-02-20 | End: 2024-02-20

## 2024-02-20 RX ORDER — PREDNISONE 20 MG/1
40 TABLET ORAL DAILY
Status: DISCONTINUED | OUTPATIENT
Start: 2024-02-21 | End: 2024-02-20 | Stop reason: HOSPADM

## 2024-02-20 RX ORDER — ACETAMINOPHEN 160 MG
1 TABLET,DISINTEGRATING ORAL DAILY
Qty: 30 CAPSULE | Refills: 2 | Status: SHIPPED | OUTPATIENT
Start: 2024-02-20

## 2024-02-20 RX ORDER — PREDNISONE 20 MG/1
40 TABLET ORAL DAILY
Qty: 6 TABLET | Refills: 0 | Status: SHIPPED | OUTPATIENT
Start: 2024-02-21 | End: 2024-02-24

## 2024-02-20 RX ADMIN — IPRATROPIUM BROMIDE AND ALBUTEROL SULFATE 1 DOSE: 2.5; .5 SOLUTION RESPIRATORY (INHALATION) at 12:55

## 2024-02-20 RX ADMIN — WATER 40 MG: 1 INJECTION INTRAMUSCULAR; INTRAVENOUS; SUBCUTANEOUS at 08:22

## 2024-02-20 RX ADMIN — ARIPIPRAZOLE 5 MG: 5 TABLET ORAL at 08:22

## 2024-02-20 RX ADMIN — IPRATROPIUM BROMIDE AND ALBUTEROL SULFATE 1 DOSE: 2.5; .5 SOLUTION RESPIRATORY (INHALATION) at 09:17

## 2024-02-20 RX ADMIN — ROFLUMILAST 250 MCG: 500 TABLET ORAL at 08:22

## 2024-02-20 RX ADMIN — HYDROXYZINE PAMOATE 25 MG: 25 CAPSULE ORAL at 08:56

## 2024-02-20 RX ADMIN — ARFORMOTEROL TARTRATE 15 MCG: 15 SOLUTION RESPIRATORY (INHALATION) at 09:17

## 2024-02-20 RX ADMIN — PANTOPRAZOLE SODIUM 40 MG: 40 TABLET, DELAYED RELEASE ORAL at 05:34

## 2024-02-20 RX ADMIN — Medication 10 ML: at 08:23

## 2024-02-20 RX ADMIN — BUDESONIDE 500 MCG: 0.5 SUSPENSION RESPIRATORY (INHALATION) at 09:17

## 2024-02-20 ASSESSMENT — PAIN SCALES - GENERAL: PAINLEVEL_OUTOF10: 0

## 2024-02-20 NOTE — DISCHARGE INSTRUCTIONS
Internal medicine    Follow ups  Please follow up with the internal medicine clinic at [facility].Your appointment has been scheduled for 2/26/24 at 14:30   This is Trish vargas please call her for transportation coordination [phone]   Please keep all other follow up appointments:  Sleep medicine   Pulmonology     Changes in healthcare   Please take all medications as indicated  Diet: regular diet   Activity: activity as tolerated  New Medications started during this hospital stay  Prednisone 40 starting 2/21/24 and take for 3 doses last dose 2/23/24   Duoneb nebulizer temporarily till Dr Glez sees you    Vit D3 2000 units per day   Changes to your medications  None   Medications you should stop taking   Valproic acid   Additional labs, testing or imaging needed after discharge   none    Please contact us if you have any concerns, wish to change or make an appointment:  Internal medicine clinic   Phone: [phone]  Fax: [phone]  [facility] [place]note 2][facility] [address]  Or please call the nurse line at [phone].  Should you have further questions in regards to this visit, you can review your clinical note and after visit summary document on your Dash account.  Other questions can be directed to our nurse line at [phone].     Other than any new prescriptions given to you today, the list of home medications on this After Visit Summary are based on information provided to us from you and your healthcare providers. This information, including the list, dose, and frequency of medications is only as accurate as the information you provided. If you have any questions or concerns about your home medications, please contact your Primary Care Physician for further clarification.    Spoke to mom. She stated that pt started saying last night that he feels a discomfort on his lower mid sternum. Per mom . Pt states he feels something there. Denies any resp distress or SOB, no light headedness or dizzy, no pain or discomfort else where. Denies any sports related injury or trauma. No n/v/d, had a cold a few weeks ago, no new illness now. Pt currently in school. Pt has an appt today. Advised mom to take pt to ED if pt is not acting himself when she picks him up from school, having any resp concerns or any other concerns. Mom states understanding.

## 2024-02-20 NOTE — PROGRESS NOTES
CLINICAL PHARMACY NOTE: MEDS TO BEDS    Total # of Prescriptions Filled: 1   The following medications were delivered to the patient:  Vitamin d3    Additional Documentation:   Patient picked up the vitamin d in the pharmacy. Patient left without the prednisone 20mg tbl.

## 2024-02-20 NOTE — CARE COORDINATION
Discharge order noted. Patient to discharge home today. Spoke with patient to verify if she has a nebulizer at home; she confirmed she does, nebulizer and oxygen through [facility] [place]. Discussed transport resources; provided to patient. Inquired about concerns at home; patient reported being overwhelmed with the 6 grandkids at home; offered emotional support and outpatient counseling. Patient declined outpatient services and states she will set this up on her own. Patient reports her daughter will be transporting her home and will bring in her portable O2 tank. Call made to patient's daughter, Farhana, she confirmed she will  the patient at 3p and will bring in her O2 tank; reports no needs or concerns. Patient and nurse informed.    Electronically signed by TIGIST Vigil on 2/20/2024 at 12:18 PM

## 2024-02-20 NOTE — PROGRESS NOTES
[facility]  Internal Medicine Residency / House Medicine Service    Attending Physician Statement  I have discussed the case, including pertinent history and exam findings with the resident and the team.  I have seen and examined the patient and the key elements of the encounter have been performed by me.  I agree with the assessment, plan and orders as documented by the resident.      A&O  And less SOB  Wheezing decreasing  Having 2 DECHO at bedside during exam  Meds reviewed for discharge  To oral Prednisone   Remainder of medical problems as per resident note.      Jens Fong MD FRCP [facility]  Internal Medicine Residency Faculty

## 2024-02-20 NOTE — DISCHARGE SUMMARY
[facility]  Discharge Summary    PCP: Chapin Siddiqui MD    Admit Date:2/18/2024  Discharge Date: 2/20/2024    Admission Diagnosis:   Worsening Dyspnea 2/2 COPD exacerbation   COPD on 3L at baseline   Tobacco abuse   Depression and anxiety   GERD   Hx methamphetamine abuse   Vitamin D deficiency     Discharge Diagnosis:  Worsening Dyspnea 2/2 COPD exacerbation, resolved    COPD on 3L at baseline   Tobacco abuse   Depression and anxiety   GERD   Hx methamphetamine abuse   Vitamin D deficiency     Hospital Course:   Brenda Nunn is a 58 y.o. female with PMHx of COPD, tobacco abuse, depression, anxiety and substance abuse came in with a CC of SOB.     Patient presented from home due to worsening SOB and dyspnea on exertion over the past day. Patient states she was started to get worse SOB yesterday but would do her home breathing treatments with some improvements. Today she slowly was feeling worse and could barely walk across the room due to the dyspnea. She is on 3L at baseline and has not changed how much oxygen she is currently on. Patient does admit to smoking a few cigarettes today and is not currently interested in quitting. Denies any fever, abdominal pain, constipation or diarrhea.  Patient does endorse chills for the past 4-5 days that will just come out of no where. This is not withdrawals because she had that in the past and denies any current drug use since her last admission on 2/10 for drug use. She states her daughter, who she lives with along with grandchildren, have had a cold for the past few days. Denies any recent travel. Patient did received Duoneb treatments and steroids in the ED and continues to have shortness of breath despite intervention. Her vitals have remained stable. Patient is being admitted for observation due to COPD exacerbation.     Respiratory panel was negative, there were no changes in sputum color amount or viscosity, she

## 2024-02-20 NOTE — PLAN OF CARE
Problem: Chronic Conditions and Co-morbidities  Goal: Patient's chronic conditions and co-morbidity symptoms are monitored and maintained or improved  2/20/2024 1027 by Lucinda Lucero RN  Outcome: Progressing  2/20/2024 0307 by Alysha Arzola RN  Outcome: Progressing     Problem: Discharge Planning  Goal: Discharge to home or other facility with appropriate resources  2/20/2024 1027 by Lucinda Lucero RN  Outcome: Progressing  2/20/2024 0307 by Alysha Arzola RN  Outcome: Progressing     Problem: Safety - Adult  Goal: Free from fall injury  2/20/2024 1027 by Lucinda Lucero RN  Outcome: Progressing  2/20/2024 0307 by Alysha Arzola RN  Outcome: Progressing

## 2024-02-21 ENCOUNTER — TELEPHONE (OUTPATIENT)
Dept: INTERNAL MEDICINE | Age: 59
End: 2024-02-21

## 2024-02-21 NOTE — TELEPHONE ENCOUNTER
Last Appointment:  12/22/23  Future Appointments   Date Time Provider Department Center   2/26/2024  2:30 PM Genesis Enriquez MD [facility] IM [facility]   2/27/2024  2:00 PM Paulie Chavez MD [facility] SLEEP [facility]   3/13/2024  9:00 AM Pedro Bedolla MD [facility] Pulm [facility]      No answer noted. VM message left for patient to return call to this nurse.

## 2024-02-22 NOTE — PROGRESS NOTES
[facility]  Internal Medicine Department    Attending Physician Statement:  Lorenzo Carroll Jr. D.O.    I have discussed the case, including pertinent history and exam findings with the resident. I have reviewed all past medical history, past surgical history, family history, social history, medications, and allergies and updated as appropriate in the history section of the chart. I have seen and examined the patient and the key elements of the encounter have been performed by me. I agree with the assessment, plan and orders as documented by the resident.      GOLD Stage IV COPD  -multiple admissions for COPD exacerbations  -not in exacerbation at this time  -does not have inhalers as she was recently discharged from nursing facility   -restart inhalers   -patient to have polysomnography as outpatient   -home O2 3L currently tolerating    Methamphetamine Withdrawal  -patient used 4 days ago intranasally;   -discussed cessation with donna  -UDS to confirm that patient did not ingest any unknown substances   -ativan PRN for withdrawal symtpoms   -peer recovery     Plan to DC home tomorrow if hemodynamically stable; meds to beds for inhalers     Remainder of medical problems as per resident note.    
    [facility]  Internal Medicine Department    Attending Physician Statement:  Lorenzo Carroll Jr. D.O.    I have discussed the case, including pertinent history and exam findings with the resident. I have reviewed all past medical history, past surgical history, family history, social history, medications, and allergies and updated as appropriate in the history section of the chart. I have seen and examined the patient and the key elements of the encounter have been performed by me. I agree with the assessment, plan and orders as documented by the resident.      GOLD Stage IV COPD  -multiple admissions for COPD exacerbations  -not in exacerbation at this time  -does not have inhalers as she was recently discharged from nursing facility   -restart inhalers   -patient to have polysomnography as outpatient   -home O2 3L currently tolerating    Methamphetamine Withdrawal  -patient used 4 days ago intranasally;   -discussed cessation with donna  -UDS shows amphetamine and marajuana  -peer recovery     Plan to DC home today    Remainder of medical problems as per resident note.    
  Physician Progress Note      PATIENT:               ARUNA MADRID  CSN #:                  [id number]  :                       1965  ADMIT DATE:       2024 1:22 PM  DISCH DATE:        2024 3:45 PM  RESPONDING  PROVIDER #:        Samy Oliveira MD          QUERY TEXT:    Patient admitted with SOB.   Noted documentation of AECOPD in the ED and H&P   and not in ACOPDE in the hospitalist PN 2024.  If possible, please document in progress notes and discharge summary if you   are evaluating and /or treating any of the following:      The medical record reflects the following:  Risk Factors: COPD  Clinical Indicators: She has not been taking her inhalers since her discharge   from the facility. Patient was supposed to schedule with OP pulmonary rehab,   which she didn't after her PFT confirms COPD. She is having SOB since Monday.   Her baseline O2 requirement at home is 3 L, After receiving one dose of duoneb   patient's breathing didn't improve, saturation drops to 90s with 3L,   requiring 4L of O2.Acute on chronic hypoxic respiratory failure due to COPD   exacerbation In the H&P, PN :Acute on chronic hypoxic respiratory failure   due to COPD exacerbation  Treatment: 125 mg of solumedrol is given, duoneb , does not have inhalers as   she was recently discharged from nursing facility  -restart inhalers    Thank you, Svitlana Peralta RN, CDS [phone]  Options provided:  -- AECOPD confirmed and COPD without exacerbation ruled out  -- COPD without exacerbation confirmed and AECOPD ruled out  -- Other - I will add my own diagnosis  -- Disagree - Not applicable / Not valid  -- Disagree - Clinically unable to determine / Unknown  -- Refer to Clinical Documentation Reviewer    PROVIDER RESPONSE TEXT:    After study, AECOPD confirmed and COPD without exacerbation ruled out.    Query created by: Svitlana Peralta on 2024 2:28 PM      Electronically signed by:  Samy Oliveira MD 2024 2:37 PM          
CLINICAL PHARMACY NOTE: MEDS TO BEDS    Total # of Prescriptions Filled: 5   The following medications were delivered to the patient:  Symbicort   Pantoprazole 40mg  Prednisone 20mg  Roflumilast 500mg  Iprat-albut soldoyle    Additional Documentation:    Picked up  
DVT Prophylaxis Adjustment Policy (DVT Prophylaxis)     This patient is on DVT Prophylaxis medication that requires a dose adjustment      Date Body Weight IBW  Adjusted BW SCr  CrCl Dialysis status   2/8/2024 114.9 kg (253 lb 3.2 oz) Ideal body weight: 61.6 kg (135 lb 12.9 oz)  Adjusted ideal body weight: 82.9 kg (182 lb 12.2 oz) Serum creatinine: 0.7 mg/dL 02/08/24 0848  Estimated creatinine clearance: 116 mL/min N/a       Pharmacy has dose-adjusted the DVT Prophylaxis regimen to match   the recommendations from the following table        Ordered Medication:Lovenox 40mg daily    Order Changed/converted to: Lovenox 30mg BID      These changes were made per protocol according to the [facility] Pharmacist   Review for Appropriate Use and Automatic Dose Adjustments of   Subcutaneous Anticoagulants Policy     *Please note this dose may need readjusted if patient's condition changes.    Please contact pharmacy with any questions regarding these changes.    Jazmin Sauceda [facility]  2/8/2024  1:14 PM    
Home medications given to patient from medication bin and discussed with patient, Patient IV removed, New medications from pharmacy delivered to patient. Patient understood discharge instructions and follow ups. Patient then taken via wheelchair to private vehicle.  
status/obtunded  Options provided:  -- Acute Respiratory Failure as evidenced by, Please document evidence.  -- Acute Respiratory Failure ruled out after study and Chronic Respiratory   Failure confirmed  -- Other - I will add my own diagnosis  -- Disagree - Not applicable / Not valid  -- Disagree - Clinically unable to determine / Unknown  -- Refer to Clinical Documentation Reviewer    PROVIDER RESPONSE TEXT:    Provider is clinically unable to determine a response to this query.  Acute on Chronic Respiratory Failure would be appropriate in the context of   increasd oxygen requirements with underlying chronic respiratory failure.    Query created by: Svitlana Peralta on 2/21/2024 7:01 AM      Electronically signed by:  Samy Oliveira MD 2/22/2024 1:03 PM

## 2024-02-23 NOTE — PROGRESS NOTES
Physician Progress Note      PATIENT:               ARUNA MADRID  CSN #:                  [id number]  :                       1965  ADMIT DATE:       2024 2:57 PM  DISCH DATE:        2024 3:33 PM  RESPONDING  PROVIDER #:        Lakhwinder Gann MD          QUERY TEXT:    Pt admitted with COPD exacerbation.  Pt noted to be on 3L NC chronically. If   possible, please document in the progress notes and discharge summary if you   are evaluating and/or treating any of the following:    The medical record reflects the following:  Risk Factors: COPD  Clinical Indicators: Per notes, On 3L NC chronically. Currently on 4LNC, no   documented distress.  Treatment: 4L currently, 3L baseline    Thank you,  Tory Jackson, RN, BSN, CRCR, Clinical Documentation Improvement  Options provided:  -- Chronic respiratory failure with hypoxia  -- Chronic respiratory failure with hypercapnia  -- Chronic respiratory failure with hypoxia and hypercapnia  -- Other - I will add my own diagnosis  -- Disagree - Not applicable / Not valid  -- Disagree - Clinically unable to determine / Unknown  -- Refer to Clinical Documentation Reviewer    PROVIDER RESPONSE TEXT:    This patient has chronic respiratory failure with hypoxia.    Query created by: Tory Jackson on 2024 7:38 AM      Electronically signed by:  Lakhwinder Gann MD 2024 5:35 PM

## 2024-02-26 ENCOUNTER — OFFICE VISIT (OUTPATIENT)
Dept: INTERNAL MEDICINE | Age: 59
End: 2024-02-26
Payer: MEDICARE

## 2024-02-26 VITALS
HEIGHT: 67 IN | TEMPERATURE: 98.4 F | BODY MASS INDEX: 37.39 KG/M2 | HEART RATE: 103 BPM | DIASTOLIC BLOOD PRESSURE: 77 MMHG | OXYGEN SATURATION: 95 % | RESPIRATION RATE: 20 BRPM | SYSTOLIC BLOOD PRESSURE: 121 MMHG | WEIGHT: 238.2 LBS

## 2024-02-26 DIAGNOSIS — F41.9 ANXIETY: ICD-10-CM

## 2024-02-26 DIAGNOSIS — Z12.4 CERVICAL CANCER SCREENING: ICD-10-CM

## 2024-02-26 DIAGNOSIS — R31.9 HEMATURIA, UNSPECIFIED TYPE: ICD-10-CM

## 2024-02-26 DIAGNOSIS — Z59.41 FOOD INSECURITY: ICD-10-CM

## 2024-02-26 DIAGNOSIS — B07.9 WART OF HAND: ICD-10-CM

## 2024-02-26 DIAGNOSIS — R31.1 BENIGN ESSENTIAL MICROSCOPIC HEMATURIA: Primary | ICD-10-CM

## 2024-02-26 DIAGNOSIS — K22.9 IRREGULAR Z LINE OF ESOPHAGUS: ICD-10-CM

## 2024-02-26 DIAGNOSIS — K21.00 GASTROESOPHAGEAL REFLUX DISEASE WITH ESOPHAGITIS, UNSPECIFIED WHETHER HEMORRHAGE: ICD-10-CM

## 2024-02-26 DIAGNOSIS — Z09 HOSPITAL DISCHARGE FOLLOW-UP: ICD-10-CM

## 2024-02-26 DIAGNOSIS — G47.00 INSOMNIA, UNSPECIFIED TYPE: ICD-10-CM

## 2024-02-26 DIAGNOSIS — J44.9 CHRONIC OBSTRUCTIVE PULMONARY DISEASE, UNSPECIFIED COPD TYPE (HCC): ICD-10-CM

## 2024-02-26 DIAGNOSIS — R63.8 INSUFFICIENT INTAKE OF FOOD AND WATER: ICD-10-CM

## 2024-02-26 LAB
BILIRUBIN, POC: NEGATIVE
BLOOD URINE, POC: NEGATIVE
CLARITY, POC: CLEAR
COLOR, POC: YELLOW
GLUCOSE URINE, POC: NEGATIVE
KETONES, POC: NEGATIVE
LEUKOCYTE EST, POC: NORMAL
NITRITE, POC: NEGATIVE
PH, POC: 5.5
PROTEIN, POC: NEGATIVE
SPECIFIC GRAVITY, POC: >=1.03
UROBILINOGEN, POC: 0.2

## 2024-02-26 PROCEDURE — 99213 OFFICE O/P EST LOW 20 MIN: CPT | Performed by: STUDENT IN AN ORGANIZED HEALTH CARE EDUCATION/TRAINING PROGRAM

## 2024-02-26 PROCEDURE — 81002 URINALYSIS NONAUTO W/O SCOPE: CPT | Performed by: STUDENT IN AN ORGANIZED HEALTH CARE EDUCATION/TRAINING PROGRAM

## 2024-02-26 PROCEDURE — 1111F DSCHRG MED/CURRENT MED MERGE: CPT | Performed by: STUDENT IN AN ORGANIZED HEALTH CARE EDUCATION/TRAINING PROGRAM

## 2024-02-26 RX ORDER — RAMELTEON 8 MG/1
8 TABLET ORAL NIGHTLY
Qty: 90 TABLET | Refills: 0 | Status: CANCELLED | OUTPATIENT
Start: 2024-02-26 | End: 2024-05-26

## 2024-02-26 RX ORDER — ARIPIPRAZOLE 5 MG/1
5 TABLET ORAL DAILY
Qty: 90 TABLET | Refills: 0 | Status: CANCELLED | OUTPATIENT
Start: 2024-02-26

## 2024-02-26 RX ORDER — ALBUTEROL SULFATE 90 UG/1
2 AEROSOL, METERED RESPIRATORY (INHALATION) EVERY 6 HOURS PRN
Qty: 18 G | Refills: 0 | Status: SHIPPED | OUTPATIENT
Start: 2024-02-26

## 2024-02-26 RX ORDER — IPRATROPIUM BROMIDE AND ALBUTEROL SULFATE 2.5; .5 MG/3ML; MG/3ML
1 SOLUTION RESPIRATORY (INHALATION) EVERY 4 HOURS
Qty: 540 ML | Refills: 0 | Status: SHIPPED | OUTPATIENT
Start: 2024-02-26 | End: 2024-03-27

## 2024-02-26 RX ORDER — HYDROXYZINE HYDROCHLORIDE 25 MG/1
25 TABLET, FILM COATED ORAL EVERY 6 HOURS PRN
Qty: 120 TABLET | Refills: 0 | Status: SHIPPED | OUTPATIENT
Start: 2024-02-26 | End: 2024-03-27

## 2024-02-26 RX ORDER — MIRTAZAPINE 15 MG/1
15 TABLET, FILM COATED ORAL NIGHTLY
Qty: 90 TABLET | Refills: 0 | Status: CANCELLED | OUTPATIENT
Start: 2024-02-26

## 2024-02-26 RX ORDER — GUAIFENESIN 400 MG/1
400 TABLET ORAL 3 TIMES DAILY
Qty: 90 TABLET | Refills: 0 | Status: SHIPPED | OUTPATIENT
Start: 2024-02-26

## 2024-02-26 RX ORDER — PRAZOSIN HYDROCHLORIDE 1 MG/1
1 CAPSULE ORAL NIGHTLY
COMMUNITY

## 2024-02-26 RX ORDER — PANTOPRAZOLE SODIUM 40 MG/1
40 TABLET, DELAYED RELEASE ORAL
Qty: 90 TABLET | Refills: 0 | Status: SHIPPED | OUTPATIENT
Start: 2024-02-26 | End: 2024-05-26

## 2024-02-26 SDOH — ECONOMIC STABILITY - FOOD INSECURITY: FOOD INSECURITY: Z59.41

## 2024-02-26 ASSESSMENT — PATIENT HEALTH QUESTIONNAIRE - PHQ9
2. FEELING DOWN, DEPRESSED OR HOPELESS: 1
5. POOR APPETITE OR OVEREATING: 1
SUM OF ALL RESPONSES TO PHQ9 QUESTIONS 1 & 2: 4
SUM OF ALL RESPONSES TO PHQ QUESTIONS 1-9: 10
7. TROUBLE CONCENTRATING ON THINGS, SUCH AS READING THE NEWSPAPER OR WATCHING TELEVISION: 0
SUM OF ALL RESPONSES TO PHQ QUESTIONS 1-9: 10
9. THOUGHTS THAT YOU WOULD BE BETTER OFF DEAD, OR OF HURTING YOURSELF: 0
10. IF YOU CHECKED OFF ANY PROBLEMS, HOW DIFFICULT HAVE THESE PROBLEMS MADE IT FOR YOU TO DO YOUR WORK, TAKE CARE OF THINGS AT HOME, OR GET ALONG WITH OTHER PEOPLE: 0
SUM OF ALL RESPONSES TO PHQ QUESTIONS 1-9: 10
3. TROUBLE FALLING OR STAYING ASLEEP: 1
4. FEELING TIRED OR HAVING LITTLE ENERGY: 0
SUM OF ALL RESPONSES TO PHQ QUESTIONS 1-9: 10
6. FEELING BAD ABOUT YOURSELF - OR THAT YOU ARE A FAILURE OR HAVE LET YOURSELF OR YOUR FAMILY DOWN: 3
8. MOVING OR SPEAKING SO SLOWLY THAT OTHER PEOPLE COULD HAVE NOTICED. OR THE OPPOSITE, BEING SO FIGETY OR RESTLESS THAT YOU HAVE BEEN MOVING AROUND A LOT MORE THAN USUAL: 1

## 2024-02-26 NOTE — PROGRESS NOTES
[facility]  Internal Medicine Residency Clinic    Attending Physician Statement  I have discussed the case, including pertinent history and exam findings with the resident physician.  I agree with the assessment, plan and orders as documented by the resident. I have reviewed all pertinent PMHx, PSHx, FamHx, SocialHx, medications, and allergies and updated history as appropriate.    Patient here for hospital follow up. COPD exacerbation. Patient is from [place] and is currently living here but has not established care until this time. Has had multiple admissions for this in the past. On chronic 3 L O2 at home.  Has appointment with[facility] on 3/13/2024.    Symbicort  Duoneb 4 times per day.   Uses albuterol 5 times a day.   Mucinex  Roflumilast.     Still with SOB with short distances.     Severe BENJAMIN but does not have her machine.  States she had a shared storage space.    Has sleep appointment tomorrow.      Lives in a home without water currenlty. She lives with her daughter and 7 children. Water off since 2/1/2024.  Uses soda throughout the day.     Admits to methamphetamine use.     Remainder of medical problems as per resident note.    Tayler Arshad MD  2/26/2024 3:30 PM

## 2024-02-26 NOTE — PROGRESS NOTES
Post-Discharge Transitional Care  Follow Up      Brenda Nunn   YOB: 1965    Date of Office Visit:  2/26/2024  Date of Hospital Admission: 2/18/24  Date of Hospital Discharge: 2/20/24  Risk of hospital readmission (high >=14%. Medium >=10%) :Readmission Risk Score: 31.3      Care management risk score Rising risk (score 2-5) and Complex Care (Scores >=6): No Risk Score On File     Non face to face  following discharge, date last encounter closed (first attempt may have been earlier): *No documented post hospital discharge outreach found in the last 14 days    Call initiated 2 business days of discharge: *No response recorded in the last 14 days    ASSESSMENT/PLAN:   Benign essential microscopic hematuria  Irregular Z line of esophagus  -     pantoprazole (PROTONIX) 40 MG tablet; Take 1 tablet by mouth every morning (before breakfast), Disp-90 tablet, R-0Normal  Gastroesophageal reflux disease with esophagitis, unspecified whether hemorrhage  -     pantoprazole (PROTONIX) 40 MG tablet; Take 1 tablet by mouth every morning (before breakfast), Disp-90 tablet, R-0Normal  Hematuria, unspecified type  -     POCT Urinalysis no Micro: no blood found on dipstick, no gross hematuria visualized  Chronic obstructive pulmonary disease, unspecified COPD type ([facility])  -     albuterol sulfate HFA (VENTOLIN HFA) 108 (90 Base) MCG/ACT inhaler; Inhale 2 puffs into the lungs every 6 hours as needed for Wheezing or Shortness of Breath, Disp-18 g, R-0Normal  -     guaiFENesin 400 MG tablet; Take 1 tablet by mouth in the morning, at noon, and at bedtime, Disp-90 tablet, R-0Normal  -     ipratropium 0.5 mg-albuterol 2.5 mg (DUONEB) 0.5-2.5 (3) MG/3ML SOLN nebulizer solution; Inhale 3 mLs into the lungs every 4 hours Indications: Treatment to Prevent COPD with Bronchospasms, Disp-540 mL, R-0Normal  Anxiety  -     hydrOXYzine HCl (ATARAX) 25 MG tablet; Take 1 tablet by mouth every 6 hours as needed for Anxiety, Disp-120

## 2024-02-28 ENCOUNTER — SOCIAL WORK (OUTPATIENT)
Dept: INTERNAL MEDICINE | Age: 59
End: 2024-02-28

## 2024-02-29 ENCOUNTER — CARE COORDINATION (OUTPATIENT)
Dept: CARE COORDINATION | Age: 59
End: 2024-02-29

## 2024-02-29 NOTE — CARE COORDINATION
Attempted to reach patient by telephone. Left HIPAA compliant message requesting a return call. Will send unable to reach letter. Will attempt to reach patient again.

## 2024-03-01 NOTE — PROGRESS NOTES
LSW initiated contact with pt as seen in waiting room at [facility] on  2.26.24;states she is doing well despite over crowded living arrangements with her daughter which has been same since moving to [place] in October 2023; met privately then w pt in room; states family struggling especially with food; reports water will be turned on 3.1.24; pt with good hygiene and appearance; denies drug usage;continues with frustrations with limitations due to oxygen usage.  Denies any other needs; noted BLANCA ESCOTO unable to reach pt; she reports her phone will be on 3.3.24 and verified number in chart; also provided daughter's number as well as back up at [phone]  Provided pt and daughter with 3 bags of food; spoke with daughter at  and she reports all is now good with JFS and food stamps so they should be good moving forward.  Advised to contact LSW as needed.    In review of comletechart 3.1.24 note pt was in ED on 2.10.24 and desiring drug rehab but had barriers with oxygen; attempted call to pt unsuccessfully to further discuss as phone is turned off; will discuss w BLANCA ESCOTO

## 2024-03-06 ENCOUNTER — HOSPITAL ENCOUNTER (INPATIENT)
Age: 59
LOS: 2 days | Discharge: NURSING, REHAB OR LONG-TERM CARE | DRG: 191 | End: 2024-03-11
Attending: EMERGENCY MEDICINE | Admitting: INTERNAL MEDICINE
Payer: MEDICARE

## 2024-03-06 ENCOUNTER — APPOINTMENT (OUTPATIENT)
Dept: GENERAL RADIOLOGY | Age: 59
DRG: 191 | End: 2024-03-06
Payer: MEDICARE

## 2024-03-06 DIAGNOSIS — J44.1 CHRONIC OBSTRUCTIVE PULMONARY DISEASE WITH ACUTE EXACERBATION (HCC): ICD-10-CM

## 2024-03-06 DIAGNOSIS — J44.1 COPD EXACERBATION (HCC): Primary | ICD-10-CM

## 2024-03-06 DIAGNOSIS — R06.09 DYSPNEA ON EXERTION: ICD-10-CM

## 2024-03-06 LAB
AMPHET UR QL SCN: NEGATIVE
ANION GAP SERPL CALCULATED.3IONS-SCNC: 9 MMOL/L (ref 7–16)
B PARAP IS1001 DNA NPH QL NAA+NON-PROBE: NOT DETECTED
B PERT DNA SPEC QL NAA+PROBE: NOT DETECTED
BARBITURATES UR QL SCN: NEGATIVE
BASOPHILS # BLD: 0.03 K/UL (ref 0–0.2)
BASOPHILS NFR BLD: 1 % (ref 0–2)
BENZODIAZ UR QL: NEGATIVE
BUN SERPL-MCNC: 14 MG/DL (ref 6–20)
BUPRENORPHINE UR QL: NEGATIVE
C PNEUM DNA NPH QL NAA+NON-PROBE: NOT DETECTED
CALCIUM SERPL-MCNC: 9.5 MG/DL (ref 8.6–10.2)
CANNABINOIDS UR QL SCN: POSITIVE
CHLORIDE SERPL-SCNC: 100 MMOL/L (ref 98–107)
CO2 SERPL-SCNC: 33 MMOL/L (ref 22–29)
COCAINE UR QL SCN: NEGATIVE
CREAT SERPL-MCNC: 0.8 MG/DL (ref 0.5–1)
EKG ATRIAL RATE: 86 BPM
EKG P AXIS: 65 DEGREES
EKG P-R INTERVAL: 156 MS
EKG Q-T INTERVAL: 364 MS
EKG QRS DURATION: 80 MS
EKG QTC CALCULATION (BAZETT): 435 MS
EKG R AXIS: 63 DEGREES
EKG T AXIS: 83 DEGREES
EKG VENTRICULAR RATE: 86 BPM
EOSINOPHIL # BLD: 0.09 K/UL (ref 0.05–0.5)
EOSINOPHILS RELATIVE PERCENT: 2 % (ref 0–6)
ERYTHROCYTE [DISTWIDTH] IN BLOOD BY AUTOMATED COUNT: 11.8 % (ref 11.5–15)
FENTANYL UR QL: NEGATIVE
FLUAV RNA NPH QL NAA+NON-PROBE: NOT DETECTED
FLUAV RNA RESP QL NAA+PROBE: NOT DETECTED
FLUBV RNA NPH QL NAA+NON-PROBE: NOT DETECTED
FLUBV RNA RESP QL NAA+PROBE: NOT DETECTED
GFR SERPL CREATININE-BSD FRML MDRD: >60 ML/MIN/1.73M2
GLUCOSE SERPL-MCNC: 96 MG/DL (ref 74–99)
HADV DNA NPH QL NAA+NON-PROBE: NOT DETECTED
HCOV 229E RNA NPH QL NAA+NON-PROBE: NOT DETECTED
HCOV HKU1 RNA NPH QL NAA+NON-PROBE: NOT DETECTED
HCOV NL63 RNA NPH QL NAA+NON-PROBE: NOT DETECTED
HCOV OC43 RNA NPH QL NAA+NON-PROBE: NOT DETECTED
HCT VFR BLD AUTO: 40.8 % (ref 34–48)
HGB BLD-MCNC: 12.6 G/DL (ref 11.5–15.5)
HMPV RNA NPH QL NAA+NON-PROBE: NOT DETECTED
HPIV1 RNA NPH QL NAA+NON-PROBE: NOT DETECTED
HPIV2 RNA NPH QL NAA+NON-PROBE: NOT DETECTED
HPIV3 RNA NPH QL NAA+NON-PROBE: NOT DETECTED
HPIV4 RNA NPH QL NAA+NON-PROBE: NOT DETECTED
IMM GRANULOCYTES # BLD AUTO: <0.03 K/UL (ref 0–0.58)
IMM GRANULOCYTES NFR BLD: 0 % (ref 0–5)
LYMPHOCYTES NFR BLD: 1.42 K/UL (ref 1.5–4)
LYMPHOCYTES RELATIVE PERCENT: 30 % (ref 20–42)
M PNEUMO DNA NPH QL NAA+NON-PROBE: NOT DETECTED
MCH RBC QN AUTO: 28.4 PG (ref 26–35)
MCHC RBC AUTO-ENTMCNC: 30.9 G/DL (ref 32–34.5)
MCV RBC AUTO: 91.9 FL (ref 80–99.9)
METHADONE UR QL: NEGATIVE
MONOCYTES NFR BLD: 0.3 K/UL (ref 0.1–0.95)
MONOCYTES NFR BLD: 6 % (ref 2–12)
NEUTROPHILS NFR BLD: 61 % (ref 43–80)
NEUTS SEG NFR BLD: 2.95 K/UL (ref 1.8–7.3)
OPIATES UR QL SCN: NEGATIVE
OXYCODONE UR QL SCN: NEGATIVE
PCP UR QL SCN: NEGATIVE
PLATELET # BLD AUTO: 216 K/UL (ref 130–450)
PMV BLD AUTO: 9.6 FL (ref 7–12)
POTASSIUM SERPL-SCNC: 4.1 MMOL/L (ref 3.5–5)
PROCALCITONIN SERPL-MCNC: 0.05 NG/ML (ref 0–0.08)
RBC # BLD AUTO: 4.44 M/UL (ref 3.5–5.5)
RSV RNA NPH QL NAA+NON-PROBE: NOT DETECTED
RV+EV RNA NPH QL NAA+NON-PROBE: NOT DETECTED
SARS-COV-2 RNA NPH QL NAA+NON-PROBE: NOT DETECTED
SARS-COV-2 RNA RESP QL NAA+PROBE: NOT DETECTED
SODIUM SERPL-SCNC: 142 MMOL/L (ref 132–146)
SOURCE: NORMAL
SPECIMEN DESCRIPTION: NORMAL
SPECIMEN DESCRIPTION: NORMAL
TEST INFORMATION: ABNORMAL
TROPONIN I SERPL HS-MCNC: 11 NG/L (ref 0–9)
TROPONIN I SERPL HS-MCNC: 12 NG/L (ref 0–9)
WBC OTHER # BLD: 4.8 K/UL (ref 4.5–11.5)

## 2024-03-06 PROCEDURE — 6370000000 HC RX 637 (ALT 250 FOR IP)

## 2024-03-06 PROCEDURE — 87636 SARSCOV2 & INF A&B AMP PRB: CPT

## 2024-03-06 PROCEDURE — 85025 COMPLETE CBC W/AUTO DIFF WBC: CPT

## 2024-03-06 PROCEDURE — 93010 ELECTROCARDIOGRAM REPORT: CPT | Performed by: INTERNAL MEDICINE

## 2024-03-06 PROCEDURE — 96372 THER/PROPH/DIAG INJ SC/IM: CPT

## 2024-03-06 PROCEDURE — 80048 BASIC METABOLIC PNL TOTAL CA: CPT

## 2024-03-06 PROCEDURE — 96375 TX/PRO/DX INJ NEW DRUG ADDON: CPT

## 2024-03-06 PROCEDURE — 6360000002 HC RX W HCPCS

## 2024-03-06 PROCEDURE — 84145 PROCALCITONIN (PCT): CPT

## 2024-03-06 PROCEDURE — G0378 HOSPITAL OBSERVATION PER HR: HCPCS

## 2024-03-06 PROCEDURE — 94640 AIRWAY INHALATION TREATMENT: CPT

## 2024-03-06 PROCEDURE — 71045 X-RAY EXAM CHEST 1 VIEW: CPT

## 2024-03-06 PROCEDURE — 2500000003 HC RX 250 WO HCPCS

## 2024-03-06 PROCEDURE — 93005 ELECTROCARDIOGRAM TRACING: CPT

## 2024-03-06 PROCEDURE — 96365 THER/PROPH/DIAG IV INF INIT: CPT

## 2024-03-06 PROCEDURE — 2700000000 HC OXYGEN THERAPY PER DAY

## 2024-03-06 PROCEDURE — 96366 THER/PROPH/DIAG IV INF ADDON: CPT

## 2024-03-06 PROCEDURE — 0202U NFCT DS 22 TRGT SARS-COV-2: CPT

## 2024-03-06 PROCEDURE — 99222 1ST HOSP IP/OBS MODERATE 55: CPT | Performed by: INTERNAL MEDICINE

## 2024-03-06 PROCEDURE — 99285 EMERGENCY DEPT VISIT HI MDM: CPT

## 2024-03-06 PROCEDURE — 96374 THER/PROPH/DIAG INJ IV PUSH: CPT

## 2024-03-06 PROCEDURE — 2580000003 HC RX 258

## 2024-03-06 PROCEDURE — 84484 ASSAY OF TROPONIN QUANT: CPT

## 2024-03-06 PROCEDURE — 94660 CPAP INITIATION&MGMT: CPT

## 2024-03-06 PROCEDURE — 80307 DRUG TEST PRSMV CHEM ANLYZR: CPT

## 2024-03-06 RX ORDER — POLYETHYLENE GLYCOL 3350 17 G/17G
17 POWDER, FOR SOLUTION ORAL DAILY PRN
Status: DISCONTINUED | OUTPATIENT
Start: 2024-03-06 | End: 2024-03-11 | Stop reason: HOSPADM

## 2024-03-06 RX ORDER — ENOXAPARIN SODIUM 100 MG/ML
30 INJECTION SUBCUTANEOUS 2 TIMES DAILY
Status: DISCONTINUED | OUTPATIENT
Start: 2024-03-06 | End: 2024-03-11 | Stop reason: HOSPADM

## 2024-03-06 RX ORDER — ALBUTEROL SULFATE 2.5 MG/3ML
2.5 SOLUTION RESPIRATORY (INHALATION) EVERY 6 HOURS PRN
Status: DISCONTINUED | OUTPATIENT
Start: 2024-03-06 | End: 2024-03-06

## 2024-03-06 RX ORDER — CHOLECALCIFEROL (VITAMIN D3) 125 MCG
5 CAPSULE ORAL NIGHTLY PRN
Status: DISCONTINUED | OUTPATIENT
Start: 2024-03-06 | End: 2024-03-06

## 2024-03-06 RX ORDER — INSULIN LISPRO 100 [IU]/ML
0-4 INJECTION, SOLUTION INTRAVENOUS; SUBCUTANEOUS
Status: DISCONTINUED | OUTPATIENT
Start: 2024-03-06 | End: 2024-03-11 | Stop reason: HOSPADM

## 2024-03-06 RX ORDER — NICOTINE 21 MG/24HR
1 PATCH, TRANSDERMAL 24 HOURS TRANSDERMAL DAILY
Status: DISCONTINUED | OUTPATIENT
Start: 2024-03-06 | End: 2024-03-11 | Stop reason: HOSPADM

## 2024-03-06 RX ORDER — IPRATROPIUM BROMIDE AND ALBUTEROL SULFATE 2.5; .5 MG/3ML; MG/3ML
3 SOLUTION RESPIRATORY (INHALATION) ONCE
Status: COMPLETED | OUTPATIENT
Start: 2024-03-06 | End: 2024-03-06

## 2024-03-06 RX ORDER — ACETAMINOPHEN 650 MG/1
650 SUPPOSITORY RECTAL EVERY 6 HOURS PRN
Status: DISCONTINUED | OUTPATIENT
Start: 2024-03-06 | End: 2024-03-11 | Stop reason: HOSPADM

## 2024-03-06 RX ORDER — BUDESONIDE 0.5 MG/2ML
0.5 INHALANT ORAL
Status: DISCONTINUED | OUTPATIENT
Start: 2024-03-06 | End: 2024-03-11 | Stop reason: HOSPADM

## 2024-03-06 RX ORDER — ROFLUMILAST 500 UG/1
250 TABLET ORAL DAILY
Status: DISCONTINUED | OUTPATIENT
Start: 2024-03-06 | End: 2024-03-06

## 2024-03-06 RX ORDER — MIRTAZAPINE 15 MG/1
15 TABLET, ORALLY DISINTEGRATING ORAL NIGHTLY
Status: DISCONTINUED | OUTPATIENT
Start: 2024-03-06 | End: 2024-03-11 | Stop reason: HOSPADM

## 2024-03-06 RX ORDER — PANTOPRAZOLE SODIUM 40 MG/1
40 TABLET, DELAYED RELEASE ORAL
Status: CANCELLED | OUTPATIENT
Start: 2024-03-07

## 2024-03-06 RX ORDER — SODIUM CHLORIDE 0.9 % (FLUSH) 0.9 %
5-40 SYRINGE (ML) INJECTION PRN
Status: DISCONTINUED | OUTPATIENT
Start: 2024-03-06 | End: 2024-03-11 | Stop reason: HOSPADM

## 2024-03-06 RX ORDER — PANTOPRAZOLE SODIUM 40 MG/1
40 TABLET, DELAYED RELEASE ORAL
Status: DISCONTINUED | OUTPATIENT
Start: 2024-03-07 | End: 2024-03-11 | Stop reason: HOSPADM

## 2024-03-06 RX ORDER — ONDANSETRON 4 MG/1
4 TABLET, ORALLY DISINTEGRATING ORAL EVERY 8 HOURS PRN
Status: DISCONTINUED | OUTPATIENT
Start: 2024-03-06 | End: 2024-03-11 | Stop reason: HOSPADM

## 2024-03-06 RX ORDER — ARFORMOTEROL TARTRATE 15 UG/2ML
15 SOLUTION RESPIRATORY (INHALATION)
Status: DISCONTINUED | OUTPATIENT
Start: 2024-03-06 | End: 2024-03-11 | Stop reason: HOSPADM

## 2024-03-06 RX ORDER — SODIUM CHLORIDE 9 MG/ML
INJECTION, SOLUTION INTRAVENOUS PRN
Status: DISCONTINUED | OUTPATIENT
Start: 2024-03-06 | End: 2024-03-11 | Stop reason: HOSPADM

## 2024-03-06 RX ORDER — ARIPIPRAZOLE 5 MG/1
5 TABLET ORAL DAILY
Status: DISCONTINUED | OUTPATIENT
Start: 2024-03-06 | End: 2024-03-11 | Stop reason: HOSPADM

## 2024-03-06 RX ORDER — HYDROXYZINE HYDROCHLORIDE 50 MG/ML
50 INJECTION, SOLUTION INTRAMUSCULAR EVERY 6 HOURS PRN
Status: DISCONTINUED | OUTPATIENT
Start: 2024-03-06 | End: 2024-03-06

## 2024-03-06 RX ORDER — GUAIFENESIN 400 MG/1
400 TABLET ORAL 3 TIMES DAILY
Status: DISCONTINUED | OUTPATIENT
Start: 2024-03-06 | End: 2024-03-11 | Stop reason: HOSPADM

## 2024-03-06 RX ORDER — IPRATROPIUM BROMIDE AND ALBUTEROL SULFATE 2.5; .5 MG/3ML; MG/3ML
1 SOLUTION RESPIRATORY (INHALATION) EVERY 4 HOURS PRN
Status: DISCONTINUED | OUTPATIENT
Start: 2024-03-06 | End: 2024-03-07

## 2024-03-06 RX ORDER — SODIUM CHLORIDE 0.9 % (FLUSH) 0.9 %
5-40 SYRINGE (ML) INJECTION EVERY 12 HOURS SCHEDULED
Status: DISCONTINUED | OUTPATIENT
Start: 2024-03-06 | End: 2024-03-11 | Stop reason: HOSPADM

## 2024-03-06 RX ORDER — PRAZOSIN HYDROCHLORIDE 1 MG/1
1 CAPSULE ORAL NIGHTLY
Status: DISCONTINUED | OUTPATIENT
Start: 2024-03-06 | End: 2024-03-11 | Stop reason: HOSPADM

## 2024-03-06 RX ORDER — ONDANSETRON 2 MG/ML
4 INJECTION INTRAMUSCULAR; INTRAVENOUS EVERY 6 HOURS PRN
Status: DISCONTINUED | OUTPATIENT
Start: 2024-03-06 | End: 2024-03-11 | Stop reason: HOSPADM

## 2024-03-06 RX ORDER — HYDROXYZINE PAMOATE 25 MG/1
50 CAPSULE ORAL EVERY 6 HOURS PRN
Status: DISCONTINUED | OUTPATIENT
Start: 2024-03-06 | End: 2024-03-11 | Stop reason: HOSPADM

## 2024-03-06 RX ORDER — MECOBALAMIN 5000 MCG
5 TABLET,DISINTEGRATING ORAL NIGHTLY PRN
Status: DISCONTINUED | OUTPATIENT
Start: 2024-03-06 | End: 2024-03-11

## 2024-03-06 RX ORDER — ACETAMINOPHEN 325 MG/1
650 TABLET ORAL EVERY 6 HOURS PRN
Status: DISCONTINUED | OUTPATIENT
Start: 2024-03-06 | End: 2024-03-11 | Stop reason: HOSPADM

## 2024-03-06 RX ADMIN — GUAIFENESIN 400 MG: 400 TABLET ORAL at 22:45

## 2024-03-06 RX ADMIN — CEFTRIAXONE SODIUM 2000 MG: 2 INJECTION, POWDER, FOR SOLUTION INTRAMUSCULAR; INTRAVENOUS at 14:52

## 2024-03-06 RX ADMIN — DOXYCYCLINE 100 MG: 100 INJECTION, POWDER, LYOPHILIZED, FOR SOLUTION INTRAVENOUS at 15:01

## 2024-03-06 RX ADMIN — SODIUM CHLORIDE, PRESERVATIVE FREE 10 ML: 5 INJECTION INTRAVENOUS at 22:45

## 2024-03-06 RX ADMIN — HYDROXYZINE HYDROCHLORIDE 50 MG: 50 INJECTION, SOLUTION INTRAMUSCULAR at 19:06

## 2024-03-06 RX ADMIN — IPRATROPIUM BROMIDE AND ALBUTEROL SULFATE 3 DOSE: 2.5; .5 SOLUTION RESPIRATORY (INHALATION) at 12:01

## 2024-03-06 RX ADMIN — ARFORMOTEROL TARTRATE 15 MCG: 15 SOLUTION RESPIRATORY (INHALATION) at 22:22

## 2024-03-06 RX ADMIN — ENOXAPARIN SODIUM 30 MG: 100 INJECTION SUBCUTANEOUS at 22:44

## 2024-03-06 RX ADMIN — BUDESONIDE 500 MCG: 0.5 SUSPENSION RESPIRATORY (INHALATION) at 22:22

## 2024-03-06 RX ADMIN — WATER 125 MG: 1 INJECTION INTRAMUSCULAR; INTRAVENOUS; SUBCUTANEOUS at 11:12

## 2024-03-06 ASSESSMENT — PAIN SCALES - GENERAL: PAINLEVEL_OUTOF10: 0

## 2024-03-06 NOTE — ED PROVIDER NOTES
troponin of 11.  No electrolyte disturbance or acute kidney injury.  No leukocytosis or anemia.  Negative COVID and flu.  Chest x-ray does show some evidence of small consolidation on the right.  EKG was stable in comparison to previous.  Patient attempted to ambulate but became very tachypneic and could only walk a short distance.  Patient was administered 1 dose of IV antibiotics of Rocephin and doxycycline to begin treatment.  Discussed case with Dr. Dalton, who accepts the patient for admission.      CONSULTS: (Who and What was discussed)  IP CONSULT TO INTERNAL MEDICINE        I am the Primary Clinician of Record.    FINAL IMPRESSION      1. COPD exacerbation (HCC)    2. Dyspnea on exertion          DISPOSITION/PLAN     DISPOSITION Admitted 03/06/2024 03:37:15 PM    DISPOSITION  Disposition: Admit to telemetry  Patient condition is stable    3/6/24, 10:58 AM ASHA Ying PGY-2  Emergency Medicine    PATIENT REFERRED TO:  No follow-up provider specified.    DISCHARGE MEDICATIONS:  Current Discharge Medication List          DISCONTINUED MEDICATIONS:  Current Discharge Medication List                 (Please note that portions of this note were completed with a voice recognition program.  Efforts were made to edit the dictations but occasionally words are mis-transcribed.)    Fransisco Ying DO (electronically signed)

## 2024-03-06 NOTE — PROGRESS NOTES
Patient was seen with the medical residents    Harsh wheezing throughout.  She is currently on 2 L nasal cannula.  Her heart rate is stable.  Extremities unremarkable    Acute exacerbation COPD with hypoxemia.  Appears the patient also has a history of chronic respiratory failure and uses BiPAP at home.  Today she was acutely hypoxemic with exertion.  This is not acute hypoxemic respiratory failure because she only requires 2 L nasal cannula to stabilize herself.    Observation  COPD exacerbation to be treated with aerosols, antibiotic therapy and systemic steroid.  Plan discharge in 2 midnights    Electronically signed by Parish Dalton MD on 3/6/24 at 4:04 PM EST

## 2024-03-06 NOTE — H&P
[facility]  Internal Medicine Residency Program  History and Physical    Patient:  Brenda Nunn 58 y.o. female MRN: [id number]     Date of Service: 3/6/2024    Hospital Day: 1    History of Present Illness   Brenda Nunn is a 58 y.o. female with PMHx of anxiety, depression, HFpEF, COPD (on 3L O2 at home), HTN, GERD, and tobacco, marijuana, and methamphetamine use who came in with a CC of SOB over the past few days.  Patient was recently admitted for COPD exacerbation on .  At that time, all infectious workup was negative.  She was discharged on prednisone 40mg for 3 days and instructed to follow up with pulmonology outpatient.  Patient presented to the ED stable.  Vitals were unremarkable.  Labs were also unremarkable.  Patient had a CXR which did not appear changed when compared to her most recent one from her last admission.  Patient is on her home dose of O2.  Patient also had ambulating pulse oximetry and maintained an O2 sat >92% while on 3L.  Patient was given 125mg IV solu-medrol while in the ED.  She was admitted for further workup and evaluation.      Past Medical History:       Diagnosis Date    CHF (congestive heart failure) (HCC)     COPD (chronic obstructive pulmonary disease) (HCC)     Depression     Hypertension        Past Surgical History:        Procedure Laterality Date     SECTION      HYSTERECTOMY (CERVIX STATUS UNKNOWN)      Ovaries retained. Unknown cervix status. performed for uterine fibroids       Medications Prior to Admission:    Prior to Admission medications    Medication Sig Start Date End Date Taking? Authorizing Provider   prazosin (MINIPRESS) 1 MG capsule Take 1 capsule by mouth nightly    Provider, MD Otilio   albuterol sulfate HFA (VENTOLIN HFA) 108 (90 Base) MCG/ACT inhaler Inhale 2 puffs into the lungs every 6 hours as needed for Wheezing or Shortness of Breath 24   Genesis Enriquez MD   guaiFENesin 400 MG tablet Take 1 tablet  headaches.   Eyes:  Negative for discharge and redness.   HEENT:  Negative for sneezing and sore throat, admits to sinus drainage   Cardiovascular:  Negative for chest pain, palpitations and leg swelling.   Respiratory: admits to cough, chest tightness, shortness of breath and wheezing.    GI:  Negative for abdominal distention, abdominal pain, N/V/C/D  :  Negative for dysuria or increased frequency.   Skin: Negative.        Physical Exam   Vitals: BP (!) 168/96   Pulse 94   Temp 98 °F (36.7 °C)   Resp (!) 31   Ht 1.702 m (5' 7\")   Wt 108 kg (238 lb)   SpO2 94%   BMI 37.28 kg/m²     Physical Exam:  General Appearance: No acute distress. Awake and conversant.   Neuro: Round symmetric pupil that react to light bilaterally    Cardiovascular: regular rate and rhythm, S1 and S2 heard, no murmurs appreciated   Respiratory: Diffuse inspiratory and expiratory wheezing present bilaterally  Abdomen: Soft, non-tender; bowel sounds normal; no masses, no organomegaly, rebound or guarding  Extremities: Extremities normal, no cyanosis, distal pulses intact, no pedal edema.     Labs and Imaging Studies     Basic Labs:    Complete Blood Count:   Recent Labs     03/06/24  1104   WBC 4.8   HGB 12.6   HCT 40.8           Last 3 Blood Glucose:   Recent Labs     03/06/24  1104   GLUCOSE 96        PT/INR:    Lab Results   Component Value Date/Time    PROTIME 10.2 11/19/2023 04:28 PM    INR 0.9 11/19/2023 04:28 PM     PTT:    Lab Results   Component Value Date/Time    APTT 28.0 11/19/2023 04:28 PM       Comprehensive Metabolic Profile:   Recent Labs     03/06/24  1104      K 4.1      CO2 33*   BUN 14   CREATININE 0.8   GLUCOSE 96   CALCIUM 9.5      Magnesium:   Lab Results   Component Value Date/Time    MG 1.6 02/07/2024 04:05 PM     Phosphorus:   Lab Results   Component Value Date/Time    PHOS 3.9 12/19/2023 05:21 AM     Ionized Calcium: No results found for: \"CAION\"   Troponin: No results for input(s):

## 2024-03-07 LAB
ANION GAP SERPL CALCULATED.3IONS-SCNC: 8 MMOL/L (ref 7–16)
BASOPHILS # BLD: 0.01 K/UL (ref 0–0.2)
BASOPHILS NFR BLD: 0 % (ref 0–2)
BUN SERPL-MCNC: 12 MG/DL (ref 6–20)
CALCIUM SERPL-MCNC: 9.7 MG/DL (ref 8.6–10.2)
CHLORIDE SERPL-SCNC: 102 MMOL/L (ref 98–107)
CO2 SERPL-SCNC: 31 MMOL/L (ref 22–29)
CREAT SERPL-MCNC: 0.6 MG/DL (ref 0.5–1)
EOSINOPHIL # BLD: 0 K/UL (ref 0.05–0.5)
EOSINOPHILS RELATIVE PERCENT: 0 % (ref 0–6)
ERYTHROCYTE [DISTWIDTH] IN BLOOD BY AUTOMATED COUNT: 11.6 % (ref 11.5–15)
GFR SERPL CREATININE-BSD FRML MDRD: >60 ML/MIN/1.73M2
GLUCOSE SERPL-MCNC: 130 MG/DL (ref 74–99)
HCT VFR BLD AUTO: 38.6 % (ref 34–48)
HGB BLD-MCNC: 12.2 G/DL (ref 11.5–15.5)
IMM GRANULOCYTES # BLD AUTO: <0.03 K/UL (ref 0–0.58)
IMM GRANULOCYTES NFR BLD: 0 % (ref 0–5)
LYMPHOCYTES NFR BLD: 0.94 K/UL (ref 1.5–4)
LYMPHOCYTES RELATIVE PERCENT: 15 % (ref 20–42)
MCH RBC QN AUTO: 28.3 PG (ref 26–35)
MCHC RBC AUTO-ENTMCNC: 31.6 G/DL (ref 32–34.5)
MCV RBC AUTO: 89.6 FL (ref 80–99.9)
MONOCYTES NFR BLD: 0.55 K/UL (ref 0.1–0.95)
MONOCYTES NFR BLD: 9 % (ref 2–12)
NEUTROPHILS NFR BLD: 76 % (ref 43–80)
NEUTS SEG NFR BLD: 4.81 K/UL (ref 1.8–7.3)
PLATELET # BLD AUTO: 218 K/UL (ref 130–450)
PMV BLD AUTO: 10.1 FL (ref 7–12)
POTASSIUM SERPL-SCNC: 4.2 MMOL/L (ref 3.5–5)
RBC # BLD AUTO: 4.31 M/UL (ref 3.5–5.5)
SODIUM SERPL-SCNC: 141 MMOL/L (ref 132–146)
TROPONIN I SERPL HS-MCNC: 10 NG/L (ref 0–9)
WBC OTHER # BLD: 6.3 K/UL (ref 4.5–11.5)

## 2024-03-07 PROCEDURE — G0378 HOSPITAL OBSERVATION PER HR: HCPCS

## 2024-03-07 PROCEDURE — 96376 TX/PRO/DX INJ SAME DRUG ADON: CPT

## 2024-03-07 PROCEDURE — 2580000003 HC RX 258

## 2024-03-07 PROCEDURE — 2500000003 HC RX 250 WO HCPCS

## 2024-03-07 PROCEDURE — 99231 SBSQ HOSP IP/OBS SF/LOW 25: CPT | Performed by: INTERNAL MEDICINE

## 2024-03-07 PROCEDURE — 96366 THER/PROPH/DIAG IV INF ADDON: CPT

## 2024-03-07 PROCEDURE — 6370000000 HC RX 637 (ALT 250 FOR IP)

## 2024-03-07 PROCEDURE — 94640 AIRWAY INHALATION TREATMENT: CPT

## 2024-03-07 PROCEDURE — 85025 COMPLETE CBC W/AUTO DIFF WBC: CPT

## 2024-03-07 PROCEDURE — 6360000002 HC RX W HCPCS

## 2024-03-07 PROCEDURE — 96372 THER/PROPH/DIAG INJ SC/IM: CPT

## 2024-03-07 PROCEDURE — 36415 COLL VENOUS BLD VENIPUNCTURE: CPT

## 2024-03-07 PROCEDURE — 84484 ASSAY OF TROPONIN QUANT: CPT

## 2024-03-07 PROCEDURE — 97161 PT EVAL LOW COMPLEX 20 MIN: CPT

## 2024-03-07 PROCEDURE — 97165 OT EVAL LOW COMPLEX 30 MIN: CPT

## 2024-03-07 PROCEDURE — 80048 BASIC METABOLIC PNL TOTAL CA: CPT

## 2024-03-07 PROCEDURE — 2700000000 HC OXYGEN THERAPY PER DAY

## 2024-03-07 PROCEDURE — 94660 CPAP INITIATION&MGMT: CPT

## 2024-03-07 RX ORDER — IPRATROPIUM BROMIDE AND ALBUTEROL SULFATE 2.5; .5 MG/3ML; MG/3ML
1 SOLUTION RESPIRATORY (INHALATION)
Status: DISCONTINUED | OUTPATIENT
Start: 2024-03-07 | End: 2024-03-11 | Stop reason: HOSPADM

## 2024-03-07 RX ORDER — DOXYCYCLINE HYCLATE 100 MG/1
100 CAPSULE ORAL EVERY 12 HOURS SCHEDULED
Status: DISCONTINUED | OUTPATIENT
Start: 2024-03-07 | End: 2024-03-11 | Stop reason: HOSPADM

## 2024-03-07 RX ADMIN — HYDROXYZINE PAMOATE 50 MG: 25 CAPSULE ORAL at 03:40

## 2024-03-07 RX ADMIN — BUDESONIDE 500 MCG: 0.5 SUSPENSION RESPIRATORY (INHALATION) at 09:42

## 2024-03-07 RX ADMIN — BUDESONIDE 500 MCG: 0.5 SUSPENSION RESPIRATORY (INHALATION) at 20:51

## 2024-03-07 RX ADMIN — IPRATROPIUM BROMIDE AND ALBUTEROL SULFATE 1 DOSE: .5; 3 SOLUTION RESPIRATORY (INHALATION) at 03:56

## 2024-03-07 RX ADMIN — IPRATROPIUM BROMIDE AND ALBUTEROL SULFATE 1 DOSE: .5; 2.5 SOLUTION RESPIRATORY (INHALATION) at 20:51

## 2024-03-07 RX ADMIN — IPRATROPIUM BROMIDE AND ALBUTEROL SULFATE 1 DOSE: .5; 2.5 SOLUTION RESPIRATORY (INHALATION) at 09:43

## 2024-03-07 RX ADMIN — HYDROXYZINE PAMOATE 50 MG: 25 CAPSULE ORAL at 15:37

## 2024-03-07 RX ADMIN — IPRATROPIUM BROMIDE AND ALBUTEROL SULFATE 1 DOSE: .5; 2.5 SOLUTION RESPIRATORY (INHALATION) at 13:02

## 2024-03-07 RX ADMIN — PANTOPRAZOLE SODIUM 40 MG: 40 TABLET, DELAYED RELEASE ORAL at 05:34

## 2024-03-07 RX ADMIN — WATER 40 MG: 1 INJECTION INTRAMUSCULAR; INTRAVENOUS; SUBCUTANEOUS at 09:38

## 2024-03-07 RX ADMIN — ARFORMOTEROL TARTRATE 15 MCG: 15 SOLUTION RESPIRATORY (INHALATION) at 20:51

## 2024-03-07 RX ADMIN — DOXYCYCLINE HYCLATE 100 MG: 100 CAPSULE ORAL at 20:46

## 2024-03-07 RX ADMIN — ENOXAPARIN SODIUM 30 MG: 100 INJECTION SUBCUTANEOUS at 20:45

## 2024-03-07 RX ADMIN — GUAIFENESIN 400 MG: 400 TABLET ORAL at 15:37

## 2024-03-07 RX ADMIN — PRAZOSIN HYDROCHLORIDE 1 MG: 1 CAPSULE ORAL at 20:46

## 2024-03-07 RX ADMIN — MIRTAZAPINE 15 MG: 15 TABLET, ORALLY DISINTEGRATING ORAL at 03:40

## 2024-03-07 RX ADMIN — IPRATROPIUM BROMIDE AND ALBUTEROL SULFATE 1 DOSE: .5; 2.5 SOLUTION RESPIRATORY (INHALATION) at 15:14

## 2024-03-07 RX ADMIN — HYDROXYZINE PAMOATE 50 MG: 25 CAPSULE ORAL at 09:43

## 2024-03-07 RX ADMIN — SODIUM CHLORIDE, PRESERVATIVE FREE 10 ML: 5 INJECTION INTRAVENOUS at 09:34

## 2024-03-07 RX ADMIN — ENOXAPARIN SODIUM 30 MG: 100 INJECTION SUBCUTANEOUS at 09:30

## 2024-03-07 RX ADMIN — GUAIFENESIN 400 MG: 400 TABLET ORAL at 20:46

## 2024-03-07 RX ADMIN — GUAIFENESIN 400 MG: 400 TABLET ORAL at 09:31

## 2024-03-07 RX ADMIN — ARIPIPRAZOLE 5 MG: 5 TABLET ORAL at 09:30

## 2024-03-07 RX ADMIN — HYDROXYZINE PAMOATE 50 MG: 25 CAPSULE ORAL at 21:37

## 2024-03-07 RX ADMIN — DOXYCYCLINE 100 MG: 100 INJECTION, POWDER, LYOPHILIZED, FOR SOLUTION INTRAVENOUS at 02:56

## 2024-03-07 RX ADMIN — PRAZOSIN HYDROCHLORIDE 1 MG: 1 CAPSULE ORAL at 03:40

## 2024-03-07 RX ADMIN — ARFORMOTEROL TARTRATE 15 MCG: 15 SOLUTION RESPIRATORY (INHALATION) at 09:42

## 2024-03-07 RX ADMIN — CALCIUM CARBONATE-VITAMIN D TAB 500 MG-200 UNIT 1 TABLET: 500-200 TAB at 09:31

## 2024-03-07 RX ADMIN — SODIUM CHLORIDE, PRESERVATIVE FREE 10 ML: 5 INJECTION INTRAVENOUS at 20:46

## 2024-03-07 RX ADMIN — MIRTAZAPINE 15 MG: 15 TABLET, ORALLY DISINTEGRATING ORAL at 20:46

## 2024-03-07 ASSESSMENT — PAIN SCALES - GENERAL: PAINLEVEL_OUTOF10: 0

## 2024-03-07 NOTE — PROGRESS NOTES
[facility]  Internal Medicine Residency Program  Progress Note - House Team       Patient:  Brenda Nunn 58 y.o. female   MRN: [id number]       Date of Service: 3/7/2024    CC: SOB over past few days       Subjective     Patient was seen and examined at bedside, she was not in acute disorder, wheezing improved, she had complain of panic attack,       Objective       Physical Exam  Vitals: /77   Pulse 97   Temp (!) 96.5 °F (35.8 °C) (Core)   Resp 20   Ht 1.702 m (5' 7\")   Wt 108 kg (238 lb)   SpO2 99%   BMI 37.28 kg/m²     I & O - 24hr: No intake/output data recorded.   General Appearance: alert, appears stated age, and cooperative  HEENT:  Head: Normocephalic, no lesions, without obvious abnormality.  Neck: no adenopathy, no carotid bruit, no JVD, supple, symmetrical, trachea midline, and thyroid not enlarged, symmetric, no tenderness/mass/nodules  Lung:  expiratory wheezing   Heart: regular rate and rhythm, S1, S2 normal, no murmur, click, rub or gallop  Abdomen: soft, non-tender; bowel sounds normal; no masses,  no organomegaly  Extremities:  extremities normal, atraumatic, no cyanosis or edema  Musculokeletal: No joint swelling, no muscle tenderness. ROM normal in all joints of extremities.   Neurologic: Mental status: Alert, oriented, thought content appropriate, depressed and anxious     Diet:   ADULT DIET; Regular      Pertinent Labs & Imaging Studies     Labs    CBC:   Lab Results   Component Value Date/Time    WBC 6.3 03/07/2024 06:41 AM    RBC 4.31 03/07/2024 06:41 AM    HGB 12.2 03/07/2024 06:41 AM    HCT 38.6 03/07/2024 06:41 AM    MCV 89.6 03/07/2024 06:41 AM    MCH 28.3 03/07/2024 06:41 AM    MCHC 31.6 03/07/2024 06:41 AM    RDW 11.6 03/07/2024 06:41 AM     03/07/2024 06:41 AM    MPV 10.1 03/07/2024 06:41 AM     CBC with Differential:    Lab Results   Component Value Date/Time    WBC 6.3 03/07/2024 06:41 AM    RBC 4.31 03/07/2024 06:41 AM    HGB 12.2 03/07/2024

## 2024-03-07 NOTE — PROGRESS NOTES
Progress  Note  Chief Complaint   Patient presents with    Chest Pain     Chest pain beginning two days ago with SOB. Hx COPD. 3L O2 at baseline. [facility]     Shortness of Breath     Historical Issues:  Current Facility-Administered Medications   Medication Dose Route Frequency Provider Last Rate Last Admin    ipratropium 0.5 mg-albuterol 2.5 mg (DUONEB) nebulizer solution 1 Dose  1 Dose Inhalation Q4H WA RT Radha Whalen MD   1 Dose at 03/07/24 1302    doxycycline (VIBRAMYCIN) 100 mg in sodium chloride 0.9 % 100 mL IVPB ([id number])  100 mg IntraVENous Q12H Fransisco Ying, DO   Stopped at 03/07/24 0356    sodium chloride flush 0.9 % injection 5-40 mL  5-40 mL IntraVENous 2 times per day Avi Patel, DO   10 mL at 03/07/24 0934    sodium chloride flush 0.9 % injection 5-40 mL  5-40 mL IntraVENous PRN Avi Patel, DO        0.9 % sodium chloride infusion   IntraVENous PRN Avi Patel,         enoxaparin Sodium (LOVENOX) injection 30 mg  30 mg SubCUTAneous BID Avi Patel, DO   30 mg at 03/07/24 0930    ondansetron (ZOFRAN-ODT) disintegrating tablet 4 mg  4 mg Oral Q8H PRN Avi Patel,         Or    ondansetron (ZOFRAN) injection 4 mg  4 mg IntraVENous Q6H PRN Avi Patel,         polyethylene glycol (GLYCOLAX) packet 17 g  17 g Oral Daily PRN Avi Patel,         acetaminophen (TYLENOL) tablet 650 mg  650 mg Oral Q6H PRN Avi Patel,         Or    acetaminophen (TYLENOL) suppository 650 mg  650 mg Rectal Q6H PRN Avi Patel,         methylPREDNISolone sodium succ (SOLU-MEDROL) 40 mg in sterile water 1 mL injection  40 mg IntraVENous Daily Jane Jiménez MD   40 mg at 03/07/24 0938    budesonide (PULMICORT) nebulizer suspension 500 mcg  0.5 mg Nebulization BID RT Avi Patel, DO   500 mcg at 03/07/24 0942    arformoterol tartrate (BROVANA) nebulizer solution 15 mcg  15 mcg Nebulization BID RT Avi Patel, DO    15 mcg at 03/07/24 0942    prazosin (MINIPRESS) capsule 1 mg  1 mg Oral Nightly Avi Patel DO   1 mg at 03/07/24 0340    guaiFENesin tablet 400 mg  400 mg Oral TID Avi Patel DO   400 mg at 03/07/24 0931    oyster shell calcium w/D 500-5 MG-MCG tablet 1 tablet  1 tablet Oral Daily Avi Patel DO   1 tablet at 03/07/24 0931    ARIPiprazole (ABILIFY) tablet 5 mg  5 mg Oral Daily Avi Patel DO   5 mg at 03/07/24 0930    mirtazapine (REMERON SOL-TAB) disintegrating tablet 15 mg  15 mg Oral Nightly Avi Patel DO   15 mg at 03/07/24 0340    pantoprazole (PROTONIX) tablet 40 mg  40 mg Oral QAM AC Jane Jiménez MD   40 mg at 03/07/24 0534    insulin lispro (HUMALOG) injection vial 0-4 Units  0-4 Units SubCUTAneous TID  Jane Jiménez MD        melatonin disintegrating tablet 5 mg  5 mg Oral Nightly PRN Avi Patel DO        nicotine (NICODERM CQ) 14 MG/24HR 1 patch  1 patch TransDERmal Daily Avi Patel DO   1 patch at 03/07/24 0930    hydrOXYzine pamoate (VISTARIL) capsule 50 mg  50 mg Oral Q6H PRN Jane Jiménez MD   50 mg at 03/07/24 0943     Recent Complaints:  Review of Systems  Vitals:    03/07/24 1129   BP: 133/77   Pulse: 97   Resp: 20   Temp: (!) 96.5 °F (35.8 °C)   SpO2: 99%     Physical Exam  Cardiovascular:      Rate and Rhythm: Normal rate and regular rhythm.   Pulmonary:      Comments: Wheezing is significantly improved        Recent Labs     03/06/24  1104 03/07/24  0641    141   K 4.1 4.2    102   CO2 33* 31*   BUN 14 12   CREATININE 0.8 0.6   GLUCOSE 96 130*   CALCIUM 9.5 9.7     Recent Labs     03/06/24  1104 03/07/24  0641   WBC 4.8 6.3   RBC 4.44 4.31   HGB 12.6 12.2   HCT 40.8 38.6   MCV 91.9 89.6   MCH 28.4 28.3   MCHC 30.9* 31.6*   RDW 11.8 11.6    218   MPV 9.6 10.1           Principal Problem:    COPD exacerbation (HCC)  Resolved Problems:    * No resolved hospital problems. *      Plan:  Evaluate for home oxygen  Ensure that she

## 2024-03-07 NOTE — PROGRESS NOTES
Physical Therapy  Physical Therapy Initial Assessment     Name: Brenda Nunn  : 1965  MRN: [id number]      Date of Service: 3/7/2024    Evaluating PT:  Kimi Hurst, PT, DPT, [id number]    Room #:  8204/8204-A  Diagnosis:  COPD exacerbation (HCC) [J44.1]  PMHx/PSHx:    Past Medical History:   Diagnosis Date    CHF (congestive heart failure) (HCC)     COPD (chronic obstructive pulmonary disease) (HCC)     Depression     Hypertension       Past Surgical History:   Procedure Laterality Date     SECTION      HYSTERECTOMY (CERVIX STATUS UNKNOWN)      Ovaries retained. Unknown cervix status. performed for uterine fibroids      Procedure/Surgery:  none this admission   Precautions:  Fall Risk, O2, anxious  Equipment Needs:  TBD    SUBJECTIVE:    Pt lives with 12 family members in a 2 story home with 5-6 stairs to enter and 1 rail.  Bed is on 2 floor and bath is on 2 floor; pt is unable to reach 2nd floor. Pt sleeps on the couch on the 1st floor and uses a bsc.  Pt ambulated with no AD for short distances within the home PTA. Pt also utilizes w/c for mobility.    OBJECTIVE:   Initial Evaluation  Date: 3/7/24 Treatment Short Term/ Long Term   Goals   AM-PAC 6 Clicks      Was pt agreeable to Eval/treatment? yes     Does pt have pain? No c/o pain     Bed Mobility  Rolling: NT  Supine to sit: SBA HOB elevated  Sit to supine: NT  Scooting: SBA  Rolling: Independent   Supine to sit: Independent   Sit to supine: Independent  Scooting:  Independent    Transfers Sit to stand: SBA  Stand to sit: SBA  Stand pivot: SBA no AD  Sit to stand: Independent   Stand to sit: Independent   Stand pivot: mod Independent with AAD   Ambulation    15 feet with no AD SBA  50+ feet with AAD mod Independent    Stair negotiation: ascended and descended  NT  6+ steps with 1 rail mod Independent    ROM BUE:  Refer to OT note  BLE:  WFL     Strength BUE:  Refer to OT note  BLE:  4/5  Improve by 1/3 MMT   Balance Sitting EOB:

## 2024-03-07 NOTE — PROGRESS NOTES
OCCUPATIONAL THERAPY INITIAL EVALUATION    [facility] [address] [place], [place]      Date:3/7/2024                                                Patient Name: Brenda Nunn  MRN: [id number]  : 1965  Room: [facility]     Evaluating OT:Mely Mac OTR/L   License #  [id number]       Referring Provider: Cuco Huffman MD     Specific Provider Orders/Date: OT evaluation & treatment        Diagnosis: COPD exacerbation (HCC) [J44.1]      Pertinent Medical History:  has a past medical history of CHF (congestive heart failure) (HCC), COPD (chronic obstructive pulmonary disease) ([facility]), Depression, and Hypertension.    Surgery: none this admit    Past Surgical History:  has a past surgical history that includes  section and Hysterectomy ().       Precautions:  Fall Risk, 3L O2      Assessment of current deficits    [x] Functional mobility            [x]ADLs           [x] Strength                  [x]Cognition    [x] Functional transfers          [x] IADLs         [x] Safety Awareness   [x]Endurance    [x] Fine Coordination                         [x] Balance      [] Vision/perception   [x]Sensation      []Gross Motor Coordination             [] ROM           [] Delirium                   [] Motor Control      OT PLAN OF CARE   OT POC based on physician orders, patient diagnosis and results of clinical assessment     Frequency/Duration: 2-4 days/wk for 2 weeks PRN   Specific OT Treatment Interventions to include:   Instruction/training on adapted ADL techniques and AE recommendations to increase functional independence within precautions  Training on energy conservation strategies, correct breathing pattern and techniques to improve independence/tolerance for self-care routine  Functional transfer/mobility training/DME recommendations for increased independence, safety, and fall prevention  Patient/Family education to increase follow  simplification to improve endurance   Proper Positioning/Alignment: for optimal healing, skin integrity to prevent breakdown, decrease edema  Skilled monitoring of vitals: to include BP, spO2 & HR during session  Sitting/standing Balance/Tolerance- to increased balance & activity tolerance during ADLs as well as facilitate proper posture and/or positioning.     Rehab Potential: Good for established goals     Patient / Family Goal: to \"get stronger\"       Patient and/or family were instructed on functional diagnosis, prognosis/goals and OT plan of care. Demonstrated F+ understanding.      Eval Complexity: Low     Time In: 14:00  Time Out: 14:20  Total Treatment Time: eval only    Min Units   OT Eval Low 31324  x     OT Eval Medium 83898       OT Eval High 99804       OT Re-Eval 21083       Therapeutic Ex 43618       Therapeutic Activities 28868       ADL/Self Care 99657       Orthotic Management 29173       Manual 25348       Neuro Re-Ed 74490       Non-Billable Time          Evaluation Time additionally includes thorough review of current medical information, gathering information on past medical history/social history and prior level of function, interpretation of standardized testing/informal observation of tasks, assessment of data and development of plan of care and goals.        Mely Mac, OTR/L   License #  [id number]

## 2024-03-07 NOTE — PROGRESS NOTES
Date: 3/6/2024    Time: 11:35 PM    Patient Placed On BIPAP/CPAP/ Non-Invasive Ventilation?  Yes    If no must comment.  Facial area red/color change? No           If YES are Blister/Lesion present?No   If yes must notify nursing staff  BIPAP/CPAP skin barrier?  Yes    Skin barrier type:mepilexlite       Comments:        Comments:        Master Oscar RCP

## 2024-03-08 LAB
ANION GAP SERPL CALCULATED.3IONS-SCNC: 9 MMOL/L (ref 7–16)
BASOPHILS # BLD: 0.02 K/UL (ref 0–0.2)
BASOPHILS NFR BLD: 0 % (ref 0–2)
BUN SERPL-MCNC: 18 MG/DL (ref 6–20)
CALCIUM SERPL-MCNC: 9.4 MG/DL (ref 8.6–10.2)
CHLORIDE SERPL-SCNC: 103 MMOL/L (ref 98–107)
CO2 SERPL-SCNC: 30 MMOL/L (ref 22–29)
CREAT SERPL-MCNC: 0.7 MG/DL (ref 0.5–1)
EOSINOPHIL # BLD: 0.01 K/UL (ref 0.05–0.5)
EOSINOPHILS RELATIVE PERCENT: 0 % (ref 0–6)
ERYTHROCYTE [DISTWIDTH] IN BLOOD BY AUTOMATED COUNT: 11.9 % (ref 11.5–15)
GFR SERPL CREATININE-BSD FRML MDRD: >60 ML/MIN/1.73M2
GLUCOSE SERPL-MCNC: 88 MG/DL (ref 74–99)
HCT VFR BLD AUTO: 39 % (ref 34–48)
HGB BLD-MCNC: 12 G/DL (ref 11.5–15.5)
IMM GRANULOCYTES # BLD AUTO: <0.03 K/UL (ref 0–0.58)
IMM GRANULOCYTES NFR BLD: 0 % (ref 0–5)
LYMPHOCYTES NFR BLD: 2.04 K/UL (ref 1.5–4)
LYMPHOCYTES RELATIVE PERCENT: 36 % (ref 20–42)
MCH RBC QN AUTO: 28.3 PG (ref 26–35)
MCHC RBC AUTO-ENTMCNC: 30.8 G/DL (ref 32–34.5)
MCV RBC AUTO: 92 FL (ref 80–99.9)
MONOCYTES NFR BLD: 0.48 K/UL (ref 0.1–0.95)
MONOCYTES NFR BLD: 8 % (ref 2–12)
NEUTROPHILS NFR BLD: 55 % (ref 43–80)
NEUTS SEG NFR BLD: 3.15 K/UL (ref 1.8–7.3)
PLATELET # BLD AUTO: 202 K/UL (ref 130–450)
PMV BLD AUTO: 10.1 FL (ref 7–12)
POTASSIUM SERPL-SCNC: 4.1 MMOL/L (ref 3.5–5)
RBC # BLD AUTO: 4.24 M/UL (ref 3.5–5.5)
SODIUM SERPL-SCNC: 142 MMOL/L (ref 132–146)
WBC OTHER # BLD: 5.7 K/UL (ref 4.5–11.5)

## 2024-03-08 PROCEDURE — G0378 HOSPITAL OBSERVATION PER HR: HCPCS

## 2024-03-08 PROCEDURE — 94660 CPAP INITIATION&MGMT: CPT

## 2024-03-08 PROCEDURE — 36415 COLL VENOUS BLD VENIPUNCTURE: CPT

## 2024-03-08 PROCEDURE — 6370000000 HC RX 637 (ALT 250 FOR IP)

## 2024-03-08 PROCEDURE — 2700000000 HC OXYGEN THERAPY PER DAY

## 2024-03-08 PROCEDURE — 2580000003 HC RX 258

## 2024-03-08 PROCEDURE — 85025 COMPLETE CBC W/AUTO DIFF WBC: CPT

## 2024-03-08 PROCEDURE — 6360000002 HC RX W HCPCS

## 2024-03-08 PROCEDURE — 80048 BASIC METABOLIC PNL TOTAL CA: CPT

## 2024-03-08 PROCEDURE — 99233 SBSQ HOSP IP/OBS HIGH 50: CPT | Performed by: INTERNAL MEDICINE

## 2024-03-08 PROCEDURE — 96372 THER/PROPH/DIAG INJ SC/IM: CPT

## 2024-03-08 PROCEDURE — 94640 AIRWAY INHALATION TREATMENT: CPT

## 2024-03-08 PROCEDURE — 99222 1ST HOSP IP/OBS MODERATE 55: CPT | Performed by: INTERNAL MEDICINE

## 2024-03-08 RX ORDER — ROFLUMILAST 500 UG/1
250 TABLET ORAL DAILY
Status: DISCONTINUED | OUTPATIENT
Start: 2024-03-08 | End: 2024-03-11 | Stop reason: HOSPADM

## 2024-03-08 RX ORDER — PREDNISONE 20 MG/1
40 TABLET ORAL
Status: COMPLETED | OUTPATIENT
Start: 2024-03-08 | End: 2024-03-10

## 2024-03-08 RX ADMIN — PREDNISONE 40 MG: 20 TABLET ORAL at 12:15

## 2024-03-08 RX ADMIN — HYDROXYZINE PAMOATE 50 MG: 25 CAPSULE ORAL at 06:29

## 2024-03-08 RX ADMIN — GUAIFENESIN 400 MG: 400 TABLET ORAL at 09:15

## 2024-03-08 RX ADMIN — MIRTAZAPINE 15 MG: 15 TABLET, ORALLY DISINTEGRATING ORAL at 20:38

## 2024-03-08 RX ADMIN — ENOXAPARIN SODIUM 30 MG: 100 INJECTION SUBCUTANEOUS at 20:39

## 2024-03-08 RX ADMIN — PANTOPRAZOLE SODIUM 40 MG: 40 TABLET, DELAYED RELEASE ORAL at 06:29

## 2024-03-08 RX ADMIN — IPRATROPIUM BROMIDE AND ALBUTEROL SULFATE 1 DOSE: .5; 2.5 SOLUTION RESPIRATORY (INHALATION) at 09:52

## 2024-03-08 RX ADMIN — IPRATROPIUM BROMIDE AND ALBUTEROL SULFATE 1 DOSE: .5; 2.5 SOLUTION RESPIRATORY (INHALATION) at 12:55

## 2024-03-08 RX ADMIN — BUDESONIDE 500 MCG: 0.5 SUSPENSION RESPIRATORY (INHALATION) at 21:20

## 2024-03-08 RX ADMIN — PRAZOSIN HYDROCHLORIDE 1 MG: 1 CAPSULE ORAL at 20:39

## 2024-03-08 RX ADMIN — HYDROXYZINE PAMOATE 50 MG: 25 CAPSULE ORAL at 20:39

## 2024-03-08 RX ADMIN — DOXYCYCLINE HYCLATE 100 MG: 100 CAPSULE ORAL at 20:38

## 2024-03-08 RX ADMIN — BUDESONIDE 500 MCG: 0.5 SUSPENSION RESPIRATORY (INHALATION) at 09:52

## 2024-03-08 RX ADMIN — ARFORMOTEROL TARTRATE 15 MCG: 15 SOLUTION RESPIRATORY (INHALATION) at 21:20

## 2024-03-08 RX ADMIN — SODIUM CHLORIDE, PRESERVATIVE FREE 10 ML: 5 INJECTION INTRAVENOUS at 09:49

## 2024-03-08 RX ADMIN — IPRATROPIUM BROMIDE AND ALBUTEROL SULFATE 1 DOSE: .5; 2.5 SOLUTION RESPIRATORY (INHALATION) at 21:20

## 2024-03-08 RX ADMIN — ARFORMOTEROL TARTRATE 15 MCG: 15 SOLUTION RESPIRATORY (INHALATION) at 09:52

## 2024-03-08 RX ADMIN — ENOXAPARIN SODIUM 30 MG: 100 INJECTION SUBCUTANEOUS at 09:48

## 2024-03-08 RX ADMIN — SODIUM CHLORIDE, PRESERVATIVE FREE 10 ML: 5 INJECTION INTRAVENOUS at 20:39

## 2024-03-08 RX ADMIN — GUAIFENESIN 400 MG: 400 TABLET ORAL at 20:38

## 2024-03-08 RX ADMIN — IPRATROPIUM BROMIDE AND ALBUTEROL SULFATE 1 DOSE: .5; 2.5 SOLUTION RESPIRATORY (INHALATION) at 17:10

## 2024-03-08 RX ADMIN — GUAIFENESIN 400 MG: 400 TABLET ORAL at 15:42

## 2024-03-08 RX ADMIN — CALCIUM CARBONATE-VITAMIN D TAB 500 MG-200 UNIT 1 TABLET: 500-200 TAB at 09:47

## 2024-03-08 RX ADMIN — DOXYCYCLINE HYCLATE 100 MG: 100 CAPSULE ORAL at 09:48

## 2024-03-08 RX ADMIN — ROFLUMILAST 250 MCG: 500 TABLET ORAL at 12:20

## 2024-03-08 RX ADMIN — ARIPIPRAZOLE 5 MG: 5 TABLET ORAL at 09:49

## 2024-03-08 NOTE — PROGRESS NOTES
[facility]  Internal Medicine Residency Program  Progress Note - House Team       Patient:  Brenda Nunn 58 y.o. female   MRN: [id number]       Date of Service: 3/8/2024    CC: SOB over past few days  Overnight events: no acute events overnight     Subjective     Brenda Nunn was seen and examined at bedside today morning. She was complaining about more shortness of breath, while ambulating to bathroom. She denies any chest pain, palpitations, leg swelling, nausea, vomiting or abdominal pain.      Objective     Physical Exam  Vitals: /74   Pulse 93   Temp (!) 96.6 °F (35.9 °C) (Temporal)   Resp 18   Ht 1.702 m (5' 7\")   Wt 108 kg (238 lb)   SpO2 94%   BMI 37.28 kg/m²     I & O - 24hr: No intake/output data recorded.   General Appearance: alert, appears stated age, and cooperative  HEENT:  Head: Normal, normocephalic, atraumatic.  Neck: no JVD and supple, symmetrical, trachea midline  Lung: espiratory wheezing on ausculation  Heart: regular rate and rhythm and S1, S2 normal  Abdomen: soft, non-tender; bowel sounds normal; no masses,  no organomegaly  Extremities:  extremities normal, atraumatic, no cyanosis or edema  Musculokeletal: No joint swelling, no muscle tenderness. ROM normal in all joints of extremities.   Neurologic: Mental status: Alert, oriented, thought content appropriate, depressed and anxious, muscle strength normal    Diet:   ADULT DIET; Regular    Pertinent Labs & Imaging Studies     Labs    Recent Labs     03/06/24  1104 03/07/24  0641 03/08/24  0537   WBC 4.8 6.3 5.7   RBC 4.44 4.31 4.24   HGB 12.6 12.2 12.0   HCT 40.8 38.6 39.0   MCV 91.9 89.6 92.0   MCH 28.4 28.3 28.3   MCHC 30.9* 31.6* 30.8*   RDW 11.8 11.6 11.9    218 202   MPV 9.6 10.1 10.1     Recent Labs     03/06/24  1104 03/07/24  0641 03/08/24  0537    141 142   K 4.1 4.2 4.1    102 103   CO2 33* 31* 30*   BUN 14 12 18   CREATININE 0.8 0.6 0.7   ANIONGAP 9 8 9   GLUCOSE 96 130* 88

## 2024-03-08 NOTE — CONSULTS
Consult received.  Patient known to [facility].  Will change consult.  Thank you.    ALICIA Chicas - NP

## 2024-03-08 NOTE — PROGRESS NOTES
[facility]  Internal Medicine Residency Program  Progress Note - House Team       Patient:  Brenda Nunn 58 y.o. female   MRN: [id number]       Date of Service: 3/8/2024  Admission date: 3/6/2024 10:49 AM ; Hospital day: 0   CC: Chest Pain (Chest pain beginning two days ago with SOB. Hx COPD. 3L O2 at baseline. 1 duoneb given by EMS. -[facility]. ) and Shortness of Breath     Overnight events: none     Subjective     Patient was examined today at bedside. She reported the dyspnea got worse today. She reported feeling fine yesterday night and got worse when she woke up. She reported that her O2 supplementation got increased in the morning and her shortness of breath has gotten better since then. Patient reported concerns for this shortness of breath and that it is inducing her anxiety. She reported no panic attacks since yesterday.       Objective   PHYSICAL EXAM  General Appearance: ill appearing, and appears as stated age  HEENT: Normocephalic, atraumatic  Neck: midline trachea, no carotid bruit  Lung: expiratory wheezing  Heart: regular rate and rhythm, no murmur  Abdomen: soft, bowel sounds normal; no masses  Extremities: no cyanosis or edema  Musculokeletal: No joint swelling  Neurologic: Mental status: alert and oriented x3    CARDIOVASCULAR  Vitals: /74   Pulse 93   Temp (!) 96.6 °F (35.9 °C) (Temporal)   Resp 18   Ht 1.702 m (5' 7\")   Wt 108 kg (238 lb)   SpO2 94%   BMI 37.28 kg/m²     Pulse rate range      : Pulse  Av.2  Min: 62  Max: 97  BP range                  : Systolic (24hrs), Av , Min:85 , Max:136   ; Diastolic (24hrs), Av, Min:74, Max:84       PULMONARY  Respiration range   : Resp  Av.7  Min: 18  Max: 20  Pulse Ox range : SpO2  Av.5 %  Min: 91 %  Max: 100 %  No results for input(s): \"PH\", \"PCO2\", \"PO2\", \"HCO3\", \"BE\", \"O2SAT\" in the last 72 hours.    Invalid input(s): \"PFRATIO\"     NEPHROLOGY (FLUIDS/ELECTROLYTES & NUTRITION)  Urine: 300 mL   I  WC    nicotine  1 patch TransDERmal Daily     PRN Meds: sodium chloride flush, sodium chloride, ondansetron **OR** ondansetron, polyethylene glycol, acetaminophen **OR** acetaminophen, melatonin, hydrOXYzine pamoate      Imaging studies     XR CHEST PORTABLE   Final Result   1.  Right basilar discoid atelectasis, not seen previously.      2.  Chronic lung disease with emphysema.      3.  Poor penetration of the lung bases related to portable technique and   large body habitus.                Student's Assessment and Plan     Assessment and Plan:  COPD exacerbation with chronic respiratory failure  IV steroid was change to oral prednisone.  Currently on Oxygen supplementation  Plan: BiPAP as needed. Pulmonary consult. Continue current medications with Brovana, Pulmicort, and Duoneb. Continue antibiotics. Follow spO2  Hx of anxiety and depression  Currently on Vistaril, Abilify, and Remeron    Plan: continue home medication and outpatient psych follow up  Hx of tobacco use  46.2 pack year smoking history   Counseled on risk of smoking and provided resources for quitting  Plan: continue nicotine patch   Hx of HFpEF  Plan: continue home meds  History of Obstructive Sleep Apnea  Continue BIPAP as needed  Plan: continue current management  GERD  Currently on protonix  7. Insomnia   Plan: continue Remeron           Discharge planning:    Tobacco use per chart:  reports that she has been smoking cigarettes. She started smoking about 46 years ago. She has a 46.2 pack-year smoking history. She has never used smokeless tobacco.  Alcohol use per chart:  reports no history of alcohol use.  DVT prophylaxis: Lovenox  GI prophylaxis: Protonix  Diet:   ADULT DIET; Regular   Bowel regimen: as needed  Pain management: as needed  Code status: Full Code   Disposition: Continue Current Care  Family: updated as available    Rehan Maurer MS3  Attending physician: Dr. Dalton

## 2024-03-08 NOTE — CONSULTS
[facility]  Department of Internal Medicine  Division of Pulmonary, Critical Care and Sleep Medicine  Consult Note    Jose J Vo DO, MPH, FCCP, FACOI, FACP  Colleen Maria DO, [facility]  Corey Kim MD, MS Lia Moraes, APRN-CNP    Patient: Brenda Nunn  MRN: [id number]  : 1965    Encounter Time: 5:22 PM     Date of Admission: 3/6/2024 10:49 AM    Primary Care Physician: Genesis Enriquez MD    Reason for Consultation: COPD     HISTORY OF PRESENT ILLNESS : Brenda Nunn 58 y.o. female was seen in consultation regarding the above chief compliant.  Brenda Nunn is a 58 y.o. female with PMHx of COPD, tobacco abuse, depression, anxiety and substance abuse came in with a CC of SOB.     Patient presented from home due to worsening SOB and dyspnea on exertion over the past day. Patient states she was started to get worse SOB yesterday but would do her home breathing treatments with some improvements. Today she slowly was feeling worse and could barely walk across the room due to the dyspnea. She is on 3L at baseline and has not changed how much oxygen she is currently on. Patient does admit to smoking a few cigarettes today and is not currently interested in quitting. Denies any fever, abdominal pain, constipation or diarrhea.  Patient does endorse chills for the past 4-5 days that will just come out of no where. This is not withdrawals because she had that in the past and denies any current drug use since her last admission on 2/10 for drug use. She states her daughter, who she lives with along with grandchildren, have had a cold for the past few days. Denies any recent travel. Patient did received Duoneb treatments and steroids in the ED and continues to have shortness of breath despite intervention. Her vitals have remained stable. Patient is being admitted for observation due to COPD exacerbation. Same thing noted just a different month.     PAST MEDICAL  96.6 °F (35.9 °C)  96.8 °F (36 °C)    TempSrc: Temporal  Temporal    SpO2: 94% 97% 100% 97%   Weight:       Height:            Intake/Output Summary (Last 24 hours) at 3/8/2024 1722  Last data filed at 3/8/2024 0503  Gross per 24 hour   Intake --   Output 600 ml   Net -600 ml        CONSTITUTIONAL:   A&O x 3, NAD  SKIN:     No rash, No suspicious lesions, No skin discoloration  HEENT:     EOMI, MMM, No thrush  NECK:    No bruits, No JVP appreciated  CV:      Sinus,  No murmur, No rubs, No gallops  PULMONARY:   Couse BS,  No Wheezing, No Rales, No Rhonchi      No noted egophony  ABDOMEN:     Soft, non-tender. BS normal. No R/R/G  EXT:    No deformities .  No clubbing.       No lower extremity edema, No venous stasis  PULSE:   Appears equal and palpable.  PSYCHIATRIC:  Seems appropriate, No acute psychosis  MS:    No fractures, No gross weakness  NEUROLOGIC:   The clinical assessment is non-focal     DATA: IMAGING & TESTING:     LABORATORY TESTS:    CBC:   Lab Results   Component Value Date/Time    WBC 5.7 03/08/2024 05:37 AM    RBC 4.24 03/08/2024 05:37 AM    HGB 12.0 03/08/2024 05:37 AM    HCT 39.0 03/08/2024 05:37 AM    MCV 92.0 03/08/2024 05:37 AM    MCH 28.3 03/08/2024 05:37 AM    MCHC 30.8 03/08/2024 05:37 AM    RDW 11.9 03/08/2024 05:37 AM     03/08/2024 05:37 AM    MPV 10.1 03/08/2024 05:37 AM     CMP:    Lab Results   Component Value Date/Time     03/08/2024 05:37 AM    K 4.1 03/08/2024 05:37 AM     03/08/2024 05:37 AM    CO2 30 03/08/2024 05:37 AM    BUN 18 03/08/2024 05:37 AM    CREATININE 0.7 03/08/2024 05:37 AM    LABGLOM >60 03/08/2024 05:37 AM    GLUCOSE 88 03/08/2024 05:37 AM    PROT 6.6 02/18/2024 03:22 PM    LABALBU 4.0 02/18/2024 03:22 PM    CALCIUM 9.4 03/08/2024 05:37 AM    BILITOT 0.3 02/18/2024 03:22 PM    ALKPHOS 71 02/18/2024 03:22 PM    AST 12 02/18/2024 03:22 PM    ALT 11 02/18/2024 03:22 PM        PRO-BNP:   Lab Results   Component Value Date    PROBNP 57 02/18/2024

## 2024-03-08 NOTE — PROGRESS NOTES
Date: 3/7/2024    Time: 10:00 PM    Patient Placed On BIPAP/CPAP/ Non-Invasive Ventilation?  Yes    If no must comment.  Facial area red/color change? No             Comments:    Jayleen Coronel RCP   03/07/24 8529   NIV Type   $NIV $Daily Charge   NIV Started/Stopped On   Equipment Type V60   Mode Bilevel   Mask Type Full face mask   Mask Size Medium   Assessment   Level of Consciousness 0   Comfort Level Good   Using Accessory Muscles No   Mask Compliance Good   Skin Assessment Clean, dry, & intact   Settings/Measurements   PIP Observed 11 cm H20   IPAP 14 cmH20   CPAP/EPAP 5 cmH2O   Vt (Measured) 502 mL   Rate Ordered 14   Insp Rise Time (%) 4 %   FiO2  50 %   I Time/ I Time % 0.9 s   Minute Volume (L/min) 14.7 Liters   Mask Leak (lpm) 28 lpm   Patient's Home Machine No   Alarm Settings   Alarms On Y   Oxygen Therapy/Pulse Ox   O2 Therapy Oxygen   O2 Device PAP (positive airway pressure)

## 2024-03-09 ENCOUNTER — APPOINTMENT (OUTPATIENT)
Dept: GENERAL RADIOLOGY | Age: 59
DRG: 191 | End: 2024-03-09
Payer: MEDICARE

## 2024-03-09 LAB
ANION GAP SERPL CALCULATED.3IONS-SCNC: 9 MMOL/L (ref 7–16)
BASOPHILS # BLD: 0.04 K/UL (ref 0–0.2)
BASOPHILS NFR BLD: 1 % (ref 0–2)
BUN SERPL-MCNC: 17 MG/DL (ref 6–20)
CALCIUM SERPL-MCNC: 9.4 MG/DL (ref 8.6–10.2)
CHLORIDE SERPL-SCNC: 103 MMOL/L (ref 98–107)
CO2 SERPL-SCNC: 30 MMOL/L (ref 22–29)
CREAT SERPL-MCNC: 0.7 MG/DL (ref 0.5–1)
EOSINOPHIL # BLD: 0.06 K/UL (ref 0.05–0.5)
EOSINOPHILS RELATIVE PERCENT: 1 % (ref 0–6)
ERYTHROCYTE [DISTWIDTH] IN BLOOD BY AUTOMATED COUNT: 11.8 % (ref 11.5–15)
GFR SERPL CREATININE-BSD FRML MDRD: >60 ML/MIN/1.73M2
GLUCOSE SERPL-MCNC: 69 MG/DL (ref 74–99)
HCT VFR BLD AUTO: 39.1 % (ref 34–48)
HGB BLD-MCNC: 12 G/DL (ref 11.5–15.5)
IMM GRANULOCYTES # BLD AUTO: <0.03 K/UL (ref 0–0.58)
IMM GRANULOCYTES NFR BLD: 0 % (ref 0–5)
LYMPHOCYTES NFR BLD: 2.5 K/UL (ref 1.5–4)
LYMPHOCYTES RELATIVE PERCENT: 40 % (ref 20–42)
MCH RBC QN AUTO: 28.4 PG (ref 26–35)
MCHC RBC AUTO-ENTMCNC: 30.7 G/DL (ref 32–34.5)
MCV RBC AUTO: 92.4 FL (ref 80–99.9)
MONOCYTES NFR BLD: 0.59 K/UL (ref 0.1–0.95)
MONOCYTES NFR BLD: 9 % (ref 2–12)
NEUTROPHILS NFR BLD: 49 % (ref 43–80)
NEUTS SEG NFR BLD: 3.11 K/UL (ref 1.8–7.3)
PLATELET # BLD AUTO: 208 K/UL (ref 130–450)
PMV BLD AUTO: 10.1 FL (ref 7–12)
POTASSIUM SERPL-SCNC: 4.3 MMOL/L (ref 3.5–5)
RBC # BLD AUTO: 4.23 M/UL (ref 3.5–5.5)
SODIUM SERPL-SCNC: 142 MMOL/L (ref 132–146)
WBC OTHER # BLD: 6.3 K/UL (ref 4.5–11.5)

## 2024-03-09 PROCEDURE — 5A09457 ASSISTANCE WITH RESPIRATORY VENTILATION, 24-96 CONSECUTIVE HOURS, CONTINUOUS POSITIVE AIRWAY PRESSURE: ICD-10-PCS | Performed by: INTERNAL MEDICINE

## 2024-03-09 PROCEDURE — 6370000000 HC RX 637 (ALT 250 FOR IP)

## 2024-03-09 PROCEDURE — 71045 X-RAY EXAM CHEST 1 VIEW: CPT

## 2024-03-09 PROCEDURE — 99232 SBSQ HOSP IP/OBS MODERATE 35: CPT | Performed by: INTERNAL MEDICINE

## 2024-03-09 PROCEDURE — 94640 AIRWAY INHALATION TREATMENT: CPT

## 2024-03-09 PROCEDURE — 6360000002 HC RX W HCPCS

## 2024-03-09 PROCEDURE — 87205 SMEAR GRAM STAIN: CPT

## 2024-03-09 PROCEDURE — 36415 COLL VENOUS BLD VENIPUNCTURE: CPT

## 2024-03-09 PROCEDURE — 2580000003 HC RX 258

## 2024-03-09 PROCEDURE — 96372 THER/PROPH/DIAG INJ SC/IM: CPT

## 2024-03-09 PROCEDURE — G0378 HOSPITAL OBSERVATION PER HR: HCPCS

## 2024-03-09 PROCEDURE — 80048 BASIC METABOLIC PNL TOTAL CA: CPT

## 2024-03-09 PROCEDURE — 85025 COMPLETE CBC W/AUTO DIFF WBC: CPT

## 2024-03-09 PROCEDURE — 94660 CPAP INITIATION&MGMT: CPT

## 2024-03-09 PROCEDURE — 87389 HIV-1 AG W/HIV-1&-2 AB AG IA: CPT

## 2024-03-09 PROCEDURE — 87070 CULTURE OTHR SPECIMN AEROBIC: CPT

## 2024-03-09 PROCEDURE — 82103 ALPHA-1-ANTITRYPSIN TOTAL: CPT

## 2024-03-09 PROCEDURE — 2140000000 HC CCU INTERMEDIATE R&B

## 2024-03-09 PROCEDURE — 2700000000 HC OXYGEN THERAPY PER DAY

## 2024-03-09 RX ADMIN — MIRTAZAPINE 15 MG: 15 TABLET, ORALLY DISINTEGRATING ORAL at 21:10

## 2024-03-09 RX ADMIN — BENZOCAINE AND MENTHOL 1 LOZENGE: 15; 3.6 LOZENGE ORAL at 11:20

## 2024-03-09 RX ADMIN — BUDESONIDE 500 MCG: 0.5 SUSPENSION RESPIRATORY (INHALATION) at 07:52

## 2024-03-09 RX ADMIN — BENZOCAINE AND MENTHOL 1 LOZENGE: 15; 3.6 LOZENGE ORAL at 17:46

## 2024-03-09 RX ADMIN — ARFORMOTEROL TARTRATE 15 MCG: 15 SOLUTION RESPIRATORY (INHALATION) at 20:43

## 2024-03-09 RX ADMIN — GUAIFENESIN 400 MG: 400 TABLET ORAL at 21:10

## 2024-03-09 RX ADMIN — HYDROXYZINE PAMOATE 50 MG: 25 CAPSULE ORAL at 13:34

## 2024-03-09 RX ADMIN — ARFORMOTEROL TARTRATE 15 MCG: 15 SOLUTION RESPIRATORY (INHALATION) at 07:52

## 2024-03-09 RX ADMIN — SODIUM CHLORIDE, PRESERVATIVE FREE 10 ML: 5 INJECTION INTRAVENOUS at 09:21

## 2024-03-09 RX ADMIN — GUAIFENESIN 400 MG: 400 TABLET ORAL at 13:34

## 2024-03-09 RX ADMIN — HYDROXYZINE PAMOATE 50 MG: 25 CAPSULE ORAL at 21:16

## 2024-03-09 RX ADMIN — PRAZOSIN HYDROCHLORIDE 1 MG: 1 CAPSULE ORAL at 21:10

## 2024-03-09 RX ADMIN — HYDROXYZINE PAMOATE 50 MG: 25 CAPSULE ORAL at 06:13

## 2024-03-09 RX ADMIN — IPRATROPIUM BROMIDE AND ALBUTEROL SULFATE 1 DOSE: .5; 2.5 SOLUTION RESPIRATORY (INHALATION) at 20:43

## 2024-03-09 RX ADMIN — CALCIUM CARBONATE-VITAMIN D TAB 500 MG-200 UNIT 1 TABLET: 500-200 TAB at 09:20

## 2024-03-09 RX ADMIN — ENOXAPARIN SODIUM 30 MG: 100 INJECTION SUBCUTANEOUS at 21:11

## 2024-03-09 RX ADMIN — PANTOPRAZOLE SODIUM 40 MG: 40 TABLET, DELAYED RELEASE ORAL at 06:13

## 2024-03-09 RX ADMIN — IPRATROPIUM BROMIDE AND ALBUTEROL SULFATE 1 DOSE: .5; 2.5 SOLUTION RESPIRATORY (INHALATION) at 15:45

## 2024-03-09 RX ADMIN — DOXYCYCLINE HYCLATE 100 MG: 100 CAPSULE ORAL at 21:10

## 2024-03-09 RX ADMIN — SODIUM CHLORIDE, PRESERVATIVE FREE 10 ML: 5 INJECTION INTRAVENOUS at 21:11

## 2024-03-09 RX ADMIN — PREDNISONE 40 MG: 20 TABLET ORAL at 11:20

## 2024-03-09 RX ADMIN — GUAIFENESIN 400 MG: 400 TABLET ORAL at 09:20

## 2024-03-09 RX ADMIN — ARIPIPRAZOLE 5 MG: 5 TABLET ORAL at 09:20

## 2024-03-09 RX ADMIN — ENOXAPARIN SODIUM 30 MG: 100 INJECTION SUBCUTANEOUS at 09:21

## 2024-03-09 RX ADMIN — BUDESONIDE 500 MCG: 0.5 SUSPENSION RESPIRATORY (INHALATION) at 20:43

## 2024-03-09 RX ADMIN — DOXYCYCLINE HYCLATE 100 MG: 100 CAPSULE ORAL at 09:20

## 2024-03-09 RX ADMIN — IPRATROPIUM BROMIDE AND ALBUTEROL SULFATE 1 DOSE: .5; 2.5 SOLUTION RESPIRATORY (INHALATION) at 07:52

## 2024-03-09 RX ADMIN — ROFLUMILAST 250 MCG: 500 TABLET ORAL at 09:20

## 2024-03-09 RX ADMIN — IPRATROPIUM BROMIDE AND ALBUTEROL SULFATE 1 DOSE: .5; 2.5 SOLUTION RESPIRATORY (INHALATION) at 12:03

## 2024-03-09 NOTE — PROGRESS NOTES
Patient 96% resting on 3 L nasal cannula O2.    Patient remained at 93% and above ambulating on 3 L nasal cannula O2.    Patient dropped as low as 88% on room air while resting.

## 2024-03-09 NOTE — PROGRESS NOTES
Date: 3/8/2024    Time: 9:48 PM    Patient Placed On BIPAP/CPAP/ Non-Invasive Ventilation?  Yes    If no must comment.  Facial area red/color change? No           If YES are Blister/Lesion present?No   If yes must notify nursing staff  BIPAP/CPAP skin barrier?  Yes    Skin barrier type:mepilexlite       Comments:        Master Oscar RCP

## 2024-03-09 NOTE — PROGRESS NOTES
[facility]  Department of Internal Medicine  Division of Pulmonary, Critical Care and Sleep Medicine  Consult Note    Jose J Vo DO, MPH, FCCP, FACOI, FACP  Colleen Maria DO, FCCP  Corey Kim MD, MS Lia Moraes, APRN-CNP    Patient: Brenda Nunn  MRN: [id number]  : 1965    Encounter Time: 3:14 PM     Date of Admission: 3/6/2024 10:49 AM    Primary Care Physician: Genesis Enriquez MD    Reason for Consultation: COPD   SUBJECTIVE:  Spoke with Brenda daughter in detail  Consider SNF  Weight loss, CPAP compliance, repeat Sleep study.      OBJECTIVE:     PHYSICAL EXAM:   VITALS:   Vitals:    24 0400 24 0430 24 0805 24 1226   BP: 109/73  127/73 127/73   Pulse: 77  92 97   Resp: 21 23 22 22   Temp: 99.4 °F (37.4 °C)  99 °F (37.2 °C) 97.8 °F (36.6 °C)   TempSrc: Axillary  Temporal Temporal   SpO2: 100%  100% 96%   Weight:       Height:            Intake/Output Summary (Last 24 hours) at 3/9/2024 1514  Last data filed at 3/9/2024 1418  Gross per 24 hour   Intake 660 ml   Output 150 ml   Net 510 ml          CONSTITUTIONAL:   A&O x 3, NAD  SKIN:     Needs Fungal Cream No skin discoloration  HEENT:     EOMI, MMM, No thrush  NECK:    No bruits, No JVP appreciated  CV:      Sinus,  No murmur, No rubs, No gallops  PULMONARY:   Couse BS,  No Wheezing, No Rales, No Rhonchi      No noted egophony  ABDOMEN:     Soft, non-tender. BS normal. No R/R/G  EXT:    No deformities .  No clubbing.       No lower extremity edema, No venous stasis  PULSE:   Appears equal and palpable.  PSYCHIATRIC:  Seems appropriate, No acute psychosis  MS:    No fractures, No gross weakness  NEUROLOGIC:   The clinical assessment is non-focal     DATA: IMAGING & TESTING:     LABORATORY TESTS:    CBC:   Lab Results   Component Value Date/Time    WBC 6.3 2024 06:27 AM    RBC 4.23 2024 06:27 AM    HGB 12.0 2024 06:27 AM    HCT 39.1 2024 06:27 AM     seen previously. 2.  Chronic lung disease with emphysema. 3.  Poor penetration of the lung bases related to portable technique and large body habitus.     Echo (TTE) complete (PRN contrast/bubble/strain/3D)    Result Date: 2/20/2024    Left Ventricle: Normal left ventricular systolic function with a visually estimated EF of 55 - 60%. Left ventricle size is normal. Normal wall thickness. Normal wall motion. Grade I diastolic dysfunction.   Right Ventricle: Normal systolic function.   Mitral Valve: Trace regurgitation.   Tricuspid Valve: Trace regurgitation. The estimated RVSP is 41 mmHg.   Pericardium: No pericardial effusion.         Assessment: 58 year old with moderate COPD and freq (extrafreq) exacerbations confound by drugs, smoking and noncompliance with pulmonary rehab and some continued anxiety.   COPD,? Exacerbation, pH normal, Not hypoxemic in ER  Anxiety - seen by psy a few time, limited helpfulness  Smoking  Canibus abuse  Emphysema.   Vitamin D Def  Ig E only 27 so no mixed disease or overlap  Sleep study never done        Plan:   Continue current treatment opinions, as stated last time should consider LTAC or SNF given readmissions to hospital.  NIV to offered   Continue PDE4 inhibitor  Continue Vitamin D  Alpha 1 and HIV not done ?  Fungal Crm to groin  Repeat Sleep study  Weight loss   Pulmonary rehab discussed again        Jose J Vo DO MPH, FCCP, MACOI, FACP  Professor of Internal Medicine  Pulmonary, Critical Care and Sleep Medicine

## 2024-03-09 NOTE — PROGRESS NOTES
[facility]  Internal Medicine Residency Program  Progress Note - House Team       Patient:  Brenda Nunn 58 y.o. female   MRN: [id number]       Date of Service: 3/9/2024    CC: SOB over past few days  Overnight events: no acute events overnight     Subjective     Brenda Nunn was seen and examined at bedside today morning.     Objective     Physical Exam  Vitals: /73   Pulse 77   Temp 99.4 °F (37.4 °C) (Axillary)   Resp 23   Ht 1.702 m (5' 7\")   Wt 108 kg (238 lb)   SpO2 100%   BMI 37.28 kg/m²     I & O - 24hr: No intake/output data recorded.   General Appearance: alert, appears stated age, and cooperative  HEENT:  Head: Normal, normocephalic, atraumatic.  Neck: no JVD and supple, symmetrical, trachea midline  Lung: espiratory wheezing on ausculation  Heart: regular rate and rhythm and S1, S2 normal  Abdomen: soft, non-tender; bowel sounds normal; no masses,  no organomegaly  Extremities:  extremities normal, atraumatic, no cyanosis or edema  Musculokeletal: No joint swelling, no muscle tenderness. ROM normal in all joints of extremities.   Neurologic: Mental status: Alert, oriented, thought content appropriate, depressed and anxious, muscle strength normal    Diet:   ADULT DIET; Regular    Pertinent Labs & Imaging Studies     Labs    Recent Labs     03/06/24  1104 03/07/24  0641 03/08/24  0537   WBC 4.8 6.3 5.7   RBC 4.44 4.31 4.24   HGB 12.6 12.2 12.0   HCT 40.8 38.6 39.0   MCV 91.9 89.6 92.0   MCH 28.4 28.3 28.3   MCHC 30.9* 31.6* 30.8*   RDW 11.8 11.6 11.9    218 202   MPV 9.6 10.1 10.1     Recent Labs     03/06/24  1104 03/07/24  0641 03/08/24  0537    141 142   K 4.1 4.2 4.1    102 103   CO2 33* 31* 30*   BUN 14 12 18   CREATININE 0.8 0.6 0.7   ANIONGAP 9 8 9   GLUCOSE 96 130* 88   CALCIUM 9.5 9.7 9.4     No results for input(s): \"LACTA\" in the last 72 hours.  Recent Labs     03/06/24  1104 03/06/24  1145 03/07/24  1034   TROPHS 12* 11* 10*     No results

## 2024-03-10 LAB
ANION GAP SERPL CALCULATED.3IONS-SCNC: 7 MMOL/L (ref 7–16)
BASOPHILS # BLD: 0.02 K/UL (ref 0–0.2)
BASOPHILS NFR BLD: 0 % (ref 0–2)
BUN SERPL-MCNC: 15 MG/DL (ref 6–20)
CALCIUM SERPL-MCNC: 9.6 MG/DL (ref 8.6–10.2)
CHLORIDE SERPL-SCNC: 100 MMOL/L (ref 98–107)
CO2 SERPL-SCNC: 33 MMOL/L (ref 22–29)
CREAT SERPL-MCNC: 0.6 MG/DL (ref 0.5–1)
EOSINOPHIL # BLD: 0.05 K/UL (ref 0.05–0.5)
EOSINOPHILS RELATIVE PERCENT: 1 % (ref 0–6)
ERYTHROCYTE [DISTWIDTH] IN BLOOD BY AUTOMATED COUNT: 11.7 % (ref 11.5–15)
GFR SERPL CREATININE-BSD FRML MDRD: >60 ML/MIN/1.73M2
GLUCOSE SERPL-MCNC: 68 MG/DL (ref 74–99)
HCT VFR BLD AUTO: 40.7 % (ref 34–48)
HGB BLD-MCNC: 12.9 G/DL (ref 11.5–15.5)
IMM GRANULOCYTES # BLD AUTO: <0.03 K/UL (ref 0–0.58)
IMM GRANULOCYTES NFR BLD: 0 % (ref 0–5)
LYMPHOCYTES NFR BLD: 2.31 K/UL (ref 1.5–4)
LYMPHOCYTES RELATIVE PERCENT: 38 % (ref 20–42)
MCH RBC QN AUTO: 28.9 PG (ref 26–35)
MCHC RBC AUTO-ENTMCNC: 31.7 G/DL (ref 32–34.5)
MCV RBC AUTO: 91.1 FL (ref 80–99.9)
MICROORGANISM SPEC CULT: NORMAL
MICROORGANISM/AGENT SPEC: NORMAL
MONOCYTES NFR BLD: 0.56 K/UL (ref 0.1–0.95)
MONOCYTES NFR BLD: 9 % (ref 2–12)
NEUTROPHILS NFR BLD: 51 % (ref 43–80)
NEUTS SEG NFR BLD: 3.11 K/UL (ref 1.8–7.3)
PLATELET # BLD AUTO: 224 K/UL (ref 130–450)
PMV BLD AUTO: 10.2 FL (ref 7–12)
POTASSIUM SERPL-SCNC: 4 MMOL/L (ref 3.5–5)
RBC # BLD AUTO: 4.47 M/UL (ref 3.5–5.5)
SODIUM SERPL-SCNC: 140 MMOL/L (ref 132–146)
SPECIMEN DESCRIPTION: NORMAL
WBC OTHER # BLD: 6.1 K/UL (ref 4.5–11.5)

## 2024-03-10 PROCEDURE — 85025 COMPLETE CBC W/AUTO DIFF WBC: CPT

## 2024-03-10 PROCEDURE — 2700000000 HC OXYGEN THERAPY PER DAY

## 2024-03-10 PROCEDURE — 80048 BASIC METABOLIC PNL TOTAL CA: CPT

## 2024-03-10 PROCEDURE — 97530 THERAPEUTIC ACTIVITIES: CPT

## 2024-03-10 PROCEDURE — 94640 AIRWAY INHALATION TREATMENT: CPT

## 2024-03-10 PROCEDURE — 6370000000 HC RX 637 (ALT 250 FOR IP): Performed by: STUDENT IN AN ORGANIZED HEALTH CARE EDUCATION/TRAINING PROGRAM

## 2024-03-10 PROCEDURE — 6370000000 HC RX 637 (ALT 250 FOR IP)

## 2024-03-10 PROCEDURE — 36415 COLL VENOUS BLD VENIPUNCTURE: CPT

## 2024-03-10 PROCEDURE — 2580000003 HC RX 258

## 2024-03-10 PROCEDURE — 6360000002 HC RX W HCPCS

## 2024-03-10 PROCEDURE — 94660 CPAP INITIATION&MGMT: CPT

## 2024-03-10 PROCEDURE — 99232 SBSQ HOSP IP/OBS MODERATE 35: CPT | Performed by: INTERNAL MEDICINE

## 2024-03-10 PROCEDURE — 97535 SELF CARE MNGMENT TRAINING: CPT

## 2024-03-10 PROCEDURE — 2140000000 HC CCU INTERMEDIATE R&B

## 2024-03-10 RX ORDER — MECOBALAMIN 5000 MCG
5 TABLET,DISINTEGRATING ORAL NIGHTLY
Status: DISCONTINUED | OUTPATIENT
Start: 2024-03-10 | End: 2024-03-11 | Stop reason: HOSPADM

## 2024-03-10 RX ADMIN — ENOXAPARIN SODIUM 30 MG: 100 INJECTION SUBCUTANEOUS at 09:57

## 2024-03-10 RX ADMIN — IPRATROPIUM BROMIDE AND ALBUTEROL SULFATE 1 DOSE: .5; 2.5 SOLUTION RESPIRATORY (INHALATION) at 15:38

## 2024-03-10 RX ADMIN — IPRATROPIUM BROMIDE AND ALBUTEROL SULFATE 1 DOSE: .5; 2.5 SOLUTION RESPIRATORY (INHALATION) at 12:10

## 2024-03-10 RX ADMIN — SODIUM CHLORIDE, PRESERVATIVE FREE 10 ML: 5 INJECTION INTRAVENOUS at 19:59

## 2024-03-10 RX ADMIN — Medication 5 MG: at 19:58

## 2024-03-10 RX ADMIN — GUAIFENESIN 400 MG: 400 TABLET ORAL at 09:58

## 2024-03-10 RX ADMIN — PRAZOSIN HYDROCHLORIDE 1 MG: 1 CAPSULE ORAL at 19:58

## 2024-03-10 RX ADMIN — HYDROXYZINE PAMOATE 50 MG: 25 CAPSULE ORAL at 16:44

## 2024-03-10 RX ADMIN — DOXYCYCLINE HYCLATE 100 MG: 100 CAPSULE ORAL at 19:58

## 2024-03-10 RX ADMIN — ARFORMOTEROL TARTRATE 15 MCG: 15 SOLUTION RESPIRATORY (INHALATION) at 08:46

## 2024-03-10 RX ADMIN — IPRATROPIUM BROMIDE AND ALBUTEROL SULFATE 1 DOSE: .5; 2.5 SOLUTION RESPIRATORY (INHALATION) at 08:46

## 2024-03-10 RX ADMIN — GUAIFENESIN 400 MG: 400 TABLET ORAL at 19:59

## 2024-03-10 RX ADMIN — ARFORMOTEROL TARTRATE 15 MCG: 15 SOLUTION RESPIRATORY (INHALATION) at 18:44

## 2024-03-10 RX ADMIN — BUDESONIDE 500 MCG: 0.5 SUSPENSION RESPIRATORY (INHALATION) at 18:44

## 2024-03-10 RX ADMIN — GUAIFENESIN 400 MG: 400 TABLET ORAL at 14:54

## 2024-03-10 RX ADMIN — ARIPIPRAZOLE 5 MG: 5 TABLET ORAL at 09:57

## 2024-03-10 RX ADMIN — HYDROXYZINE PAMOATE 50 MG: 25 CAPSULE ORAL at 09:58

## 2024-03-10 RX ADMIN — ROFLUMILAST 250 MCG: 500 TABLET ORAL at 09:58

## 2024-03-10 RX ADMIN — PANTOPRAZOLE SODIUM 40 MG: 40 TABLET, DELAYED RELEASE ORAL at 06:28

## 2024-03-10 RX ADMIN — BENZOCAINE AND MENTHOL 1 LOZENGE: 15; 3.6 LOZENGE ORAL at 14:59

## 2024-03-10 RX ADMIN — BENZOCAINE AND MENTHOL 1 LOZENGE: 15; 3.6 LOZENGE ORAL at 19:59

## 2024-03-10 RX ADMIN — DOXYCYCLINE HYCLATE 100 MG: 100 CAPSULE ORAL at 09:57

## 2024-03-10 RX ADMIN — HYDROXYZINE PAMOATE 50 MG: 25 CAPSULE ORAL at 22:55

## 2024-03-10 RX ADMIN — CALCIUM CARBONATE-VITAMIN D TAB 500 MG-200 UNIT 1 TABLET: 500-200 TAB at 09:58

## 2024-03-10 RX ADMIN — BUDESONIDE 500 MCG: 0.5 SUSPENSION RESPIRATORY (INHALATION) at 08:46

## 2024-03-10 RX ADMIN — BENZOCAINE AND MENTHOL 1 LOZENGE: 15; 3.6 LOZENGE ORAL at 10:01

## 2024-03-10 RX ADMIN — ENOXAPARIN SODIUM 30 MG: 100 INJECTION SUBCUTANEOUS at 19:58

## 2024-03-10 RX ADMIN — MICONAZOLE NITRATE: 20 CREAM TOPICAL at 20:00

## 2024-03-10 RX ADMIN — IPRATROPIUM BROMIDE AND ALBUTEROL SULFATE 1 DOSE: .5; 2.5 SOLUTION RESPIRATORY (INHALATION) at 18:44

## 2024-03-10 RX ADMIN — PREDNISONE 40 MG: 20 TABLET ORAL at 10:01

## 2024-03-10 RX ADMIN — MICONAZOLE NITRATE: 20 CREAM TOPICAL at 11:11

## 2024-03-10 RX ADMIN — SODIUM CHLORIDE, PRESERVATIVE FREE 10 ML: 5 INJECTION INTRAVENOUS at 09:57

## 2024-03-10 RX ADMIN — MIRTAZAPINE 15 MG: 15 TABLET, ORALLY DISINTEGRATING ORAL at 19:59

## 2024-03-10 NOTE — PROGRESS NOTES
[facility]  Department of Internal Medicine  Division of Pulmonary, Critical Care and Sleep Medicine  Progress Note    Patient: Brenda Nunn  MRN: [id number]  : 1965    Encounter Time: 6:20 PM     Date of Admission: 3/6/2024 10:49 AM    Primary Care Physician: Genesis Enriquez MD    Reason for Consultation: COPD    SUBJECTIVE:      Consider SNF  Weight loss, CPAP compliance, repeat Sleep study.      OBJECTIVE:     PHYSICAL EXAM:   VITALS:   Vitals:    03/10/24 0846 03/10/24 1135 03/10/24 1211 03/10/24 1550   BP:  123/83  125/77   Pulse:  73  82   Resp:  22  22   Temp:  97.5 °F (36.4 °C)  98 °F (36.7 °C)   TempSrc:  Temporal  Temporal   SpO2: 98% 100% 99% 96%   Weight:       Height:            Intake/Output Summary (Last 24 hours) at 3/10/2024 1820  Last data filed at 3/10/2024 1550  Gross per 24 hour   Intake 480 ml   Output 2000 ml   Net -1520 ml          CONSTITUTIONAL:   Alert  SKIN:     Needs Fungal Cream No skin discoloration  HEENT:     EOMI, MMM, No thrush  NECK:    No bruits, No JVP appreciated  CV:      Sinus,  No murmur, No rubs, No gallops  PULMONARY:   Couse BS,  No Wheezing, No Rales, No Rhonchi      No noted egophony  ABDOMEN:     Soft, non-tender. BS normal. No R/R/G  EXT:    No deformities .  No clubbing.       No lower extremity edema, No venous stasis  PULSE:   Appears equal and palpable.  PSYCHIATRIC:  Seems appropriate, No acute psychosis  MS:    No fractures, No gross weakness  NEUROLOGIC:   The clinical assessment is non-focal     DATA: IMAGING & TESTING:     LABORATORY TESTS:    CBC:   Lab Results   Component Value Date/Time    WBC 6.1 03/10/2024 07:16 AM    RBC 4.47 03/10/2024 07:16 AM    HGB 12.9 03/10/2024 07:16 AM    HCT 40.7 03/10/2024 07:16 AM    MCV 91.1 03/10/2024 07:16 AM    MCH 28.9 03/10/2024 07:16 AM    MCHC 31.7 03/10/2024 07:16 AM    RDW 11.7 03/10/2024 07:16 AM     03/10/2024 07:16 AM    MPV 10.2 03/10/2024 07:16 AM  contrast/bubble/strain/3D)    Result Date: 2/20/2024    Left Ventricle: Normal left ventricular systolic function with a visually estimated EF of 55 - 60%. Left ventricle size is normal. Normal wall thickness. Normal wall motion. Grade I diastolic dysfunction.   Right Ventricle: Normal systolic function.   Mitral Valve: Trace regurgitation.   Tricuspid Valve: Trace regurgitation. The estimated RVSP is 41 mmHg.   Pericardium: No pericardial effusion.         Assessment: 58 year old with moderate COPD and freq (extrafreq) exacerbations confound by drugs, smoking and noncompliance with pulmonary rehab and some continued anxiety.   COPD,? Exacerbation, pH normal, Not hypoxemic in ER  Anxiety - seen by psy a few time, limited helpfulness  Smoking  Canibus abuse  Emphysema.   Vitamin D Def  Ig E only 27 so no mixed disease or overlap  Sleep study never done        Plan:   Continue current treatment opinions, as stated last time should consider LTAC or SNF given readmissions to hospital.  NIV to offered   Continue PDE4 inhibitor  Continue Vitamin D  Alpha 1 and HIV not done pending  Fungal Crm to groin  Repeat Sleep study  Weight loss needed  Pulmonary rehab discussed again - referral made  Discharge planning        Jose J Vo DO MPH, FCCP, MACOI, FACP  Professor of Internal Medicine  Pulmonary, Critical Care and Sleep Medicine

## 2024-03-10 NOTE — PROGRESS NOTES
OCCUPATIONAL THERAPY TREATMENT SESSION  [facility] [address], [place]      Date:3/10/2024                                                Patient Name: Brenda Nunn  MRN: [id number]  : 1965  Room: [facility]     Evaluating OT:Mely Mac OTR/L   License #  [id number]       Referring Provider: Cuco Huffman MD     Specific Provider Orders/Date: OT evaluation & treatment        Diagnosis: COPD exacerbation (HCC) [J44.1]      Pertinent Medical History:  has a past medical history of CHF (congestive heart failure) (HCC), COPD (chronic obstructive pulmonary disease) ([facility]), Depression, and Hypertension.    Surgery: none this admit    Past Surgical History:  has a past surgical history that includes  section and Hysterectomy ().       Precautions:  Fall Risk, 3L O2      Assessment of current deficits    [x] Functional mobility            [x]ADLs           [x] Strength                  [x]Cognition    [x] Functional transfers          [x] IADLs         [x] Safety Awareness   [x]Endurance    [x] Fine Coordination                         [x] Balance      [] Vision/perception   [x]Sensation      []Gross Motor Coordination             [] ROM           [] Delirium                   [] Motor Control      OT PLAN OF CARE   OT POC based on physician orders, patient diagnosis and results of clinical assessment     Frequency/Duration: 2-4 days/wk for 2 weeks PRN   Specific OT Treatment Interventions to include:   Instruction/training on adapted ADL techniques and AE recommendations to increase functional independence within precautions  Training on energy conservation strategies, correct breathing pattern and techniques to improve independence/tolerance for self-care routine  Functional transfer/mobility training/DME recommendations for increased independence, safety, and fall prevention  Patient/Family education to increase follow  provided this date includes:   Instruction/training on safety and adapted techniques for completion of ADLs: to increase [place] in self care   Instruction/training on safe functional mobility/transfer techniques: with focus on safety, technique & precautions   Proper Positioning/Alignment: for optimal healing, skin integrity to prevent breakdown, decrease edema  Skilled monitoring of vitals: to include BP, spO2 & HR during session       Time In: 0927  Time Out: 0937  Total Treatment Time: 10 min    Min Units   OT Eval Low 76093       OT Eval Medium 37328       OT Eval High 55055       OT Re-Eval 30644       Therapeutic Ex 03554       Therapeutic Activities 56468       ADL/Self Care 68396 10  1    Orthotic Management 83205       Manual 92765       Neuro Re-Ed 65285       Non-Billable Time          Kaylan Fraga OTD, OTR/L, [id number]

## 2024-03-10 NOTE — PROGRESS NOTES
[facility]  Internal Medicine Residency Program  Progress Note - House Team       Patient:  Brenda Nunn 58 y.o. female   MRN: [id number]       Date of Service: 3/10/2024    CC: SOB over past few days  Overnight events: no acute events overnight     Subjective     Brenda Nunn was seen and examined at bedside today morning. She was on BIPAP, saturating normal. She mentioned feeling much better today. Now she wants to go to long term facility for further care. Otherwise, she denies any other acute concerns.     Objective     Physical Exam  Vitals: BP (!) 106/57   Pulse 74   Temp 97.6 °F (36.4 °C) (Temporal)   Resp 22   Ht 1.702 m (5' 7\")   Wt 108 kg (238 lb)   SpO2 95%   BMI 37.28 kg/m²     I & O - 24hr: No intake/output data recorded.   General Appearance: alert, appears stated age, and cooperative  HEENT:  Head: Normal, normocephalic, atraumatic.  Neck: no JVD and supple, symmetrical, trachea midline  Lung: expiratory wheezing on auscultation, improved from yesterday  Heart: regular rate and rhythm and S1, S2 normal  Abdomen: soft, non-tender; bowel sounds normal; no masses,  no organomegaly  Extremities:  extremities normal, atraumatic, no cyanosis or edema  Musculokeletal: No joint swelling, no muscle tenderness. ROM normal in all joints of extremities.   Neurologic: Mental status: Alert, oriented, thought content appropriate, depressed and anxious, muscle strength normal    Diet:   ADULT DIET; Regular    Pertinent Labs & Imaging Studies     Labs    Recent Labs     03/08/24 0537 03/09/24  0627 03/10/24  0716   WBC 5.7 6.3 6.1   RBC 4.24 4.23 4.47   HGB 12.0 12.0 12.9   HCT 39.0 39.1 40.7   MCV 92.0 92.4 91.1   MCH 28.3 28.4 28.9   MCHC 30.8* 30.7* 31.7*   RDW 11.9 11.8 11.7    208 224   MPV 10.1 10.1 10.2     Recent Labs     03/08/24 0537 03/09/24  0627 03/10/24  0716    142 140   K 4.1 4.3 4.0    103 100   CO2 30* 30* 33*   BUN 18 17 15   CREATININE 0.7 0.7 0.6  M.D., CAMERON.  Hospital charts reviewed, including other providers notes, relevant labs and imaging. Coordinating care with residents, other providers and/or counseling patient    I have seen patient/discussed the history and PE with the resident, and I agree with workup/plan and orders as documented by the resident.  (billing based on complexity of Medical decision making)  My Assessment as follows:                 Baseline o2 2-3LNC +cpap at home  Suspected copd exac  +bronchospasm +wheezing     Claims \"exac\" - with discussions of DC home +anxiety exac  Lives with 12 people in her home  But also refuses placement  +polysubtance  +smoker      Antifungal - groin  Hiv and alpha 1 test done  IgE not bad, doubt steroids will be very effective- no eosphilia- not allergic type     Echo- some asthma/copd exac pulm HTN- not severe  RVSP is 41 mmHg     Always pulse ox ok >90%-- pulse ox- chronically OK  Keeps getting put on high dose O2- bc patient \"feels sob\"    Per pulm:  .\"...remained stable. Patient is being admitted for observation due to COPD exacerbation. Same thing noted just a different month... consider LTAC or SNF given readmissions to hospital.  NIV to offered \"     Pulm rehab again discussed, chronic axiety  She is very reluctant to go home  She wants to stay- again discuss [facility] as only option to stay in hospital  So now agreeable to [facility]

## 2024-03-10 NOTE — PROGRESS NOTES
Physical Therapy  Rx    Name: Brenda Nunn  : 1965  MRN: [id number]      Date of Service: 3/10/2024    Evaluating PT:  Kimi Hurst, PT, DPT, [id number]    Room #:  8204/8204-A  Diagnosis:  COPD exacerbation (HCC) [J44.1]  PMHx/PSHx:    Past Medical History:   Diagnosis Date    CHF (congestive heart failure) (HCC)     COPD (chronic obstructive pulmonary disease) (HCC)     Depression     Hypertension       Past Surgical History:   Procedure Laterality Date     SECTION      HYSTERECTOMY (CERVIX STATUS UNKNOWN)      Ovaries retained. Unknown cervix status. performed for uterine fibroids      Procedure/Surgery:  none this admission   Precautions:  Fall Risk, O2, anxious  Equipment Needs:  TBD    SUBJECTIVE:    Pt lives with 12 family members in a 2 story home with 5-6 stairs to enter and 1 rail.  Bed is on 2 floor and bath is on 2 floor; pt is unable to reach 2nd floor. Pt sleeps on the couch on the 1st floor and uses a bsc.  Pt ambulated with no AD for short distances within the home PTA. Pt also utilizes w/c for mobility.    OBJECTIVE:   Initial Evaluation  Date: 3/7/24 Treatment  3/10/24 Short Term/ Long Term   Goals   AM-PAC 6 Clicks 16/24 15/24    Was pt agreeable to Eval/treatment? yes yes    Does pt have pain? No c/o pain     Bed Mobility  Rolling: NT  Supine to sit: SBA HOB elevated  Sit to supine: NT  Scooting: SBA Rolling: SBA  Supine to sit:   SBA   Sit to supine: SBA   Scooting: SBA   Rolling: Independent   Supine to sit: Independent   Sit to supine: Independent  Scooting:  Independent    Transfers Sit to stand: SBA  Stand to sit: SBA  Stand pivot: SBA no AD Sit to stand: Min  Stand to sit: Min  Stand pivot: Min   Sit to stand: Independent   Stand to sit: Independent   Stand pivot: mod Independent with AAD   Ambulation    15 feet with no AD SBA  10 feet  Min poor endurance can benefit from Rehab.   50+ feet with AAD mod Independent    Stair negotiation: ascended and descended  NT NT 6+  prior level of function, completion of standardized testing/informal observation of tasks, assessment of data and education on plan of care and goals.    CPT codes:  [] Low Complexity PT evaluation 83421  [] Moderate Complexity PT evaluation 22043  [] High Complexity PT evaluation 53509  [] PT Re-evaluation 61457  [] Gait training 04631 0 minutes  [] Manual therapy 59151 0 minutes  [x] Therapeutic activities 07592 10 minutes  [] Therapeutic exercises 21726 0 minutes  [] Neuromuscular reeducation 52666 0 minutes     Shimon Theodore, PT [id number]

## 2024-03-11 ENCOUNTER — CARE COORDINATION (OUTPATIENT)
Dept: CARE COORDINATION | Age: 59
End: 2024-03-11

## 2024-03-11 VITALS
HEIGHT: 67 IN | DIASTOLIC BLOOD PRESSURE: 73 MMHG | RESPIRATION RATE: 18 BRPM | BODY MASS INDEX: 37.35 KG/M2 | HEART RATE: 88 BPM | WEIGHT: 238 LBS | SYSTOLIC BLOOD PRESSURE: 122 MMHG | OXYGEN SATURATION: 97 % | TEMPERATURE: 96.8 F

## 2024-03-11 LAB
ANION GAP SERPL CALCULATED.3IONS-SCNC: 9 MMOL/L (ref 7–16)
BASOPHILS # BLD: 0.03 K/UL (ref 0–0.2)
BASOPHILS NFR BLD: 0 % (ref 0–2)
BUN SERPL-MCNC: 14 MG/DL (ref 6–20)
CALCIUM SERPL-MCNC: 9.6 MG/DL (ref 8.6–10.2)
CHLORIDE SERPL-SCNC: 102 MMOL/L (ref 98–107)
CO2 SERPL-SCNC: 32 MMOL/L (ref 22–29)
CREAT SERPL-MCNC: 0.7 MG/DL (ref 0.5–1)
EOSINOPHIL # BLD: 0.02 K/UL (ref 0.05–0.5)
EOSINOPHILS RELATIVE PERCENT: 0 % (ref 0–6)
ERYTHROCYTE [DISTWIDTH] IN BLOOD BY AUTOMATED COUNT: 11.8 % (ref 11.5–15)
GFR SERPL CREATININE-BSD FRML MDRD: >60 ML/MIN/1.73M2
GLUCOSE BLD-MCNC: 108 MG/DL (ref 74–99)
GLUCOSE BLD-MCNC: 137 MG/DL (ref 74–99)
GLUCOSE SERPL-MCNC: 64 MG/DL (ref 74–99)
HCT VFR BLD AUTO: 38.3 % (ref 34–48)
HGB BLD-MCNC: 11.9 G/DL (ref 11.5–15.5)
HIV 1+2 AB+HIV1 P24 AG SERPL QL IA: NONREACTIVE
IMM GRANULOCYTES # BLD AUTO: 0.03 K/UL (ref 0–0.58)
IMM GRANULOCYTES NFR BLD: 0 % (ref 0–5)
LYMPHOCYTES NFR BLD: 2.41 K/UL (ref 1.5–4)
LYMPHOCYTES RELATIVE PERCENT: 35 % (ref 20–42)
MCH RBC QN AUTO: 28.4 PG (ref 26–35)
MCHC RBC AUTO-ENTMCNC: 31.1 G/DL (ref 32–34.5)
MCV RBC AUTO: 91.4 FL (ref 80–99.9)
MONOCYTES NFR BLD: 0.52 K/UL (ref 0.1–0.95)
MONOCYTES NFR BLD: 8 % (ref 2–12)
NEUTROPHILS NFR BLD: 57 % (ref 43–80)
NEUTS SEG NFR BLD: 3.9 K/UL (ref 1.8–7.3)
PLATELET # BLD AUTO: 214 K/UL (ref 130–450)
PMV BLD AUTO: 10.4 FL (ref 7–12)
POTASSIUM SERPL-SCNC: 3.8 MMOL/L (ref 3.5–5)
RBC # BLD AUTO: 4.19 M/UL (ref 3.5–5.5)
SODIUM SERPL-SCNC: 143 MMOL/L (ref 132–146)
WBC OTHER # BLD: 6.9 K/UL (ref 4.5–11.5)

## 2024-03-11 PROCEDURE — 6370000000 HC RX 637 (ALT 250 FOR IP)

## 2024-03-11 PROCEDURE — 2700000000 HC OXYGEN THERAPY PER DAY

## 2024-03-11 PROCEDURE — 99232 SBSQ HOSP IP/OBS MODERATE 35: CPT | Performed by: INTERNAL MEDICINE

## 2024-03-11 PROCEDURE — 2580000003 HC RX 258

## 2024-03-11 PROCEDURE — 6360000002 HC RX W HCPCS

## 2024-03-11 PROCEDURE — 85025 COMPLETE CBC W/AUTO DIFF WBC: CPT

## 2024-03-11 PROCEDURE — 36415 COLL VENOUS BLD VENIPUNCTURE: CPT

## 2024-03-11 PROCEDURE — 94660 CPAP INITIATION&MGMT: CPT

## 2024-03-11 PROCEDURE — 94640 AIRWAY INHALATION TREATMENT: CPT

## 2024-03-11 PROCEDURE — 82962 GLUCOSE BLOOD TEST: CPT

## 2024-03-11 PROCEDURE — 80048 BASIC METABOLIC PNL TOTAL CA: CPT

## 2024-03-11 RX ORDER — DEXTROSE MONOHYDRATE 50 MG/ML
INJECTION, SOLUTION INTRAVENOUS CONTINUOUS
Status: ACTIVE | OUTPATIENT
Start: 2024-03-11 | End: 2024-03-11

## 2024-03-11 RX ORDER — ONDANSETRON 4 MG/1
4 TABLET, ORALLY DISINTEGRATING ORAL EVERY 8 HOURS PRN
Qty: 9 TABLET | Refills: 0 | Status: SHIPPED | OUTPATIENT
Start: 2024-03-11 | End: 2024-03-14

## 2024-03-11 RX ORDER — RAMELTEON 8 MG/1
8 TABLET ORAL NIGHTLY
Qty: 30 TABLET | Refills: 0 | Status: SHIPPED | OUTPATIENT
Start: 2024-03-11 | End: 2024-03-20 | Stop reason: SDUPTHER

## 2024-03-11 RX ADMIN — PANTOPRAZOLE SODIUM 40 MG: 40 TABLET, DELAYED RELEASE ORAL at 06:19

## 2024-03-11 RX ADMIN — CALCIUM CARBONATE-VITAMIN D TAB 500 MG-200 UNIT 1 TABLET: 500-200 TAB at 08:27

## 2024-03-11 RX ADMIN — GUAIFENESIN 400 MG: 400 TABLET ORAL at 08:28

## 2024-03-11 RX ADMIN — ARIPIPRAZOLE 5 MG: 5 TABLET ORAL at 08:34

## 2024-03-11 RX ADMIN — IPRATROPIUM BROMIDE AND ALBUTEROL SULFATE 1 DOSE: .5; 2.5 SOLUTION RESPIRATORY (INHALATION) at 11:56

## 2024-03-11 RX ADMIN — MICONAZOLE NITRATE: 20 CREAM TOPICAL at 08:34

## 2024-03-11 RX ADMIN — IPRATROPIUM BROMIDE AND ALBUTEROL SULFATE 1 DOSE: .5; 2.5 SOLUTION RESPIRATORY (INHALATION) at 08:25

## 2024-03-11 RX ADMIN — BENZOCAINE AND MENTHOL 1 LOZENGE: 15; 3.6 LOZENGE ORAL at 08:37

## 2024-03-11 RX ADMIN — HYDROXYZINE PAMOATE 50 MG: 25 CAPSULE ORAL at 13:32

## 2024-03-11 RX ADMIN — HYDROXYZINE PAMOATE 50 MG: 25 CAPSULE ORAL at 06:24

## 2024-03-11 RX ADMIN — SODIUM CHLORIDE, PRESERVATIVE FREE 10 ML: 5 INJECTION INTRAVENOUS at 08:28

## 2024-03-11 RX ADMIN — DOXYCYCLINE HYCLATE 100 MG: 100 CAPSULE ORAL at 08:27

## 2024-03-11 RX ADMIN — BUDESONIDE 500 MCG: 0.5 SUSPENSION RESPIRATORY (INHALATION) at 08:25

## 2024-03-11 RX ADMIN — ARFORMOTEROL TARTRATE 15 MCG: 15 SOLUTION RESPIRATORY (INHALATION) at 08:24

## 2024-03-11 RX ADMIN — ENOXAPARIN SODIUM 30 MG: 100 INJECTION SUBCUTANEOUS at 08:27

## 2024-03-11 RX ADMIN — ROFLUMILAST 250 MCG: 500 TABLET ORAL at 08:34

## 2024-03-11 RX ADMIN — GUAIFENESIN 400 MG: 400 TABLET ORAL at 14:24

## 2024-03-11 NOTE — DISCHARGE INSTR - COC
Continuity of Care Form    Patient Name: Brenda Nunn   :  1965  MRN:  [id number]    Admit date:  3/6/2024  Discharge date:  ***    Code Status Order: Full Code   Advance Directives:     Admitting Physician:  Parish Dalton MD  PCP: Genesis Enriquez MD    Discharging Nurse: ***  Discharging Hospital Unit/Room#: 8204/8204-A  Discharging Unit Phone Number: ***    Emergency Contact:   Extended Emergency Contact Information  Primary Emergency Contact: BRAIN SCHREIBER  Mobile Phone: [phone]  Relation: Child  Preferred language: English   needed? No    Past Surgical History:  Past Surgical History:   Procedure Laterality Date     SECTION      HYSTERECTOMY (CERVIX STATUS UNKNOWN)      Ovaries retained. Unknown cervix status. performed for uterine fibroids       Immunization History:     There is no immunization history on file for this patient.    Active Problems:  Patient Active Problem List   Diagnosis Code    COPD with acute exacerbation ([facility]) J44.1    Mood disorder ([facility]) F39    Bipolar 1 disorder ([facility]) F31.9    Bipolar disorder, current episode mixed, severe ([facility]) F31.63    Stress incontinence (female) (male) N39.3    Irregular Z line of esophagus K22.9    Polysubstance use disorder F19.90    BENJAMIN (obstructive sleep apnea) G47.33    Anxiety F41.9    Acute hypoxic respiratory failure ([facility]) J96.01    Acute on chronic respiratory failure with hypoxia and hypercapnia ([facility]) J96.21, J96.22    Borderline personality disorder ([facility]) F60.3    Acute respiratory failure with hypercapnia ([facility]) J96.02    Tobacco use Z72.0    Intertrigo L30.4    Vaginal candidiasis B37.31    Acute respiratory failure with hypoxia and hypercapnia ([facility]) J96.01, J96.02    Chronic hypoxic respiratory failure ([facility]) J96.11    Chronic obstructive pulmonary disease ([facility]) J44.9    COPD (chronic obstructive pulmonary disease) ([facility]) J44.9    COPD exacerbation ([facility]) J44.1       Isolation/Infection:   Isolation            No  mg daily for 3 days.  Pulmonology was consulted, Roflumilast to 50 mg daily was resumed.  Patient's AM-PAC score was 16/24.  HIV and alpha-1 antitrypsin levels ordered, pending.  Patient improved while hospitalization.  Decision was made to discharge patient to [facility].    Please arrange for patient to be seen by PCP within 1 week of discharge from [facility].     PHYSICIAN SIGNATURE:  Electronically signed by Mary Jane Jolley MD on 3/11/24 at 12:00 PM EDT

## 2024-03-11 NOTE — PROGRESS NOTES
[facility]  Internal Medicine Residency Program  Progress Note - House Team       Patient:  Brenda Nunn 58 y.o. female   MRN: [id number]       Date of Service: 3/11/2024    CC: SOB  Overnight events: melatonin for sleep, vitals stable     Subjective     Patient was seen at bedside in no acute distress. Patient has SOB on walking, not at rest.Eating okay.      Objective       Physical Exam  Vitals: /70   Pulse 77   Temp 97.8 °F (36.6 °C) (Temporal)   Resp 22   Ht 1.702 m (5' 7\")   Wt 108 kg (238 lb)   SpO2 97%   BMI 37.28 kg/m²     I & O - 24hr: No intake/output data recorded.   General Appearance: alert, appears stated age, and cooperative  HEENT:  Head: Normal, normocephalic, atraumatic.  Lung: wheezes bilaterally  Heart: regular rate and rhythm  Abdomen: soft, non-tender; bowel sounds normal; no masses,  no organomegaly  Extremities:  extremities normal, atraumatic, no cyanosis or edema  Musculokeletal: No joint swelling, no muscle tenderness. ROM normal in all joints of extremities.   Neurologic: Mental status: Alert, oriented, thought content appropriate    Diet:   ADULT DIET; Regular      Pertinent Labs & Imaging Studies     Labs    Recent Labs     03/09/24  0627 03/10/24  0716 03/11/24  0528   WBC 6.3 6.1 6.9   RBC 4.23 4.47 4.19   HGB 12.0 12.9 11.9   HCT 39.1 40.7 38.3   MCV 92.4 91.1 91.4   MCH 28.4 28.9 28.4   MCHC 30.7* 31.7* 31.1*   RDW 11.8 11.7 11.8    224 214   MPV 10.1 10.2 10.4     Recent Labs     03/09/24  0627 03/10/24  0716 03/11/24  0528    140 143   K 4.3 4.0 3.8    100 102   CO2 30* 33* 32*   BUN 17 15 14   CREATININE 0.7 0.6 0.7   ANIONGAP 9 7 9   GLUCOSE 69* 68* 64*   CALCIUM 9.4 9.6 9.6         Imaging Studies:    XR CHEST PORTABLE   Final Result   1. Minimal bibasilar atelectasis without consolidation.   2. Cardiomegaly.         XR CHEST PORTABLE   Final Result   1.  Right basilar discoid atelectasis, not seen previously.      2.   with discussions of DC home +anxiety cesar  Lives with 12 people in her home  But also refuses placement  +polysubtance  +smoker        Antifungal - groin  Hiv and alpha 1 test done  IgE not bad, doubt steroids will be very effective- no eosphilia- not highly allergic type  Dicussed LABA/steroid inhaler- off systemic steroid   ongoing tobacco  Echo- some asthma/copd exac pulm HTN- not severe  RVSP is 41 mmHg      Always pulse ox ok >90%-- pulse ox- chronically OK  Keeps getting put on high dose O2- bc patient \"feels sob\"     Per pulm:  .\"...remained stable. Patient is being admitted for observation due to COPD exacerbation. Same thing noted just a different month... consider LTAC or SNF given readmissions to hospital.  NIV to offered \"     Pulm rehab again discussed, chronic axiety  She is very reluctant to go home  She wants to stay- again discuss [facility] as only option to stay in hospital  So now agreeable to [facility]  +deblitation/pulm rehab  Reviewed all meds-- finished doxy  DC today to [facility]   General

## 2024-03-11 NOTE — DISCHARGE SUMMARY
Head: Normocephalic and atraumatic.   Cardiovascular:      Rate and Rhythm: Normal rate and regular rhythm.   Pulmonary:      Breath sounds: Wheezing present.   Abdominal:      General: Bowel sounds are normal.      Palpations: Abdomen is soft.      Tenderness: There is no abdominal tenderness.   Musculoskeletal:      Right lower leg: No edema.      Left lower leg: No edema.   Neurological:      Mental Status: She is alert and oriented to person, place, and time.          Pending test results:   Alpha-1 antitrypsin    Consults:  Pulmonology    Procedures:  None    Condition at discharge: Stable    Disposition: [facility]    Outpatient Recommendations:  Pulmonary rehabilitation as outpatient  Please refer to sleep study  Please monitor for medication compliance    Discharge Medications:     Medication List        START taking these medications      miconazole 2 % cream  Commonly known as: MICOTIN  Apply topically 2 times daily.     ondansetron 4 MG disintegrating tablet  Commonly known as: ZOFRAN-ODT  Take 1 tablet by mouth every 8 hours as needed for Nausea or Vomiting            CONTINUE taking these medications      albuterol sulfate  (90 Base) MCG/ACT inhaler  Commonly known as: Ventolin HFA  Inhale 2 puffs into the lungs every 6 hours as needed for Wheezing or Shortness of Breath     ARIPiprazole 5 MG tablet  Commonly known as: ABILIFY  Take 1 tablet by mouth daily     budesonide-formoterol 160-4.5 MCG/ACT Aero  Commonly known as: Symbicort  Inhale 2 puffs into the lungs 2 times daily     Full Kit Nebulizer Set Misc  Use as directed with nebulized medication.     guaiFENesin 400 MG tablet  Take 1 tablet by mouth in the morning, at noon, and at bedtime     hydrOXYzine HCl 25 MG tablet  Commonly known as: ATARAX  Take 1 tablet by mouth every 6 hours as needed for Anxiety     ipratropium 0.5 mg-albuterol 2.5 mg 0.5-2.5 (3) MG/3ML Soln nebulizer solution  Commonly known as: DUONEB  Inhale 3 mLs into the lungs every  new prescriptions given to you today, the list of home medications on this After Visit Summary are based on information provided to us from you and your healthcare providers. This information, including the list, dose, and frequency of medications is only as accurate as the information you provided. If you have any questions or concerns about your home medications, please contact your Primary Care Physician for further clarification.       Mary Jane Jolley MD  PGY- 1   3:28 PM 3/11/2024      Attending physician: Dr. Lopez

## 2024-03-11 NOTE — CARE COORDINATION
Brenda currently admitted for COPD exacerbation. Discharge plan is to [facility]. Will close program.

## 2024-03-11 NOTE — CARE COORDINATION
03/08/24 Rec'd call from IM resident about HHC for patient They want skilled care to have RN speak with pt about her oxygen and for education related to her oxygen in an effort to prevent future admission Pt had no preference on choices for HHC Referral made to Cresencio at [facility].  Additionally CM spoke with Isidra at [facility] who supplies the patient with her oxygen about going to the home to provide education.  Isidra states that they have been to the home multiple times to educate the patient on both the use of home O2 and her Bipap but indicates they will send someone out again next week to discuss this with the patient.  Pt plan is still to return home with her daughter who will transport.  Pt will need a walking pulse ox as she is normally on 3LNC and is now on 5LNC.  Electronically signed by Rob Cunningham RN CM on 3/8/2024 at 11:23 AM       11:26AM [facility] unable to accept Left message for Regina at [facility] for referral Electronically signed by Rob Cunningham RN CM on 3/8/2024 at 11:27 AM        4:00pm  Regina from [facility] has accepted the pt with a SOC of Monday 03/11/24 Orders are in chart [facility] will need notified of discharge  Electronically signed by Rob Cunningham RN on 3/8/2024 at 4:01 PM     Pt notified of above states that her phone is not working at this time and they would need to call her daughter cell at [phone] Regina notified Electronically signed by Rob Cunningham RN on 3/8/2024 at 5:02 PM       The Plan for Transition of Care is related to the following treatment goals: home care    The Patient  was provided with a choice of provider and agrees   with the discharge plan. [x] Yes [] No    Freedom of choice list was provided with basic dialogue that supports the patient's individualized plan of care/goals, treatment preferences and shares the quality data associated with the providers. [x] Yes [] No   
03/11/24 CM Update:  Spoke with [facility] she states they will not accept the patient Spoke with pt and daughter regarding discharge preferences and inclusion for discharge plan they are agreeable to a referral to [facility] Referral made to Jennifer CEDILLO/EVY to follow Electronically signed by Rob Cunningham RN on 3/11/2024 at 9:10 AM     10:33am Rec'd text from Jennifer they can accept at [facility] confirmed with patient that [facility] was acceptable advised that pt can transfer to [facility] when bed becomes available Precert was started IM resident prompted for discharge MAMADOU/SW to follow Electronically signed by Rob Cunningham RN CM on 3/11/2024 at 10:35 AM     11:50am Text from Jennifer they can accept pending precert resident was prompted earlier for discharge Will set up [facility] for transport when order is placed to avoid cancellation fees incurring if discharge is not placed timely Electronically signed by Rob Cunningham RN on 3/11/2024 at 11:52 AM   
03/11/24 Discharge order noted Jennifer can accept at [facility] precert pending [facility] to transport time 2:30pm BS RN liaison and patient notified Electronically signed by Rob Cunningham RN CM on 3/11/2024 at 12:15 PM   
3/10/24  SW received a call by the resident requesting this  speak with patient as patient is now requesting to go to a [facility].  Updated therapy requested per on call staff and [facility] list provided to patient.  Will await updated therapy evals to see if patient qualifies for a [facility] stay. Patient has requested [facility] with referral sent to [facility].  Patient will need a pre-cert to discharge if she qualifies for a skilled rehab stay. EVY/MAMADOU to follow.    Electronically signed by VIOLETTE Aceves on 3/10/2024 at 9:59 AM   
  Current services prior to admission: Oxygen Therapy            Current DME:              Type of Home Care services:  None    ADLS  Prior functional level: Assistance with the following:, Bathing, Dressing, Housework, Shopping  Current functional level: Assistance with the following:, Bathing, Dressing, Housework, Shopping    PT AM-PAC:   /24  OT AM-PAC:   /24    Family can provide assistance at DC: Yes  Would you like Case Management to discuss the discharge plan with any other family members/significant others, and if so, who? No  Plans to Return to Present Housing: Yes  Other Identified Issues/Barriers to RETURNING to current housing: yes  Potential Assistance needed at discharge: Transportation            Potential DME:  none  Patient expects to discharge to: House  Plan for transportation at discharge:  daughter    Financial    Payor: [facility] MEDICARE / Plan: [facility] MEDICARE ADVANTAGE / Product Type: *No Product type* /     Does insurance require precert for SNF: Yes    Potential assistance Purchasing Medications:    Meds-to-Beds request: Yes      [facility] - [facility] [address] [facility] - P [phone] - F [phone]  [address] [place], [place] [address]  Phone: [phone] Fax: [phone]    [id number] - [facility] [address] [facility] -  [phone] - F [phone]  [address] [id number]  Phone: [phone] Fax: [phone]    [id number] - [facility] [address] [facility] -  [phone] - F [phone]  [address] [id number]  Phone: [phone] Fax: [phone]    [id number] - [facility] [address] [facility] -  [phone] - F [phone]  [address] [facility] [address]  Phone: [phone] Fax: [phone]      Notes:    Factors facilitating achievement of predicted outcomes: Family support    Barriers to discharge: none    Additional Case Management Notes:

## 2024-03-11 NOTE — DISCHARGE INSTRUCTIONS
Internal medicine    Follow ups  Please follow up with the internal medicine clinic at [facility] within 14 days of discharge from your rehabilitation facility       Changes in healthcare   Please take all medications as indicated  Diet: regular diet   Activity: activity as tolerated  New Medications started during this hospital stay  none  Changes to your medications  none  Medications you should stop taking   none  Additional labs, testing or imaging needed after discharge   HIV, alpha-1 antitrypsin level pending    Please contact us if you have any concerns, wish to change or make an appointment:  Internal medicine clinic   Phone: [phone]  Fax: [phone] [address] [facility] [address]  Or please call the nurse line at [phone].  Should you have further questions in regards to this visit, you can review your clinical note and after visit summary document on your [employer] account.  Other questions can be directed to our nurse line at [phone].     Other than any new prescriptions given to you today, the list of home medications on this After Visit Summary are based on information provided to us from you and your healthcare providers. This information, including the list, dose, and frequency of medications is only as accurate as the information you provided. If you have any questions or concerns about your home medications, please contact your Primary Care Physician for further clarification.

## 2024-03-12 ENCOUNTER — TELEPHONE (OUTPATIENT)
Dept: PULMONOLOGY | Age: 59
End: 2024-03-12

## 2024-03-12 DIAGNOSIS — G47.33 OSA (OBSTRUCTIVE SLEEP APNEA): Primary | ICD-10-CM

## 2024-03-12 LAB — A1AT SERPL-MCNC: 145 MG/DL (ref 90–200)

## 2024-03-12 NOTE — TELEPHONE ENCOUNTER
Call to pt re: appt for tomorrow with Dr Bedolla as new pt; recent hospitalization and follow up in a few weeks needed. Pt staying with Dtr and spoke with Dtr Farhana after call today to pt's number says \"number is unreachable\". Dtr agreeable to rescheduling appt for follow up in office for Dr Bedolla. Sleep study to be ordered and follow up appt after sleep testing and referral to Pulmonary Rehab to be sent to arrange these for pt post hospitalization per orders. Dtr advised office will arrange referrals to sleep and Pulmonary rehab and office to mail appt card for office visit.

## 2024-03-19 ENCOUNTER — TELEPHONE (OUTPATIENT)
Dept: INTERNAL MEDICINE | Age: 59
End: 2024-03-19

## 2024-03-19 NOTE — TELEPHONE ENCOUNTER
Pt left message for LSW to return call due being in need of medications. Chart reviewed and  note pt was discharged from hospital on 3.11.24 and transferred to [facility].    LSW returned call and was advised by pt's daughter that pt is without some medications (namely abilify).  Daughter reports she took pt home after pt at [facility] for 3 hours and did not want to stay.  Pt was given no paperwork as it was such short 3 hour stay.  Appt given for 3.20.24 at 1:30PM with Dr Flowers.

## 2024-03-20 ENCOUNTER — OFFICE VISIT (OUTPATIENT)
Dept: INTERNAL MEDICINE | Age: 59
End: 2024-03-20

## 2024-03-20 ENCOUNTER — SOCIAL WORK (OUTPATIENT)
Dept: INTERNAL MEDICINE | Age: 59
End: 2024-03-20

## 2024-03-20 VITALS
WEIGHT: 238 LBS | HEART RATE: 95 BPM | TEMPERATURE: 97 F | DIASTOLIC BLOOD PRESSURE: 74 MMHG | SYSTOLIC BLOOD PRESSURE: 123 MMHG | BODY MASS INDEX: 37.35 KG/M2 | HEIGHT: 67 IN | OXYGEN SATURATION: 92 % | RESPIRATION RATE: 20 BRPM

## 2024-03-20 DIAGNOSIS — F41.9 ANXIETY: ICD-10-CM

## 2024-03-20 DIAGNOSIS — E55.9 VITAMIN D DEFICIENCY: ICD-10-CM

## 2024-03-20 DIAGNOSIS — J44.9 CHRONIC OBSTRUCTIVE PULMONARY DISEASE, UNSPECIFIED COPD TYPE (HCC): ICD-10-CM

## 2024-03-20 DIAGNOSIS — R07.9 CHEST PAIN, UNSPECIFIED TYPE: Primary | ICD-10-CM

## 2024-03-20 DIAGNOSIS — Z09 HOSPITAL DISCHARGE FOLLOW-UP: ICD-10-CM

## 2024-03-20 DIAGNOSIS — G47.00 INSOMNIA, UNSPECIFIED TYPE: ICD-10-CM

## 2024-03-20 DIAGNOSIS — F31.9 BIPOLAR 1 DISORDER (HCC): ICD-10-CM

## 2024-03-20 RX ORDER — ROFLUMILAST 250 UG/1
250 TABLET ORAL DAILY
Qty: 30 TABLET | Refills: 0 | Status: SHIPPED | OUTPATIENT
Start: 2024-03-20 | End: 2024-04-19

## 2024-03-20 RX ORDER — RAMELTEON 8 MG/1
8 TABLET ORAL NIGHTLY
Qty: 14 TABLET | Refills: 0 | Status: SHIPPED | OUTPATIENT
Start: 2024-03-20 | End: 2024-04-03

## 2024-03-20 RX ORDER — MIRTAZAPINE 15 MG/1
15 TABLET, FILM COATED ORAL NIGHTLY
Qty: 14 TABLET | Refills: 0 | Status: SHIPPED | OUTPATIENT
Start: 2024-03-20

## 2024-03-20 RX ORDER — PRAZOSIN HYDROCHLORIDE 1 MG/1
1 CAPSULE ORAL NIGHTLY
Qty: 30 CAPSULE | Refills: 2 | Status: SHIPPED | OUTPATIENT
Start: 2024-03-20

## 2024-03-20 RX ORDER — ROFLUMILAST 250 UG/1
250 TABLET ORAL DAILY
Qty: 30 TABLET | Refills: 5 | Status: CANCELLED | OUTPATIENT
Start: 2024-03-20 | End: 2024-09-16

## 2024-03-20 RX ORDER — GUAIFENESIN 400 MG/1
400 TABLET ORAL 3 TIMES DAILY
Qty: 90 TABLET | Refills: 5 | Status: SHIPPED | OUTPATIENT
Start: 2024-03-20

## 2024-03-20 RX ORDER — ARIPIPRAZOLE 5 MG/1
5 TABLET ORAL DAILY
Qty: 14 TABLET | Refills: 0 | Status: SHIPPED | OUTPATIENT
Start: 2024-03-20

## 2024-03-20 RX ORDER — ACETAMINOPHEN 160 MG
2000 TABLET,DISINTEGRATING ORAL DAILY
Qty: 30 CAPSULE | Refills: 5 | Status: SHIPPED | OUTPATIENT
Start: 2024-03-20

## 2024-03-20 ASSESSMENT — ENCOUNTER SYMPTOMS
VOMITING: 0
NAUSEA: 0
COUGH: 1
EYES NEGATIVE: 1
GASTROINTESTINAL NEGATIVE: 1

## 2024-03-20 NOTE — PROGRESS NOTES
Dr Flowers requested LSW to meet with pt regarding transportation issues.  Pt known to LSW; pt states she may need help with ride for 4.1.24 IM appt. States daughter's car is to be repaired by this weekend.  LSW advised request for [employer] van will be made which was emailed to TERRI Wiley as back up.      Reviewed chart and note pt continues to have out of state Medicaid ([facility]) Pt states she also has Medicare plan which [facility] Medicare Advantage is marked as verified; pt reports she has not closed [place] case and is unsure if she will remain in [place].  Pt states she will know at 4.1.24 appt and will discuss with LSW; provided list of documents which will be needed to facilitate Medicaid application    Pt has not yet connected with community mental health provider; advised [facility] has attempted contact without success; pt requests to use daughter's number at [phone] which is on chart and LSW called daughter for her return ride home

## 2024-03-20 NOTE — PATIENT INSTRUCTIONS
Dear Brenda Nunn,    Thank you for coming to your appointment today. I hope we have addressed all of your needs.     Please make sure to do the following:  - Continue your medications as listed.    - Referrals have been made to [facility]:  If you do not hear from the office in 1 week, please call the number listed.  - Follow-up with Pulmonology as scheduled.   - We will see each other again on April 1, 2024 for PCP follow-up.     Call for a sooner appointment if you develop any new or worsening symptoms such as severe chest pain, difficulty breathing.    Have a great day!    Sincerely,  Ma. Shani Flowers MD  3/20/2024  2:27 PM

## 2024-03-20 NOTE — PROGRESS NOTES
[facility]  Internal Medicine Residency Clinic  Attending Physician Statement:  Kevin Lopez M.D., F.A.C.P.  Patient is seen for fu visit today.  -- acute and chronic problems addressed  I have seen/discussed the case, including pertinent history and exam findings with the resident, review of last visit medical records/labs/imaging if applicable-     Addressed when applicable- Health maintenance issues of vaccinations, social determinants/depression/CA screening, tobacco cessation etc...    I agree with the assessment, plan and orders as documented by the resident.    -Billiing assessed by medical complexity of case  My assessment of high points of today's visit as follows:      past medical history of anxiety, depression, HFpEF, COPD ( on 3 L of oxygen at home), hypertension, GERD, tobacco abuse, Marijuana, and methamphetamine use     Hospitalization:  \"with a CC of SOB over the past few days. She was recently admitted for COPD exacerbation on 02/18. At that time, all infectious workup was negative. She was discharged on Prednisone 40 mg for 3 days and instructed to follow up with pulmonology outpatient. She presented to the ED stable. Vitals were unremarkable. Labs were also unremarkable. Patient had a CXR which did not appear changed when compared to her most recent one from her last admission. Patient is on her home dose of oxygen, She also had ambulating pulse oximetry and maintained on oxygen saturation >92% while on 3 L. ... initially on IV Solu-Medrol, later changed to prednisone p.o. 40 mg daily for 3 days. Pulmonology was consulted, Roflumilast to 50 mg daily was resumed. Patient's AM-PAC score was 16/24..I sent the Rx to the pharmacy to [facility] \".    .. NO change in her chronic home o2  2-3 liters during her entire admission, no conversastional dyspnea and NO acute exac of cough during her entire admission    She stayed for long time in hosptia  12 people at home (some sick)   While 
  mirtazapine 15 MG tablet  Commonly known as: REMERON  Take 1 tablet by mouth nightly     pantoprazole 40 MG tablet  Commonly known as: PROTONIX  Take 1 tablet by mouth every morning (before breakfast)     prazosin 1 MG capsule  Commonly known as: MINIPRESS  Take 1 capsule by mouth nightly     ramelteon 8 MG tablet  Commonly known as: Rozerem  Take 1 tablet by mouth nightly for 14 days     Vitamin D3 50 MCG (2000 UT) Caps capsule  Generic drug: vitamin D  Take 1 capsule by mouth daily               Where to Get Your Medications        These medications were sent to [id number] - [address] [phone] -  [phone]  [facility] [address]      Phone: [phone]   ARIPiprazole 5 MG tablet  guaiFENesin 400 MG tablet  mirtazapine 15 MG tablet  prazosin 1 MG capsule  ramelteon 8 MG tablet  Roflumilast 250 MCG tablet  Vitamin D3 50 MCG (2000 UT) Caps capsule          Medications marked \"taking\" at this time  Outpatient Medications Marked as Taking for the 3/20/24 encounter (Office Visit) with Vincent Flowers MD   Medication Sig Dispense Refill    ARIPiprazole (ABILIFY) 5 MG tablet Take 1 tablet by mouth daily 14 tablet 0    vitamin D (VITAMIN D3) 50 MCG (2000 UT) CAPS capsule Take 1 capsule by mouth daily 30 capsule 5    guaiFENesin 400 MG tablet Take 1 tablet by mouth in the morning, at noon, and at bedtime 90 tablet 5    mirtazapine (REMERON) 15 MG tablet Take 1 tablet by mouth nightly 14 tablet 0    prazosin (MINIPRESS) 1 MG capsule Take 1 capsule by mouth nightly 30 capsule 2    ramelteon (ROZEREM) 8 MG tablet Take 1 tablet by mouth nightly for 14 days 14 tablet 0    Roflumilast (DALIRESP) 250 MCG tablet Take 1 tablet by mouth daily 30 tablet 0    miconazole (MICOTIN) 2 % cream Apply topically 2 times daily. 14 g 1    albuterol sulfate HFA (VENTOLIN HFA) 108 (90 Base) MCG/ACT inhaler Inhale 2 puffs into the lungs every 6 hours as needed for

## 2024-03-21 ENCOUNTER — TELEPHONE (OUTPATIENT)
Dept: INTERNAL MEDICINE | Age: 59
End: 2024-03-21

## 2024-03-26 ENCOUNTER — HOSPITAL ENCOUNTER (OUTPATIENT)
Age: 59
Setting detail: OBSERVATION
Discharge: HOME | End: 2024-03-27
Attending: EMERGENCY MEDICINE | Admitting: INTERNAL MEDICINE
Payer: MEDICARE

## 2024-03-26 ENCOUNTER — APPOINTMENT (OUTPATIENT)
Dept: GENERAL RADIOLOGY | Age: 59
End: 2024-03-26
Payer: MEDICARE

## 2024-03-26 DIAGNOSIS — J44.1 COPD EXACERBATION (HCC): ICD-10-CM

## 2024-03-26 DIAGNOSIS — R06.02 SHORTNESS OF BREATH: Primary | ICD-10-CM

## 2024-03-26 DIAGNOSIS — R07.9 CHEST PAIN, UNSPECIFIED TYPE: ICD-10-CM

## 2024-03-26 LAB
ALBUMIN SERPL-MCNC: 4.1 G/DL (ref 3.5–5.2)
ALP SERPL-CCNC: 74 U/L (ref 35–104)
ALT SERPL-CCNC: 12 U/L (ref 0–32)
ANION GAP SERPL CALCULATED.3IONS-SCNC: 12 MMOL/L (ref 7–16)
AST SERPL-CCNC: 12 U/L (ref 0–31)
B PARAP IS1001 DNA NPH QL NAA+NON-PROBE: NOT DETECTED
B PERT DNA SPEC QL NAA+PROBE: NOT DETECTED
BASOPHILS # BLD: 0.02 K/UL (ref 0–0.2)
BASOPHILS NFR BLD: 0 % (ref 0–2)
BILIRUB SERPL-MCNC: 0.2 MG/DL (ref 0–1.2)
BNP SERPL-MCNC: <36 PG/ML (ref 0–125)
BUN SERPL-MCNC: 16 MG/DL (ref 6–20)
C PNEUM DNA NPH QL NAA+NON-PROBE: NOT DETECTED
CALCIUM SERPL-MCNC: 9.7 MG/DL (ref 8.6–10.2)
CHLORIDE SERPL-SCNC: 100 MMOL/L (ref 98–107)
CO2 SERPL-SCNC: 29 MMOL/L (ref 22–29)
CREAT SERPL-MCNC: 1 MG/DL (ref 0.5–1)
EOSINOPHIL # BLD: 0.1 K/UL (ref 0.05–0.5)
EOSINOPHILS RELATIVE PERCENT: 1 % (ref 0–6)
ERYTHROCYTE [DISTWIDTH] IN BLOOD BY AUTOMATED COUNT: 11.9 % (ref 11.5–15)
FLUAV RNA NPH QL NAA+NON-PROBE: NOT DETECTED
FLUAV RNA RESP QL NAA+PROBE: NOT DETECTED
FLUBV RNA NPH QL NAA+NON-PROBE: NOT DETECTED
FLUBV RNA RESP QL NAA+PROBE: NOT DETECTED
GFR SERPL CREATININE-BSD FRML MDRD: 65 ML/MIN/1.73M2
GLUCOSE SERPL-MCNC: 117 MG/DL (ref 74–99)
HADV DNA NPH QL NAA+NON-PROBE: NOT DETECTED
HCOV 229E RNA NPH QL NAA+NON-PROBE: NOT DETECTED
HCOV HKU1 RNA NPH QL NAA+NON-PROBE: NOT DETECTED
HCOV NL63 RNA NPH QL NAA+NON-PROBE: NOT DETECTED
HCOV OC43 RNA NPH QL NAA+NON-PROBE: NOT DETECTED
HCT VFR BLD AUTO: 39.7 % (ref 34–48)
HGB BLD-MCNC: 12.5 G/DL (ref 11.5–15.5)
HMPV RNA NPH QL NAA+NON-PROBE: NOT DETECTED
HPIV1 RNA NPH QL NAA+NON-PROBE: NOT DETECTED
HPIV2 RNA NPH QL NAA+NON-PROBE: NOT DETECTED
HPIV3 RNA NPH QL NAA+NON-PROBE: NOT DETECTED
HPIV4 RNA NPH QL NAA+NON-PROBE: NOT DETECTED
IMM GRANULOCYTES # BLD AUTO: <0.03 K/UL (ref 0–0.58)
IMM GRANULOCYTES NFR BLD: 0 % (ref 0–5)
LYMPHOCYTES NFR BLD: 0.81 K/UL (ref 1.5–4)
LYMPHOCYTES RELATIVE PERCENT: 12 % (ref 20–42)
M PNEUMO DNA NPH QL NAA+NON-PROBE: NOT DETECTED
MAGNESIUM SERPL-MCNC: 1.6 MG/DL (ref 1.6–2.6)
MCH RBC QN AUTO: 28.5 PG (ref 26–35)
MCHC RBC AUTO-ENTMCNC: 31.5 G/DL (ref 32–34.5)
MCV RBC AUTO: 90.6 FL (ref 80–99.9)
MONOCYTES NFR BLD: 0.2 K/UL (ref 0.1–0.95)
MONOCYTES NFR BLD: 3 % (ref 2–12)
NEUTROPHILS NFR BLD: 84 % (ref 43–80)
NEUTS SEG NFR BLD: 5.89 K/UL (ref 1.8–7.3)
PLATELET # BLD AUTO: 188 K/UL (ref 130–450)
PMV BLD AUTO: 10 FL (ref 7–12)
POTASSIUM SERPL-SCNC: 4.1 MMOL/L (ref 3.5–5)
PROCALCITONIN SERPL-MCNC: 0.05 NG/ML (ref 0–0.08)
PROT SERPL-MCNC: 6.9 G/DL (ref 6.4–8.3)
RBC # BLD AUTO: 4.38 M/UL (ref 3.5–5.5)
RSV RNA NPH QL NAA+NON-PROBE: NOT DETECTED
RV+EV RNA NPH QL NAA+NON-PROBE: NOT DETECTED
SARS-COV-2 RNA NPH QL NAA+NON-PROBE: NOT DETECTED
SARS-COV-2 RNA RESP QL NAA+PROBE: NOT DETECTED
SODIUM SERPL-SCNC: 141 MMOL/L (ref 132–146)
SOURCE: NORMAL
SPECIMEN DESCRIPTION: NORMAL
SPECIMEN DESCRIPTION: NORMAL
TROPONIN I SERPL HS-MCNC: 8 NG/L (ref 0–9)
TROPONIN I SERPL HS-MCNC: 9 NG/L (ref 0–9)
WBC OTHER # BLD: 7 K/UL (ref 4.5–11.5)

## 2024-03-26 PROCEDURE — 80053 COMPREHEN METABOLIC PANEL: CPT

## 2024-03-26 PROCEDURE — 2580000003 HC RX 258

## 2024-03-26 PROCEDURE — 0202U NFCT DS 22 TRGT SARS-COV-2: CPT

## 2024-03-26 PROCEDURE — 96372 THER/PROPH/DIAG INJ SC/IM: CPT

## 2024-03-26 PROCEDURE — 96375 TX/PRO/DX INJ NEW DRUG ADDON: CPT

## 2024-03-26 PROCEDURE — 87636 SARSCOV2 & INF A&B AMP PRB: CPT

## 2024-03-26 PROCEDURE — 84484 ASSAY OF TROPONIN QUANT: CPT

## 2024-03-26 PROCEDURE — 6370000000 HC RX 637 (ALT 250 FOR IP)

## 2024-03-26 PROCEDURE — 85025 COMPLETE CBC W/AUTO DIFF WBC: CPT

## 2024-03-26 PROCEDURE — 71045 X-RAY EXAM CHEST 1 VIEW: CPT

## 2024-03-26 PROCEDURE — 96365 THER/PROPH/DIAG IV INF INIT: CPT

## 2024-03-26 PROCEDURE — G0378 HOSPITAL OBSERVATION PER HR: HCPCS

## 2024-03-26 PROCEDURE — 84145 PROCALCITONIN (PCT): CPT

## 2024-03-26 PROCEDURE — 6360000002 HC RX W HCPCS

## 2024-03-26 PROCEDURE — 83880 ASSAY OF NATRIURETIC PEPTIDE: CPT

## 2024-03-26 PROCEDURE — 83735 ASSAY OF MAGNESIUM: CPT

## 2024-03-26 PROCEDURE — 99285 EMERGENCY DEPT VISIT HI MDM: CPT

## 2024-03-26 PROCEDURE — 93005 ELECTROCARDIOGRAM TRACING: CPT

## 2024-03-26 PROCEDURE — 94640 AIRWAY INHALATION TREATMENT: CPT

## 2024-03-26 RX ORDER — MAGNESIUM SULFATE IN WATER 40 MG/ML
2000 INJECTION, SOLUTION INTRAVENOUS ONCE
Status: COMPLETED | OUTPATIENT
Start: 2024-03-26 | End: 2024-03-26

## 2024-03-26 RX ORDER — ARIPIPRAZOLE 5 MG/1
5 TABLET ORAL DAILY
Status: DISCONTINUED | OUTPATIENT
Start: 2024-03-27 | End: 2024-03-27 | Stop reason: HOSPADM

## 2024-03-26 RX ORDER — ARFORMOTEROL TARTRATE 15 UG/2ML
15 SOLUTION RESPIRATORY (INHALATION)
Status: DISCONTINUED | OUTPATIENT
Start: 2024-03-26 | End: 2024-03-27 | Stop reason: HOSPADM

## 2024-03-26 RX ORDER — IPRATROPIUM BROMIDE AND ALBUTEROL SULFATE 2.5; .5 MG/3ML; MG/3ML
1 SOLUTION RESPIRATORY (INHALATION)
Status: DISCONTINUED | OUTPATIENT
Start: 2024-03-27 | End: 2024-03-27 | Stop reason: HOSPADM

## 2024-03-26 RX ORDER — ROFLUMILAST 500 UG/1
250 TABLET ORAL DAILY
Status: DISCONTINUED | OUTPATIENT
Start: 2024-03-27 | End: 2024-03-27 | Stop reason: HOSPADM

## 2024-03-26 RX ORDER — IPRATROPIUM BROMIDE AND ALBUTEROL SULFATE 2.5; .5 MG/3ML; MG/3ML
1 SOLUTION RESPIRATORY (INHALATION)
Status: DISPENSED | OUTPATIENT
Start: 2024-03-26 | End: 2024-03-26

## 2024-03-26 RX ORDER — ONDANSETRON 2 MG/ML
4 INJECTION INTRAMUSCULAR; INTRAVENOUS EVERY 6 HOURS PRN
Status: DISCONTINUED | OUTPATIENT
Start: 2024-03-26 | End: 2024-03-27 | Stop reason: HOSPADM

## 2024-03-26 RX ORDER — PANTOPRAZOLE SODIUM 40 MG/1
40 TABLET, DELAYED RELEASE ORAL
Status: DISCONTINUED | OUTPATIENT
Start: 2024-03-27 | End: 2024-03-27 | Stop reason: HOSPADM

## 2024-03-26 RX ORDER — BUDESONIDE 0.5 MG/2ML
0.5 INHALANT ORAL
Status: DISCONTINUED | OUTPATIENT
Start: 2024-03-26 | End: 2024-03-27 | Stop reason: HOSPADM

## 2024-03-26 RX ORDER — ACETAMINOPHEN 650 MG/1
650 SUPPOSITORY RECTAL EVERY 6 HOURS PRN
Status: DISCONTINUED | OUTPATIENT
Start: 2024-03-26 | End: 2024-03-27 | Stop reason: HOSPADM

## 2024-03-26 RX ORDER — SODIUM CHLORIDE 0.9 % (FLUSH) 0.9 %
5-40 SYRINGE (ML) INJECTION PRN
Status: DISCONTINUED | OUTPATIENT
Start: 2024-03-26 | End: 2024-03-27 | Stop reason: HOSPADM

## 2024-03-26 RX ORDER — POLYETHYLENE GLYCOL 3350 17 G/17G
17 POWDER, FOR SOLUTION ORAL DAILY PRN
Status: DISCONTINUED | OUTPATIENT
Start: 2024-03-26 | End: 2024-03-27 | Stop reason: HOSPADM

## 2024-03-26 RX ORDER — PRAZOSIN HYDROCHLORIDE 1 MG/1
1 CAPSULE ORAL NIGHTLY
Status: DISCONTINUED | OUTPATIENT
Start: 2024-03-26 | End: 2024-03-27 | Stop reason: HOSPADM

## 2024-03-26 RX ORDER — ENOXAPARIN SODIUM 100 MG/ML
30 INJECTION SUBCUTANEOUS 2 TIMES DAILY
Status: DISCONTINUED | OUTPATIENT
Start: 2024-03-26 | End: 2024-03-27 | Stop reason: HOSPADM

## 2024-03-26 RX ORDER — CHOLECALCIFEROL (VITAMIN D3) 50 MCG
2000 TABLET ORAL DAILY
Status: DISCONTINUED | OUTPATIENT
Start: 2024-03-27 | End: 2024-03-27 | Stop reason: HOSPADM

## 2024-03-26 RX ORDER — MIRTAZAPINE 15 MG/1
15 TABLET, FILM COATED ORAL NIGHTLY
Status: DISCONTINUED | OUTPATIENT
Start: 2024-03-26 | End: 2024-03-27 | Stop reason: HOSPADM

## 2024-03-26 RX ORDER — ACETAMINOPHEN 325 MG/1
650 TABLET ORAL EVERY 6 HOURS PRN
Status: DISCONTINUED | OUTPATIENT
Start: 2024-03-26 | End: 2024-03-27 | Stop reason: HOSPADM

## 2024-03-26 RX ORDER — MECOBALAMIN 5000 MCG
5 TABLET,DISINTEGRATING ORAL NIGHTLY PRN
Status: DISCONTINUED | OUTPATIENT
Start: 2024-03-26 | End: 2024-03-27 | Stop reason: HOSPADM

## 2024-03-26 RX ORDER — SODIUM CHLORIDE 9 MG/ML
INJECTION, SOLUTION INTRAVENOUS PRN
Status: DISCONTINUED | OUTPATIENT
Start: 2024-03-26 | End: 2024-03-27 | Stop reason: HOSPADM

## 2024-03-26 RX ORDER — NICOTINE 21 MG/24HR
1 PATCH, TRANSDERMAL 24 HOURS TRANSDERMAL DAILY
Status: DISCONTINUED | OUTPATIENT
Start: 2024-03-27 | End: 2024-03-27 | Stop reason: HOSPADM

## 2024-03-26 RX ORDER — ONDANSETRON 4 MG/1
4 TABLET, ORALLY DISINTEGRATING ORAL EVERY 8 HOURS PRN
Status: DISCONTINUED | OUTPATIENT
Start: 2024-03-26 | End: 2024-03-27 | Stop reason: HOSPADM

## 2024-03-26 RX ORDER — SODIUM CHLORIDE 0.9 % (FLUSH) 0.9 %
5-40 SYRINGE (ML) INJECTION EVERY 12 HOURS SCHEDULED
Status: DISCONTINUED | OUTPATIENT
Start: 2024-03-26 | End: 2024-03-27 | Stop reason: HOSPADM

## 2024-03-26 RX ADMIN — MIRTAZAPINE 15 MG: 15 TABLET, FILM COATED ORAL at 23:25

## 2024-03-26 RX ADMIN — IPRATROPIUM BROMIDE AND ALBUTEROL SULFATE 3 DOSE: .5; 3 SOLUTION RESPIRATORY (INHALATION) at 20:11

## 2024-03-26 RX ADMIN — SODIUM CHLORIDE, PRESERVATIVE FREE 10 ML: 5 INJECTION INTRAVENOUS at 23:25

## 2024-03-26 RX ADMIN — MAGNESIUM SULFATE IN WATER 2000 MG: 40 INJECTION, SOLUTION INTRAVENOUS at 17:40

## 2024-03-26 RX ADMIN — METHYLPREDNISOLONE SODIUM SUCCINATE 40 MG: 40 INJECTION INTRAMUSCULAR; INTRAVENOUS at 23:25

## 2024-03-26 RX ADMIN — ENOXAPARIN SODIUM 30 MG: 100 INJECTION SUBCUTANEOUS at 23:24

## 2024-03-26 ASSESSMENT — LIFESTYLE VARIABLES: HOW OFTEN DO YOU HAVE A DRINK CONTAINING ALCOHOL: NEVER

## 2024-03-26 ASSESSMENT — PAIN - FUNCTIONAL ASSESSMENT: PAIN_FUNCTIONAL_ASSESSMENT: NONE - DENIES PAIN

## 2024-03-26 NOTE — ED PROVIDER NOTES
Brenda Nunn is a 58 y.o. female    HPI  Brenda Nunn is a 58 y.o. female presenting to the ED for Shortness of Breath (Usually wears O2 at 3L N/C, EMS gave Solu Medrol 125 mg and 1 Duoneb in route)    History comes primarily from the patient and EMS.  Patient presents to the emergency department for shortness of breath and chest pain which has been ongoing for the last 3 days.  The patient does have history of COPD and wears 3 L of oxygen at baseline she says over the last 3 days her shortness of breath has gotten progressively worse.  She has been taking her appropriate aerosolized or nebulized medications for her COPD.  Her last nebulizer treatment today was at 1 PM at home.  And route, EMS gave her 125 of Solu-Medrol and 1 DuoNeb.  Patient has had an increased cough but not necessarily increased sputum production.  No fevers lightheadedness or dizziness, abdominal pain, nausea or vomiting.      ROS  Full review of systems completed.  Pertinent positives and negatives per the HPI, unless otherwise stated ROS is negative.    Physical Exam  Vitals and nursing note reviewed.   Constitutional:       General: She is not in acute distress.     Appearance: Normal appearance. She is obese. She is not ill-appearing.   HENT:      Head: Normocephalic and atraumatic.      Right Ear: External ear normal.      Left Ear: External ear normal.      Nose: Nose normal. No rhinorrhea.      Mouth/Throat:      Mouth: Mucous membranes are moist.      Pharynx: Oropharynx is clear.   Eyes:      Extraocular Movements: Extraocular movements intact.      Conjunctiva/sclera: Conjunctivae normal.      Pupils: Pupils are equal, round, and reactive to light.   Cardiovascular:      Rate and Rhythm: Normal rate and regular rhythm.      Pulses: Normal pulses.      Heart sounds: Normal heart sounds. No murmur heard.  Pulmonary:      Effort: Pulmonary effort is normal. No respiratory distress.      Breath sounds: Wheezing present.   Abdominal:       250 mcg (has no administration in time range)   vitamin D (CHOLECALCIFEROL) tablet 2,000 Units (has no administration in time range)   melatonin disintegrating tablet 5 mg (has no administration in time range)   sodium chloride flush 0.9 % injection 5-40 mL (10 mLs IntraVENous Given 3/26/24 2325)   sodium chloride flush 0.9 % injection 5-40 mL (has no administration in time range)   0.9 % sodium chloride infusion (has no administration in time range)   enoxaparin Sodium (LOVENOX) injection 30 mg (30 mg SubCUTAneous Given 3/26/24 2324)   ondansetron (ZOFRAN-ODT) disintegrating tablet 4 mg (has no administration in time range)     Or   ondansetron (ZOFRAN) injection 4 mg (has no administration in time range)   polyethylene glycol (GLYCOLAX) packet 17 g (has no administration in time range)   acetaminophen (TYLENOL) tablet 650 mg (has no administration in time range)     Or   acetaminophen (TYLENOL) suppository 650 mg (has no administration in time range)   arformoterol tartrate (BROVANA) nebulizer solution 15 mcg ( Nebulization Canceled Entry 3/26/24 2254)   methylPREDNISolone sodium succ (SOLU-MEDROL) 40 mg in sterile water 1 mL injection (40 mg IntraVENous Given 3/26/24 2325)   budesonide (PULMICORT) nebulizer suspension 500 mcg ( Nebulization Canceled Entry 3/26/24 2254)   ipratropium 0.5 mg-albuterol 2.5 mg (DUONEB) nebulizer solution 1 Dose (has no administration in time range)   nicotine (NICODERM CQ) 14 MG/24HR 1 patch (has no administration in time range)   magnesium sulfate 2000 mg in 50 mL IVPB premix (0 mg IntraVENous Stopped 3/26/24 1818)         Discussion: Patient presents in what appears to be a COPD exacerbation.  The patient has had worsening shortness of breath over the last 3 days even while taking her appropriate inhalers.  She does wear 3 L of oxygen at baseline.  EMS gave the patient 125 of Solu-Medrol, 1 DuoNeb.  Upon arrival, the patient sounds very wheezy and tight on pulmonary exam.

## 2024-03-27 VITALS
BODY MASS INDEX: 38.84 KG/M2 | HEART RATE: 87 BPM | SYSTOLIC BLOOD PRESSURE: 132 MMHG | RESPIRATION RATE: 18 BRPM | WEIGHT: 247.5 LBS | HEIGHT: 67 IN | TEMPERATURE: 97.2 F | OXYGEN SATURATION: 94 % | DIASTOLIC BLOOD PRESSURE: 91 MMHG

## 2024-03-27 DIAGNOSIS — F31.9 BIPOLAR 1 DISORDER (HCC): ICD-10-CM

## 2024-03-27 DIAGNOSIS — G47.00 INSOMNIA, UNSPECIFIED TYPE: ICD-10-CM

## 2024-03-27 LAB
AMPHET UR QL SCN: NEGATIVE
ANION GAP SERPL CALCULATED.3IONS-SCNC: 11 MMOL/L (ref 7–16)
APAP SERPL-MCNC: <5 UG/ML (ref 10–30)
BARBITURATES UR QL SCN: NEGATIVE
BASOPHILS # BLD: 0.01 K/UL (ref 0–0.2)
BASOPHILS NFR BLD: 0 % (ref 0–2)
BENZODIAZ UR QL: NEGATIVE
BILIRUB UR QL STRIP: NEGATIVE
BUN SERPL-MCNC: 15 MG/DL (ref 6–20)
BUPRENORPHINE UR QL: NEGATIVE
CALCIUM SERPL-MCNC: 9.6 MG/DL (ref 8.6–10.2)
CANNABINOIDS UR QL SCN: NEGATIVE
CHLORIDE SERPL-SCNC: 103 MMOL/L (ref 98–107)
CLARITY UR: CLEAR
CO2 SERPL-SCNC: 28 MMOL/L (ref 22–29)
COCAINE UR QL SCN: NEGATIVE
COLOR UR: YELLOW
CREAT SERPL-MCNC: 0.7 MG/DL (ref 0.5–1)
EKG ATRIAL RATE: 86 BPM
EKG P AXIS: 71 DEGREES
EKG P-R INTERVAL: 164 MS
EKG Q-T INTERVAL: 372 MS
EKG QRS DURATION: 82 MS
EKG QTC CALCULATION (BAZETT): 445 MS
EKG R AXIS: 23 DEGREES
EKG T AXIS: 49 DEGREES
EKG VENTRICULAR RATE: 86 BPM
EOSINOPHIL # BLD: 0 K/UL (ref 0.05–0.5)
EOSINOPHILS RELATIVE PERCENT: 0 % (ref 0–6)
EPI CELLS #/AREA URNS HPF: ABNORMAL /HPF
ERYTHROCYTE [DISTWIDTH] IN BLOOD BY AUTOMATED COUNT: 11.6 % (ref 11.5–15)
ETHANOLAMINE SERPL-MCNC: <10 MG/DL
FENTANYL UR QL: NEGATIVE
GFR SERPL CREATININE-BSD FRML MDRD: >90 ML/MIN/1.73M2
GLUCOSE BLD-MCNC: 236 MG/DL (ref 74–99)
GLUCOSE SERPL-MCNC: 212 MG/DL (ref 74–99)
GLUCOSE UR STRIP-MCNC: 100 MG/DL
HCT VFR BLD AUTO: 42.3 % (ref 34–48)
HGB BLD-MCNC: 13.8 G/DL (ref 11.5–15.5)
HGB UR QL STRIP.AUTO: NEGATIVE
IMM GRANULOCYTES # BLD AUTO: <0.03 K/UL (ref 0–0.58)
IMM GRANULOCYTES NFR BLD: 0 % (ref 0–5)
KETONES UR STRIP-MCNC: NEGATIVE MG/DL
LEUKOCYTE ESTERASE UR QL STRIP: NEGATIVE
LYMPHOCYTES NFR BLD: 0.46 K/UL (ref 1.5–4)
LYMPHOCYTES RELATIVE PERCENT: 9 % (ref 20–42)
MAGNESIUM SERPL-MCNC: 1.8 MG/DL (ref 1.6–2.6)
MCH RBC QN AUTO: 28.8 PG (ref 26–35)
MCHC RBC AUTO-ENTMCNC: 32.6 G/DL (ref 32–34.5)
MCV RBC AUTO: 88.1 FL (ref 80–99.9)
METHADONE UR QL: NEGATIVE
MONOCYTES NFR BLD: 0.03 K/UL (ref 0.1–0.95)
MONOCYTES NFR BLD: 1 % (ref 2–12)
NEUTROPHILS NFR BLD: 90 % (ref 43–80)
NEUTS SEG NFR BLD: 4.84 K/UL (ref 1.8–7.3)
NITRITE UR QL STRIP: NEGATIVE
OPIATES UR QL SCN: NEGATIVE
OXYCODONE UR QL SCN: NEGATIVE
PCP UR QL SCN: NEGATIVE
PH UR STRIP: 5.5 [PH] (ref 5–9)
PHOSPHATE SERPL-MCNC: 2.8 MG/DL (ref 2.5–4.5)
PLATELET # BLD AUTO: 211 K/UL (ref 130–450)
PMV BLD AUTO: 10.4 FL (ref 7–12)
POTASSIUM SERPL-SCNC: 4.4 MMOL/L (ref 3.5–5)
PROT UR STRIP-MCNC: NEGATIVE MG/DL
RBC # BLD AUTO: 4.8 M/UL (ref 3.5–5.5)
RBC # BLD: NORMAL 10*6/UL
RBC #/AREA URNS HPF: ABNORMAL /HPF
SALICYLATES SERPL-MCNC: <0.3 MG/DL (ref 0–30)
SODIUM SERPL-SCNC: 142 MMOL/L (ref 132–146)
SP GR UR STRIP: 1.01 (ref 1–1.03)
TEST INFORMATION: NORMAL
TOXIC TRICYCLIC SC,BLOOD: NEGATIVE
UROBILINOGEN UR STRIP-ACNC: 0.2 EU/DL (ref 0–1)
WBC #/AREA URNS HPF: ABNORMAL /HPF
WBC OTHER # BLD: 5.4 K/UL (ref 4.5–11.5)

## 2024-03-27 PROCEDURE — 6370000000 HC RX 637 (ALT 250 FOR IP): Performed by: STUDENT IN AN ORGANIZED HEALTH CARE EDUCATION/TRAINING PROGRAM

## 2024-03-27 PROCEDURE — 2700000000 HC OXYGEN THERAPY PER DAY

## 2024-03-27 PROCEDURE — 2580000003 HC RX 258

## 2024-03-27 PROCEDURE — 96372 THER/PROPH/DIAG INJ SC/IM: CPT

## 2024-03-27 PROCEDURE — 99222 1ST HOSP IP/OBS MODERATE 55: CPT | Performed by: INTERNAL MEDICINE

## 2024-03-27 PROCEDURE — 80179 DRUG ASSAY SALICYLATE: CPT

## 2024-03-27 PROCEDURE — 80307 DRUG TEST PRSMV CHEM ANLYZR: CPT

## 2024-03-27 PROCEDURE — 6360000002 HC RX W HCPCS

## 2024-03-27 PROCEDURE — 6370000000 HC RX 637 (ALT 250 FOR IP)

## 2024-03-27 PROCEDURE — 82962 GLUCOSE BLOOD TEST: CPT

## 2024-03-27 PROCEDURE — G0378 HOSPITAL OBSERVATION PER HR: HCPCS

## 2024-03-27 PROCEDURE — 93010 ELECTROCARDIOGRAM REPORT: CPT | Performed by: INTERNAL MEDICINE

## 2024-03-27 PROCEDURE — 96366 THER/PROPH/DIAG IV INF ADDON: CPT

## 2024-03-27 PROCEDURE — 81001 URINALYSIS AUTO W/SCOPE: CPT

## 2024-03-27 PROCEDURE — 85025 COMPLETE CBC W/AUTO DIFF WBC: CPT

## 2024-03-27 PROCEDURE — 36415 COLL VENOUS BLD VENIPUNCTURE: CPT

## 2024-03-27 PROCEDURE — 80143 DRUG ASSAY ACETAMINOPHEN: CPT

## 2024-03-27 PROCEDURE — G0480 DRUG TEST DEF 1-7 CLASSES: HCPCS

## 2024-03-27 PROCEDURE — 96376 TX/PRO/DX INJ SAME DRUG ADON: CPT

## 2024-03-27 PROCEDURE — 80048 BASIC METABOLIC PNL TOTAL CA: CPT

## 2024-03-27 PROCEDURE — 94640 AIRWAY INHALATION TREATMENT: CPT

## 2024-03-27 PROCEDURE — 83735 ASSAY OF MAGNESIUM: CPT

## 2024-03-27 PROCEDURE — 97166 OT EVAL MOD COMPLEX 45 MIN: CPT

## 2024-03-27 PROCEDURE — 96367 TX/PROPH/DG ADDL SEQ IV INF: CPT

## 2024-03-27 PROCEDURE — 84100 ASSAY OF PHOSPHORUS: CPT

## 2024-03-27 RX ORDER — MIRTAZAPINE 15 MG/1
15 TABLET, FILM COATED ORAL NIGHTLY
Qty: 14 TABLET | Refills: 0 | OUTPATIENT
Start: 2024-03-27

## 2024-03-27 RX ORDER — PREDNISONE 20 MG/1
40 TABLET ORAL DAILY
Qty: 6 TABLET | Refills: 0 | Status: SHIPPED | OUTPATIENT
Start: 2024-03-27 | End: 2024-03-30

## 2024-03-27 RX ORDER — HYDROXYZINE HYDROCHLORIDE 10 MG/1
25 TABLET, FILM COATED ORAL ONCE
Status: DISCONTINUED | OUTPATIENT
Start: 2024-03-27 | End: 2024-03-27 | Stop reason: CLARIF

## 2024-03-27 RX ORDER — HYDROXYZINE PAMOATE 25 MG/1
25 CAPSULE ORAL ONCE
Status: COMPLETED | OUTPATIENT
Start: 2024-03-27 | End: 2024-03-27

## 2024-03-27 RX ORDER — ARIPIPRAZOLE 5 MG/1
5 TABLET ORAL DAILY
Qty: 14 TABLET | Refills: 0 | OUTPATIENT
Start: 2024-03-27

## 2024-03-27 RX ORDER — HYDROXYZINE PAMOATE 25 MG/1
25 CAPSULE ORAL DAILY PRN
Status: DISCONTINUED | OUTPATIENT
Start: 2024-03-27 | End: 2024-03-27 | Stop reason: HOSPADM

## 2024-03-27 RX ORDER — MAGNESIUM SULFATE IN WATER 40 MG/ML
2000 INJECTION, SOLUTION INTRAVENOUS ONCE
Status: COMPLETED | OUTPATIENT
Start: 2024-03-27 | End: 2024-03-27

## 2024-03-27 RX ORDER — HYDROXYZINE HYDROCHLORIDE 10 MG/1
10 TABLET, FILM COATED ORAL ONCE
Status: DISCONTINUED | OUTPATIENT
Start: 2024-03-27 | End: 2024-03-27 | Stop reason: HOSPADM

## 2024-03-27 RX ADMIN — Medication 2000 UNITS: at 08:45

## 2024-03-27 RX ADMIN — Medication 5 MG: at 01:43

## 2024-03-27 RX ADMIN — METHYLPREDNISOLONE SODIUM SUCCINATE 40 MG: 40 INJECTION INTRAMUSCULAR; INTRAVENOUS at 08:45

## 2024-03-27 RX ADMIN — ARIPIPRAZOLE 5 MG: 5 TABLET ORAL at 08:44

## 2024-03-27 RX ADMIN — IPRATROPIUM BROMIDE AND ALBUTEROL SULFATE 1 DOSE: 2.5; .5 SOLUTION RESPIRATORY (INHALATION) at 11:52

## 2024-03-27 RX ADMIN — PANTOPRAZOLE SODIUM 40 MG: 40 TABLET, DELAYED RELEASE ORAL at 05:11

## 2024-03-27 RX ADMIN — HYDROXYZINE PAMOATE 25 MG: 25 CAPSULE ORAL at 02:58

## 2024-03-27 RX ADMIN — ARFORMOTEROL TARTRATE 15 MCG: 15 SOLUTION RESPIRATORY (INHALATION) at 07:56

## 2024-03-27 RX ADMIN — ENOXAPARIN SODIUM 30 MG: 100 INJECTION SUBCUTANEOUS at 08:43

## 2024-03-27 RX ADMIN — PRAZOSIN HYDROCHLORIDE 1 MG: 1 CAPSULE ORAL at 02:52

## 2024-03-27 RX ADMIN — IPRATROPIUM BROMIDE AND ALBUTEROL SULFATE 1 DOSE: 2.5; .5 SOLUTION RESPIRATORY (INHALATION) at 17:29

## 2024-03-27 RX ADMIN — IPRATROPIUM BROMIDE AND ALBUTEROL SULFATE 1 DOSE: 2.5; .5 SOLUTION RESPIRATORY (INHALATION) at 07:55

## 2024-03-27 RX ADMIN — MAGNESIUM SULFATE HEPTAHYDRATE 2000 MG: 40 INJECTION, SOLUTION INTRAVENOUS at 08:57

## 2024-03-27 RX ADMIN — ROFLUMILAST 250 MCG: 500 TABLET ORAL at 08:44

## 2024-03-27 RX ADMIN — ACETAMINOPHEN 650 MG: 325 TABLET ORAL at 15:46

## 2024-03-27 RX ADMIN — HYDROXYZINE PAMOATE 25 MG: 25 CAPSULE ORAL at 15:46

## 2024-03-27 RX ADMIN — SODIUM CHLORIDE, PRESERVATIVE FREE 10 ML: 5 INJECTION INTRAVENOUS at 09:05

## 2024-03-27 RX ADMIN — BUDESONIDE 500 MCG: 0.5 SUSPENSION RESPIRATORY (INHALATION) at 07:55

## 2024-03-27 ASSESSMENT — PAIN SCALES - GENERAL: PAINLEVEL_OUTOF10: 6

## 2024-03-27 ASSESSMENT — PAIN DESCRIPTION - DESCRIPTORS: DESCRIPTORS: PRESSURE;THROBBING;SQUEEZING

## 2024-03-27 ASSESSMENT — PAIN - FUNCTIONAL ASSESSMENT: PAIN_FUNCTIONAL_ASSESSMENT: PREVENTS OR INTERFERES WITH MANY ACTIVE NOT PASSIVE ACTIVITIES

## 2024-03-27 ASSESSMENT — PAIN DESCRIPTION - LOCATION: LOCATION: HEAD;OTHER (COMMENT)

## 2024-03-27 NOTE — PROGRESS NOTES
4 Eyes Skin Assessment     NAME:  Brenda Nunn  YOB: 1965  MEDICAL RECORD NUMBER:  [id number]    The patient is being assessed for  Admission    I agree that at least one RN has performed a thorough Head to Toe Skin Assessment on the patient. ALL assessment sites listed below have been assessed.      Areas assessed by both nurses:    Head, Face, Ears, Shoulders, Back, Chest, Arms, Elbows, Hands, Sacrum. Buttock, Coccyx, Ischium, Legs. Feet and Heels, and Under Medical Devices         Does the Patient have a Wound? No noted wound(s)       Gabe Prevention initiated by RN: No  Wound Care Orders initiated by RN: No    Pressure Injury (Stage 3,4, Unstageable, DTI, NWPT, and Complex wounds) if present, place Wound referral order by RN under : No    New Ostomies, if present place, Ostomy referral order under : No     Nurse 1 eSignature: Electronically signed by Olayinka Olmstead RN on 3/27/24 at 3:17 AM EDT    **SHARE this note so that the co-signing nurse can place an eSignature**    Nurse 2 eSignature: Electronically signed by Lina Casanova RN on 3/27/24 at 3:33 AM EDT

## 2024-03-27 NOTE — ACP (ADVANCE CARE PLANNING)
3/27/24, Advance Care Planning   Healthcare Decision Maker:    Primary Decision Maker: BRAIN SCHREIBER - Axel - [phone]    Click here to complete Healthcare Decision Makers including selection of the Healthcare Decision Maker Relationship (ie \"Primary\").

## 2024-03-27 NOTE — PLAN OF CARE
Problem: Chronic Conditions and Co-morbidities  Goal: Patient's chronic conditions and co-morbidity symptoms are monitored and maintained or improved  3/27/2024 1753 by Margaux Ratliff, RN  Outcome: Adequate for Discharge  3/27/2024 1153 by Margaux Ratliff, RN  Outcome: Progressing

## 2024-03-27 NOTE — DISCHARGE SUMMARY
[facility]  Discharge Summary    PCP: Genesis Enriquez MD    Admit Date:3/26/2024  Discharge Date: 3/27/2024    Admission Diagnosis:   Acute on chronic respiratory failure 2/2 COPD exacerbation secondary to tobacco exposure  Possible urinary tract infection  Heart failure with preserved ejection fraction, echo on 02/20 showed ejection fraction 55 to 60%  Tobacco use disorder  History of substance abuse  Obstructive sleep apnea  Gastroesophageal reflux disease, on Protonix  Insomnia, on Remeron at home  Prediabetes, A1c 5.7%, Blood glucose today morning 212, not on medication    Discharge Diagnosis:  Acute on chronic respiratory failure 2/2 COPD, on 3L O2 baseline  BENJAMIN  HFpEF (55-60% in 2020)   GERD   Pre-DM, A1c 5.7 on 11/14/23, not on medications  Insomnia, on Remeron  Tobacco use, 5-6 cigarettes per day   Hx of substance use     Hospital Course:   Brenda Nunn 58 y.o. female with PMHx of COPD on 3L, depression, CHF, HTN, anxiety, Vit D deficiency, BENJAMIN, presented to the ED by EMS with complaints of exertional dyspnea for the last 3 days.     She was recently admitted on 3/6/24 to 3/11/24 and managed as COPD exacerbation, discharged with short-course steroids and levofloxacin. Patient was discharge to facility, however she left. She was seen at the IM clinic on 3/20/24 where she noted chest discomfort. An EKG was done which showed no ischemic changes.  She then noted progressive SOB since 3 days prior, but noted to be increased on the day of presentation after she smoked 3 cigarettes. Patient reports cough but no sputum production, no fever, no chills, no exposure to sick contacts. She also reports some burning urination for the past few days.      In the ED, she was normotensive, afebrile, HR 90, saturating 94 on 4 L of oxygen via nasal cannula.  She received 1 dose of Solu-Medrol, 1 dose of DuoNeb by EMS on her way to the hospital. All the relevant labs and imaging  pulmonary rehabilitation  Advised smoking cessation    Discharge Medications:     Medication List        START taking these medications      predniSONE 20 MG tablet  Commonly known as: DELTASONE  Take 2 tablets by mouth daily for 3 days            CONTINUE taking these medications      albuterol sulfate  (90 Base) MCG/ACT inhaler  Commonly known as: Ventolin HFA  Inhale 2 puffs into the lungs every 6 hours as needed for Wheezing or Shortness of Breath     ARIPiprazole 5 MG tablet  Commonly known as: ABILIFY  Take 1 tablet by mouth daily     budesonide-formoterol 160-4.5 MCG/ACT Aero  Commonly known as: Symbicort  Inhale 2 puffs into the lungs 2 times daily     Full Kit Nebulizer Set Misc  Use as directed with nebulized medication.     guaiFENesin 400 MG tablet  Take 1 tablet by mouth in the morning, at noon, and at bedtime     hydrOXYzine HCl 25 MG tablet  Commonly known as: ATARAX  Take 1 tablet by mouth every 6 hours as needed for Anxiety     ipratropium 0.5 mg-albuterol 2.5 mg 0.5-2.5 (3) MG/3ML Soln nebulizer solution  Commonly known as: DUONEB  Inhale 3 mLs into the lungs every 4 hours Indications: Treatment to Prevent COPD with Bronchospasms     miconazole 2 % cream  Commonly known as: MICOTIN  Apply topically 2 times daily.     mirtazapine 15 MG tablet  Commonly known as: REMERON  Take 1 tablet by mouth nightly     pantoprazole 40 MG tablet  Commonly known as: PROTONIX  Take 1 tablet by mouth every morning (before breakfast)     prazosin 1 MG capsule  Commonly known as: MINIPRESS  Take 1 capsule by mouth nightly     ramelteon 8 MG tablet  Commonly known as: Rozerem  Take 1 tablet by mouth nightly for 14 days     Roflumilast 250 MCG tablet  Commonly known as: DALIRESP  Take 1 tablet by mouth daily     Vitamin D3 50 MCG (2000 UT) Caps capsule  Generic drug: vitamin D  Take 1 capsule by mouth daily               Where to Get Your Medications        These medications were sent to [id number] -

## 2024-03-27 NOTE — CARE COORDINATION
3/27/24, SW was contacted by Yenni from [facility].  Patient is able to get rollator.  Yenni to bring it up to patient prior to patient discharging to home.  Patient informed.  EVY to follow.      Sandy Batres, JIGNESH  [facility] Case Management  [phone]

## 2024-03-27 NOTE — DISCHARGE INSTRUCTIONS
[facility] Internal Medicine Resident Service    Activity as tolerated  Diet: common adult  Be compliant with your medications and take them as prescribed.    Special Instructions:   Please START taking Prednisone 40mg (2 tablets) for 3 more days (5 days of steroids total)  Please follow up in PCP office after discharge    Hospital Follow up: [facility] with House Team   Call to confirm appointment Tel: [phone] ([facility])     Other Follow-Ups:    Future Appointments   Date Time Provider Department Center   4/1/2024  1:30 PM Genesis Enriquez MD [facility] IM [facility]   4/15/2024  2:20 PM Paulie Chavez MD [facility]   4/16/2024  1:00 PM Pedro Bedolla MD [facility] Pulm [facility]   5/7/2024  1:00 PM Rhoda Duke, APRN - CNP [facility]       Other than any new prescriptions given to you today, the list of home going meds on this After Visit Summary are based on information provided to us from you. This information, including the list, dose, and frequency of medications is only as accurate as the information you provided. If you have any questions or concerns about your home medications, please contact your Primary Care Physician for further clarification.      Humberto Singh MD PGY-3  3/27/2024  1:14 PM

## 2024-03-27 NOTE — PROGRESS NOTES
For clothing management and august care s/[ Urination on standard commode   Independent   Bed Mobility  Rolling L/R: Mod I    Supine to sit: Stand by assist    Sit to supine: Stand by assist    Supine to sit: Independent  Sit to supine: Independent   Functional Transfers Sit to stand: Stand by assist    Stand to sit: Stand by assist    Stand pivot: Stand by assist    Independent   Functional Mobility Stand by assist  with no AD to and from the restroom   Independent     Balance Sitting: Independent      Standing: Stand by assist        Standing: Independent   Activity Tolerance Fair -  SpO2 on 3L dropped to 85% with functional mobility   OT provided verbal instructions on breathing technique and with extended time Pt recovered to 90%   Fair +   Visual/  Perceptual WFL            BUE  ROM/Strength/  Fine motor Coordination Hand dominance: R    RUE: ROM WFL      Strength: grossly 4+/5      Strength: WFL      Coordination:  WFL     LUE: ROM WFL      Strength: grossly 4+/5      Strength: WFL      Coordination: WFL      Safety   Fair  Good during functional activity while managing O2 line       Hearing: WFL  Sensation:  No c/o numbness or tingling   Tone: WFL   Edema: None noted    Comments:   RN cleared patient for OT.  Upon arrival, patient was semi-supine in bed  and agreeable to OT session. no visitors present throughout session. At end of session, patient long sitting in bed ; with call light and phone within reach; all lines and tubes intact.   Patient presents with decreased safety awareness, activity tolerance , and balance . Pt demonstrated decreased independence during ADLs, bed mobility , functional transfers, and functional mobility. Pt would benefit from continued skilled OT to increase safety and independence with completion of ADL tasks and functional mobility for improved quality of life and return to PLOF.       Treatment: OT treatment provided this date includes:  OT edu pt/family on role of OT  in the acute care setting. pt verbalized understanding   Therapist facilitated and instructed pt on adapted techniques & compensatory strategies to improve safety and independence with ADLs, bed mobility , functional transfers, and functional mobility as noted above to allow pt to achieve highest level of independence and safely. Pt provided verbal instructions, cuing, extended time, and encouragement in order to promote maximum level of independence.   Pt on 3L o2 via nc throughout session; SpO2 85-92%   96-99bpm   Pt edu on use of call light and waiting until staff present to attempt any functional mobility. Pt verbalized understanding and demonstrated ability to locate and press call button.     Rehab Potential: Good for established goals     Patient / Family Goal: to go home      Patient and/or family were instructed on functional diagnosis, prognosis/goals and OT plan of care. Pt/family demonstrated understanding.       Eval Complexity:      Description  Performance deficits  Clinical decision making  Co-morbidities affecting occupational performance  Modification or assistance to complete eval    Low Complexity   1 to 3 []  Low []  None []  None []   Moderate Complexity   3 to 5 [x]  Mod [x]  Maybe []  Min to Mod [x]   High Complexity   5 or more []  High []  Yes [x]  Max []     The above evaluation is classified as moderate complexity based off the noted performance deficits, personal factors, co-morbidities, assistance required, and other factors as noted in the clinical evaluation and functional testing.     Time In: 0947  Time Out: 1000  Total Treatment Time: 0 minutes    Min Units   OT Eval Low 10543       OT Eval Moderate 97166  x 1   OT Eval High 52168       OT Re-Eval 38334       Therapeutic Ex 26369       Therapeutic Activities 86305      ADL/Self Care 23014      Orthotic Management 76785       Neuro Re-Ed 26697       Non-Billable Time          Evaluation Time includes thorough review of current

## 2024-03-27 NOTE — H&P
with Bronchospasms 2/26/24 3/27/24  Genesis Enriquez MD   pantoprazole (PROTONIX) 40 MG tablet Take 1 tablet by mouth every morning (before breakfast) 2/26/24 5/26/24  Genesis Enriquez MD   budesonide-formoterol (SYMBICORT) 160-4.5 MCG/ACT AERO Inhale 2 puffs into the lungs 2 times daily 2/9/24 3/25/24  Lorenzo Carroll Jr.,    Respiratory Therapy Supplies (FULL KIT NEBULIZER SET) MISC Use as directed with nebulized medication. 2/9/24   Lorenzo Carroll Jr., DO     Allergies:  Pcn [penicillins]    Social History:   TOBACCO:   reports that she has been smoking cigarettes. She started smoking about 46 years ago. She has a 46.2 pack-year smoking history. She has never used smokeless tobacco.  ETOH:   reports no history of alcohol use.    Family History:   History reviewed. No pertinent family history.    REVIEW OF SYSTEMS:  Constitutional: No fever, no chills, no change in weight; good appetite  HEENT: No blurred vision, no ear problems, sore throat, no rhinorrhea.  Respiratory: shortness of breath, cough, no sputum production  Cardiology: chest tightness, no dyspnea on exertion, no paroxysmal nocturnal dyspnea, no orthopnea, no palpitation, no leg swelling.   Gastroenterology: No dysphagia, no reflux; no abdominal pain, no nausea or vomiting; no constipation or diarrhea. No hematochezia   Genitourinary: burning urination, itching, no dysuria, no frequency, hesitancy, no hematuria  Musculoskeletal: no joint pain, no myalgia, no change in range of movement  Neurology: no focal weakness in extremities, no slurred speech, no double vision, no tingling or numbness sensation  Endocrinology: no temperature intolerance, no polyphagia, polydipsia or polyuria  Hematology: no increased bleeding, no bruising, no lymphadenopathy  Skin: no skin changes noticed by patient  Psychology: no depressed mood, no suicidal ideation  Physical Exam     Net IO Since Admission: No IO data has been entered for this period [03/26/24  3612]    Physical Exam  Vitals: /88   Pulse 90   Temp 97.9 °F (36.6 °C)   Resp 18   Ht 1.702 m (5' 7\")   Wt 108 kg (238 lb)   SpO2 94%   BMI 37.28 kg/m²     I & O - 24hr: No intake/output data recorded.   General Appearance: alert, appears stated age, cooperative, and no distress  HEENT:  Head: Normal, normocephalic, atraumatic.  Neck: no JVD and supple, symmetrical, trachea midline  Lung: wheezes bilaterally during inspiration and expiration  Heart: regular rate and rhythm and S1, S2 normal  Abdomen: soft, non-tender; bowel sounds normal; no masses,  no organomegaly  Extremities:  extremities normal, atraumatic, no cyanosis or edema  Musculokeletal: No joint swelling, no muscle tenderness. ROM normal in all joints of extremities.   Neurologic: Mental status: Alert, oriented, thought content appropriate    Diet: ADULT DIET; Regular    Labs and Imaging Studies   Labs    Recent Labs     03/26/24  1729   WBC 7.0   RBC 4.38   HGB 12.5   HCT 39.7   MCV 90.6   MCH 28.5   MCHC 31.5*   RDW 11.9      MPV 10.0     Recent Labs     03/26/24  1729      K 4.1      MG 1.6   CO2 29   BUN 16   CREATININE 1.0   ANIONGAP 12   GLUCOSE 117*   CALCIUM 9.7   PROT 6.9   LABALBU 4.1   BILITOT 0.2   ALKPHOS 74   AST 12   ALT 12     No results for input(s): \"LACTA\" in the last 72 hours.  Recent Labs     03/26/24  1729 03/26/24  1730   TROPHS 9 8   PROBNP <36  --      No results for input(s): \"PH\", \"PCO2\", \"PO2\", \"HCO3\", \"BE\", \"O2SAT\" in the last 72 hours.    Imaging Studies:    XR CHEST PORTABLE   Final Result   No acute process.      Cardiomegaly.           Resident's Assessment and Plan     Assessment   Acute on chronic respiratory failure 2/2 COPD exacerbation secondary to tobacco exposure  Influenza and COVID-19 negative  Respiratory panel pending  Baseline oxygen 3 L  Admission saturation 94% on 4 L of oxygen  Got IV Solu-Medrol once, 1 DuoNeb by EMS  Pro-Albino 0.05 normal  CXR showed no acute process,

## 2024-03-27 NOTE — TELEPHONE ENCOUNTER
Last Appointment:  2/26/2024  Future Appointments   Date Time Provider Department Center   4/1/2024  1:30 PM Genesis Enriquez MD [facility]   4/15/2024  2:20 PM Paulie Chavez MD [facility]   4/16/2024  1:00 PM Pedro Bedolla MD [facility] Pul[facility]   5/7/2024  1:00 PM Rhoda Duke, APRN - CNP [facility]

## 2024-03-27 NOTE — CARE COORDINATION
3/27/24, Patient admitted for COPD.  Patient is a readmission.  Met with patient at bedside to discuss transition of care/discharge preference and SW role.  Contacted daughter Farhana by phone in real time to discuss discharge plan with patient Patient says she was only at [place] a day before going home at last discharge.  Patient says she could not stay there.  Patient understands she is a re-admission and says he had a smoke which caused her breathing issues.  Discussed smoking program which patient declined.  Patient says she would like the patch or nicotine gum-discussed ways patient could pursue getting these items.  Patient lives with daughter in a 2 story home with 5-6 steps to get into the home.  DME is a WC.  Patient uses the WC only when she has to go out and walk a longer distance.  Patient would like a rollator which she feels would help her when ambulating and getting tired.  Referral made to Yenni at [facility].  Will wait to hear from Yenni on rollator-order is in.  Discussed HHC which patient has [facility].  Patient would like to continue with [facility] but wants PT/OT.  Call placed to Regina from [facility] and confirmed SN is seeing patient.  Did a [facility] order for PT/OT for [facility].  Patient is on 3L 02 through [facility] at home.  Daughter will need to bring a portable to the hospital once patient is discharged to home.  Daughter or other family will be transportation for patient.  SW to follow.      Case Management Assessment  Initial Evaluation    Date/Time of Evaluation: 3/27/2024 1:18 PM  Assessment Completed by: VIOLETTE Izquierdo    If patient is discharged prior to next notation, then this note serves as note for discharge by case management.    Patient Name: Brenda Nunn                   YOB: 1965  Diagnosis: Shortness of breath [R06.02]  COPD exacerbation (HCC) [J44.1]  Chest pain, unspecified type [R07.9]                   Date / Time: 3/26/2024  4:09 PM    Patient Admission

## 2024-03-27 NOTE — ED NOTES
Ambulated patient to restroom.    Patient was on her baseline 3L NC. To the restroom patient was 92% on the 3L. On the way back from restroom patient did drop to 88% and felt SOB

## 2024-03-27 NOTE — FLOWSHEET NOTE
03/27/24 1755   AVS Reviewed   AVS & discharge instructions reviewed with patient and/or representative? Yes   Reviewed instructions with Patient   Level of Understanding Questions answered;Teach back completed;Verbalized understanding;Return demonstration

## 2024-03-27 NOTE — PROGRESS NOTES
[facility]  Internal Medicine Residency Program  Progress Note - House Team       Patient:  Brenda Nunn 58 y.o. female   MRN: [id number]       Date of Service: 3/27/2024    CC: Shortness of breath for last 3 days  Overnight events:   Hydroxyzine 25 mg once given  Subjective     Brenda Nunn was seen and examined at bedside today morning. She is saturating 94% on 3 L of oxygen via nasal canula, she mentioned, with movements or any exertion like moving bowel, she feels more short of breath, otherwise, she denies any chest pain, palpitation, nausea, vomiting, abdominal pain, fever, or any other acute symptoms.     Objective     Physical Exam  Vitals: BP (!) 147/96   Pulse 100   Temp 97.8 °F (36.6 °C) (Temporal)   Resp 25   Ht 1.702 m (5' 7\")   Wt 112.3 kg (247 lb 8 oz)   SpO2 91%   BMI 38.76 kg/m²     I & O - 24hr: No intake/output data recorded.   General Appearance: alert, appears stated age, cooperative, and no distress  HEENT:  Head: Normal, normocephalic, atraumatic.  Neck: no JVD and supple, symmetrical, trachea midline  Lung: rhonchi bilaterally and wheezes bilaterally, mild improvement since admission  Heart: regular rate and rhythm and S1, S2 normal  Abdomen: soft, non-tender; bowel sounds normal; no masses,  no organomegaly  Extremities:  extremities normal, atraumatic, no cyanosis or edema  Musculokeletal: No joint swelling, no muscle tenderness. ROM normal in all joints of extremities.   Neurologic: Mental status: Alert, oriented, thought content appropriate    Diet:   ADULT DIET; Regular    Pertinent Labs & Imaging Studies     Labs    Recent Labs     03/26/24  1729 03/27/24  0502   WBC 7.0 5.4   RBC 4.38 4.80   HGB 12.5 13.8   HCT 39.7 42.3   MCV 90.6 88.1   MCH 28.5 28.8   MCHC 31.5* 32.6   RDW 11.9 11.6    211   MPV 10.0 10.4     Recent Labs     03/26/24  1729 03/27/24  0502    142   K 4.1 4.4    103   MG 1.6 1.8   PHOS  --  2.8   CO2 29 28   BUN 16 15  needed  Code status: Full Code   Disposition: Continue Current Care  Family: updated as available    Maia Espinal MD, PGY-1   Attending physician: ALAN Marx

## 2024-03-27 NOTE — PLAN OF CARE
Problem: Chronic Conditions and Co-morbidities  Goal: Patient's chronic conditions and co-morbidity symptoms are monitored and maintained or improved  3/27/2024 1153 by Margaux Ratliff, RN  Outcome: Progressing  3/27/2024 0132 by Olayinka Olmstead, RN  Outcome: Progressing

## 2024-03-28 ENCOUNTER — CARE COORDINATION (OUTPATIENT)
Dept: CARE COORDINATION | Age: 59
End: 2024-03-28

## 2024-03-28 DIAGNOSIS — J44.1 COPD WITH ACUTE EXACERBATION (HCC): Primary | ICD-10-CM

## 2024-03-28 PROCEDURE — 1111F DSCHRG MED/CURRENT MED MERGE: CPT | Performed by: STUDENT IN AN ORGANIZED HEALTH CARE EDUCATION/TRAINING PROGRAM

## 2024-03-28 NOTE — CARE COORDINATION
Care Transitions Initial Follow Up Call    Call within 2 business days of discharge: Yes    Patient Current Location:  Home: [address] [place] [id number]    Care Transition Nurse contacted the family by telephone to perform post hospital discharge assessment. Verified name and  with family as identifiers. Provided introduction to self, and explanation of the Care Transition Nurse role.     Patient: Brenda Nunn Patient : 1965   MRN: [id number]  Reason for Admission: SOB  Discharge Date: 3/27/24 RARS: Readmission Risk Score: 32.5      Last Discharge Facility       Date Complaint Diagnosis Description Type Department Provider    3/26/24 Shortness of Breath Shortness of breath ... ED to Hosp-Admission (Discharged) (ADMITTED) [facility] Lorenzo Guthrie Jr., DO; García, ...   Spoke with Brenda's daughter Farhana (verified on HIPAA) for initial observation status care transition call post hospital discharge. She reports that her mother is still sleeping. Farhana reports that her mom is currently wearing 4L O2 because she was complaining of still feeling SOB last night. She has been using her Albuterol. Farhana will  the ordered Prednisone today, CTN explained the importance of taking it.   She has not heard from [facility] yet, CTN will call.   Farhana will drive Brenda to her appointment with Dr. Enriquez on Monday, 24.   Farhana denies any needs, questions, or concerns at this time.       Care Transition Nurse reviewed discharge instructions, medical action plan, and red flags with family who verbalized understanding. The family was given an opportunity to ask questions and does not have any further questions or concerns at this time. Were discharge instructions available to patient? Yes. Reviewed appropriate site of care based on symptoms and resources available to patient including: PCP  Home health. The patient agrees to contact the PCP office for questions related to their healthcare.

## 2024-04-03 ENCOUNTER — CARE COORDINATION (OUTPATIENT)
Dept: CARE COORDINATION | Age: 59
End: 2024-04-03

## 2024-04-03 DIAGNOSIS — J44.9 CHRONIC OBSTRUCTIVE PULMONARY DISEASE, UNSPECIFIED COPD TYPE (HCC): ICD-10-CM

## 2024-04-03 RX ORDER — IPRATROPIUM BROMIDE AND ALBUTEROL SULFATE 2.5; .5 MG/3ML; MG/3ML
SOLUTION RESPIRATORY (INHALATION)
Qty: 540 ML | Refills: 0 | OUTPATIENT
Start: 2024-04-03

## 2024-04-03 NOTE — CARE COORDINATION
Care Transitions Follow Up Call    Patient: Brenda Nunn  Patient : 1965   MRN: [id number]  Reason for Admission: SOB  Discharge Date: 3/27/24 RARS: Readmission Risk Score: 32.5    First attempt to reach the patient for subsequent Care Transition call. HIPAA compliant message left with CTN's contact information requesting return phone call.    Follow Up  Future Appointments   Date Time Provider Department Center   2024  2:30 PM Genesis Enriquez MD [facility]   4/15/2024  2:20 PM Paulie Chavez MD [facility]   2024  1:00 PM Pedro Bedolla MD [facility] Pulm [facility]   2024  1:00 PM Rhoda Duke, APRN - CNP [facility]   Wendy Brandon RN

## 2024-04-05 DIAGNOSIS — G47.00 INSOMNIA, UNSPECIFIED TYPE: ICD-10-CM

## 2024-04-05 DIAGNOSIS — F31.9 BIPOLAR 1 DISORDER (HCC): ICD-10-CM

## 2024-04-05 RX ORDER — ARIPIPRAZOLE 5 MG/1
5 TABLET ORAL DAILY
Qty: 30 TABLET | Refills: 1 | OUTPATIENT
Start: 2024-04-05

## 2024-04-05 RX ORDER — MIRTAZAPINE 15 MG/1
15 TABLET, FILM COATED ORAL NIGHTLY
Qty: 30 TABLET | Refills: 1 | OUTPATIENT
Start: 2024-04-05

## 2024-04-05 NOTE — TELEPHONE ENCOUNTER
Pt requesting refills on these medications, raealteon is not on current med list .  Last Appointment:  2/26/2024  Future Appointments   Date Time Provider Department Center   4/15/2024  2:20 PM Paulie Chavez MD [place] SLE[facility]   4/16/2024  1:00 PM Pedro Bedolla MD [facility] Pulm [facility]   5/6/2024  2:00 PM Genesis Enriquez MD [facility] IM [facility]   5/7/2024  1:00 PM Rhoda Duke, APRN - CNP [facility]

## 2024-04-05 NOTE — TELEPHONE ENCOUNTER
Patient needs to make appointment with her psychiatrist; we are not refilling this medication as an outpatient.  Thank you     Jose

## 2024-04-05 NOTE — TELEPHONE ENCOUNTER
----- Message from Joy Blake sent at 4/5/2024  1:23 PM EDT -----  Subject: Refill Request    QUESTIONS  Name of Medication? ARIPiprazole (ABILIFY) 5 MG tablet  Patient-reported dosage and instructions? 1 daily  How many days do you have left? 1  Preferred Pharmacy? [id number]  Pharmacy phone number (if available)? [phone]  Additional Information for Provider? Pt has appt 5/6 but needs refill   before then.  ---------------------------------------------------------------------------  --------------,  Name of Medication? mirtazapine (REMERON) 15 MG tablet  Patient-reported dosage and instructions? 1 at night  How many days do you have left? 1  Preferred Pharmacy? [id number]  Pharmacy phone number (if available)? [phone]  ---------------------------------------------------------------------------  --------------,  Name of Medication? ramelteon (ROZEREM) 8 MG tablet  Patient-reported dosage and instructions? 1 at night  How many days do you have left? 1  Preferred Pharmacy? [id number]  Pharmacy phone number (if available)? [phone]  ---------------------------------------------------------------------------  --------------  CALL BACK INFO  What is the best way for the office to contact you? OK to leave message on   voicemail  Preferred Call Back Phone Number? [phone]  ---------------------------------------------------------------------------  --------------  SCRIPT ANSWERS  Relationship to Patient? Self

## 2024-04-08 DIAGNOSIS — G47.00 INSOMNIA, UNSPECIFIED TYPE: ICD-10-CM

## 2024-04-08 DIAGNOSIS — F31.9 BIPOLAR 1 DISORDER (HCC): ICD-10-CM

## 2024-04-08 RX ORDER — MIRTAZAPINE 15 MG/1
15 TABLET, FILM COATED ORAL NIGHTLY
Qty: 30 TABLET | Refills: 0 | Status: SHIPPED | OUTPATIENT
Start: 2024-04-08

## 2024-04-08 RX ORDER — HYDROXYZINE PAMOATE 25 MG/1
CAPSULE ORAL
Qty: 120 CAPSULE | Refills: 0 | OUTPATIENT
Start: 2024-04-08

## 2024-04-08 RX ORDER — RAMELTEON 8 MG/1
TABLET ORAL
Qty: 30 TABLET | Refills: 0 | OUTPATIENT
Start: 2024-04-08

## 2024-04-11 ENCOUNTER — CARE COORDINATION (OUTPATIENT)
Dept: CARE COORDINATION | Age: 59
End: 2024-04-11

## 2024-04-11 ENCOUNTER — APPOINTMENT (RX ONLY)
Dept: URBAN - METROPOLITAN AREA CLINIC 134 | Facility: CLINIC | Age: 59
Setting detail: DERMATOLOGY
End: 2024-04-11

## 2024-04-11 NOTE — CARE COORDINATION
Care Transitions Note    Final Call      Final attempt to reach the patient for subsequent Care Transition call. HIPAA compliant message left with CTN's contact information requesting return phone call. Will sign off.      Outreach Attempts:   HIPAA compliant voicemail left for patient.     Upcoming Appointments:    Future Appointments         Provider Specialty Dept Phone    4/15/2024 2:20 PM Paulie Chavez MD Sleep Medicine [phone]    4/16/2024 1:00 PM Pedro Bedolla MD Pulmonology [phone]    5/6/2024 2:00 PM Genesis Enriquez MD Internal Medicine [phone]    5/7/2024 1:00 PM Rhoda Duke, APRN - CNP Obstetrics and Gynecology [phone]            Wendy Brandon RN

## 2024-04-18 ENCOUNTER — TELEPHONE (OUTPATIENT)
Dept: INTERNAL MEDICINE | Age: 59
End: 2024-04-18

## 2024-04-19 ENCOUNTER — APPOINTMENT (OUTPATIENT)
Dept: ULTRASOUND IMAGING | Age: 59
DRG: 291 | End: 2024-04-19
Payer: MEDICARE

## 2024-04-19 ENCOUNTER — HOSPITAL ENCOUNTER (INPATIENT)
Age: 59
LOS: 3 days | Discharge: HOME HEALTH | DRG: 291 | End: 2024-04-23
Attending: EMERGENCY MEDICINE | Admitting: HOSPITALIST
Payer: MEDICARE

## 2024-04-19 ENCOUNTER — APPOINTMENT (OUTPATIENT)
Dept: CT IMAGING | Age: 59
DRG: 291 | End: 2024-04-19
Payer: MEDICARE

## 2024-04-19 ENCOUNTER — APPOINTMENT (OUTPATIENT)
Dept: GENERAL RADIOLOGY | Age: 59
DRG: 291 | End: 2024-04-19
Payer: MEDICARE

## 2024-04-19 DIAGNOSIS — J44.1 COPD EXACERBATION (HCC): Primary | ICD-10-CM

## 2024-04-19 DIAGNOSIS — J96.01 ACUTE RESPIRATORY FAILURE WITH HYPOXIA (HCC): ICD-10-CM

## 2024-04-19 LAB
AADO2: 254 MMHG
ANION GAP SERPL CALCULATED.3IONS-SCNC: 10 MMOL/L (ref 7–16)
B PARAP IS1001 DNA NPH QL NAA+NON-PROBE: NOT DETECTED
B PERT DNA SPEC QL NAA+PROBE: NOT DETECTED
B.E.: 3.1 MMOL/L (ref -3–3)
BASOPHILS # BLD: 0.02 K/UL (ref 0–0.2)
BASOPHILS NFR BLD: 0 % (ref 0–2)
BNP SERPL-MCNC: 72 PG/ML (ref 0–125)
BUN SERPL-MCNC: 16 MG/DL (ref 6–20)
C PNEUM DNA NPH QL NAA+NON-PROBE: NOT DETECTED
CALCIUM SERPL-MCNC: 9.3 MG/DL (ref 8.6–10.2)
CHLORIDE SERPL-SCNC: 104 MMOL/L (ref 98–107)
CO2 SERPL-SCNC: 31 MMOL/L (ref 22–29)
COHB: 2.1 % (ref 0–1.5)
CREAT SERPL-MCNC: 0.9 MG/DL (ref 0.5–1)
CRITICAL: ABNORMAL
DATE ANALYZED: ABNORMAL
DATE OF COLLECTION: ABNORMAL
EOSINOPHIL # BLD: 0.19 K/UL (ref 0.05–0.5)
EOSINOPHILS RELATIVE PERCENT: 3 % (ref 0–6)
ERYTHROCYTE [DISTWIDTH] IN BLOOD BY AUTOMATED COUNT: 12.7 % (ref 11.5–15)
FIO2: 60 %
FLUAV RNA NPH QL NAA+NON-PROBE: NOT DETECTED
FLUBV RNA NPH QL NAA+NON-PROBE: NOT DETECTED
GFR SERPL CREATININE-BSD FRML MDRD: 73 ML/MIN/1.73M2
GLUCOSE SERPL-MCNC: 106 MG/DL (ref 74–99)
HADV DNA NPH QL NAA+NON-PROBE: NOT DETECTED
HCO3: 29.5 MMOL/L (ref 22–26)
HCOV 229E RNA NPH QL NAA+NON-PROBE: NOT DETECTED
HCOV HKU1 RNA NPH QL NAA+NON-PROBE: NOT DETECTED
HCOV NL63 RNA NPH QL NAA+NON-PROBE: NOT DETECTED
HCOV OC43 RNA NPH QL NAA+NON-PROBE: NOT DETECTED
HCT VFR BLD AUTO: 42.1 % (ref 34–48)
HGB BLD-MCNC: 12.8 G/DL (ref 11.5–15.5)
HHB: 2.4 % (ref 0–5)
HMPV RNA NPH QL NAA+NON-PROBE: NOT DETECTED
HPIV1 RNA NPH QL NAA+NON-PROBE: NOT DETECTED
HPIV2 RNA NPH QL NAA+NON-PROBE: NOT DETECTED
HPIV3 RNA NPH QL NAA+NON-PROBE: NOT DETECTED
HPIV4 RNA NPH QL NAA+NON-PROBE: NOT DETECTED
IMM GRANULOCYTES # BLD AUTO: <0.03 K/UL (ref 0–0.58)
IMM GRANULOCYTES NFR BLD: 0 % (ref 0–5)
LAB: ABNORMAL
LYMPHOCYTES NFR BLD: 2.32 K/UL (ref 1.5–4)
LYMPHOCYTES RELATIVE PERCENT: 32 % (ref 20–42)
Lab: 2002
M PNEUMO DNA NPH QL NAA+NON-PROBE: NOT DETECTED
MAGNESIUM SERPL-MCNC: 1.6 MG/DL (ref 1.6–2.6)
MCH RBC QN AUTO: 27.7 PG (ref 26–35)
MCHC RBC AUTO-ENTMCNC: 30.4 G/DL (ref 32–34.5)
MCV RBC AUTO: 91.1 FL (ref 80–99.9)
METHB: 0.4 % (ref 0–1.5)
MODE: ABNORMAL
MONOCYTES NFR BLD: 0.5 K/UL (ref 0.1–0.95)
MONOCYTES NFR BLD: 7 % (ref 2–12)
NEUTROPHILS NFR BLD: 58 % (ref 43–80)
NEUTS SEG NFR BLD: 4.29 K/UL (ref 1.8–7.3)
O2 SATURATION: 97.5 % (ref 92–98.5)
O2HB: 95.1 % (ref 94–97)
OPERATOR ID: ABNORMAL
PATIENT TEMP: 37 C
PCO2: 52.3 MMHG (ref 35–45)
PEEP/CPAP: 5 CMH2O
PFO2: 1.69 MMHG/%
PH BLOOD GAS: 7.37 (ref 7.35–7.45)
PLATELET # BLD AUTO: 217 K/UL (ref 130–450)
PMV BLD AUTO: 10.3 FL (ref 7–12)
PO2: 101.3 MMHG (ref 75–100)
POTASSIUM SERPL-SCNC: 4 MMOL/L (ref 3.5–5)
PS: 15 CMH20
RBC # BLD AUTO: 4.62 M/UL (ref 3.5–5.5)
RI(T): 2.51
RSV RNA NPH QL NAA+NON-PROBE: NOT DETECTED
RV+EV RNA NPH QL NAA+NON-PROBE: NOT DETECTED
SARS-COV-2 RNA NPH QL NAA+NON-PROBE: NOT DETECTED
SODIUM SERPL-SCNC: 145 MMOL/L (ref 132–146)
SOURCE, BLOOD GAS: ABNORMAL
SPECIMEN DESCRIPTION: NORMAL
THB: 13.5 G/DL (ref 11.5–16.5)
TIME ANALYZED: 2014
TROPONIN I SERPL HS-MCNC: 11 NG/L (ref 0–9)
TROPONIN I SERPL HS-MCNC: 7 NG/L (ref 0–9)
WBC OTHER # BLD: 7.3 K/UL (ref 4.5–11.5)

## 2024-04-19 PROCEDURE — 96365 THER/PROPH/DIAG IV INF INIT: CPT

## 2024-04-19 PROCEDURE — 0202U NFCT DS 22 TRGT SARS-COV-2: CPT

## 2024-04-19 PROCEDURE — 93005 ELECTROCARDIOGRAM TRACING: CPT

## 2024-04-19 PROCEDURE — 85025 COMPLETE CBC W/AUTO DIFF WBC: CPT

## 2024-04-19 PROCEDURE — 94660 CPAP INITIATION&MGMT: CPT

## 2024-04-19 PROCEDURE — 96375 TX/PRO/DX INJ NEW DRUG ADDON: CPT

## 2024-04-19 PROCEDURE — 71275 CT ANGIOGRAPHY CHEST: CPT

## 2024-04-19 PROCEDURE — 6360000002 HC RX W HCPCS

## 2024-04-19 PROCEDURE — 6360000004 HC RX CONTRAST MEDICATION: Performed by: RADIOLOGY

## 2024-04-19 PROCEDURE — 84484 ASSAY OF TROPONIN QUANT: CPT

## 2024-04-19 PROCEDURE — 2580000003 HC RX 258

## 2024-04-19 PROCEDURE — 80048 BASIC METABOLIC PNL TOTAL CA: CPT

## 2024-04-19 PROCEDURE — 5A09457 ASSISTANCE WITH RESPIRATORY VENTILATION, 24-96 CONSECUTIVE HOURS, CONTINUOUS POSITIVE AIRWAY PRESSURE: ICD-10-PCS | Performed by: HOSPITALIST

## 2024-04-19 PROCEDURE — 99285 EMERGENCY DEPT VISIT HI MDM: CPT

## 2024-04-19 PROCEDURE — 71045 X-RAY EXAM CHEST 1 VIEW: CPT

## 2024-04-19 PROCEDURE — 82805 BLOOD GASES W/O2 SATURATION: CPT

## 2024-04-19 PROCEDURE — 93971 EXTREMITY STUDY: CPT

## 2024-04-19 PROCEDURE — 83735 ASSAY OF MAGNESIUM: CPT

## 2024-04-19 PROCEDURE — 83880 ASSAY OF NATRIURETIC PEPTIDE: CPT

## 2024-04-19 PROCEDURE — 2580000003 HC RX 258: Performed by: RADIOLOGY

## 2024-04-19 RX ORDER — MAGNESIUM SULFATE IN WATER 40 MG/ML
2000 INJECTION, SOLUTION INTRAVENOUS ONCE
Status: COMPLETED | OUTPATIENT
Start: 2024-04-19 | End: 2024-04-19

## 2024-04-19 RX ORDER — SODIUM CHLORIDE 0.9 % (FLUSH) 0.9 %
10 SYRINGE (ML) INJECTION PRN
Status: COMPLETED | OUTPATIENT
Start: 2024-04-19 | End: 2024-04-19

## 2024-04-19 RX ADMIN — MAGNESIUM SULFATE HEPTAHYDRATE 2000 MG: 40 INJECTION, SOLUTION INTRAVENOUS at 21:01

## 2024-04-19 RX ADMIN — Medication 10 ML: at 22:42

## 2024-04-19 RX ADMIN — WATER 2000 MG: 1 INJECTION INTRAMUSCULAR; INTRAVENOUS; SUBCUTANEOUS at 21:00

## 2024-04-19 RX ADMIN — IOPAMIDOL 75 ML: 755 INJECTION, SOLUTION INTRAVENOUS at 22:38

## 2024-04-19 ASSESSMENT — PAIN - FUNCTIONAL ASSESSMENT: PAIN_FUNCTIONAL_ASSESSMENT: NONE - DENIES PAIN

## 2024-04-19 NOTE — ED PROVIDER NOTES
58-year-old female with history of COPD chronically wears 3 L nasal cannula at home.  She presents to the emergency department for the concerns of shortness of breath that has been going on for the past 4 to 5 days.  She states that she has been having increased dyspnea, wheezing and increasing cough frequency.  She has been using DuoNeb treatments at home but has not seemed to improve her symptoms.  Today she had a breathing treatment she has been getting worse so she called EMS she got 2 breathing treatments along with IV Solu-Medrol 125.  And she is having her 1 breathing treatment as well.  She still reports dyspnea.  She also states that she has been having right lower leg swelling.   She denies the following: Chest pain, back pain, nausea, vomiting, GI bleed, urinary symptoms    Chief Complaint   Patient presents with    Shortness of Breath     (Did 4 breathing treatments at home today with no relief) EMS GAVE 125MG SOLUMEDROL, 2 BREATHING TREATMENTS PTA       Review of Systems   Pert stated in the hpi above   Physical Exam  Vitals reviewed.   Constitutional:       General: She is not in acute distress.     Appearance: She is not ill-appearing.   HENT:      Head: Normocephalic.      Right Ear: External ear normal.      Left Ear: External ear normal.      Nose: Nose normal.      Mouth/Throat:      Mouth: Mucous membranes are moist.   Eyes:      General:         Right eye: No discharge.         Left eye: No discharge.      Conjunctiva/sclera: Conjunctivae normal.   Cardiovascular:      Rate and Rhythm: Normal rate and regular rhythm.      Heart sounds:      No friction rub. No gallop.   Pulmonary:      Effort: No respiratory distress.      Breath sounds: No stridor. Decreased breath sounds and wheezing present.   Abdominal:      General: There is no distension.      Tenderness: There is no abdominal tenderness. There is no guarding or rebound.   Musculoskeletal:         General: No deformity or signs of injury.

## 2024-04-19 NOTE — PROGRESS NOTES
Date: 4/19/2024    Time: 7:46 PM    Patient Placed On BIPAP/CPAP/ Non-Invasive Ventilation?  Yes    If no must comment.  Facial area red/color change? No           If YES are Blister/Lesion present?No   If yes must notify nursing staff  BIPAP/CPAP skin barrier?  Yes    Skin barrier type:mepilexlite       Comments:        Master Oscar RCP

## 2024-04-20 PROBLEM — J44.1 COPD WITH EXACERBATION (HCC): Status: ACTIVE | Noted: 2024-04-20

## 2024-04-20 LAB
ANION GAP SERPL CALCULATED.3IONS-SCNC: 18 MMOL/L (ref 7–16)
BASOPHILS # BLD: 0 K/UL (ref 0–0.2)
BASOPHILS NFR BLD: 0 % (ref 0–2)
BUN SERPL-MCNC: 20 MG/DL (ref 6–20)
CALCIUM SERPL-MCNC: 9.4 MG/DL (ref 8.6–10.2)
CHLORIDE SERPL-SCNC: 102 MMOL/L (ref 98–107)
CO2 SERPL-SCNC: 23 MMOL/L (ref 22–29)
CREAT SERPL-MCNC: 0.7 MG/DL (ref 0.5–1)
EOSINOPHIL # BLD: 0 K/UL (ref 0.05–0.5)
EOSINOPHILS RELATIVE PERCENT: 0 % (ref 0–6)
ERYTHROCYTE [DISTWIDTH] IN BLOOD BY AUTOMATED COUNT: 12.4 % (ref 11.5–15)
GFR SERPL CREATININE-BSD FRML MDRD: >90 ML/MIN/1.73M2
GLUCOSE SERPL-MCNC: 192 MG/DL (ref 74–99)
HCT VFR BLD AUTO: 41.7 % (ref 34–48)
HGB BLD-MCNC: 12.9 G/DL (ref 11.5–15.5)
LYMPHOCYTES NFR BLD: 0.38 K/UL (ref 1.5–4)
LYMPHOCYTES RELATIVE PERCENT: 6 % (ref 20–42)
MCH RBC QN AUTO: 27.8 PG (ref 26–35)
MCHC RBC AUTO-ENTMCNC: 30.9 G/DL (ref 32–34.5)
MCV RBC AUTO: 89.9 FL (ref 80–99.9)
MONOCYTES NFR BLD: 0.05 K/UL (ref 0.1–0.95)
MONOCYTES NFR BLD: 1 % (ref 2–12)
NEUTROPHILS NFR BLD: 93 % (ref 43–80)
NEUTS SEG NFR BLD: 5.77 K/UL (ref 1.8–7.3)
PLATELET # BLD AUTO: 215 K/UL (ref 130–450)
PMV BLD AUTO: 10.3 FL (ref 7–12)
POTASSIUM SERPL-SCNC: 4.4 MMOL/L (ref 3.5–5)
RBC # BLD AUTO: 4.64 M/UL (ref 3.5–5.5)
RBC # BLD: NORMAL 10*6/UL
SODIUM SERPL-SCNC: 143 MMOL/L (ref 132–146)
WBC OTHER # BLD: 6.2 K/UL (ref 4.5–11.5)

## 2024-04-20 PROCEDURE — 2580000003 HC RX 258

## 2024-04-20 PROCEDURE — 6370000000 HC RX 637 (ALT 250 FOR IP)

## 2024-04-20 PROCEDURE — 6370000000 HC RX 637 (ALT 250 FOR IP): Performed by: HOSPITALIST

## 2024-04-20 PROCEDURE — 2060000000 HC ICU INTERMEDIATE R&B

## 2024-04-20 PROCEDURE — 2700000000 HC OXYGEN THERAPY PER DAY

## 2024-04-20 PROCEDURE — 94640 AIRWAY INHALATION TREATMENT: CPT

## 2024-04-20 PROCEDURE — 87449 NOS EACH ORGANISM AG IA: CPT

## 2024-04-20 PROCEDURE — 94664 DEMO&/EVAL PT USE INHALER: CPT

## 2024-04-20 PROCEDURE — 86738 MYCOPLASMA ANTIBODY: CPT

## 2024-04-20 PROCEDURE — 6360000002 HC RX W HCPCS: Performed by: INTERNAL MEDICINE

## 2024-04-20 PROCEDURE — 2580000003 HC RX 258: Performed by: INTERNAL MEDICINE

## 2024-04-20 PROCEDURE — 6370000000 HC RX 637 (ALT 250 FOR IP): Performed by: INTERNAL MEDICINE

## 2024-04-20 PROCEDURE — 6360000002 HC RX W HCPCS: Performed by: HOSPITALIST

## 2024-04-20 PROCEDURE — 87899 AGENT NOS ASSAY W/OPTIC: CPT

## 2024-04-20 PROCEDURE — 99223 1ST HOSP IP/OBS HIGH 75: CPT | Performed by: HOSPITALIST

## 2024-04-20 PROCEDURE — 36415 COLL VENOUS BLD VENIPUNCTURE: CPT

## 2024-04-20 PROCEDURE — 80048 BASIC METABOLIC PNL TOTAL CA: CPT

## 2024-04-20 PROCEDURE — 94660 CPAP INITIATION&MGMT: CPT

## 2024-04-20 PROCEDURE — 99223 1ST HOSP IP/OBS HIGH 75: CPT | Performed by: INTERNAL MEDICINE

## 2024-04-20 PROCEDURE — 6360000002 HC RX W HCPCS

## 2024-04-20 PROCEDURE — 85025 COMPLETE CBC W/AUTO DIFF WBC: CPT

## 2024-04-20 PROCEDURE — 2580000003 HC RX 258: Performed by: HOSPITALIST

## 2024-04-20 RX ORDER — BUDESONIDE AND FORMOTEROL FUMARATE DIHYDRATE 160; 4.5 UG/1; UG/1
2 AEROSOL RESPIRATORY (INHALATION) 2 TIMES DAILY
Status: DISCONTINUED | OUTPATIENT
Start: 2024-04-20 | End: 2024-04-20 | Stop reason: CLARIF

## 2024-04-20 RX ORDER — ONDANSETRON 2 MG/ML
INJECTION INTRAMUSCULAR; INTRAVENOUS
Status: DISPENSED
Start: 2024-04-20 | End: 2024-04-20

## 2024-04-20 RX ORDER — MIRTAZAPINE 15 MG/1
15 TABLET, FILM COATED ORAL NIGHTLY
Status: DISCONTINUED | OUTPATIENT
Start: 2024-04-20 | End: 2024-04-23 | Stop reason: HOSPADM

## 2024-04-20 RX ORDER — ROFLUMILAST 500 UG/1
250 TABLET ORAL DAILY
Status: DISCONTINUED | OUTPATIENT
Start: 2024-04-20 | End: 2024-04-20

## 2024-04-20 RX ORDER — PREDNISONE 20 MG/1
40 TABLET ORAL DAILY
Status: DISCONTINUED | OUTPATIENT
Start: 2024-04-22 | End: 2024-04-23 | Stop reason: HOSPADM

## 2024-04-20 RX ORDER — NICOTINE 21 MG/24HR
1 PATCH, TRANSDERMAL 24 HOURS TRANSDERMAL DAILY
Status: DISCONTINUED | OUTPATIENT
Start: 2024-04-20 | End: 2024-04-23 | Stop reason: HOSPADM

## 2024-04-20 RX ORDER — ALBUTEROL SULFATE 90 UG/1
2 AEROSOL, METERED RESPIRATORY (INHALATION) EVERY 4 HOURS PRN
Status: DISCONTINUED | OUTPATIENT
Start: 2024-04-20 | End: 2024-04-20 | Stop reason: CLARIF

## 2024-04-20 RX ORDER — IPRATROPIUM BROMIDE AND ALBUTEROL SULFATE 2.5; .5 MG/3ML; MG/3ML
1 SOLUTION RESPIRATORY (INHALATION)
Status: DISCONTINUED | OUTPATIENT
Start: 2024-04-20 | End: 2024-04-20

## 2024-04-20 RX ORDER — GUAIFENESIN 400 MG/1
400 TABLET ORAL 3 TIMES DAILY
Status: DISCONTINUED | OUTPATIENT
Start: 2024-04-20 | End: 2024-04-23 | Stop reason: HOSPADM

## 2024-04-20 RX ORDER — SODIUM CHLORIDE 9 MG/ML
INJECTION, SOLUTION INTRAVENOUS PRN
Status: DISCONTINUED | OUTPATIENT
Start: 2024-04-20 | End: 2024-04-23 | Stop reason: HOSPADM

## 2024-04-20 RX ORDER — ACETAMINOPHEN 650 MG/1
650 SUPPOSITORY RECTAL EVERY 6 HOURS PRN
Status: DISCONTINUED | OUTPATIENT
Start: 2024-04-20 | End: 2024-04-23 | Stop reason: HOSPADM

## 2024-04-20 RX ORDER — ONDANSETRON 4 MG/1
4 TABLET, ORALLY DISINTEGRATING ORAL EVERY 8 HOURS PRN
Status: DISCONTINUED | OUTPATIENT
Start: 2024-04-20 | End: 2024-04-23 | Stop reason: HOSPADM

## 2024-04-20 RX ORDER — ALBUTEROL SULFATE 2.5 MG/3ML
2.5 SOLUTION RESPIRATORY (INHALATION) EVERY 4 HOURS PRN
Status: DISCONTINUED | OUTPATIENT
Start: 2024-04-20 | End: 2024-04-22

## 2024-04-20 RX ORDER — HYDROXYZINE PAMOATE 25 MG/1
25 CAPSULE ORAL ONCE
Status: COMPLETED | OUTPATIENT
Start: 2024-04-20 | End: 2024-04-20

## 2024-04-20 RX ORDER — IPRATROPIUM BROMIDE AND ALBUTEROL SULFATE 2.5; .5 MG/3ML; MG/3ML
1 SOLUTION RESPIRATORY (INHALATION)
Status: DISCONTINUED | OUTPATIENT
Start: 2024-04-20 | End: 2024-04-22

## 2024-04-20 RX ORDER — ARIPIPRAZOLE 5 MG/1
5 TABLET ORAL DAILY
Status: DISCONTINUED | OUTPATIENT
Start: 2024-04-20 | End: 2024-04-23 | Stop reason: HOSPADM

## 2024-04-20 RX ORDER — ONDANSETRON 2 MG/ML
4 INJECTION INTRAMUSCULAR; INTRAVENOUS EVERY 6 HOURS PRN
Status: DISCONTINUED | OUTPATIENT
Start: 2024-04-20 | End: 2024-04-23 | Stop reason: HOSPADM

## 2024-04-20 RX ORDER — ENOXAPARIN SODIUM 100 MG/ML
30 INJECTION SUBCUTANEOUS 2 TIMES DAILY
Status: DISCONTINUED | OUTPATIENT
Start: 2024-04-20 | End: 2024-04-23 | Stop reason: HOSPADM

## 2024-04-20 RX ORDER — SODIUM CHLORIDE 0.9 % (FLUSH) 0.9 %
5-40 SYRINGE (ML) INJECTION PRN
Status: DISCONTINUED | OUTPATIENT
Start: 2024-04-20 | End: 2024-04-23 | Stop reason: HOSPADM

## 2024-04-20 RX ORDER — PANTOPRAZOLE SODIUM 40 MG/1
40 TABLET, DELAYED RELEASE ORAL
Status: DISCONTINUED | OUTPATIENT
Start: 2024-04-20 | End: 2024-04-23 | Stop reason: HOSPADM

## 2024-04-20 RX ORDER — IPRATROPIUM BROMIDE AND ALBUTEROL SULFATE 2.5; .5 MG/3ML; MG/3ML
3 SOLUTION RESPIRATORY (INHALATION) ONCE
Status: COMPLETED | OUTPATIENT
Start: 2024-04-20 | End: 2024-04-20

## 2024-04-20 RX ORDER — BUDESONIDE 0.5 MG/2ML
0.5 INHALANT ORAL
Status: DISCONTINUED | OUTPATIENT
Start: 2024-04-20 | End: 2024-04-23 | Stop reason: HOSPADM

## 2024-04-20 RX ORDER — ROFLUMILAST 500 UG/1
500 TABLET ORAL DAILY
Status: DISCONTINUED | OUTPATIENT
Start: 2024-04-21 | End: 2024-04-23 | Stop reason: HOSPADM

## 2024-04-20 RX ORDER — POLYETHYLENE GLYCOL 3350 17 G/17G
17 POWDER, FOR SOLUTION ORAL DAILY PRN
Status: DISCONTINUED | OUTPATIENT
Start: 2024-04-20 | End: 2024-04-23 | Stop reason: HOSPADM

## 2024-04-20 RX ORDER — FUROSEMIDE 10 MG/ML
40 INJECTION INTRAMUSCULAR; INTRAVENOUS DAILY
Status: DISCONTINUED | OUTPATIENT
Start: 2024-04-20 | End: 2024-04-22

## 2024-04-20 RX ORDER — ACETAMINOPHEN 325 MG/1
650 TABLET ORAL EVERY 6 HOURS PRN
Status: DISCONTINUED | OUTPATIENT
Start: 2024-04-20 | End: 2024-04-23 | Stop reason: HOSPADM

## 2024-04-20 RX ORDER — PRAZOSIN HYDROCHLORIDE 1 MG/1
1 CAPSULE ORAL NIGHTLY
Status: DISCONTINUED | OUTPATIENT
Start: 2024-04-20 | End: 2024-04-23 | Stop reason: HOSPADM

## 2024-04-20 RX ORDER — ARFORMOTEROL TARTRATE 15 UG/2ML
15 SOLUTION RESPIRATORY (INHALATION)
Status: DISCONTINUED | OUTPATIENT
Start: 2024-04-20 | End: 2024-04-23 | Stop reason: HOSPADM

## 2024-04-20 RX ORDER — SODIUM CHLORIDE 0.9 % (FLUSH) 0.9 %
5-40 SYRINGE (ML) INJECTION EVERY 12 HOURS SCHEDULED
Status: DISCONTINUED | OUTPATIENT
Start: 2024-04-20 | End: 2024-04-23 | Stop reason: HOSPADM

## 2024-04-20 RX ADMIN — ENOXAPARIN SODIUM 30 MG: 100 INJECTION SUBCUTANEOUS at 08:35

## 2024-04-20 RX ADMIN — ARFORMOTEROL TARTRATE 15 MCG: 15 SOLUTION RESPIRATORY (INHALATION) at 20:33

## 2024-04-20 RX ADMIN — SODIUM CHLORIDE, PRESERVATIVE FREE 10 ML: 5 INJECTION INTRAVENOUS at 08:36

## 2024-04-20 RX ADMIN — IPRATROPIUM BROMIDE AND ALBUTEROL SULFATE 1 DOSE: .5; 3 SOLUTION RESPIRATORY (INHALATION) at 09:13

## 2024-04-20 RX ADMIN — IPRATROPIUM BROMIDE AND ALBUTEROL SULFATE 1 DOSE: 2.5; .5 SOLUTION RESPIRATORY (INHALATION) at 16:04

## 2024-04-20 RX ADMIN — HYDROXYZINE PAMOATE 25 MG: 25 CAPSULE ORAL at 10:22

## 2024-04-20 RX ADMIN — SODIUM CHLORIDE, PRESERVATIVE FREE 10 ML: 5 INJECTION INTRAVENOUS at 10:19

## 2024-04-20 RX ADMIN — ROFLUMILAST 250 MCG: 500 TABLET ORAL at 08:35

## 2024-04-20 RX ADMIN — WATER 40 MG: 1 INJECTION INTRAMUSCULAR; INTRAVENOUS; SUBCUTANEOUS at 05:56

## 2024-04-20 RX ADMIN — GUAIFENESIN 400 MG: 400 TABLET ORAL at 13:48

## 2024-04-20 RX ADMIN — BUDESONIDE 500 MCG: 0.5 SUSPENSION RESPIRATORY (INHALATION) at 20:34

## 2024-04-20 RX ADMIN — PRAZOSIN HYDROCHLORIDE 1 MG: 1 CAPSULE ORAL at 20:48

## 2024-04-20 RX ADMIN — ARIPIPRAZOLE 5 MG: 5 TABLET ORAL at 08:36

## 2024-04-20 RX ADMIN — WATER 1000 MG: 1 INJECTION INTRAMUSCULAR; INTRAVENOUS; SUBCUTANEOUS at 20:45

## 2024-04-20 RX ADMIN — WATER 40 MG: 1 INJECTION INTRAMUSCULAR; INTRAVENOUS; SUBCUTANEOUS at 17:06

## 2024-04-20 RX ADMIN — ACETAZOLAMIDE SODIUM 500 MG: 500 INJECTION, POWDER, LYOPHILIZED, FOR SOLUTION INTRAVENOUS at 14:15

## 2024-04-20 RX ADMIN — IPRATROPIUM BROMIDE AND ALBUTEROL SULFATE 1 DOSE: 2.5; .5 SOLUTION RESPIRATORY (INHALATION) at 20:34

## 2024-04-20 RX ADMIN — ENOXAPARIN SODIUM 30 MG: 100 INJECTION SUBCUTANEOUS at 20:46

## 2024-04-20 RX ADMIN — ARFORMOTEROL TARTRATE 15 MCG: 15 SOLUTION RESPIRATORY (INHALATION) at 09:13

## 2024-04-20 RX ADMIN — WATER 40 MG: 1 INJECTION INTRAMUSCULAR; INTRAVENOUS; SUBCUTANEOUS at 23:12

## 2024-04-20 RX ADMIN — GUAIFENESIN 400 MG: 400 TABLET ORAL at 20:46

## 2024-04-20 RX ADMIN — SODIUM CHLORIDE, PRESERVATIVE FREE 10 ML: 5 INJECTION INTRAVENOUS at 20:47

## 2024-04-20 RX ADMIN — MIRTAZAPINE 15 MG: 15 TABLET, FILM COATED ORAL at 20:46

## 2024-04-20 RX ADMIN — IPRATROPIUM BROMIDE AND ALBUTEROL SULFATE 3 DOSE: .5; 3 SOLUTION RESPIRATORY (INHALATION) at 00:30

## 2024-04-20 RX ADMIN — BUDESONIDE 500 MCG: 0.5 SUSPENSION RESPIRATORY (INHALATION) at 09:13

## 2024-04-20 RX ADMIN — PANTOPRAZOLE SODIUM 40 MG: 40 TABLET, DELAYED RELEASE ORAL at 05:57

## 2024-04-20 RX ADMIN — AZITHROMYCIN MONOHYDRATE 500 MG: 500 INJECTION, POWDER, LYOPHILIZED, FOR SOLUTION INTRAVENOUS at 01:27

## 2024-04-20 RX ADMIN — FUROSEMIDE 40 MG: 10 INJECTION, SOLUTION INTRAMUSCULAR; INTRAVENOUS at 13:48

## 2024-04-20 RX ADMIN — WATER 40 MG: 1 INJECTION INTRAMUSCULAR; INTRAVENOUS; SUBCUTANEOUS at 10:19

## 2024-04-20 ASSESSMENT — PAIN SCALES - GENERAL
PAINLEVEL_OUTOF10: 0

## 2024-04-20 NOTE — PROGRESS NOTES
Patient passed bedside swallow. She consumed one pudding and drank 120 ml water. No watery eyes, runny nose, coughing or choking noted.  Patient is requesting a Nicotine patch and Vistaril for anxiety. Dr ОЛЬГА Augustin notified via [employer]

## 2024-04-20 NOTE — H&P
Hospital Medicine History & Physical      PCP: Genesis Enriquez MD    Date of Admission: 2024    Date of Service: Pt seen/examined on 2024 and is  admitted to Inpatient with expected LOS greater than two midnights due to medical therapy.      Chief Complaint:  had concerns including Shortness of Breath ((Did 4 breathing treatments at home today with no relief) EMS GAVE 125MG SOLUMEDROL, 2 BREATHING TREATMENTS PTA).    History Of Present Illness:    Ms. Brenda Nunn, a 58 y.o. year old female  with PMH of class II obesity, COPD, chronic respiratory failure with hypoxia on home oxygen, bipolar 1 disorder,    Pt presented to ED for evaluation of worsening shortness of breath.  ABG showed hypercapnia.  With CO2 52.  Patient has been placed on BiPAP and tolerates well.  Patient is well oriented on my visit, denies any fever, chills, chest pain/chest pressure denies nausea vomiting or abdominal pain.    CTA pulmonary  IMPRESSION:  1. No acute pulmonary embolus identified within limitations  2. Suggestion of pulmonary emphysema and chronic bronchitis  3. Bibasilar densities, worse on the right, consistent with atelectasis and  possible scar  4. Minimal pleural effusions       Initial workups reviewed:   WBC normal, H/H normal , platelet normal  Renal function normal  Hepatic function   normal    CXR  reviewed,with no acute cardiopulmonary process.   Troponin 11--7  EKG reviewed normal sinus rhythm with no acute ST/T wave ischemic change.  Last ECHO on 2024 with LVEF 55 to 60%    Past Medical History:        Diagnosis Date    CHF (congestive heart failure) (HCC)     COPD (chronic obstructive pulmonary disease) (HCC)     Depression     Hypertension        Past Surgical History:        Procedure Laterality Date     SECTION      HYSTERECTOMY (CERVIX STATUS UNKNOWN)      Ovaries retained. Unknown cervix status. performed for uterine fibroids       Medications Prior to Admission:      Prior to  Cardiomegaly.  The osseous structures are without acute process.     No acute process. Cardiomegaly.       ASSESSMENT & PLAN::    Principal Problem:    COPD with acute exacerbation (HCC)  Active Problems:    Mood disorder (HCC)    Bipolar 1 disorder (HCC)    Acute on chronic respiratory failure with hypoxia and hypercapnia (HCC)    Chronic hypoxic respiratory failure (HCC)    COPD with exacerbation (HCC)  Resolved Problems:    * No resolved hospital problems. *    -Patient is being acute on chronic respiratory failure with hypoxia and hypercapnia due to COPD exacerbation  -Wean BiPAP to home oxygen support as tolerated  -Patient received ceftriaxone and azithromycin in ED, will continue azithromycin for total of 3 doses.  -S/p Solu-Medrol 125 in ED, continue Solu-Medrol 40 IV 4 times daily x 48 hours, followed by tapering to oral prednisone  -As needed albuterol, DuoNebs and Symbicort    DVT Prophylaxis: [x]Lovenox []Heparin []PCD [] Warfarin/NOAC []Encouraged ambulation    Diet: Diet NPO  Code Status: Prior  Surrogate decision maker confirmed with patient:   Extended Emergency Contact Information  Primary Emergency Contact: BRAIN SCHREIBER  Mobile Phone: [phone]  Relation: Child  Preferred language: English   needed? No    Admit status: [] Observation [x] Inpatient   Disposition: [x]Med/Surg [] Intermediate [] ICU/CCU    +++++++++++++++++++++++++++++++++++++++++++++++++  Frankie Vazquez MD PhD  [facility]  [place], [place]  +++++++++++++++++++++++++++++++++++++++++++++++++  NOTE: Report could be transcribed using voice recognition software. Every effort was made to ensure accuracy; however, inadvertent computerized transcription errors may be present.

## 2024-04-20 NOTE — PLAN OF CARE
Patient follows with Genesis Enriquez MD  resident IM clinic. Was admitted last month under house team.  Was contacted to assume care of patient. Patient accepted by Dr. Fong with house team 1.     Patient was seen and examined at bedside. Patient is anxious on bipap will order one time dose of Vystaril at this time. She states that her left leg is slightly bigger then her left. The hospitalist team has already done a US of the Left leg.      Physical Exam:  Vitals: /84   Pulse 78   Temp 97.9 °F (36.6 °C) (Temporal)   Resp 22   Ht 1.702 m (5' 7\")   Wt 112 kg (247 lb)   SpO2 97%   BMI 38.69 kg/m²     I & O - 24hr: No intake/output data recorded.   General Appearance: alert, appears stated age, and cooperative  HEENT:  Head: Normal, normocephalic, atraumatic.  Neck: supple, symmetrical, trachea midline  Lung: wheezes bilaterally  Heart: regular rate and rhythm and S1, S2 normal  Abdomen: soft, non-tender; bowel sounds normal; no masses,  no organomegaly  Extremities:  extremities normal, atraumatic, no cyanosis or edema  Musculokeletal: No joint swelling, no muscle tenderness. ROM normal in all joints of extremities.   Neurologic: Mental status: Alert, oriented, thought content appropriate       Assessment and Plan:  Acute on chronic respiratory failure 2/2 COPD, on 3L O2 baseline  BENJAMIN  HFpEF (55-60% in 2020)   GERD   Pre-DM, A1c 5.7 on 11/14/23, not on medications  Insomnia, on Remeron  Tobacco use, 5-6 cigarettes per day   Hx of substance use    Plan   Continue on bipap, wean oxygen as able  Continue breathing treatments  Continue azithromycin   IV prednisone > transition to oral   Follow pulm recs

## 2024-04-20 NOTE — PROGRESS NOTES
[facility]  Internal Medicine Residency / House Medicine Service    Attending Physician Statement  I have discussed the case, including pertinent history and exam findings with the resident and the team.  I have seen and examined the patient and the key elements of the encounter have been performed by me.  I agree with the assessment, plan and orders as documented by the resident.      Alert and interactive on BIPAP  VS stable   Meds reviewed including IV Solumedrol  ABG , pCO2 52,3 and improving  Viral panel negative  DVT ruled out   Plan: Aggressive treatment of COPD           Follow Pulm consult  Remainder of medical problems as per resident note.      Jens Fong MD FRCP [facility]  Internal Medicine Residency Faculty

## 2024-04-20 NOTE — PROGRESS NOTES
Date: 4/20/2024    Time: 12:08 AM    Patient Placed On BIPAP/CPAP/ Non-Invasive Ventilation?  Yes    If no must comment.  Facial area red/color change? No           If YES are Blister/Lesion present?Yes   If yes must notify nursing staff  BIPAP/CPAP skin barrier?  Yes    Skin barrier type:mepilex       Comments:     04/19/24 3232   NIV Type   NIV Started/Stopped On   Equipment Type V60   Mode Biphasic   Mask Type Full face mask   Mask Size Small   Assessment   Pulse 90   Respirations 30   SpO2 97 %   Level of Consciousness 0   Comfort Level Good   Using Accessory Muscles Yes   Mask Compliance Good   Skin Assessment Clean, dry, & intact   Skin Protection for O2 Device Yes   Orientation Middle   Location Nose   Intervention(s) Skin Barrier   Settings/Measurements   PIP Observed 15 cm H20   IPAP 15 cmH20   CPAP/EPAP 8 cmH2O   Vt (Measured) 417 mL   Rate Ordered 14   Insp Rise Time (%) 2 %   FiO2  40 %   I Time/ I Time % 0.85 s   Minute Volume (L/min) 15.3 Liters   Mask Leak (lpm) 35 lpm           MARIO THURSTON RCP

## 2024-04-20 NOTE — CONSULTS
Taking? Authorizing Provider   mirtazapine (REMERON) 15 MG tablet TAKE 1 TABLET BY MOUTH AT BEDTIME 4/8/24   Yumiko Ravi MD   ARIPiprazole (ABILIFY) 5 MG tablet Take 1 tablet by mouth daily 3/20/24   Vincent Flowers MD   vitamin D (VITAMIN D3) 50 MCG (2000 UT) CAPS capsule Take 1 capsule by mouth daily 3/20/24   Vincent Flowers MD   guaiFENesin 400 MG tablet Take 1 tablet by mouth in the morning, at noon, and at bedtime 3/20/24   Vincent Flowers MD   prazosin (MINIPRESS) 1 MG capsule Take 1 capsule by mouth nightly 3/20/24   Vincent Flowers MD   Roflumilast (DALIRESP) 250 MCG tablet Take 1 tablet by mouth daily 3/20/24 4/19/24  Vincent Flowers MD   miconazole (MICOTIN) 2 % cream Apply topically 2 times daily. 3/11/24   Mary Jane Jolley MD   albuterol sulfate HFA (VENTOLIN HFA) 108 (90 Base) MCG/ACT inhaler Inhale 2 puffs into the lungs every 6 hours as needed for Wheezing or Shortness of Breath 2/26/24   Genesis Enriquez MD   ipratropium 0.5 mg-albuterol 2.5 mg (DUONEB) 0.5-2.5 (3) MG/3ML SOLN nebulizer solution Inhale 3 mLs into the lungs every 4 hours Indications: Treatment to Prevent COPD with Bronchospasms 2/26/24 3/27/24  Genesis Enriquez MD   pantoprazole (PROTONIX) 40 MG tablet Take 1 tablet by mouth every morning (before breakfast) 2/26/24 5/26/24  Genesis Enriquez MD   budesonide-formoterol (SYMBICORT) 160-4.5 MCG/ACT AERO Inhale 2 puffs into the lungs 2 times daily 2/9/24 3/25/24  Lorenzo Carroll Jr., DO   Respiratory Therapy Supplies (FULL KIT NEBULIZER SET) MISC Use as directed with nebulized medication. 2/9/24   Lorenzo Carroll Jr., DO       ALLERGIES: Pcn [penicillins]       REVIEW OF SYSTEMS:  Otherwise negative if not reported or listed below    A 10-point review of systems was completed, and ROS is negative except as mentioned in HPI.    OBJECTIVE:     PHYSICAL EXAM:   VITALS:   Vitals:    04/20/24 0453 04/20/24 0602 04/20/24 0800 04/20/24  4/19/2024 10:34 pm TECHNIQUE: CTA of the chest was performed after the administration of intravenous contrast.  Multiplanar reformatted images are provided for review.  MIP images are provided for review. Automated exposure control, iterative reconstruction, and/or weight based adjustment of the mA/kV was utilized to reduce the radiation dose to as low as reasonably achievable. COMPARISON: None. HISTORY: ORDERING SYSTEM PROVIDED HISTORY: possible pulmonary embolism TECHNOLOGIST PROVIDED HISTORY: Reason for exam:->possible pulmonary embolism What reading provider will be dictating this exam?->CRC FINDINGS: Pulmonary Arteries: There is no definite acute pulmonary embolus identified with suboptimal contrast opacification noted to limit evaluation Mediastinum: No evidence of mediastinal lymphadenopathy.  The heart and pericardium demonstrate no acute abnormality.  There is no acute abnormality of the thoracic aorta. There are coronary atherosclerotic calcifications present. Lungs/pleura: There is small pleural effusions.  Bronchial wall thickening rather diffusely likely chronic bronchitis there are lower lung densities more to the right consistent with atelectasis.  There is pulmonary emphysema. Upper Abdomen: There is a 3 cm left adrenal nodule with HU of -0.5, consistent with lipid rich adenoma.  No routine follow-up imaging is recommended. Soft Tissues/Bones: No acute bone or soft tissue abnormality.     1. No acute pulmonary embolus identified within limitations 2. Suggestion of pulmonary emphysema and chronic bronchitis 3. Bibasilar densities, worse on the right, consistent with atelectasis and possible scar 4. Minimal pleural effusions RECOMMENDATIONS: Follow-up screening lung CT in 1 year     Vascular duplex lower extremity venous right    Result Date: 4/19/2024  EXAMINATION: DUPLEX VENOUS ULTRASOUND OF THE RIGHT LOWER EXTREMITY 4/19/2024 10:22 pm TECHNIQUE: Duplex ultrasound using B-mode/gray scaled imaging and

## 2024-04-20 NOTE — PROGRESS NOTES
4 Eyes Skin Assessment     NAME:  Brenda Nunn  YOB: 1965  MEDICAL RECORD NUMBER:  [id number]    The patient is being assessed for  Admission    I agree that at least one RN has performed a thorough Head to Toe Skin Assessment on the patient. ALL assessment sites listed below have been assessed.      Areas assessed by both nurses:    Head, Face, Ears, Shoulders, Back, Chest, Arms, Elbows, Hands, Sacrum. Buttock, Coccyx, Ischium, and Legs. Feet and Heels        Does the Patient have a Wound? No noted wound(s)       Gabe Prevention initiated by RN: Yes  Wound Care Orders initiated by RN: No    Pressure Injury (Stage 3,4, Unstageable, DTI, NWPT, and Complex wounds) if present, place Wound referral order by RN under : No    New Ostomies, if present place, Ostomy referral order under : No     Nurse 1 eSignature: Electronically signed by Tuyet Zepeda RN on 4/20/24 at 4:35 AM EDT    **SHARE this note so that the co-signing nurse can place an eSignature**    Nurse 2 eSignature: Electronically signed by Salud Zheng RN on 4/20/24 at 4:39 AM EDT

## 2024-04-20 NOTE — PLAN OF CARE
Patient follows with the resident IM clinic.  Was admitted last month under house team.  Contacted house team to assume care of patient.  Patient accepted by Dr. Fong with house team 1.    Electronically signed by Alexis Bautista MD on 4/20/2024 at 8:34 AM

## 2024-04-21 LAB
ANION GAP SERPL CALCULATED.3IONS-SCNC: 11 MMOL/L (ref 7–16)
BASOPHILS # BLD: 0 K/UL (ref 0–0.2)
BASOPHILS NFR BLD: 0 % (ref 0–2)
BNP SERPL-MCNC: 74 PG/ML (ref 0–125)
BUN SERPL-MCNC: 23 MG/DL (ref 6–20)
CALCIUM SERPL-MCNC: 9.6 MG/DL (ref 8.6–10.2)
CHLORIDE SERPL-SCNC: 97 MMOL/L (ref 98–107)
CO2 SERPL-SCNC: 25 MMOL/L (ref 22–29)
CREAT SERPL-MCNC: 0.8 MG/DL (ref 0.5–1)
CRP SERPL HS-MCNC: 3 MG/L (ref 0–5)
EKG ATRIAL RATE: 86 BPM
EKG P AXIS: 44 DEGREES
EKG P-R INTERVAL: 144 MS
EKG Q-T INTERVAL: 384 MS
EKG QRS DURATION: 84 MS
EKG QTC CALCULATION (BAZETT): 459 MS
EKG R AXIS: 36 DEGREES
EKG T AXIS: 62 DEGREES
EKG VENTRICULAR RATE: 86 BPM
EOSINOPHIL # BLD: 0 K/UL (ref 0.05–0.5)
EOSINOPHILS RELATIVE PERCENT: 0 % (ref 0–6)
ERYTHROCYTE [DISTWIDTH] IN BLOOD BY AUTOMATED COUNT: 12.4 % (ref 11.5–15)
GFR SERPL CREATININE-BSD FRML MDRD: 87 ML/MIN/1.73M2
GLUCOSE SERPL-MCNC: 205 MG/DL (ref 74–99)
HCT VFR BLD AUTO: 42.3 % (ref 34–48)
HGB BLD-MCNC: 13.1 G/DL (ref 11.5–15.5)
IMM GRANULOCYTES # BLD AUTO: 0.04 K/UL (ref 0–0.58)
IMM GRANULOCYTES NFR BLD: 1 % (ref 0–5)
L PNEUMO1 AG UR QL IA.RAPID: NEGATIVE
LYMPHOCYTES NFR BLD: 0.49 K/UL (ref 1.5–4)
LYMPHOCYTES RELATIVE PERCENT: 6 % (ref 20–42)
MCH RBC QN AUTO: 27.6 PG (ref 26–35)
MCHC RBC AUTO-ENTMCNC: 31 G/DL (ref 32–34.5)
MCV RBC AUTO: 89.2 FL (ref 80–99.9)
MONOCYTES NFR BLD: 0.18 K/UL (ref 0.1–0.95)
MONOCYTES NFR BLD: 2 % (ref 2–12)
NEUTROPHILS NFR BLD: 91 % (ref 43–80)
NEUTS SEG NFR BLD: 6.97 K/UL (ref 1.8–7.3)
PLATELET # BLD AUTO: 220 K/UL (ref 130–450)
PMV BLD AUTO: 10.6 FL (ref 7–12)
POTASSIUM SERPL-SCNC: 4 MMOL/L (ref 3.5–5)
PROCALCITONIN SERPL-MCNC: 0.05 NG/ML (ref 0–0.08)
RBC # BLD AUTO: 4.74 M/UL (ref 3.5–5.5)
RBC # BLD: ABNORMAL 10*6/UL
S PNEUM AG SPEC QL: NEGATIVE
SODIUM SERPL-SCNC: 133 MMOL/L (ref 132–146)
SPECIMEN SOURCE: NORMAL
TROPONIN I SERPL HS-MCNC: 8 NG/L (ref 0–9)
WBC OTHER # BLD: 7.7 K/UL (ref 4.5–11.5)

## 2024-04-21 PROCEDURE — 93010 ELECTROCARDIOGRAM REPORT: CPT | Performed by: INTERNAL MEDICINE

## 2024-04-21 PROCEDURE — 6360000002 HC RX W HCPCS: Performed by: HOSPITALIST

## 2024-04-21 PROCEDURE — 2700000000 HC OXYGEN THERAPY PER DAY

## 2024-04-21 PROCEDURE — 99231 SBSQ HOSP IP/OBS SF/LOW 25: CPT | Performed by: INTERNAL MEDICINE

## 2024-04-21 PROCEDURE — 6370000000 HC RX 637 (ALT 250 FOR IP): Performed by: HOSPITALIST

## 2024-04-21 PROCEDURE — 2580000003 HC RX 258: Performed by: HOSPITALIST

## 2024-04-21 PROCEDURE — 2060000000 HC ICU INTERMEDIATE R&B

## 2024-04-21 PROCEDURE — 94640 AIRWAY INHALATION TREATMENT: CPT

## 2024-04-21 PROCEDURE — 36415 COLL VENOUS BLD VENIPUNCTURE: CPT

## 2024-04-21 PROCEDURE — 6370000000 HC RX 637 (ALT 250 FOR IP)

## 2024-04-21 PROCEDURE — 85025 COMPLETE CBC W/AUTO DIFF WBC: CPT

## 2024-04-21 PROCEDURE — 84145 PROCALCITONIN (PCT): CPT

## 2024-04-21 PROCEDURE — 83880 ASSAY OF NATRIURETIC PEPTIDE: CPT

## 2024-04-21 PROCEDURE — 6360000002 HC RX W HCPCS: Performed by: INTERNAL MEDICINE

## 2024-04-21 PROCEDURE — 2580000003 HC RX 258: Performed by: INTERNAL MEDICINE

## 2024-04-21 PROCEDURE — 6370000000 HC RX 637 (ALT 250 FOR IP): Performed by: INTERNAL MEDICINE

## 2024-04-21 PROCEDURE — 86140 C-REACTIVE PROTEIN: CPT

## 2024-04-21 PROCEDURE — 99232 SBSQ HOSP IP/OBS MODERATE 35: CPT | Performed by: INTERNAL MEDICINE

## 2024-04-21 PROCEDURE — 93005 ELECTROCARDIOGRAM TRACING: CPT

## 2024-04-21 PROCEDURE — 80048 BASIC METABOLIC PNL TOTAL CA: CPT

## 2024-04-21 PROCEDURE — 94660 CPAP INITIATION&MGMT: CPT

## 2024-04-21 PROCEDURE — 84484 ASSAY OF TROPONIN QUANT: CPT

## 2024-04-21 RX ORDER — BUSPIRONE HYDROCHLORIDE 5 MG/1
5 TABLET ORAL DAILY
Status: DISCONTINUED | OUTPATIENT
Start: 2024-04-21 | End: 2024-04-22

## 2024-04-21 RX ADMIN — GUAIFENESIN 400 MG: 400 TABLET ORAL at 14:05

## 2024-04-21 RX ADMIN — IPRATROPIUM BROMIDE AND ALBUTEROL SULFATE 1 DOSE: 2.5; .5 SOLUTION RESPIRATORY (INHALATION) at 14:19

## 2024-04-21 RX ADMIN — WATER 1000 MG: 1 INJECTION INTRAMUSCULAR; INTRAVENOUS; SUBCUTANEOUS at 20:29

## 2024-04-21 RX ADMIN — WATER 40 MG: 1 INJECTION INTRAMUSCULAR; INTRAVENOUS; SUBCUTANEOUS at 05:27

## 2024-04-21 RX ADMIN — IPRATROPIUM BROMIDE AND ALBUTEROL SULFATE 1 DOSE: 2.5; .5 SOLUTION RESPIRATORY (INHALATION) at 19:58

## 2024-04-21 RX ADMIN — PANTOPRAZOLE SODIUM 40 MG: 40 TABLET, DELAYED RELEASE ORAL at 05:27

## 2024-04-21 RX ADMIN — ACETAZOLAMIDE SODIUM 500 MG: 500 INJECTION, POWDER, LYOPHILIZED, FOR SOLUTION INTRAVENOUS at 14:27

## 2024-04-21 RX ADMIN — PRAZOSIN HYDROCHLORIDE 1 MG: 1 CAPSULE ORAL at 22:05

## 2024-04-21 RX ADMIN — BUDESONIDE 500 MCG: 0.5 SUSPENSION RESPIRATORY (INHALATION) at 08:51

## 2024-04-21 RX ADMIN — SODIUM CHLORIDE, PRESERVATIVE FREE 10 ML: 5 INJECTION INTRAVENOUS at 16:37

## 2024-04-21 RX ADMIN — IPRATROPIUM BROMIDE AND ALBUTEROL SULFATE 1 DOSE: 2.5; .5 SOLUTION RESPIRATORY (INHALATION) at 08:51

## 2024-04-21 RX ADMIN — ARFORMOTEROL TARTRATE 15 MCG: 15 SOLUTION RESPIRATORY (INHALATION) at 19:58

## 2024-04-21 RX ADMIN — SODIUM CHLORIDE, PRESERVATIVE FREE 10 ML: 5 INJECTION INTRAVENOUS at 07:42

## 2024-04-21 RX ADMIN — MIRTAZAPINE 15 MG: 15 TABLET, FILM COATED ORAL at 20:29

## 2024-04-21 RX ADMIN — ARFORMOTEROL TARTRATE 15 MCG: 15 SOLUTION RESPIRATORY (INHALATION) at 08:51

## 2024-04-21 RX ADMIN — IPRATROPIUM BROMIDE AND ALBUTEROL SULFATE 1 DOSE: 2.5; .5 SOLUTION RESPIRATORY (INHALATION) at 16:20

## 2024-04-21 RX ADMIN — BUSPIRONE HYDROCHLORIDE 5 MG: 5 TABLET ORAL at 10:28

## 2024-04-21 RX ADMIN — GUAIFENESIN 400 MG: 400 TABLET ORAL at 20:29

## 2024-04-21 RX ADMIN — ARIPIPRAZOLE 5 MG: 5 TABLET ORAL at 08:20

## 2024-04-21 RX ADMIN — FUROSEMIDE 40 MG: 10 INJECTION, SOLUTION INTRAMUSCULAR; INTRAVENOUS at 07:19

## 2024-04-21 RX ADMIN — WATER 40 MG: 1 INJECTION INTRAMUSCULAR; INTRAVENOUS; SUBCUTANEOUS at 09:46

## 2024-04-21 RX ADMIN — WATER 40 MG: 1 INJECTION INTRAMUSCULAR; INTRAVENOUS; SUBCUTANEOUS at 20:31

## 2024-04-21 RX ADMIN — ACETAMINOPHEN 650 MG: 325 TABLET ORAL at 22:07

## 2024-04-21 RX ADMIN — ENOXAPARIN SODIUM 30 MG: 100 INJECTION SUBCUTANEOUS at 22:07

## 2024-04-21 RX ADMIN — GUAIFENESIN 400 MG: 400 TABLET ORAL at 07:28

## 2024-04-21 RX ADMIN — AZITHROMYCIN MONOHYDRATE 500 MG: 500 INJECTION, POWDER, LYOPHILIZED, FOR SOLUTION INTRAVENOUS at 07:23

## 2024-04-21 RX ADMIN — ROFLUMILAST 500 MCG: 500 TABLET ORAL at 08:20

## 2024-04-21 RX ADMIN — ALUMINUM HYDROXIDE, MAGNESIUM HYDROXIDE, AND SIMETHICONE: 1200; 120; 1200 SUSPENSION ORAL at 09:44

## 2024-04-21 RX ADMIN — SODIUM CHLORIDE, PRESERVATIVE FREE 10 ML: 5 INJECTION INTRAVENOUS at 20:33

## 2024-04-21 RX ADMIN — ENOXAPARIN SODIUM 30 MG: 100 INJECTION SUBCUTANEOUS at 07:20

## 2024-04-21 RX ADMIN — WATER 40 MG: 1 INJECTION INTRAMUSCULAR; INTRAVENOUS; SUBCUTANEOUS at 16:37

## 2024-04-21 RX ADMIN — BUDESONIDE 500 MCG: 0.5 SUSPENSION RESPIRATORY (INHALATION) at 19:58

## 2024-04-21 ASSESSMENT — PAIN SCALES - GENERAL
PAINLEVEL_OUTOF10: 0
PAINLEVEL_OUTOF10: 0

## 2024-04-21 NOTE — PROGRESS NOTES
[facility]  Internal Medicine Residency / House Medicine Service    Attending Physician Statement  I have discussed the case, including pertinent history and exam findings with the resident and the team.  I have seen and examined the patient and the key elements of the encounter have been performed by me.  I agree with the assessment, plan and orders as documented by the resident.        A&O  Less dyspnea this AM  Early AM with severe GERD symptoms  EKG negative  Relief with gastric cocktAIL  COPD some improvement   Requesting anxiety med  Plan: Continue same             Buspar 5 mg tid prn    Remainder of medical problems as per resident note.      Jens Fong MD FRCP [facility]  Internal Medicine Residency Faculty

## 2024-04-21 NOTE — PROGRESS NOTES
[facility]  Internal Medicine Residency Program  Progress Note - House Team 1    Patient:  Brenda Nunn 58 y.o. female MRN: [id number]     Date of Service: 4/21/2024     CC:   Chief Complaint   Patient presents with    Shortness of Breath     (Did 4 breathing treatments at home today with no relief) EMS GAVE 125MG SOLUMEDROL, 2 BREATHING TREATMENTS PTA     Overnight events: none      Subjective     Patient seen and examined at bedside. Patient was anxious complaining of mid sternal pain. Ordered stat EKG, troponin and GI cocktail. Patient was then examined an hour later and she was much improved sitting comfortably eating breakfast. EKG showed NSR.     Patient was asking for vistaril she said she has been prescribed this as outpatient. Don't see any records suggesting this ross tart Buspar 5 mg for anxiety.     Objective     Physical Exam:  Vitals: BP (!) 141/97   Pulse 65   Temp 97.8 °F (36.6 °C) (Temporal)   Resp 18   Ht 1.702 m (5' 7\")   Wt 112 kg (247 lb)   SpO2 98%   BMI 38.69 kg/m²     I & O - 24hr: No intake/output data recorded.   General Appearance: alert, appears stated age, and cooperative  HEENT:  Head: Normal, normocephalic, atraumatic.  Neck: supple, symmetrical, trachea midline  Lung: wheezes bilaterally  Heart: regular rate and rhythm and S1, S2 normal  Abdomen: soft, non-tender; bowel sounds normal; no masses,  no organomegaly  Extremities:  extremities normal, atraumatic, no cyanosis or edema  Musculokeletal: No joint swelling, no muscle tenderness. ROM normal in all joints of extremities.   Neurologic: Mental status: Alert, oriented, thought content appropriate     Pertinent Labs & Imaging Studies      Complete Blood Count:   Recent Labs     04/19/24 1940 04/20/24  1029 04/21/24  0251   WBC 7.3 6.2 7.7   HGB 12.8 12.9 13.1   HCT 42.1 41.7 42.3    215 220        Last 3 Blood Glucose:   Recent Labs     04/19/24 1940 04/20/24  1029 04/21/24  0251   GLUCOSE 106*  192* 205*        PT/INR:    Lab Results   Component Value Date/Time    PROTIME 10.2 11/19/2023 04:28 PM    INR 0.9 11/19/2023 04:28 PM     PTT:    Lab Results   Component Value Date/Time    APTT 28.0 11/19/2023 04:28 PM       Comprehensive Metabolic Profile:   Recent Labs     04/19/24  1940 04/20/24  1029 04/21/24  0251    143 133   K 4.0 4.4 4.0    102 97*   CO2 31* 23 25   BUN 16 20 23*   CREATININE 0.9 0.7 0.8   GLUCOSE 106* 192* 205*   CALCIUM 9.3 9.4 9.6      Magnesium:   Lab Results   Component Value Date/Time    MG 1.6 04/19/2024 07:40 PM     Phosphorus:   Lab Results   Component Value Date/Time    PHOS 2.8 03/27/2024 05:02 AM     Ionized Calcium: No results found for: \"CAION\"   Troponin: No results for input(s): \"TROPONINI\" in the last 72 hours.    Resident's Assessment and Plan     Assessment and Plan:  Brenda Nunn is a 58 y.o. female  Initially admitted under hospitalist  group. Worsening SOB. ABG showed hypercapnia. With CO2 52. Patient has been placed on BiPAP and tolerates well.    Consults:   Pulm     Assessment:   Acute on chronic respiratory failure 2/2 COPD, on 3L O2 baseline  BENJAMIN  HFpEF (55-60% in 2020)   GERD   Pre-DM, A1c 5.7 on 11/14/23, not on medications  Insomnia, on Remeron  Tobacco use, 5-6 cigarettes per day   Hx of substance use    Plan:   Continue on bipap, wean oxygen as able  Continue breathing treatments  Continue azithromycin (3) and ceftriaxone (5D)  IV prednisone > transition to oral   Follow pulm recs   Starting buspar for anxiety     PT/OT evaluation: as needed   DVT prophylaxis/ GI prophylaxis: Lovenox   Disposition: continue current care    Cristiane Cristobal MD, PGY-1  Attending physician: Dr. Fong

## 2024-04-21 NOTE — DISCHARGE INSTRUCTIONS
Chronic Obstructive Pulmonary Disease (COPD) Flare-Ups: Care Instructions  Overview     Chronic obstructive pulmonary disease (COPD) is a lung disease that makes it hard to breathe. It is caused by damage to the lungs over many years, usually from smoking.  Chronic bronchitis and emphysema are two lung problems that are types of COPD:  Chronic bronchitis: The airways that carry air to the lungs (bronchial tubes) get inflamed and make a lot of mucus. This can narrow or block the airways. It can also make you cough.  Emphysema: The tiny air sacs (alveoli) at the end of the airways in the lungs are damaged. When the air sacs are damaged or destroyed, the inner walls break down, and the sacs become larger. These larger air sacs move less oxygen into the blood. This causes difficulty breathing or shortness of breath that gets worse over time. After air sacs are destroyed, they cannot be replaced.  Many people with COPD have attacks called flare-ups or exacerbations. This is when your usual symptoms quickly get worse and stay worse.  If you notice any problems or new symptoms, get medical treatment right away.  Follow-up care is a key part of your treatment and safety. Be sure to make and go to all appointments, and call your doctor if you are having problems. It's also a good idea to know your test results and keep a list of the medicines you take.  How can you care for yourself at home?  Be safe with medicines. Take your medicines exactly as prescribed. Call your doctor if you think you are having a problem with your medicine. You may be taking medicines such as:  Bronchodilators. These help open your airways and make breathing easier.  Corticosteroids. These reduce airway inflammation. They may be given as pills, in a vein, or in an inhaled form. You may go home with pills in addition to an inhaler that you already use.  A spacer may help you get more inhaled medicine to your lungs. Ask your doctor or pharmacist

## 2024-04-22 LAB
ANION GAP SERPL CALCULATED.3IONS-SCNC: 12 MMOL/L (ref 7–16)
BASOPHILS # BLD: 0.01 K/UL (ref 0–0.2)
BASOPHILS NFR BLD: 0 % (ref 0–2)
BUN SERPL-MCNC: 24 MG/DL (ref 6–20)
CALCIUM SERPL-MCNC: 10.1 MG/DL (ref 8.6–10.2)
CHLORIDE SERPL-SCNC: 103 MMOL/L (ref 98–107)
CO2 SERPL-SCNC: 24 MMOL/L (ref 22–29)
CREAT SERPL-MCNC: 0.8 MG/DL (ref 0.5–1)
EOSINOPHIL # BLD: 0 K/UL (ref 0.05–0.5)
EOSINOPHILS RELATIVE PERCENT: 0 % (ref 0–6)
ERYTHROCYTE [DISTWIDTH] IN BLOOD BY AUTOMATED COUNT: 12.4 % (ref 11.5–15)
GFR SERPL CREATININE-BSD FRML MDRD: >90 ML/MIN/1.73M2
GLUCOSE SERPL-MCNC: 171 MG/DL (ref 74–99)
HCT VFR BLD AUTO: 42.1 % (ref 34–48)
HGB BLD-MCNC: 13.2 G/DL (ref 11.5–15.5)
IMM GRANULOCYTES # BLD AUTO: 0.05 K/UL (ref 0–0.58)
IMM GRANULOCYTES NFR BLD: 1 % (ref 0–5)
LYMPHOCYTES NFR BLD: 0.7 K/UL (ref 1.5–4)
LYMPHOCYTES RELATIVE PERCENT: 8 % (ref 20–42)
MCH RBC QN AUTO: 27.8 PG (ref 26–35)
MCHC RBC AUTO-ENTMCNC: 31.4 G/DL (ref 32–34.5)
MCV RBC AUTO: 88.8 FL (ref 80–99.9)
MONOCYTES NFR BLD: 0.39 K/UL (ref 0.1–0.95)
MONOCYTES NFR BLD: 4 % (ref 2–12)
NEUTROPHILS NFR BLD: 88 % (ref 43–80)
NEUTS SEG NFR BLD: 8.1 K/UL (ref 1.8–7.3)
PLATELET # BLD AUTO: 233 K/UL (ref 130–450)
PMV BLD AUTO: 10.3 FL (ref 7–12)
POTASSIUM SERPL-SCNC: 4.2 MMOL/L (ref 3.5–5)
RBC # BLD AUTO: 4.74 M/UL (ref 3.5–5.5)
SODIUM SERPL-SCNC: 139 MMOL/L (ref 132–146)
WBC OTHER # BLD: 9.3 K/UL (ref 4.5–11.5)

## 2024-04-22 PROCEDURE — 6370000000 HC RX 637 (ALT 250 FOR IP): Performed by: HOSPITALIST

## 2024-04-22 PROCEDURE — 6370000000 HC RX 637 (ALT 250 FOR IP): Performed by: STUDENT IN AN ORGANIZED HEALTH CARE EDUCATION/TRAINING PROGRAM

## 2024-04-22 PROCEDURE — 2580000003 HC RX 258: Performed by: HOSPITALIST

## 2024-04-22 PROCEDURE — 2700000000 HC OXYGEN THERAPY PER DAY

## 2024-04-22 PROCEDURE — 6360000002 HC RX W HCPCS: Performed by: HOSPITALIST

## 2024-04-22 PROCEDURE — 94660 CPAP INITIATION&MGMT: CPT

## 2024-04-22 PROCEDURE — 6370000000 HC RX 637 (ALT 250 FOR IP)

## 2024-04-22 PROCEDURE — 6370000000 HC RX 637 (ALT 250 FOR IP): Performed by: INTERNAL MEDICINE

## 2024-04-22 PROCEDURE — 36415 COLL VENOUS BLD VENIPUNCTURE: CPT

## 2024-04-22 PROCEDURE — 94640 AIRWAY INHALATION TREATMENT: CPT

## 2024-04-22 PROCEDURE — 80048 BASIC METABOLIC PNL TOTAL CA: CPT

## 2024-04-22 PROCEDURE — 85025 COMPLETE CBC W/AUTO DIFF WBC: CPT

## 2024-04-22 PROCEDURE — 2060000000 HC ICU INTERMEDIATE R&B

## 2024-04-22 PROCEDURE — 99232 SBSQ HOSP IP/OBS MODERATE 35: CPT | Performed by: INTERNAL MEDICINE

## 2024-04-22 PROCEDURE — 6360000002 HC RX W HCPCS: Performed by: INTERNAL MEDICINE

## 2024-04-22 PROCEDURE — 99231 SBSQ HOSP IP/OBS SF/LOW 25: CPT | Performed by: INTERNAL MEDICINE

## 2024-04-22 RX ORDER — CEFDINIR 300 MG/1
300 CAPSULE ORAL EVERY 12 HOURS SCHEDULED
Status: DISCONTINUED | OUTPATIENT
Start: 2024-04-22 | End: 2024-04-23 | Stop reason: HOSPADM

## 2024-04-22 RX ORDER — FUROSEMIDE 40 MG/1
40 TABLET ORAL 2 TIMES DAILY
Status: DISCONTINUED | OUTPATIENT
Start: 2024-04-22 | End: 2024-04-23 | Stop reason: HOSPADM

## 2024-04-22 RX ORDER — BUSPIRONE HYDROCHLORIDE 5 MG/1
5 TABLET ORAL 2 TIMES DAILY
Status: DISCONTINUED | OUTPATIENT
Start: 2024-04-22 | End: 2024-04-23 | Stop reason: HOSPADM

## 2024-04-22 RX ORDER — IPRATROPIUM BROMIDE AND ALBUTEROL SULFATE 2.5; .5 MG/3ML; MG/3ML
1 SOLUTION RESPIRATORY (INHALATION) EVERY 4 HOURS PRN
Status: DISCONTINUED | OUTPATIENT
Start: 2024-04-22 | End: 2024-04-23 | Stop reason: HOSPADM

## 2024-04-22 RX ADMIN — ARIPIPRAZOLE 5 MG: 5 TABLET ORAL at 10:16

## 2024-04-22 RX ADMIN — PREDNISONE 40 MG: 20 TABLET ORAL at 10:17

## 2024-04-22 RX ADMIN — SODIUM CHLORIDE, PRESERVATIVE FREE 10 ML: 5 INJECTION INTRAVENOUS at 20:50

## 2024-04-22 RX ADMIN — GUAIFENESIN 400 MG: 400 TABLET ORAL at 14:40

## 2024-04-22 RX ADMIN — ARFORMOTEROL TARTRATE 15 MCG: 15 SOLUTION RESPIRATORY (INHALATION) at 07:56

## 2024-04-22 RX ADMIN — MIRTAZAPINE 15 MG: 15 TABLET, FILM COATED ORAL at 20:49

## 2024-04-22 RX ADMIN — AZITHROMYCIN MONOHYDRATE 500 MG: 500 INJECTION, POWDER, LYOPHILIZED, FOR SOLUTION INTRAVENOUS at 10:36

## 2024-04-22 RX ADMIN — ENOXAPARIN SODIUM 30 MG: 100 INJECTION SUBCUTANEOUS at 20:49

## 2024-04-22 RX ADMIN — ROFLUMILAST 500 MCG: 500 TABLET ORAL at 10:16

## 2024-04-22 RX ADMIN — ARFORMOTEROL TARTRATE 15 MCG: 15 SOLUTION RESPIRATORY (INHALATION) at 19:48

## 2024-04-22 RX ADMIN — FUROSEMIDE 40 MG: 10 INJECTION, SOLUTION INTRAMUSCULAR; INTRAVENOUS at 10:16

## 2024-04-22 RX ADMIN — CEFDINIR 300 MG: 300 CAPSULE ORAL at 13:15

## 2024-04-22 RX ADMIN — BUSPIRONE HYDROCHLORIDE 5 MG: 5 TABLET ORAL at 10:16

## 2024-04-22 RX ADMIN — BUSPIRONE HYDROCHLORIDE 5 MG: 5 TABLET ORAL at 20:48

## 2024-04-22 RX ADMIN — PRAZOSIN HYDROCHLORIDE 1 MG: 1 CAPSULE ORAL at 20:48

## 2024-04-22 RX ADMIN — PANTOPRAZOLE SODIUM 40 MG: 40 TABLET, DELAYED RELEASE ORAL at 05:19

## 2024-04-22 RX ADMIN — IPRATROPIUM BROMIDE AND ALBUTEROL SULFATE 1 DOSE: 2.5; .5 SOLUTION RESPIRATORY (INHALATION) at 15:55

## 2024-04-22 RX ADMIN — BUDESONIDE 500 MCG: 0.5 SUSPENSION RESPIRATORY (INHALATION) at 07:56

## 2024-04-22 RX ADMIN — GUAIFENESIN 400 MG: 400 TABLET ORAL at 20:49

## 2024-04-22 RX ADMIN — FUROSEMIDE 40 MG: 40 TABLET ORAL at 17:49

## 2024-04-22 RX ADMIN — GUAIFENESIN 400 MG: 400 TABLET ORAL at 10:16

## 2024-04-22 RX ADMIN — BUDESONIDE 500 MCG: 0.5 SUSPENSION RESPIRATORY (INHALATION) at 19:48

## 2024-04-22 RX ADMIN — ENOXAPARIN SODIUM 30 MG: 100 INJECTION SUBCUTANEOUS at 10:17

## 2024-04-22 RX ADMIN — ALUMINUM HYDROXIDE, MAGNESIUM HYDROXIDE, AND SIMETHICONE: 1200; 120; 1200 SUSPENSION ORAL at 12:00

## 2024-04-22 RX ADMIN — SODIUM CHLORIDE, PRESERVATIVE FREE 10 ML: 5 INJECTION INTRAVENOUS at 10:16

## 2024-04-22 RX ADMIN — IPRATROPIUM BROMIDE AND ALBUTEROL SULFATE 1 DOSE: 2.5; .5 SOLUTION RESPIRATORY (INHALATION) at 07:56

## 2024-04-22 RX ADMIN — CEFDINIR 300 MG: 300 CAPSULE ORAL at 20:49

## 2024-04-22 ASSESSMENT — PAIN SCALES - GENERAL
PAINLEVEL_OUTOF10: 0

## 2024-04-22 NOTE — PROGRESS NOTES
[facility] -- [facility], [facility]    Department of Internal Medicine  Division of Pulmonary, Critical Care Medicine  Consult Progress Note    Dr. Vo, Dr. Maria, Dr. Kim, Lia Grimes    Patient: Brenda Nunn  MRN: [id number]  : 1965    Encounter Time: 8:04 AM     Date of Admission: 2024  7:18 PM    Primary Care Physician: Genesis Enriquez MD    Reason for Consultation: Respiratory failure     HISTORY OF PRESENT ILLNESS : Brenda Nunn 58 y.o. female was seen in consultation regarding the above chief compliant.    Patient with history of COPD, HFpEF presenting with worsening shortness of breath.  She states that she has been feeling worsening shortness of breath over the past few days.  Denies any cough, fever, chills or chest pain.   She has obstructive sleep apnea for which she uses CPAP at home and feels relieved with CPAP use.  Continues to smoke 5 to 10 cigarettes daily.  Reports weight gain.    Workup here with CT PE study negative for PE, evidence of pulmonary emphysema and chronic bronchitis.  Respiratory viral panel negative.    =============================================    Brenda Nunn was seen and examined on 24    Late entry note:    Off Bipap, diuresed 3L yesterday, reports significant improvement in shortness of breath.  Continuing lasix, repeating diamox.  Continuing copd rxs.    =============================================      PAST MEDICAL HISTORY:  has a past medical history of CHF (congestive heart failure) (HCC), COPD (chronic obstructive pulmonary disease) (HCC), Depression, and Hypertension.    SURGICAL HISTORY:  has a past surgical history that includes  section and Hysterectomy ().     SOCIAL HISTORY:  reports that she has been smoking cigarettes. She started smoking about 46 years ago. She has a 46.3 pack-year smoking history. She has never used smokeless tobacco.  pulmonary emphysema. Upper Abdomen: There is a 3 cm left adrenal nodule with HU of -0.5, consistent with lipid rich adenoma.  No routine follow-up imaging is recommended. Soft Tissues/Bones: No acute bone or soft tissue abnormality.     1. No acute pulmonary embolus identified within limitations 2. Suggestion of pulmonary emphysema and chronic bronchitis 3. Bibasilar densities, worse on the right, consistent with atelectasis and possible scar 4. Minimal pleural effusions RECOMMENDATIONS: Follow-up screening lung CT in 1 year     Vascular duplex lower extremity venous right    Result Date: 4/19/2024  EXAMINATION: DUPLEX VENOUS ULTRASOUND OF THE RIGHT LOWER EXTREMITY 4/19/2024 10:22 pm TECHNIQUE: Duplex ultrasound using B-mode/gray scaled imaging and Doppler spectral analysis and color flow was obtained of the deep venous structures of the right extremity. COMPARISON: None. HISTORY: ORDERING SYSTEM PROVIDED HISTORY: right leg swelling FINDINGS: The visualized veins of the right lower extremity are patent and free of echogenic thrombus. The veins demonstrate good compressibility with normal color flow study and spectral analysis.     No evidence of DVT in the right lower extremity.     XR CHEST PORTABLE    Result Date: 4/19/2024  EXAMINATION: ONE XRAY VIEW OF THE CHEST 4/19/2024 8:17 pm COMPARISON: March 26, 2024 HISTORY: ORDERING SYSTEM PROVIDED HISTORY: SOB CMP the exacerbation concern for pneumonia TECHNOLOGIST PROVIDED HISTORY: Reason for exam:->SOB CMP the exacerbation concern for pneumonia FINDINGS: No airspace opacity or pleural effusion. The heart is normal size. No pneumothorax. No free air beneath the hemidiaphragms.     No pneumonia or pleural effusion.     XR CHEST PORTABLE    Result Date: 3/26/2024  EXAMINATION: ONE XRAY VIEW OF THE CHEST 3/26/2024 4:43 pm COMPARISON: 03/09/2024 HISTORY: ORDERING SYSTEM PROVIDED HISTORY: short of breath, cp TECHNOLOGIST PROVIDED HISTORY: Reason for exam:->short of breath,

## 2024-04-22 NOTE — ACP (ADVANCE CARE PLANNING)
Advance Care Planning   Healthcare Decision Maker:    Primary Decision Maker: BRAIN SCHREIBER - Axel - [phone]    Click here to complete Healthcare Decision Makers including selection of the Healthcare Decision Maker Relationship (ie \"Primary\").

## 2024-04-22 NOTE — PROGRESS NOTES
[facility]  Internal Medicine Residency Program  Progress Note - House Team     Patient:  Brenda Nunn 58 y.o. female MRN: [id number]     Date of Service: 4/22/2024     CC: SOB  Overnight events: no acute events overnight     Subjective     Patient was seen and examined this morning at bedside in no acute distress. Overnight no acute events. Patient complaining of anxiety this morning, states the buspar helped yesterday. She feels her breathing has improved and that she is near her baseline    Objective     Physical Exam:  Vitals: /80   Pulse 94   Temp 98.2 °F (36.8 °C) (Temporal)   Resp 22   Ht 1.702 m (5' 7\")   Wt 112 kg (247 lb)   SpO2 92%   BMI 38.69 kg/m²     I & O - 24hr: No intake/output data recorded.   General Appearance: alert and cooperative  HEENT:  Head: Normocephalic, no lesions, without obvious abnormality.  Neck: no JVD  Lung: wheezes anterior - bilateral. Normal respiratory effort.   Heart: regular rate and rhythm, S1, S2 normal, no murmur, click, rub or gallop  Abdomen: soft, non-tender; bowel sounds normal; no masses,  no organomegaly  Extremities:  extremities normal, atraumatic, no cyanosis or edema  Musculokeletal: No joint swelling, no muscle tenderness. ROM normal in all joints of extremities.   Neurologic: Mental status: Alert, oriented, thought content appropriate    Pertinent Labs & Imaging Studies     CBC with Differential:    Lab Results   Component Value Date/Time    WBC 9.3 04/22/2024 04:57 AM    RBC 4.74 04/22/2024 04:57 AM    HGB 13.2 04/22/2024 04:57 AM    HCT 42.1 04/22/2024 04:57 AM     04/22/2024 04:57 AM    MCV 88.8 04/22/2024 04:57 AM    MCH 27.8 04/22/2024 04:57 AM    MCHC 31.4 04/22/2024 04:57 AM    RDW 12.4 04/22/2024 04:57 AM    LYMPHOPCT 8 04/22/2024 04:57 AM    MONOPCT 4 04/22/2024 04:57 AM    BASOPCT 0 04/22/2024 04:57 AM    MONOSABS 0.39 04/22/2024 04:57 AM    LYMPHSABS 0.70 04/22/2024 04:57 AM    EOSABS 0.00 04/22/2024 04:57 AM

## 2024-04-22 NOTE — PROGRESS NOTES
[facility]  Internal Medicine Residency / House Medicine Service    Attending Physician Statement  I have discussed the case, including pertinent history and exam findings with the resident and the team.  I have seen and examined the patient and the key elements of the encounter have been performed by me.  I agree with the assessment, plan and orders as documented by the resident.      A&O  Mild anxiety on Buspar  VS stable  Mostly non productive cough  On aerosols and Steroids  And azithromycin and Rocephin  Viral studies negative  Diamox and Daliresp   Plan;Discharge planning    Remainder of medical problems as per resident note.      Jens Fong MD FRCP [facility]  Internal Medicine Residency Faculty

## 2024-04-22 NOTE — CARE COORDINATION
Transition of Care: Met with pt at bedside to discuss transition of care. Pt lives with her daughter in a 2 story home with 5 jya. Bed and abth on 1st floor. She has a wc and rollator. O2 @ 3L NC from [facility]. Indepndent with ADL's. Active with [facility], will need resume of care orders placed at discharge. Pt readmitted with COPD exacerbation. Baseline O2. IV Zithromax and Rocephin continue. IV Lasix changed to PO Prednisone today. CM to follow(TF)      BLAIR Rizo,RN  Case Management  [phone]          Case Management Assessment  Initial Evaluation    Date/Time of Evaluation: 4/22/2024 12:30 PM  Assessment Completed by: TYLER NGUYỄN RN    If patient is discharged prior to next notation, then this note serves as note for discharge by case management.    Patient Name: Brenda Nunn                   YOB: 1965  Diagnosis: COPD exacerbation (HCC) [J44.1]  COPD with exacerbation (HCC) [J44.1]  Acute respiratory failure with hypoxia (HCC) [J96.01]                   Date / Time: 4/19/2024  7:18 PM    Patient Admission Status: Inpatient   Readmission Risk (Low < 19, Mod (19-27), High > 27): Readmission Risk Score: 32.7    Current PCP: Genesis Enriquez MD  PCP verified by ? Yes    Chart Reviewed: Yes      History Provided by: Patient  Patient Orientation: Alert and Oriented    Patient Cognition: Alert    Hospitalization in the last 30 days (Readmission):  No    If yes, Readmission Assessment in  Navigator will be completed.    Advance Directives:      Code Status: Full Code   Patient's Primary Decision Maker is: Legal Next of Kin    Primary Decision Maker: BRAIN SCHREIBER - Child - [phone]    Discharge Planning:    Patient lives with: Children Type of Home: House  Primary Care Giver: Self  Patient Support Systems include: Children   Current Financial resources:    Current community resources:    Current services prior to admission: C-pap            Current DME:              Type of  home with no needs once medically stable    The Plan for Transition of Care is related to the following treatment goals of COPD exacerbation (HCC) [J44.1]  COPD with exacerbation (HCC) [J44.1]  Acute respiratory failure with hypoxia (HCC) [J96.01]    IF APPLICABLE: The Patient and/or patient representative Brenda and her family were provided with a choice of provider and agrees with the discharge plan. Freedom of choice list with basic dialogue that supports the patient's individualized plan of care/goals and shares the quality data associated with the providers was provided to:     Patient Representative Name:       The Patient and/or Patient Representative Agree with the Discharge Plan?      TYLER NGUYỄN RN  Case Management Department  Ph: [phone]

## 2024-04-22 NOTE — PROGRESS NOTES
Patient educated on her need to ambulate in room and sit up in chair. With great encouragement, patient sat up in chair in room for 30 minutes. No SOB or respiratory distress noted.  Tolerated well

## 2024-04-22 NOTE — PROGRESS NOTES
[facility] -- [facility], [facility]    Department of Internal Medicine  Division of Pulmonary, Critical Care Medicine  Consult Progress Note    Dr. Vo, Dr. Maria, Dr. Kim, Lia Grimes    Patient: Brenda Nunn  MRN: [id number]  : 1965    Encounter Time: 4:46 PM     Date of Admission: 2024  7:18 PM    Primary Care Physician: Genesis Enriquez MD    Reason for Consultation: Respiratory failure     HISTORY OF PRESENT ILLNESS : Brenda Nunn 58 y.o. female was seen in consultation regarding the above chief compliant.    Patient with history of COPD, HFpEF presenting with worsening shortness of breath.  She states that she has been feeling worsening shortness of breath over the past few days.  Denies any cough, fever, chills or chest pain.   She has obstructive sleep apnea for which she uses CPAP at home and feels relieved with CPAP use.  Continues to smoke 5 to 10 cigarettes daily.  Reports weight gain.    Workup here with CT PE study negative for PE, evidence of pulmonary emphysema and chronic bronchitis.  Respiratory viral panel negative.    =============================================    Brenda Nunn was seen and examined on 24    Late entry note:    Off Bipap, diuresed 3L yesterday, reports significant improvement in shortness of breath.  Continuing lasix, repeating diamox.  Continuing copd rxs.    =============================================      PAST MEDICAL HISTORY:  has a past medical history of CHF (congestive heart failure) (HCC), COPD (chronic obstructive pulmonary disease) (HCC), Depression, and Hypertension.    SURGICAL HISTORY:  has a past surgical history that includes  section and Hysterectomy ().     SOCIAL HISTORY:  reports that she has been smoking cigarettes. She started smoking about 46 years ago. She has a 46.3 pack-year smoking history. She has never used smokeless tobacco.  tablet 4 mg, 4 mg, Oral, Q8H PRN **OR** ondansetron (ZOFRAN) injection 4 mg, 4 mg, IntraVENous, Q6H PRN, Frankie Vazquez MD    polyethylene glycol (GLYCOLAX) packet 17 g, 17 g, Oral, Daily PRN, Frankie Vazquez MD    enoxaparin Sodium (LOVENOX) injection 30 mg, 30 mg, SubCUTAneous, BID, Frankie Vazquez MD, 30 mg at 04/22/24 1017    acetaminophen (TYLENOL) tablet 650 mg, 650 mg, Oral, Q6H PRN, 650 mg at 04/21/24 2207 **OR** acetaminophen (TYLENOL) suppository 650 mg, 650 mg, Rectal, Q6H PRN, Frankie Vazquez MD    [COMPLETED] methylPREDNISolone sodium succ (SOLU-MEDROL) 40 mg in sterile water 1 mL injection, 40 mg, IntraVENous, Q6H, 40 mg at 04/21/24 2031 **FOLLOWED BY** predniSONE (DELTASONE) tablet 40 mg, 40 mg, Oral, Daily, Frankie Vazquez MD, 40 mg at 04/22/24 1017    budesonide (PULMICORT) nebulizer suspension 500 mcg, 0.5 mg, Nebulization, BID RT, Frankie Vazquez MD, 500 mcg at 04/22/24 0756    arformoterol tartrate (BROVANA) nebulizer solution 15 mcg, 15 mcg, Nebulization, BID RT, Frankie Vazquez MD, 15 mcg at 04/22/24 0756    nicotine (NICODERM CQ) 14 MG/24HR 1 patch, 1 patch, TransDERmal, Daily, Yessica Ramirez MD, 1 patch at 04/22/24 1017    Roflumilast (DALIRESP) tablet 500 mcg, 500 mcg, Oral, Daily, Corey Kim MD, 500 mcg at 04/22/24 1016    guaiFENesin tablet 400 mg, 400 mg, Oral, TID, Corey Kim MD, 400 mg at 04/22/24 1440    DATA: IMAGING & TESTING:     LABORATORY TESTS:    CBC:   Lab Results   Component Value Date    WBC 9.3 04/22/2024    HGB 13.2 04/22/2024    HCT 42.1 04/22/2024    MCV 88.8 04/22/2024     04/22/2024     BMP: [unfilled]  LFTs:   Lab Results   Component Value Date    ALT 12 03/26/2024    AST 12 03/26/2024    ALKPHOS 74 03/26/2024    BILITOT 0.2 03/26/2024    PROT 6.9 03/26/2024    ALBUMIN 4.1 03/26/2024     Coags:   Lab Results   Component Value Date    INR 0.9 11/19/2023     Micro: [unfilled]    PRO-BNP:   Lab Results   Component Value Date    PROBNP 74 04/21/2024    PROBNP 72

## 2024-04-23 ENCOUNTER — TELEPHONE (OUTPATIENT)
Dept: INTERNAL MEDICINE | Age: 59
End: 2024-04-23

## 2024-04-23 VITALS
SYSTOLIC BLOOD PRESSURE: 150 MMHG | HEIGHT: 67 IN | WEIGHT: 247 LBS | RESPIRATION RATE: 24 BRPM | TEMPERATURE: 98.2 F | HEART RATE: 103 BPM | OXYGEN SATURATION: 95 % | DIASTOLIC BLOOD PRESSURE: 97 MMHG | BODY MASS INDEX: 38.77 KG/M2

## 2024-04-23 LAB
ANION GAP SERPL CALCULATED.3IONS-SCNC: 18 MMOL/L (ref 7–16)
BASOPHILS # BLD: 0.02 K/UL (ref 0–0.2)
BASOPHILS NFR BLD: 0 % (ref 0–2)
BUN SERPL-MCNC: 31 MG/DL (ref 6–20)
CALCIUM SERPL-MCNC: 10.3 MG/DL (ref 8.6–10.2)
CHLORIDE SERPL-SCNC: 101 MMOL/L (ref 98–107)
CO2 SERPL-SCNC: 24 MMOL/L (ref 22–29)
CREAT SERPL-MCNC: 0.9 MG/DL (ref 0.5–1)
EKG ATRIAL RATE: 83 BPM
EKG P AXIS: 65 DEGREES
EKG P-R INTERVAL: 194 MS
EKG Q-T INTERVAL: 374 MS
EKG QRS DURATION: 94 MS
EKG QTC CALCULATION (BAZETT): 439 MS
EKG R AXIS: 55 DEGREES
EKG T AXIS: 78 DEGREES
EKG VENTRICULAR RATE: 83 BPM
EOSINOPHIL # BLD: 0.01 K/UL (ref 0.05–0.5)
EOSINOPHILS RELATIVE PERCENT: 0 % (ref 0–6)
ERYTHROCYTE [DISTWIDTH] IN BLOOD BY AUTOMATED COUNT: 12.6 % (ref 11.5–15)
GFR SERPL CREATININE-BSD FRML MDRD: 71 ML/MIN/1.73M2
GLUCOSE SERPL-MCNC: 100 MG/DL (ref 74–99)
HCT VFR BLD AUTO: 43.7 % (ref 34–48)
HGB BLD-MCNC: 13.8 G/DL (ref 11.5–15.5)
IMM GRANULOCYTES # BLD AUTO: 0.05 K/UL (ref 0–0.58)
IMM GRANULOCYTES NFR BLD: 1 % (ref 0–5)
LYMPHOCYTES NFR BLD: 2.56 K/UL (ref 1.5–4)
LYMPHOCYTES RELATIVE PERCENT: 26 % (ref 20–42)
MCH RBC QN AUTO: 27.9 PG (ref 26–35)
MCHC RBC AUTO-ENTMCNC: 31.6 G/DL (ref 32–34.5)
MCV RBC AUTO: 88.5 FL (ref 80–99.9)
MONOCYTES NFR BLD: 1.01 K/UL (ref 0.1–0.95)
MONOCYTES NFR BLD: 10 % (ref 2–12)
NEUTROPHILS NFR BLD: 63 % (ref 43–80)
NEUTS SEG NFR BLD: 6.1 K/UL (ref 1.8–7.3)
PLATELET # BLD AUTO: 219 K/UL (ref 130–450)
PMV BLD AUTO: 10.3 FL (ref 7–12)
POTASSIUM SERPL-SCNC: 3.7 MMOL/L (ref 3.5–5)
RBC # BLD AUTO: 4.94 M/UL (ref 3.5–5.5)
SODIUM SERPL-SCNC: 143 MMOL/L (ref 132–146)
WBC OTHER # BLD: 9.8 K/UL (ref 4.5–11.5)

## 2024-04-23 PROCEDURE — 6360000002 HC RX W HCPCS: Performed by: HOSPITALIST

## 2024-04-23 PROCEDURE — 6370000000 HC RX 637 (ALT 250 FOR IP): Performed by: INTERNAL MEDICINE

## 2024-04-23 PROCEDURE — 94640 AIRWAY INHALATION TREATMENT: CPT

## 2024-04-23 PROCEDURE — 6370000000 HC RX 637 (ALT 250 FOR IP)

## 2024-04-23 PROCEDURE — 97161 PT EVAL LOW COMPLEX 20 MIN: CPT

## 2024-04-23 PROCEDURE — 94660 CPAP INITIATION&MGMT: CPT

## 2024-04-23 PROCEDURE — 6370000000 HC RX 637 (ALT 250 FOR IP): Performed by: STUDENT IN AN ORGANIZED HEALTH CARE EDUCATION/TRAINING PROGRAM

## 2024-04-23 PROCEDURE — 85025 COMPLETE CBC W/AUTO DIFF WBC: CPT

## 2024-04-23 PROCEDURE — 6370000000 HC RX 637 (ALT 250 FOR IP): Performed by: HOSPITALIST

## 2024-04-23 PROCEDURE — 2580000003 HC RX 258: Performed by: HOSPITALIST

## 2024-04-23 PROCEDURE — 80048 BASIC METABOLIC PNL TOTAL CA: CPT

## 2024-04-23 PROCEDURE — 97530 THERAPEUTIC ACTIVITIES: CPT

## 2024-04-23 PROCEDURE — 36415 COLL VENOUS BLD VENIPUNCTURE: CPT

## 2024-04-23 PROCEDURE — 99238 HOSP IP/OBS DSCHRG MGMT 30/<: CPT | Performed by: INTERNAL MEDICINE

## 2024-04-23 PROCEDURE — 2700000000 HC OXYGEN THERAPY PER DAY

## 2024-04-23 RX ORDER — ARIPIPRAZOLE 5 MG/1
5 TABLET ORAL DAILY
Qty: 30 TABLET | Refills: 0 | Status: ON HOLD | OUTPATIENT
Start: 2024-04-23 | End: 2024-05-05 | Stop reason: HOSPADM

## 2024-04-23 RX ORDER — PREDNISONE 20 MG/1
TABLET ORAL
Qty: 14 TABLET | Refills: 0 | Status: ON HOLD | OUTPATIENT
Start: 2024-04-24 | End: 2024-05-05

## 2024-04-23 RX ORDER — CEFDINIR 300 MG/1
300 CAPSULE ORAL EVERY 12 HOURS SCHEDULED
Qty: 1 CAPSULE | Refills: 0 | Status: SHIPPED | OUTPATIENT
Start: 2024-04-23 | End: 2024-04-24

## 2024-04-23 RX ORDER — NICOTINE 21 MG/24HR
1 PATCH, TRANSDERMAL 24 HOURS TRANSDERMAL DAILY
Qty: 60 PATCH | Refills: 1 | Status: SHIPPED | OUTPATIENT
Start: 2024-04-24

## 2024-04-23 RX ADMIN — ARIPIPRAZOLE 5 MG: 5 TABLET ORAL at 09:53

## 2024-04-23 RX ADMIN — IPRATROPIUM BROMIDE AND ALBUTEROL SULFATE 1 DOSE: 2.5; .5 SOLUTION RESPIRATORY (INHALATION) at 16:41

## 2024-04-23 RX ADMIN — PANTOPRAZOLE SODIUM 40 MG: 40 TABLET, DELAYED RELEASE ORAL at 06:19

## 2024-04-23 RX ADMIN — GUAIFENESIN 400 MG: 400 TABLET ORAL at 15:15

## 2024-04-23 RX ADMIN — BUSPIRONE HYDROCHLORIDE 5 MG: 5 TABLET ORAL at 09:53

## 2024-04-23 RX ADMIN — FUROSEMIDE 40 MG: 40 TABLET ORAL at 09:53

## 2024-04-23 RX ADMIN — PREDNISONE 40 MG: 20 TABLET ORAL at 09:53

## 2024-04-23 RX ADMIN — ROFLUMILAST 500 MCG: 500 TABLET ORAL at 09:53

## 2024-04-23 RX ADMIN — GUAIFENESIN 400 MG: 400 TABLET ORAL at 09:53

## 2024-04-23 RX ADMIN — CEFDINIR 300 MG: 300 CAPSULE ORAL at 09:52

## 2024-04-23 RX ADMIN — ENOXAPARIN SODIUM 30 MG: 100 INJECTION SUBCUTANEOUS at 09:53

## 2024-04-23 RX ADMIN — SODIUM CHLORIDE, PRESERVATIVE FREE 10 ML: 5 INJECTION INTRAVENOUS at 09:54

## 2024-04-23 RX ADMIN — BUDESONIDE 500 MCG: 0.5 SUSPENSION RESPIRATORY (INHALATION) at 07:59

## 2024-04-23 RX ADMIN — ARFORMOTEROL TARTRATE 15 MCG: 15 SOLUTION RESPIRATORY (INHALATION) at 07:59

## 2024-04-23 ASSESSMENT — PAIN SCALES - GENERAL
PAINLEVEL_OUTOF10: 0

## 2024-04-23 NOTE — DISCHARGE SUMMARY
[facility]  Discharge Summary    PCP: Genesis Enriquez MD    Admit Date:4/19/2024  Discharge Date: 4/23/2024      Admission Diagnosis:   Acute on chronic hypoxic hypercapnic respiratory failure  Acute decompensated heart failure with preserved ejection fraction  COPD exacerbation  BENJAMIN on CPAP/possible OHV  GERD  Pre-DM, A1C 5.7 on 11/14/23  Morbid obesity  History of tobacco abuse      Discharge Diagnosis:  Acute on chronic hypoxic hypercapnic respiratory failure, stable  Acute decompensated heart failure with preserved ejection fraction, stable  COPD exacerbation, resolved  BENJAMIN on CPAP/possible OHV, stable  GERD, stable  Pre-DM, A1C 5.7 on 11/14/23, stable  Morbid obesity, stable  History of tobacco abuse      Hospital Course:     Patient is a 58-year-old female with past medical history of class II obesity, COPD, chronic respiratory failure with hypoxia on home oxygen, bipolar 1 disorder presented to the ED for evaluation of worsening shortness of breath.  Initial ABG showed hypercapnia with CO2 52, patient was placed on BiPAP and tolerated well.  On initial evaluation patient was oriented denied fever, chills, chest pain blood pressure, nausea vomiting or abdominal pain.  [facility] was later transferred to house team for further evaluation and management.    Initial chest imaging showed bibasilar densities worse on the right side which was consistent with atelectasis and possibly scar and there were also minimal pleural effusions.  Patient was kept on noninvasive ventilation and steroid.  Pulmonary consultation was done, will was started on ceftriaxone/azithromycin, steroids and kept on breathing treatments along with incentive spirometry and flutter valve for working diagnosis of COPD exacerbation.  Later IV prednisone was transitioned to oral.  Patient was kept on BuSpar for anxiety.    NIV was gradually  mg for 5 more days, then 10 mg for 5 more days and then stop  Even if you are feeling better and not having symptoms do not stop taking antibiotic earlier then prescribed.  Please contact us if you have any concerns, wish to change or make an appointment:  Internal medicine clinic   Phone: [phone]  Fax: [phone] [address] [facility] [address]  Or please call the nurse line at [phone].  Should you have further questions in regards to this visit, you can review your clinical note and after visit summary document on your [employer] account.  Other questions can be directed to our nurse line at [phone].     Other than any new prescriptions given to you today, the list of home medications on this After Visit Summary are based on information provided to us from you and your healthcare providers. This information, including the list, dose, and frequency of medications is only as accurate as the information you provided. If you have any questions or concerns about your home medications, please contact your Primary Care Physician for further clarification.      Diomedes Velazquez MD  PGY 1   1:41 PM 4/23/2024

## 2024-04-23 NOTE — PROGRESS NOTES
Physical Therapy  Physical Therapy Initial Assessment     Name: Brenda Nunn  : 1965  MRN: [id number]      Date of Service: 2024    Evaluating PT:  Bette Mishra PT, DPT [id number]    Room #:  4508/4508-B  Diagnosis:  COPD exacerbation (HCC) [J44.1]  COPD with exacerbation (HCC) [J44.1]  Acute respiratory failure with hypoxia (HCC) [J96.01]  PMHx/PSHx:   has a past medical history of CHF (congestive heart failure) ([facility]), COPD (chronic obstructive pulmonary disease) ([facility]), Depression, and Hypertension.  Precautions:  Fall risk, O2, continuous O2 monitor  Equipment Needs:  TBD    SUBJECTIVE:    Pt lives with her daughter in a 2 story home with 5 steps to enter and 1 rail(s). Pt's bed and bath are on the second floor, but she has been sleeping and utilizing  a bedside commode on the first floor for >months due to her breathing difficulty. There are 15 steps with unilateral rail to second floor.  Pt ambulated with a rollator for household distances PTA. She uses a WC for community distances. Pt is on 3L/min O2 via NC at baseline.    OBJECTIVE:   Initial Evaluation  Date: 24 Treatment Short Term/ Long Term   Goals   AM-PAC 6 Clicks      Was pt agreeable to Eval/treatment? Yes     Does pt have pain? No c/o pain     Bed Mobility  Rolling: NT  Supine to sit: SBA  Sit to supine: NT  Scooting: SBA  Rolling: Independent  Supine to sit: Independent   Sit to supine: Independent  Scooting: Independent    Transfers Sit to stand: SBA  Stand to sit: SBA  Stand pivot: SBA with FWW  Sit to stand: Independent  Stand to sit: Independent  Stand pivot: Modified Independent with AAD   Ambulation    20' x4 repetitions with FWW and SBA  >50 feet with AAD and Modified Independent    Stair negotiation: ascended and descended  NT  5 steps with unilateral HR and Modified Independent    ROM BUE:  Refer to OT  BLE:  WFL     Strength BUE:  Refer to OT  BLE:  Not formally assessed     Balance Sitting EOB:  Supervision  Dynamic

## 2024-04-23 NOTE — PROGRESS NOTES
CLINICAL PHARMACY NOTE: MEDS TO BEDS    Total # of Prescriptions Filled: 3   The following medications were delivered to the patient:  Aripiprazole 5  Cefdinir 300  Prednisone 20    Additional Documentation:  Delivered to pt

## 2024-04-23 NOTE — PROGRESS NOTES
[facility]  Internal Medicine Residency / House Medicine Service    Attending Physician Statement  I have discussed the case, including pertinent history and exam findings with the resident and the team.  I have seen and examined the patient and the key elements of the encounter have been performed by me.  I agree with the assessment, plan and orders as documented by the resident.      COPD stable and improved  Psych meds reviewed and to contiue   Oral steroids to continue , slow taper   Plan; Discharge planning    Remainder of medical problems as per resident note.      Jens Fong MD FRCP [facility]  Internal Medicine Residency Faculty

## 2024-04-23 NOTE — PROGRESS NOTES
Date: 4/22/2024    Time: 9:31 PM    Patient Placed On BIPAP/CPAP/ Non-Invasive Ventilation?  Yes    If no must comment.  Facial area red/color change? No           If YES are Blister/Lesion present?No   If yes must notify nursing staff  BIPAP/CPAP skin barrier?  Yes    Skin barrier type:mepilexlite       Comments:        Isidra العلي RCP

## 2024-04-23 NOTE — TELEPHONE ENCOUNTER
----- Message from Sosa Michael sent at 4/23/2024  3:03 PM EDT -----  Subject: Message to Provider    QUESTIONS  Information for Provider? Pk from [facility] is calling to   confirm if  would follow the pt for home health care.  ---------------------------------------------------------------------------  --------------  CALL BACK INFO  [phone]; OK to leave message on voicemail  ---------------------------------------------------------------------------  --------------  SCRIPT ANSWERS  Relationship to Patient? Covered Entity  Covered Entity Type? Home Health Care?  Representative Name? pk

## 2024-04-23 NOTE — CARE COORDINATION
CM Update: Met with patient at bedside to discuss transition of care plan. Discharge plan is Home with [facility] and [facility] for O2. 3 liters Nasal Cannula baseline. [facility] Order noted. Family will transport at discharge. Patient came to hospital with SOB. Found to have COPD exacerbation. Rocephin and Zithromax completed today. Resident said they plan to discharge today. CM/EVY to follow. MT

## 2024-04-23 NOTE — PLAN OF CARE
No acute events overnight. Tolerated CPAP well. Pt was able to use BSC independently. Will continue with plan of care.    Problem: Discharge Planning  Goal: Discharge to home or other facility with appropriate resources  Outcome: Progressing     Problem: Safety - Adult  Goal: Free from fall injury  Outcome: Progressing     Problem: Chronic Conditions and Co-morbidities  Goal: Patient's chronic conditions and co-morbidity symptoms are monitored and maintained or improved  Outcome: Progressing

## 2024-04-24 ENCOUNTER — CARE COORDINATION (OUTPATIENT)
Dept: CARE COORDINATION | Age: 59
End: 2024-04-24

## 2024-04-24 NOTE — CARE COORDINATION
Care Transitions Initial Follow Up Call    Call within 2 business days of discharge: Yes    -Phone answered by patient's daughter Farhana (on communication release of information form) for initial care transition call post hospital discharge.  CTN noted children's voices in the background.  Farhana states she has 3 children, one age 4 and the other two under the age of 2.  Farhana unable to talk as they are getting ready to leave the house.  CTN requested return call when Farhana is able.  Patient's daughter is agreeable, CTN's contact information showing on her phone.  -Noted in EMR patient has PCP TCM/HFU scheduled for 24.   -CTN phoned [facility] and received VM, CTN left message inquiring regarding RUBENS, requested return call with CTN contact information provided.    -CTN received return call from Nidhi with [facility], RUBENS planned for tomorrow.         Patient: Brenda Nunn Patient : 1965   MRN: [id number]  Reason for Admission: COPD exacerbation   Discharge Date: 24 RARS: Readmission Risk Score: 32.5      Last Discharge Facility       Date Complaint Diagnosis Description Type Department Provider    24 Shortness of Breath COPD exacerbation (HCC) ... ED to Hosp-Admission (Discharged) (ADMITTED) [facility] Jens Fong MD; CATRACHITO Montilla..                Follow Up  Future Appointments   Date Time Provider Department Center   2024  2:00 PM Genesis Enriquez MD [facility]   2024  1:00 PM Rhoda Duke, APRN - CNP [facility]   6/3/2024  3:00 PM Pedro Bedolla MD [facility]   2024  2:30 PM Paulie Chavez MD [facility]         Sherlyn Rolle RN

## 2024-04-25 ENCOUNTER — CARE COORDINATION (OUTPATIENT)
Dept: CARE COORDINATION | Age: 59
End: 2024-04-25

## 2024-04-25 LAB — MYCOPLASMA AB,IGM: PRESENT

## 2024-04-25 NOTE — CARE COORDINATION
Care Transitions Initial Follow Up Call    Call within 2 business days of discharge: Yes    -Second attempt to speak with the patient's daughter Farhana for initial Care Transition call post hospital discharge. HIPAA compliant message left with CTN's contact information requesting return phone call.        Patient: Brenda Nunn Patient : 1965   MRN: [id number]  Reason for Admission: COPD exacerbation   Discharge Date: 24 RARS: Readmission Risk Score: 32.5      Last Discharge Facility       Date Complaint Diagnosis Description Type Department Provider    24 Shortness of Breath COPD exacerbation (HCC) ... ED to Hosp-Admission (Discharged) (ADMITTED) [facility] 4S P[facility] Jens Fong MD; ARRON Montilla.                Follow Up  Future Appointments   Date Time Provider Department Center   2024  2:00 PM Genesis Enriquez MD [facility] IM [facility]   2024  1:00 PM Rhoda Duke, APRN - CNP [facility] Wom[facility]   6/3/2024  3:00 PM Pedro Bedolla MD [facility] Pulm [facility]   2024  2:30 PM Paulie Chavze MD [facility]         Sherlyn Rolle RN

## 2024-05-03 ENCOUNTER — HOSPITAL ENCOUNTER (OUTPATIENT)
Age: 59
Setting detail: OBSERVATION
Discharge: HOME | End: 2024-05-05
Attending: STUDENT IN AN ORGANIZED HEALTH CARE EDUCATION/TRAINING PROGRAM | Admitting: INTERNAL MEDICINE
Payer: MEDICARE

## 2024-05-03 ENCOUNTER — APPOINTMENT (OUTPATIENT)
Dept: GENERAL RADIOLOGY | Age: 59
End: 2024-05-03
Payer: MEDICARE

## 2024-05-03 DIAGNOSIS — F31.9 BIPOLAR 1 DISORDER (HCC): ICD-10-CM

## 2024-05-03 DIAGNOSIS — J44.1 COPD EXACERBATION (HCC): Primary | ICD-10-CM

## 2024-05-03 DIAGNOSIS — E83.42 HYPOMAGNESEMIA: ICD-10-CM

## 2024-05-03 DIAGNOSIS — J44.9 CHRONIC OBSTRUCTIVE PULMONARY DISEASE, UNSPECIFIED COPD TYPE (HCC): ICD-10-CM

## 2024-05-03 DIAGNOSIS — R06.02 SHORTNESS OF BREATH: ICD-10-CM

## 2024-05-03 DIAGNOSIS — G47.00 INSOMNIA, UNSPECIFIED TYPE: ICD-10-CM

## 2024-05-03 LAB
ALBUMIN SERPL-MCNC: 4.3 G/DL (ref 3.5–5.2)
ALP SERPL-CCNC: 80 U/L (ref 35–104)
ALT SERPL-CCNC: 17 U/L (ref 0–32)
ANION GAP SERPL CALCULATED.3IONS-SCNC: 11 MMOL/L (ref 7–16)
AST SERPL-CCNC: 12 U/L (ref 0–31)
B.E.: 0.6 MMOL/L (ref -3–3)
BASOPHILS # BLD: 0.06 K/UL (ref 0–0.2)
BASOPHILS NFR BLD: 1 % (ref 0–2)
BILIRUB SERPL-MCNC: 0.2 MG/DL (ref 0–1.2)
BNP SERPL-MCNC: <36 PG/ML (ref 0–125)
BUN SERPL-MCNC: 21 MG/DL (ref 6–20)
CALCIUM SERPL-MCNC: 9.5 MG/DL (ref 8.6–10.2)
CHLORIDE SERPL-SCNC: 99 MMOL/L (ref 98–107)
CO2 SERPL-SCNC: 29 MMOL/L (ref 22–29)
COHB: 1.8 % (ref 0–1.5)
CREAT SERPL-MCNC: 0.9 MG/DL (ref 0.5–1)
CRITICAL: ABNORMAL
DATE ANALYZED: ABNORMAL
DATE OF COLLECTION: ABNORMAL
EOSINOPHIL # BLD: 0.06 K/UL (ref 0.05–0.5)
EOSINOPHILS RELATIVE PERCENT: 1 % (ref 0–6)
ERYTHROCYTE [DISTWIDTH] IN BLOOD BY AUTOMATED COUNT: 13.1 % (ref 11.5–15)
FLUAV RNA RESP QL NAA+PROBE: NOT DETECTED
FLUBV RNA RESP QL NAA+PROBE: NOT DETECTED
GFR, ESTIMATED: 78 ML/MIN/1.73M2
GLUCOSE SERPL-MCNC: 122 MG/DL (ref 74–99)
HCO3: 26.9 MMOL/L (ref 22–26)
HCT VFR BLD AUTO: 40.5 % (ref 34–48)
HGB BLD-MCNC: 12.7 G/DL (ref 11.5–15.5)
HHB: 9.3 % (ref 0–5)
IMM GRANULOCYTES # BLD AUTO: 0.15 K/UL (ref 0–0.58)
IMM GRANULOCYTES NFR BLD: 1 % (ref 0–5)
LAB: ABNORMAL
LYMPHOCYTES NFR BLD: 1.08 K/UL (ref 1.5–4)
LYMPHOCYTES RELATIVE PERCENT: 8 % (ref 20–42)
Lab: 2235
MAGNESIUM SERPL-MCNC: 1.5 MG/DL (ref 1.6–2.6)
MCH RBC QN AUTO: 28.3 PG (ref 26–35)
MCHC RBC AUTO-ENTMCNC: 31.4 G/DL (ref 32–34.5)
MCV RBC AUTO: 90.2 FL (ref 80–99.9)
METHB: 0.4 % (ref 0–1.5)
MODE: ABNORMAL
MONOCYTES NFR BLD: 0.45 K/UL (ref 0.1–0.95)
MONOCYTES NFR BLD: 3 % (ref 2–12)
NEUTROPHILS NFR BLD: 86 % (ref 43–80)
NEUTS SEG NFR BLD: 11.31 K/UL (ref 1.8–7.3)
O2 SATURATION: 90.5 % (ref 92–98.5)
O2HB: 88.5 % (ref 94–97)
OPERATOR ID: ABNORMAL
PATIENT TEMP: 37 C
PCO2: 49.3 MMHG (ref 35–45)
PH BLOOD GAS: 7.35 (ref 7.35–7.45)
PLATELET # BLD AUTO: 220 K/UL (ref 130–450)
PMV BLD AUTO: 10 FL (ref 7–12)
PO2: 57.8 MMHG (ref 75–100)
POTASSIUM SERPL-SCNC: 4.5 MMOL/L (ref 3.5–5)
PROT SERPL-MCNC: 7.1 G/DL (ref 6.4–8.3)
RBC # BLD AUTO: 4.49 M/UL (ref 3.5–5.5)
SARS-COV-2 RNA RESP QL NAA+PROBE: NOT DETECTED
SODIUM SERPL-SCNC: 139 MMOL/L (ref 132–146)
SOURCE, BLOOD GAS: ABNORMAL
SOURCE: NORMAL
SPECIMEN DESCRIPTION: NORMAL
THB: 14.1 G/DL (ref 11.5–16.5)
TIME ANALYZED: 2242
TROPONIN I SERPL HS-MCNC: 8 NG/L (ref 0–9)
TROPONIN I SERPL HS-MCNC: 9 NG/L (ref 0–9)
WBC OTHER # BLD: 13.1 K/UL (ref 4.5–11.5)

## 2024-05-03 PROCEDURE — 96375 TX/PRO/DX INJ NEW DRUG ADDON: CPT

## 2024-05-03 PROCEDURE — 85025 COMPLETE CBC W/AUTO DIFF WBC: CPT

## 2024-05-03 PROCEDURE — 96365 THER/PROPH/DIAG IV INF INIT: CPT

## 2024-05-03 PROCEDURE — 99285 EMERGENCY DEPT VISIT HI MDM: CPT

## 2024-05-03 PROCEDURE — 6360000002 HC RX W HCPCS

## 2024-05-03 PROCEDURE — 83880 ASSAY OF NATRIURETIC PEPTIDE: CPT

## 2024-05-03 PROCEDURE — 94660 CPAP INITIATION&MGMT: CPT

## 2024-05-03 PROCEDURE — 2580000003 HC RX 258

## 2024-05-03 PROCEDURE — 71045 X-RAY EXAM CHEST 1 VIEW: CPT

## 2024-05-03 PROCEDURE — 80053 COMPREHEN METABOLIC PANEL: CPT

## 2024-05-03 PROCEDURE — 84484 ASSAY OF TROPONIN QUANT: CPT

## 2024-05-03 PROCEDURE — 6360000002 HC RX W HCPCS: Performed by: STUDENT IN AN ORGANIZED HEALTH CARE EDUCATION/TRAINING PROGRAM

## 2024-05-03 PROCEDURE — 94640 AIRWAY INHALATION TREATMENT: CPT

## 2024-05-03 PROCEDURE — 93005 ELECTROCARDIOGRAM TRACING: CPT

## 2024-05-03 PROCEDURE — 83735 ASSAY OF MAGNESIUM: CPT

## 2024-05-03 PROCEDURE — 87636 SARSCOV2 & INF A&B AMP PRB: CPT

## 2024-05-03 PROCEDURE — 6370000000 HC RX 637 (ALT 250 FOR IP)

## 2024-05-03 PROCEDURE — G0378 HOSPITAL OBSERVATION PER HR: HCPCS

## 2024-05-03 PROCEDURE — 82805 BLOOD GASES W/O2 SATURATION: CPT

## 2024-05-03 PROCEDURE — 84145 PROCALCITONIN (PCT): CPT

## 2024-05-03 RX ORDER — IPRATROPIUM BROMIDE AND ALBUTEROL SULFATE 2.5; .5 MG/3ML; MG/3ML
3 SOLUTION RESPIRATORY (INHALATION) ONCE
Status: COMPLETED | OUTPATIENT
Start: 2024-05-03 | End: 2024-05-03

## 2024-05-03 RX ORDER — MAGNESIUM SULFATE IN WATER 40 MG/ML
2000 INJECTION, SOLUTION INTRAVENOUS ONCE
Status: COMPLETED | OUTPATIENT
Start: 2024-05-03 | End: 2024-05-03

## 2024-05-03 RX ADMIN — IPRATROPIUM BROMIDE AND ALBUTEROL SULFATE 3 DOSE: 2.5; .5 SOLUTION RESPIRATORY (INHALATION) at 21:09

## 2024-05-03 RX ADMIN — IPRATROPIUM BROMIDE AND ALBUTEROL SULFATE 3 DOSE: 2.5; .5 SOLUTION RESPIRATORY (INHALATION) at 15:45

## 2024-05-03 RX ADMIN — MAGNESIUM SULFATE HEPTAHYDRATE 2000 MG: 40 INJECTION, SOLUTION INTRAVENOUS at 16:43

## 2024-05-03 RX ADMIN — METHYLPREDNISOLONE SODIUM SUCCINATE 125 MG: 125 INJECTION, POWDER, LYOPHILIZED, FOR SOLUTION INTRAMUSCULAR; INTRAVENOUS at 15:30

## 2024-05-03 ASSESSMENT — LIFESTYLE VARIABLES
HOW OFTEN DO YOU HAVE A DRINK CONTAINING ALCOHOL: NEVER
HOW MANY STANDARD DRINKS CONTAINING ALCOHOL DO YOU HAVE ON A TYPICAL DAY: PATIENT DOES NOT DRINK
HOW OFTEN DO YOU HAVE A DRINK CONTAINING ALCOHOL: NEVER
HOW MANY STANDARD DRINKS CONTAINING ALCOHOL DO YOU HAVE ON A TYPICAL DAY: PATIENT DOES NOT DRINK

## 2024-05-03 ASSESSMENT — PAIN - FUNCTIONAL ASSESSMENT: PAIN_FUNCTIONAL_ASSESSMENT: NONE - DENIES PAIN

## 2024-05-03 NOTE — ED PROVIDER NOTES
COURSE    Disposition Considerations (Tests not ordered but considered, Shared Decision Making, Pt Expectation of Test or Tx.): Spoke with patient regarding plans for testing and treatment.  She is understanding and agreeable to plan.    Diagnoses considered include (but not limited to): COVID versus flu versus pneumonia versus COPD exacerbation versus CHF versus ACS, etc.    See ED COURSE for additional MDM. Labs & imaging reviewed and interpreted, see RESULTS above.     Re-Evaluations:           See ED Course above.       This patient's ED course included: a personal history and physicial examination, multiple bedside re-evaluations, IV medications, cardiac monitoring, and continuous pulse oximetry    This patient has remained hemodynamically stable during their ED course.      Consultations:  See ED Course    Counseling:   The emergency physician has spoken with the patient and discussed today’s results, in addition to providing specific details for the plan of care and counseling regarding the diagnosis and prognosis.  Questions are answered at this time and they are agreeable with the plan.       --------------------------------- IMPRESSION AND DISPOSITION ---------------------------------    IMPRESSION  1. COPD exacerbation (HCC)    2. Shortness of breath    3. Hypomagnesemia        DISPOSITION  Disposition: Admit to med/surg floor  Patient condition is stable    NOTE: This report was transcribed using voice recognition software. Every effort was made to ensure accuracy; however, inadvertent computerized transcription errors may be present.    Also please note that patient was seen and examined by attending physician. Plan of care and disposition discussed with attending physician and they were immediately available or present for all procedures performed.       -- Kiana Hargrove D.O. PGY-2     Emergency Medicine      5/4/2024 2:48 AM

## 2024-05-04 LAB
ANION GAP SERPL CALCULATED.3IONS-SCNC: 14 MMOL/L (ref 7–16)
B PARAP IS1001 DNA NPH QL NAA+NON-PROBE: NOT DETECTED
B PERT DNA SPEC QL NAA+PROBE: NOT DETECTED
BASOPHILS # BLD: 0.02 K/UL (ref 0–0.2)
BASOPHILS NFR BLD: 0 % (ref 0–2)
BUN SERPL-MCNC: 20 MG/DL (ref 6–20)
C PNEUM DNA NPH QL NAA+NON-PROBE: NOT DETECTED
CALCIUM SERPL-MCNC: 9.3 MG/DL (ref 8.6–10.2)
CHLORIDE SERPL-SCNC: 98 MMOL/L (ref 98–107)
CO2 SERPL-SCNC: 25 MMOL/L (ref 22–29)
CREAT SERPL-MCNC: 0.6 MG/DL (ref 0.5–1)
EOSINOPHIL # BLD: 0 K/UL (ref 0.05–0.5)
EOSINOPHILS RELATIVE PERCENT: 0 % (ref 0–6)
ERYTHROCYTE [DISTWIDTH] IN BLOOD BY AUTOMATED COUNT: 12.9 % (ref 11.5–15)
FLUAV RNA NPH QL NAA+NON-PROBE: NOT DETECTED
FLUBV RNA NPH QL NAA+NON-PROBE: NOT DETECTED
GFR, ESTIMATED: >90 ML/MIN/1.73M2
GLUCOSE BLD-MCNC: 176 MG/DL (ref 74–99)
GLUCOSE BLD-MCNC: 179 MG/DL (ref 74–99)
GLUCOSE SERPL-MCNC: 169 MG/DL (ref 74–99)
HADV DNA NPH QL NAA+NON-PROBE: NOT DETECTED
HCOV 229E RNA NPH QL NAA+NON-PROBE: NOT DETECTED
HCOV HKU1 RNA NPH QL NAA+NON-PROBE: NOT DETECTED
HCOV NL63 RNA NPH QL NAA+NON-PROBE: NOT DETECTED
HCOV OC43 RNA NPH QL NAA+NON-PROBE: NOT DETECTED
HCT VFR BLD AUTO: 39.8 % (ref 34–48)
HGB BLD-MCNC: 12.3 G/DL (ref 11.5–15.5)
HMPV RNA NPH QL NAA+NON-PROBE: NOT DETECTED
HPIV1 RNA NPH QL NAA+NON-PROBE: NOT DETECTED
HPIV2 RNA NPH QL NAA+NON-PROBE: NOT DETECTED
HPIV3 RNA NPH QL NAA+NON-PROBE: NOT DETECTED
HPIV4 RNA NPH QL NAA+NON-PROBE: NOT DETECTED
IMM GRANULOCYTES # BLD AUTO: 0.15 K/UL (ref 0–0.58)
IMM GRANULOCYTES NFR BLD: 1 % (ref 0–5)
LYMPHOCYTES NFR BLD: 0.66 K/UL (ref 1.5–4)
LYMPHOCYTES RELATIVE PERCENT: 5 % (ref 20–42)
M PNEUMO DNA NPH QL NAA+NON-PROBE: NOT DETECTED
MAGNESIUM SERPL-MCNC: 1.8 MG/DL (ref 1.6–2.6)
MCH RBC QN AUTO: 27.6 PG (ref 26–35)
MCHC RBC AUTO-ENTMCNC: 30.9 G/DL (ref 32–34.5)
MCV RBC AUTO: 89.4 FL (ref 80–99.9)
MONOCYTES NFR BLD: 0.27 K/UL (ref 0.1–0.95)
MONOCYTES NFR BLD: 2 % (ref 2–12)
NEUTROPHILS NFR BLD: 91 % (ref 43–80)
NEUTS SEG NFR BLD: 11.92 K/UL (ref 1.8–7.3)
PHOSPHATE SERPL-MCNC: 3.4 MG/DL (ref 2.5–4.5)
PLATELET # BLD AUTO: 212 K/UL (ref 130–450)
PMV BLD AUTO: 10.6 FL (ref 7–12)
POTASSIUM SERPL-SCNC: 4.3 MMOL/L (ref 3.5–5)
PROCALCITONIN SERPL-MCNC: 0.05 NG/ML (ref 0–0.08)
RBC # BLD AUTO: 4.45 M/UL (ref 3.5–5.5)
RSV RNA NPH QL NAA+NON-PROBE: NOT DETECTED
RV+EV RNA NPH QL NAA+NON-PROBE: NOT DETECTED
SARS-COV-2 RNA NPH QL NAA+NON-PROBE: NOT DETECTED
SODIUM SERPL-SCNC: 137 MMOL/L (ref 132–146)
SPECIMEN DESCRIPTION: NORMAL
WBC OTHER # BLD: 13 K/UL (ref 4.5–11.5)

## 2024-05-04 PROCEDURE — 6360000002 HC RX W HCPCS: Performed by: INTERNAL MEDICINE

## 2024-05-04 PROCEDURE — 80048 BASIC METABOLIC PNL TOTAL CA: CPT

## 2024-05-04 PROCEDURE — 6370000000 HC RX 637 (ALT 250 FOR IP)

## 2024-05-04 PROCEDURE — 96376 TX/PRO/DX INJ SAME DRUG ADON: CPT

## 2024-05-04 PROCEDURE — G0378 HOSPITAL OBSERVATION PER HR: HCPCS

## 2024-05-04 PROCEDURE — 6360000002 HC RX W HCPCS

## 2024-05-04 PROCEDURE — 2580000003 HC RX 258

## 2024-05-04 PROCEDURE — 85025 COMPLETE CBC W/AUTO DIFF WBC: CPT

## 2024-05-04 PROCEDURE — 83735 ASSAY OF MAGNESIUM: CPT

## 2024-05-04 PROCEDURE — 36415 COLL VENOUS BLD VENIPUNCTURE: CPT

## 2024-05-04 PROCEDURE — 99222 1ST HOSP IP/OBS MODERATE 55: CPT | Performed by: INTERNAL MEDICINE

## 2024-05-04 PROCEDURE — 84100 ASSAY OF PHOSPHORUS: CPT

## 2024-05-04 PROCEDURE — 0202U NFCT DS 22 TRGT SARS-COV-2: CPT

## 2024-05-04 PROCEDURE — 96366 THER/PROPH/DIAG IV INF ADDON: CPT

## 2024-05-04 PROCEDURE — 96372 THER/PROPH/DIAG INJ SC/IM: CPT

## 2024-05-04 PROCEDURE — 94660 CPAP INITIATION&MGMT: CPT

## 2024-05-04 PROCEDURE — 82962 GLUCOSE BLOOD TEST: CPT

## 2024-05-04 PROCEDURE — 94640 AIRWAY INHALATION TREATMENT: CPT

## 2024-05-04 RX ORDER — CHOLECALCIFEROL (VITAMIN D3) 50 MCG
2000 TABLET ORAL DAILY
Status: DISCONTINUED | OUTPATIENT
Start: 2024-05-04 | End: 2024-05-05 | Stop reason: HOSPADM

## 2024-05-04 RX ORDER — SODIUM CHLORIDE 9 MG/ML
INJECTION, SOLUTION INTRAVENOUS PRN
Status: DISCONTINUED | OUTPATIENT
Start: 2024-05-04 | End: 2024-05-05 | Stop reason: HOSPADM

## 2024-05-04 RX ORDER — ENOXAPARIN SODIUM 100 MG/ML
30 INJECTION SUBCUTANEOUS 2 TIMES DAILY
Status: DISCONTINUED | OUTPATIENT
Start: 2024-05-04 | End: 2024-05-05 | Stop reason: HOSPADM

## 2024-05-04 RX ORDER — SODIUM CHLORIDE 0.9 % (FLUSH) 0.9 %
5-40 SYRINGE (ML) INJECTION PRN
Status: DISCONTINUED | OUTPATIENT
Start: 2024-05-04 | End: 2024-05-05 | Stop reason: HOSPADM

## 2024-05-04 RX ORDER — ACETAMINOPHEN 650 MG/1
650 SUPPOSITORY RECTAL EVERY 6 HOURS PRN
Status: DISCONTINUED | OUTPATIENT
Start: 2024-05-04 | End: 2024-05-05 | Stop reason: HOSPADM

## 2024-05-04 RX ORDER — PRAZOSIN HYDROCHLORIDE 1 MG/1
1 CAPSULE ORAL NIGHTLY
Status: DISCONTINUED | OUTPATIENT
Start: 2024-05-04 | End: 2024-05-05 | Stop reason: HOSPADM

## 2024-05-04 RX ORDER — ARFORMOTEROL TARTRATE 15 UG/2ML
15 SOLUTION RESPIRATORY (INHALATION)
Status: DISCONTINUED | OUTPATIENT
Start: 2024-05-04 | End: 2024-05-05 | Stop reason: HOSPADM

## 2024-05-04 RX ORDER — HYDROXYZINE PAMOATE 25 MG/1
25 CAPSULE ORAL ONCE
Status: COMPLETED | OUTPATIENT
Start: 2024-05-04 | End: 2024-05-04

## 2024-05-04 RX ORDER — POLYETHYLENE GLYCOL 3350 17 G/17G
17 POWDER, FOR SOLUTION ORAL DAILY PRN
Status: DISCONTINUED | OUTPATIENT
Start: 2024-05-04 | End: 2024-05-05 | Stop reason: HOSPADM

## 2024-05-04 RX ORDER — AZITHROMYCIN 250 MG/1
250 TABLET, FILM COATED ORAL DAILY
Status: DISCONTINUED | OUTPATIENT
Start: 2024-05-04 | End: 2024-05-05 | Stop reason: HOSPADM

## 2024-05-04 RX ORDER — SODIUM CHLORIDE 0.9 % (FLUSH) 0.9 %
5-40 SYRINGE (ML) INJECTION EVERY 12 HOURS SCHEDULED
Status: DISCONTINUED | OUTPATIENT
Start: 2024-05-04 | End: 2024-05-05 | Stop reason: HOSPADM

## 2024-05-04 RX ORDER — MIRTAZAPINE 15 MG/1
15 TABLET, FILM COATED ORAL NIGHTLY
Status: DISCONTINUED | OUTPATIENT
Start: 2024-05-04 | End: 2024-05-05 | Stop reason: HOSPADM

## 2024-05-04 RX ORDER — PANTOPRAZOLE SODIUM 40 MG/1
40 TABLET, DELAYED RELEASE ORAL
Status: DISCONTINUED | OUTPATIENT
Start: 2024-05-04 | End: 2024-05-05 | Stop reason: HOSPADM

## 2024-05-04 RX ORDER — ARIPIPRAZOLE 5 MG/1
5 TABLET ORAL DAILY
Status: DISCONTINUED | OUTPATIENT
Start: 2024-05-04 | End: 2024-05-05 | Stop reason: HOSPADM

## 2024-05-04 RX ORDER — ACETAMINOPHEN 325 MG/1
650 TABLET ORAL EVERY 6 HOURS PRN
Status: DISCONTINUED | OUTPATIENT
Start: 2024-05-04 | End: 2024-05-05 | Stop reason: HOSPADM

## 2024-05-04 RX ORDER — ALBUTEROL SULFATE 2.5 MG/3ML
2.5 SOLUTION RESPIRATORY (INHALATION) EVERY 6 HOURS PRN
Status: DISCONTINUED | OUTPATIENT
Start: 2024-05-04 | End: 2024-05-05 | Stop reason: HOSPADM

## 2024-05-04 RX ORDER — NICOTINE 21 MG/24HR
1 PATCH, TRANSDERMAL 24 HOURS TRANSDERMAL DAILY
Status: DISCONTINUED | OUTPATIENT
Start: 2024-05-04 | End: 2024-05-05 | Stop reason: HOSPADM

## 2024-05-04 RX ORDER — GUAIFENESIN 400 MG/1
400 TABLET ORAL 3 TIMES DAILY
Status: DISCONTINUED | OUTPATIENT
Start: 2024-05-04 | End: 2024-05-05 | Stop reason: HOSPADM

## 2024-05-04 RX ORDER — BUDESONIDE 0.5 MG/2ML
0.5 INHALANT ORAL
Status: DISCONTINUED | OUTPATIENT
Start: 2024-05-04 | End: 2024-05-05 | Stop reason: HOSPADM

## 2024-05-04 RX ORDER — ROFLUMILAST 500 UG/1
250 TABLET ORAL DAILY
Status: DISCONTINUED | OUTPATIENT
Start: 2024-05-04 | End: 2024-05-05 | Stop reason: HOSPADM

## 2024-05-04 RX ORDER — GLUCAGON 1 MG/ML
1 KIT INJECTION PRN
Status: DISCONTINUED | OUTPATIENT
Start: 2024-05-04 | End: 2024-05-05 | Stop reason: HOSPADM

## 2024-05-04 RX ORDER — VARENICLINE TARTRATE 0.5 MG/1
0.5 TABLET, FILM COATED ORAL 2 TIMES DAILY
Status: DISCONTINUED | OUTPATIENT
Start: 2024-05-04 | End: 2024-05-05 | Stop reason: HOSPADM

## 2024-05-04 RX ORDER — MAGNESIUM SULFATE IN WATER 40 MG/ML
2000 INJECTION, SOLUTION INTRAVENOUS ONCE
Status: COMPLETED | OUTPATIENT
Start: 2024-05-04 | End: 2024-05-04

## 2024-05-04 RX ORDER — ONDANSETRON 2 MG/ML
4 INJECTION INTRAMUSCULAR; INTRAVENOUS EVERY 6 HOURS PRN
Status: DISCONTINUED | OUTPATIENT
Start: 2024-05-04 | End: 2024-05-05 | Stop reason: HOSPADM

## 2024-05-04 RX ORDER — MECOBALAMIN 5000 MCG
5 TABLET,DISINTEGRATING ORAL NIGHTLY
Status: DISCONTINUED | OUTPATIENT
Start: 2024-05-04 | End: 2024-05-05 | Stop reason: HOSPADM

## 2024-05-04 RX ORDER — ALBUTEROL SULFATE 90 UG/1
2 AEROSOL, METERED RESPIRATORY (INHALATION) EVERY 6 HOURS PRN
Status: DISCONTINUED | OUTPATIENT
Start: 2024-05-04 | End: 2024-05-04 | Stop reason: CLARIF

## 2024-05-04 RX ORDER — DEXTROSE MONOHYDRATE 100 MG/ML
INJECTION, SOLUTION INTRAVENOUS CONTINUOUS PRN
Status: DISCONTINUED | OUTPATIENT
Start: 2024-05-04 | End: 2024-05-05 | Stop reason: HOSPADM

## 2024-05-04 RX ORDER — ONDANSETRON 4 MG/1
4 TABLET, ORALLY DISINTEGRATING ORAL EVERY 8 HOURS PRN
Status: DISCONTINUED | OUTPATIENT
Start: 2024-05-04 | End: 2024-05-05 | Stop reason: HOSPADM

## 2024-05-04 RX ADMIN — SODIUM CHLORIDE, PRESERVATIVE FREE 10 ML: 5 INJECTION INTRAVENOUS at 20:54

## 2024-05-04 RX ADMIN — Medication 5 MG: at 02:16

## 2024-05-04 RX ADMIN — MAGNESIUM SULFATE HEPTAHYDRATE 2000 MG: 40 INJECTION, SOLUTION INTRAVENOUS at 13:29

## 2024-05-04 RX ADMIN — HYDROXYZINE PAMOATE 25 MG: 25 CAPSULE ORAL at 18:22

## 2024-05-04 RX ADMIN — PRAZOSIN HYDROCHLORIDE 1 MG: 1 CAPSULE ORAL at 02:33

## 2024-05-04 RX ADMIN — MIRTAZAPINE 15 MG: 15 TABLET, FILM COATED ORAL at 02:16

## 2024-05-04 RX ADMIN — GUAIFENESIN 400 MG: 400 TABLET ORAL at 09:36

## 2024-05-04 RX ADMIN — BUDESONIDE INHALATION 500 MCG: 0.5 SUSPENSION RESPIRATORY (INHALATION) at 19:24

## 2024-05-04 RX ADMIN — AZITHROMYCIN DIHYDRATE 250 MG: 250 TABLET, FILM COATED ORAL at 09:36

## 2024-05-04 RX ADMIN — ARFORMOTEROL TARTRATE 15 MCG: 15 SOLUTION RESPIRATORY (INHALATION) at 19:24

## 2024-05-04 RX ADMIN — ROFLUMILAST 250 MCG: 500 TABLET ORAL at 09:45

## 2024-05-04 RX ADMIN — BUDESONIDE INHALATION 500 MCG: 0.5 SUSPENSION RESPIRATORY (INHALATION) at 07:26

## 2024-05-04 RX ADMIN — GUAIFENESIN 400 MG: 400 TABLET ORAL at 16:01

## 2024-05-04 RX ADMIN — PANTOPRAZOLE SODIUM 40 MG: 40 TABLET, DELAYED RELEASE ORAL at 06:17

## 2024-05-04 RX ADMIN — ENOXAPARIN SODIUM 30 MG: 100 INJECTION SUBCUTANEOUS at 02:16

## 2024-05-04 RX ADMIN — WATER 40 MG: 1 INJECTION INTRAMUSCULAR; INTRAVENOUS; SUBCUTANEOUS at 13:24

## 2024-05-04 RX ADMIN — HYDROXYZINE PAMOATE 25 MG: 25 CAPSULE ORAL at 09:55

## 2024-05-04 RX ADMIN — ALBUTEROL SULFATE 2.5 MG: 2.5 SOLUTION RESPIRATORY (INHALATION) at 13:27

## 2024-05-04 RX ADMIN — MIRTAZAPINE 15 MG: 15 TABLET, FILM COATED ORAL at 20:53

## 2024-05-04 RX ADMIN — Medication 5 MG: at 20:53

## 2024-05-04 RX ADMIN — GUAIFENESIN 400 MG: 400 TABLET ORAL at 20:53

## 2024-05-04 RX ADMIN — ARIPIPRAZOLE 5 MG: 5 TABLET ORAL at 09:45

## 2024-05-04 RX ADMIN — ARFORMOTEROL TARTRATE 15 MCG: 15 SOLUTION RESPIRATORY (INHALATION) at 07:26

## 2024-05-04 RX ADMIN — PRAZOSIN HYDROCHLORIDE 1 MG: 1 CAPSULE ORAL at 20:53

## 2024-05-04 RX ADMIN — SODIUM CHLORIDE, PRESERVATIVE FREE 10 ML: 5 INJECTION INTRAVENOUS at 09:36

## 2024-05-04 RX ADMIN — Medication 2000 UNITS: at 09:36

## 2024-05-04 RX ADMIN — WATER 40 MG: 1 INJECTION INTRAMUSCULAR; INTRAVENOUS; SUBCUTANEOUS at 09:36

## 2024-05-04 RX ADMIN — ENOXAPARIN SODIUM 30 MG: 100 INJECTION SUBCUTANEOUS at 20:53

## 2024-05-04 ASSESSMENT — PAIN SCALES - GENERAL
PAINLEVEL_OUTOF10: 0

## 2024-05-04 NOTE — H&P
[facility]  Internal Medicine Residency Program  History and Physical    Patient:  Brenda Nunn 58 y.o. female MRN: [id number]     Date of Service: 5/3/2024    Hospital Day: 1      Chief complaint: had concerns including Shortness of Breath (COPD, CHF, smoker, SOB 2 days, got home breathing ttmt, 2 duonebs for ems, wheezy tightness, denies CP).    History of Present Illness   The patient is a 58 y.o. female with PMH of chronic respiratory failure requiring home oxygen, BENJAMIN, GERD, Pre-DM (A1c 5.7%), obesity, tobacco abuse, and bipolar disorder 1. She presents to the ED with complaints of worsening shortness of breath for the past 2 days. She completed a breathing treatment at home today with no relief and called EMS. She received duonebs x2 en route to the ED.     In the ED patient was afebrile, /102, HR 98, Sp02 94%. CXR showed minimal bibasilar atlectasis without focal consolidation. Labwork significant for WBC 13.1, troponin 9 with repeat 8, magnesium 1.5, pro-BNP <36, covid/influenza negative. She received duonebs, solumedrol 125, and magnesium in the ED. She will be admitted for further management.     Of note she was recently admitted from - for a COPD exacerbation treated with ceftriaxone, azithromycin, and steroids. She was discharged home with a steroid taper.       Past Medical History:      Diagnosis Date    CHF (congestive heart failure) (HCC)     COPD (chronic obstructive pulmonary disease) (HCC)     Depression     Hypertension        Past Surgical History:        Procedure Laterality Date     SECTION      HYSTERECTOMY (CERVIX STATUS UNKNOWN)      Ovaries retained. Unknown cervix status. performed for uterine fibroids       Medications Prior to Admission:    Prior to Admission medications    Medication Sig Start Date End Date Taking? Authorizing Provider   predniSONE (DELTASONE) 20 MG tablet Take 2 tablets by mouth daily for 3 days, THEN 1 tablet daily for 5

## 2024-05-04 NOTE — ED NOTES
Patient ambulated to bathroom. Patient O2 maintained above 91% ; HR between 110-118, with increased work of breathing from bathroom to room.

## 2024-05-04 NOTE — PROGRESS NOTES
Patient anxious/tearful in room and requesting to be discharged.  I educated patient that she is sick and was readmitted with wheezing and needing the IV steroids at this time.    Byron served IM resident about patient's request and recommends patient stays in hospital overnight.  Will continue to monitor.

## 2024-05-04 NOTE — PROGRESS NOTES
4 Eyes Skin Assessment     NAME:  Brenda Nunn  YOB: 1965  MEDICAL RECORD NUMBER:  [id number]    The patient is being assessed for  Admission    I agree that at least one RN has performed a thorough Head to Toe Skin Assessment on the patient. ALL assessment sites listed below have been assessed.      Areas assessed by both nurses:    Head, Face, Ears, Shoulders, Back, Chest, Arms, Elbows, Hands, Sacrum. Buttock, Coccyx, Ischium, and Legs. Feet and Heels    Bruising (abdomen/right thigh)  Redness (shins/feet)  Dry skin (BLE/feet)  Scars (BUE)        Does the Patient have a Wound? No noted wound(s)       Gabe Prevention initiated by RN: No  Wound Care Orders initiated by RN: No    Pressure Injury (Stage 3,4, Unstageable, DTI, NWPT, and Complex wounds) if present, place Wound referral order by RN under : No    New Ostomies, if present place, Ostomy referral order under : No     Nurse 1 eSignature: Electronically signed by MAGNOLIA AGUILERA RN on 5/4/24 at 6:02 AM EDT    **SHARE this note so that the co-signing nurse can place an eSignature**    Nurse 2 eSignature: Electronically signed by Vivi Kimble RN on 5/4/24 at 6:03 AM EDT

## 2024-05-05 VITALS
RESPIRATION RATE: 22 BRPM | WEIGHT: 250 LBS | TEMPERATURE: 97.5 F | BODY MASS INDEX: 39.24 KG/M2 | HEIGHT: 67 IN | OXYGEN SATURATION: 99 % | HEART RATE: 102 BPM | SYSTOLIC BLOOD PRESSURE: 157 MMHG | DIASTOLIC BLOOD PRESSURE: 95 MMHG

## 2024-05-05 LAB
ANION GAP SERPL CALCULATED.3IONS-SCNC: 12 MMOL/L (ref 7–16)
BASOPHILS # BLD: 0.02 K/UL (ref 0–0.2)
BASOPHILS NFR BLD: 0 % (ref 0–2)
BUN SERPL-MCNC: 20 MG/DL (ref 6–20)
CALCIUM SERPL-MCNC: 9.4 MG/DL (ref 8.6–10.2)
CHLORIDE SERPL-SCNC: 102 MMOL/L (ref 98–107)
CO2 SERPL-SCNC: 26 MMOL/L (ref 22–29)
CREAT SERPL-MCNC: 0.6 MG/DL (ref 0.5–1)
EOSINOPHIL # BLD: 0.01 K/UL (ref 0.05–0.5)
EOSINOPHILS RELATIVE PERCENT: 0 % (ref 0–6)
ERYTHROCYTE [DISTWIDTH] IN BLOOD BY AUTOMATED COUNT: 13 % (ref 11.5–15)
GFR, ESTIMATED: >90 ML/MIN/1.73M2
GLUCOSE SERPL-MCNC: 111 MG/DL (ref 74–99)
HCT VFR BLD AUTO: 38.3 % (ref 34–48)
HGB BLD-MCNC: 12 G/DL (ref 11.5–15.5)
IMM GRANULOCYTES # BLD AUTO: 0.12 K/UL (ref 0–0.58)
IMM GRANULOCYTES NFR BLD: 1 % (ref 0–5)
LYMPHOCYTES NFR BLD: 1.22 K/UL (ref 1.5–4)
LYMPHOCYTES RELATIVE PERCENT: 10 % (ref 20–42)
MAGNESIUM SERPL-MCNC: 2 MG/DL (ref 1.6–2.6)
MCH RBC QN AUTO: 27.9 PG (ref 26–35)
MCHC RBC AUTO-ENTMCNC: 31.3 G/DL (ref 32–34.5)
MCV RBC AUTO: 89.1 FL (ref 80–99.9)
MONOCYTES NFR BLD: 0.71 K/UL (ref 0.1–0.95)
MONOCYTES NFR BLD: 6 % (ref 2–12)
NEUTROPHILS NFR BLD: 83 % (ref 43–80)
NEUTS SEG NFR BLD: 10.43 K/UL (ref 1.8–7.3)
PHOSPHATE SERPL-MCNC: 3.4 MG/DL (ref 2.5–4.5)
PLATELET # BLD AUTO: 209 K/UL (ref 130–450)
PMV BLD AUTO: 10.4 FL (ref 7–12)
POTASSIUM SERPL-SCNC: 4.5 MMOL/L (ref 3.5–5)
RBC # BLD AUTO: 4.3 M/UL (ref 3.5–5.5)
SODIUM SERPL-SCNC: 140 MMOL/L (ref 132–146)
WBC OTHER # BLD: 12.5 K/UL (ref 4.5–11.5)

## 2024-05-05 PROCEDURE — 94660 CPAP INITIATION&MGMT: CPT

## 2024-05-05 PROCEDURE — 96376 TX/PRO/DX INJ SAME DRUG ADON: CPT

## 2024-05-05 PROCEDURE — 6360000002 HC RX W HCPCS

## 2024-05-05 PROCEDURE — 6370000000 HC RX 637 (ALT 250 FOR IP)

## 2024-05-05 PROCEDURE — G0378 HOSPITAL OBSERVATION PER HR: HCPCS

## 2024-05-05 PROCEDURE — 84100 ASSAY OF PHOSPHORUS: CPT

## 2024-05-05 PROCEDURE — 99239 HOSP IP/OBS DSCHRG MGMT >30: CPT | Performed by: INTERNAL MEDICINE

## 2024-05-05 PROCEDURE — 2580000003 HC RX 258

## 2024-05-05 PROCEDURE — 6360000002 HC RX W HCPCS: Performed by: INTERNAL MEDICINE

## 2024-05-05 PROCEDURE — 85025 COMPLETE CBC W/AUTO DIFF WBC: CPT

## 2024-05-05 PROCEDURE — 80048 BASIC METABOLIC PNL TOTAL CA: CPT

## 2024-05-05 PROCEDURE — 94640 AIRWAY INHALATION TREATMENT: CPT

## 2024-05-05 PROCEDURE — 36415 COLL VENOUS BLD VENIPUNCTURE: CPT

## 2024-05-05 PROCEDURE — 83735 ASSAY OF MAGNESIUM: CPT

## 2024-05-05 PROCEDURE — 2700000000 HC OXYGEN THERAPY PER DAY

## 2024-05-05 RX ORDER — BUDESONIDE AND FORMOTEROL FUMARATE DIHYDRATE 160; 4.5 UG/1; UG/1
2 AEROSOL RESPIRATORY (INHALATION) 2 TIMES DAILY
Qty: 3 EACH | Refills: 0 | Status: ON HOLD | OUTPATIENT
Start: 2024-05-05 | End: 2024-06-19

## 2024-05-05 RX ORDER — ALBUTEROL SULFATE 90 UG/1
2 AEROSOL, METERED RESPIRATORY (INHALATION) EVERY 6 HOURS PRN
Qty: 18 G | Refills: 2 | Status: ON HOLD | OUTPATIENT
Start: 2024-05-05

## 2024-05-05 RX ORDER — AZITHROMYCIN 250 MG/1
250 TABLET, FILM COATED ORAL DAILY
Qty: 3 TABLET | Refills: 0 | Status: SHIPPED | OUTPATIENT
Start: 2024-05-06 | End: 2024-05-10

## 2024-05-05 RX ORDER — IPRATROPIUM BROMIDE AND ALBUTEROL SULFATE 2.5; .5 MG/3ML; MG/3ML
1 SOLUTION RESPIRATORY (INHALATION) EVERY 4 HOURS
Qty: 540 ML | Refills: 0 | Status: CANCELLED | OUTPATIENT
Start: 2024-05-05 | End: 2024-06-04

## 2024-05-05 RX ORDER — ROFLUMILAST 250 UG/1
250 TABLET ORAL DAILY
Qty: 30 TABLET | Refills: 3 | Status: ON HOLD | OUTPATIENT
Start: 2024-05-05 | End: 2024-09-02

## 2024-05-05 RX ORDER — IPRATROPIUM BROMIDE AND ALBUTEROL SULFATE 2.5; .5 MG/3ML; MG/3ML
1 SOLUTION RESPIRATORY (INHALATION) EVERY 4 HOURS
Qty: 540 ML | Refills: 2 | Status: ON HOLD | OUTPATIENT
Start: 2024-05-05 | End: 2024-08-03

## 2024-05-05 RX ORDER — VARENICLINE TARTRATE 0.5 MG/1
0.5 TABLET, FILM COATED ORAL 2 TIMES DAILY
Qty: 60 TABLET | Refills: 2 | Status: CANCELLED | OUTPATIENT
Start: 2024-05-05

## 2024-05-05 RX ORDER — PREDNISONE 20 MG/1
TABLET ORAL
Qty: 25 TABLET | Refills: 0 | Status: SHIPPED | OUTPATIENT
Start: 2024-05-05 | End: 2024-05-10

## 2024-05-05 RX ORDER — CALCIUM CARBONATE 500 MG/1
500 TABLET, CHEWABLE ORAL 3 TIMES DAILY PRN
Status: DISCONTINUED | OUTPATIENT
Start: 2024-05-05 | End: 2024-05-05 | Stop reason: HOSPADM

## 2024-05-05 RX ORDER — BUSPIRONE HYDROCHLORIDE 7.5 MG/1
7.5 TABLET ORAL 2 TIMES DAILY
Qty: 60 TABLET | Refills: 0 | Status: ON HOLD | OUTPATIENT
Start: 2024-05-05 | End: 2024-06-04

## 2024-05-05 RX ADMIN — CALCIUM CARBONATE 500 MG: 500 TABLET, CHEWABLE ORAL at 11:29

## 2024-05-05 RX ADMIN — ARFORMOTEROL TARTRATE 15 MCG: 15 SOLUTION RESPIRATORY (INHALATION) at 08:10

## 2024-05-05 RX ADMIN — ALBUTEROL SULFATE 2.5 MG: 2.5 SOLUTION RESPIRATORY (INHALATION) at 08:21

## 2024-05-05 RX ADMIN — ROFLUMILAST 250 MCG: 500 TABLET ORAL at 08:49

## 2024-05-05 RX ADMIN — Medication 2000 UNITS: at 08:48

## 2024-05-05 RX ADMIN — PANTOPRAZOLE SODIUM 40 MG: 40 TABLET, DELAYED RELEASE ORAL at 05:59

## 2024-05-05 RX ADMIN — GUAIFENESIN 400 MG: 400 TABLET ORAL at 08:48

## 2024-05-05 RX ADMIN — ARIPIPRAZOLE 5 MG: 5 TABLET ORAL at 08:49

## 2024-05-05 RX ADMIN — AZITHROMYCIN DIHYDRATE 250 MG: 250 TABLET, FILM COATED ORAL at 08:48

## 2024-05-05 RX ADMIN — BUDESONIDE INHALATION 500 MCG: 0.5 SUSPENSION RESPIRATORY (INHALATION) at 08:10

## 2024-05-05 RX ADMIN — WATER 40 MG: 1 INJECTION INTRAMUSCULAR; INTRAVENOUS; SUBCUTANEOUS at 08:48

## 2024-05-05 RX ADMIN — ALBUTEROL SULFATE 2.5 MG: 2.5 SOLUTION RESPIRATORY (INHALATION) at 12:13

## 2024-05-05 RX ADMIN — SODIUM CHLORIDE, PRESERVATIVE FREE 10 ML: 5 INJECTION INTRAVENOUS at 08:48

## 2024-05-05 NOTE — DISCHARGE INSTRUCTIONS
Internal medicine    Follow ups  Please follow up with the internal medicine clinic at [facility] within 12 days of discharge.You will receive a phone call within 7 days from discharge.    Changes in healthcare   Please take all medications as indicated  Diet: regular diet   Activity: activity as tolerated  New Medications started during this hospital stay  Zithromax 250 Mg 1 tablet daily for next 3 days  Buspar 7.5 Mg 1 tablet twice daily  Prednisone tapering dose  Even if you are feeling better and not having symptoms do not stop taking antibiotic earlier then prescribed  Please contact us if you have any concerns, wish to change or make an appointment:  Internal medicine clinic   Phone: [phone]  Fax: [phone]  [address] [address]  Or please call the nurse line at [phone].  Should you have further questions in regards to this visit, you can review your clinical note and after visit summary document on your [employer] account.  Other questions can be directed to our nurse line at [phone].     Other than any new prescriptions given to you today, the list of home medications on this After Visit Summary are based on information provided to us from you and your healthcare providers. This information, including the list, dose, and frequency of medications is only as accurate as the information you provided. If you have any questions or concerns about your home medications, please contact your Primary Care Physician for further clarification.

## 2024-05-05 NOTE — CARE COORDINATION
Social Work/Case Management Transition of Care Planning (Adenike OAKES [phone]):   Discharge order noted.  Spoke with patient's nurse, Rosa.  Discharge plan is to home with no needs.  Patient has transportation.  VIOLETTE Medina  5/5/2024

## 2024-05-05 NOTE — DISCHARGE SUMMARY
[facility]  Discharge Summary    PCP: Genesis Enriquez MD    Admit Date:5/3/2024  Discharge Date: 5/5/2024    Admission Diagnosis:   COPD exacerbation  Hypomagnesemia  Leukocytosis likely 2/2 steroid use  Hx chronic respiratory failure on home oxygen  Hx BENJAMIN  Hx pre-DM (A1c 5.7%)  Hx GERD  Hx tobacco abuse  Hx obesity  Hx bipolar 1 disorder   Hx vitamin D deficiency    Discharge Diagnosis:  COPD exacerbation likely 2/2 oxygen non-compliance, improved  Hypomagnesemia, resolved  Leukocytosis likely 2/2 steroid use  Hx chronic respiratory failure on home oxygen  Hx BENJAMIN  Hx pre-DM (A1c 5.7%)  Hx GERD  Hx tobacco abuse  Hx obesity  Hx bipolar 1 disorder   Hx vitamin D deficiency    Hospital Course:   Brenda Nunn 58 y.o. female with past medical history of chronic respiratory failure requiring home oxygen, BENJAMIN, GERD, Pre-DM (A1c 5.7%), obesity, tobacco abuse, and bipolar disorder 1. She presents to the ED with complaints of worsening shortness of breath for the past 2 days. She completed a breathing treatment at home today with no relief and called EMS. She received duonebs x2 en route to the ED. In the ED patient was afebrile, /102, HR 98, Sp02 94%. CXR showed minimal bibasilar atlectasis without focal consolidation. Labwork significant for WBC 13.1, troponin 9 with repeat 8, magnesium 1.5, pro-BNP <36, covid/influenza negative. She received duonebs, solumedrol 125, and magnesium in the ED. She will be admitted for further management. Of note she was recently admitted from 4/19-4/23 for a COPD exacerbation treated with ceftriaxone, azithromycin, and steroids. She was discharged home with a steroid taper.     She was started on IV steroids, Azithromycin, and Breathing treatment. Infectious workup was negative, except mild leukocytosis, her home medications were resumed. She received total of 4 doses of Streoid in hospital. She was counseled about smoking cessation,

## 2024-05-06 ENCOUNTER — CARE COORDINATION (OUTPATIENT)
Dept: CARE COORDINATION | Age: 59
End: 2024-05-06

## 2024-05-06 LAB
EKG ATRIAL RATE: 102 BPM
EKG P AXIS: 71 DEGREES
EKG P-R INTERVAL: 150 MS
EKG Q-T INTERVAL: 332 MS
EKG QRS DURATION: 74 MS
EKG QTC CALCULATION (BAZETT): 432 MS
EKG R AXIS: 66 DEGREES
EKG T AXIS: 71 DEGREES
EKG VENTRICULAR RATE: 102 BPM

## 2024-05-06 PROCEDURE — 93010 ELECTROCARDIOGRAM REPORT: CPT | Performed by: INTERNAL MEDICINE

## 2024-05-06 NOTE — CARE COORDINATION
Care Transitions Initial Follow Up Call    Call within 2 business days of discharge: Yes    Patient Current Location:  Home: [address] [place], [place] [id number]    Care Transition Nurse contacted the patient by telephone to perform post hospital discharge assessment. Verified name and  with patient as identifiers. Provided introduction to self, and explanation of the Care Transition Nurse role.     Patient: Brenda Nunn Patient : 1965   MRN: [id number]  Reason for Admission: [facility] 5/3-24 COPD Exacerbation  Discharge Date: 24 RARS: Readmission Risk Score: 32.5    Last Discharge Facility       Date Complaint Diagnosis Description Type Department Provider    5/3/24 Shortness of Breath COPD exacerbation (HCC) ... ED to Hosp-Admission (Discharged) (ADMITTED) [facility] 8S CDU Gumaro Soni DO; CHRIS Armenta..          Was this an external facility discharge? No Discharge Facility: [facility]    Challenges to be reviewed by the provider   Additional needs identified to be addressed with provider: No  none               Method of communication with provider: none.    CTN placed call to Patient for Initial Care Transition call, Post Hospital discharge. Spoke with Pt's daughter Farhana (verified HIPAA in chart) briefly as she is currently driving. She did share that Pt is doing a lot better, but is still SOB. CTN asked daughter about multiple canceled f/u appts with PCP, Pulm, Sleep Lab. She stated that Pt will cancel appts because she does not want to go and then her symptoms worsen and goes to the ED. Inquired if Pt needed transportation to appt and daughter denied needing assistance. CTN requested to have Pt contact this CTN to complete Initial TALON call. Daughter is agreeable to have Pt call. CTM will await call back. If no call back today will attempt to reach Pt tomorrow.    Care Transitions 24 Hour Call    Do you have all of your prescriptions and are they filled?: Yes  Do you have support at home?: Child,

## 2024-05-06 NOTE — CARE COORDINATION
RN reviewed and at this time patient deemed appropriate for LPN Care Coordinator delegation, 5/6/2024.  Observation admission. //SP< RN CTN

## 2024-05-07 ENCOUNTER — CARE COORDINATION (OUTPATIENT)
Dept: CARE COORDINATION | Age: 59
End: 2024-05-07

## 2024-05-07 NOTE — CARE COORDINATION
Care Transitions Initial Follow Up Call    Call within 2 business days of discharge: Yes    Patient Current Location:  Home: [address] [place], [place] [id number]    Care Transition Nurse contacted the patient by telephone to perform post hospital discharge assessment. Verified name and  with patient as identifiers. Provided introduction to self, and explanation of the Care Transition Nurse role.     Patient: Brenda Nunn Patient : 1965   MRN: [id number]  Reason for Admission: [facility] 5/3-24 COPD Exacerbation  Discharge Date: 24 RARS: Readmission Risk Score: 32.5    Last Discharge Facility       Date Complaint Diagnosis Description Type Department Provider    5/3/24 Shortness of Breath COPD exacerbation (HCC) ... ED to Hosp-Admission (Discharged) (ADMITTED) [facility] CDU Gumaro Soni DO; CHRIS Armenta..          Was this an external facility discharge? No Discharge Facility: [facility]    Challenges to be reviewed by the provider   Additional needs identified to be addressed with provider: No  none               Method of communication with provider: none.    CTN placed call to Patient for Initial Care Transition call, Post Hospital discharge.     Pt reports SOB and cough. Pt wearing continuous O2 @ 3L NC, Pt does not have a pulse oximeter. Pt using Duoneb treatments every 4 hrs and rescue inhale every 6 hrs for SOB.   Advised Pt to purchase a Pulse Oximeter to monitor saturation, keep SpO2 >90%. Pt stated that she wears oxygen \"all the time.\" CTN inquired if Pt smoking while wearing oxygen. Pt stated YES. Advised Pt that smoking near oxygen tanks, concentrators, or masks, is extremely dangerous. Just a small spark can cause an explosive fire, causing severe injuries, property, damage, including loss of life. Pt stated that no one ever told her that before. Pt was prescribed Buspar at discharge to help with smoking cessation. Pt stated that she just started taking today.  Noted Pt has Nicoderm Patch

## 2024-05-09 ENCOUNTER — CARE COORDINATION (OUTPATIENT)
Dept: CARE COORDINATION | Age: 59
End: 2024-05-09

## 2024-05-09 ENCOUNTER — APPOINTMENT (OUTPATIENT)
Dept: GENERAL RADIOLOGY | Age: 59
DRG: 918 | End: 2024-05-09
Payer: MEDICARE

## 2024-05-09 ENCOUNTER — HOSPITAL ENCOUNTER (INPATIENT)
Age: 59
LOS: 3 days | Discharge: HOME | DRG: 918 | End: 2024-05-13
Attending: EMERGENCY MEDICINE | Admitting: INTERNAL MEDICINE
Payer: MEDICARE

## 2024-05-09 DIAGNOSIS — R45.851 SUICIDAL IDEATION: ICD-10-CM

## 2024-05-09 DIAGNOSIS — J44.1 COPD EXACERBATION (HCC): Primary | ICD-10-CM

## 2024-05-09 LAB
ALBUMIN SERPL-MCNC: 4.1 G/DL (ref 3.5–5.2)
ALP SERPL-CCNC: 79 U/L (ref 35–104)
ALT SERPL-CCNC: 19 U/L (ref 0–32)
AMPHET UR QL SCN: NEGATIVE
ANION GAP SERPL CALCULATED.3IONS-SCNC: 12 MMOL/L (ref 7–16)
APAP SERPL-MCNC: <5 UG/ML (ref 10–30)
AST SERPL-CCNC: 14 U/L (ref 0–31)
BARBITURATES UR QL SCN: NEGATIVE
BASOPHILS # BLD: 0.03 K/UL (ref 0–0.2)
BASOPHILS NFR BLD: 0 % (ref 0–2)
BENZODIAZ UR QL: NEGATIVE
BILIRUB SERPL-MCNC: 0.4 MG/DL (ref 0–1.2)
BILIRUB UR QL STRIP: NEGATIVE
BUN SERPL-MCNC: 21 MG/DL (ref 6–20)
BUPRENORPHINE UR QL: NEGATIVE
CALCIUM SERPL-MCNC: 9.7 MG/DL (ref 8.6–10.2)
CANNABINOIDS UR QL SCN: NEGATIVE
CHLORIDE SERPL-SCNC: 98 MMOL/L (ref 98–107)
CLARITY UR: CLEAR
CO2 SERPL-SCNC: 28 MMOL/L (ref 22–29)
COCAINE UR QL SCN: NEGATIVE
COLOR UR: YELLOW
CREAT SERPL-MCNC: 0.7 MG/DL (ref 0.5–1)
EOSINOPHIL # BLD: 0.01 K/UL (ref 0.05–0.5)
EOSINOPHILS RELATIVE PERCENT: 0 % (ref 0–6)
EPI CELLS #/AREA URNS HPF: NORMAL /HPF
ERYTHROCYTE [DISTWIDTH] IN BLOOD BY AUTOMATED COUNT: 13.6 % (ref 11.5–15)
ETHANOLAMINE SERPL-MCNC: <10 MG/DL
FENTANYL UR QL: NEGATIVE
GFR, ESTIMATED: >90 ML/MIN/1.73M2
GLUCOSE SERPL-MCNC: 160 MG/DL (ref 74–99)
GLUCOSE UR STRIP-MCNC: NEGATIVE MG/DL
HCT VFR BLD AUTO: 40.6 % (ref 34–48)
HGB BLD-MCNC: 12.7 G/DL (ref 11.5–15.5)
HGB UR QL STRIP.AUTO: NEGATIVE
IMM GRANULOCYTES # BLD AUTO: 0.08 K/UL (ref 0–0.58)
IMM GRANULOCYTES NFR BLD: 1 % (ref 0–5)
KETONES UR STRIP-MCNC: NEGATIVE MG/DL
LEUKOCYTE ESTERASE UR QL STRIP: ABNORMAL
LYMPHOCYTES NFR BLD: 0.47 K/UL (ref 1.5–4)
LYMPHOCYTES RELATIVE PERCENT: 3 % (ref 20–42)
MCH RBC QN AUTO: 28.1 PG (ref 26–35)
MCHC RBC AUTO-ENTMCNC: 31.3 G/DL (ref 32–34.5)
MCV RBC AUTO: 89.8 FL (ref 80–99.9)
METHADONE UR QL: NEGATIVE
MONOCYTES NFR BLD: 0.11 K/UL (ref 0.1–0.95)
MONOCYTES NFR BLD: 1 % (ref 2–12)
NEUTROPHILS NFR BLD: 95 % (ref 43–80)
NEUTS SEG NFR BLD: 13.32 K/UL (ref 1.8–7.3)
NITRITE UR QL STRIP: NEGATIVE
OPIATES UR QL SCN: NEGATIVE
OXYCODONE UR QL SCN: NEGATIVE
PCP UR QL SCN: NEGATIVE
PH UR STRIP: 6 [PH] (ref 5–9)
PLATELET # BLD AUTO: 202 K/UL (ref 130–450)
PMV BLD AUTO: 10.4 FL (ref 7–12)
POTASSIUM SERPL-SCNC: 4.3 MMOL/L (ref 3.5–5)
PROT SERPL-MCNC: 6.9 G/DL (ref 6.4–8.3)
PROT UR STRIP-MCNC: NEGATIVE MG/DL
RBC # BLD AUTO: 4.52 M/UL (ref 3.5–5.5)
RBC # BLD: ABNORMAL 10*6/UL
RBC #/AREA URNS HPF: NORMAL /HPF
SALICYLATES SERPL-MCNC: <0.3 MG/DL (ref 0–30)
SODIUM SERPL-SCNC: 138 MMOL/L (ref 132–146)
SP GR UR STRIP: 1.01 (ref 1–1.03)
TEST INFORMATION: NORMAL
TOXIC TRICYCLIC SC,BLOOD: NEGATIVE
UROBILINOGEN UR STRIP-ACNC: 0.2 EU/DL (ref 0–1)
WBC #/AREA URNS HPF: NORMAL /HPF
WBC OTHER # BLD: 14 K/UL (ref 4.5–11.5)

## 2024-05-09 PROCEDURE — 80143 DRUG ASSAY ACETAMINOPHEN: CPT

## 2024-05-09 PROCEDURE — 85025 COMPLETE CBC W/AUTO DIFF WBC: CPT

## 2024-05-09 PROCEDURE — 94640 AIRWAY INHALATION TREATMENT: CPT

## 2024-05-09 PROCEDURE — 6370000000 HC RX 637 (ALT 250 FOR IP): Performed by: EMERGENCY MEDICINE

## 2024-05-09 PROCEDURE — G0480 DRUG TEST DEF 1-7 CLASSES: HCPCS

## 2024-05-09 PROCEDURE — 94664 DEMO&/EVAL PT USE INHALER: CPT

## 2024-05-09 PROCEDURE — 99285 EMERGENCY DEPT VISIT HI MDM: CPT

## 2024-05-09 PROCEDURE — 80053 COMPREHEN METABOLIC PANEL: CPT

## 2024-05-09 PROCEDURE — 80307 DRUG TEST PRSMV CHEM ANLYZR: CPT

## 2024-05-09 PROCEDURE — 71045 X-RAY EXAM CHEST 1 VIEW: CPT

## 2024-05-09 PROCEDURE — 93005 ELECTROCARDIOGRAM TRACING: CPT | Performed by: EMERGENCY MEDICINE

## 2024-05-09 PROCEDURE — 81001 URINALYSIS AUTO W/SCOPE: CPT

## 2024-05-09 PROCEDURE — 80179 DRUG ASSAY SALICYLATE: CPT

## 2024-05-09 RX ORDER — PREDNISONE 20 MG/1
60 TABLET ORAL ONCE
Status: COMPLETED | OUTPATIENT
Start: 2024-05-09 | End: 2024-05-09

## 2024-05-09 RX ORDER — IPRATROPIUM BROMIDE AND ALBUTEROL SULFATE 2.5; .5 MG/3ML; MG/3ML
3 SOLUTION RESPIRATORY (INHALATION) ONCE
Status: COMPLETED | OUTPATIENT
Start: 2024-05-09 | End: 2024-05-09

## 2024-05-09 RX ORDER — IPRATROPIUM BROMIDE AND ALBUTEROL SULFATE 2.5; .5 MG/3ML; MG/3ML
1 SOLUTION RESPIRATORY (INHALATION)
Status: DISCONTINUED | OUTPATIENT
Start: 2024-05-09 | End: 2024-05-13 | Stop reason: HOSPADM

## 2024-05-09 RX ADMIN — IPRATROPIUM BROMIDE AND ALBUTEROL SULFATE 3 DOSE: .5; 3 SOLUTION RESPIRATORY (INHALATION) at 14:22

## 2024-05-09 RX ADMIN — IPRATROPIUM BROMIDE AND ALBUTEROL SULFATE 1 DOSE: .5; 2.5 SOLUTION RESPIRATORY (INHALATION) at 19:20

## 2024-05-09 RX ADMIN — PREDNISONE 60 MG: 20 TABLET ORAL at 16:21

## 2024-05-09 ASSESSMENT — PAIN - FUNCTIONAL ASSESSMENT: PAIN_FUNCTIONAL_ASSESSMENT: NONE - DENIES PAIN

## 2024-05-09 NOTE — ED PROVIDER NOTES
disposition:        ED Course as of 05/09/24 1824   Thu May 09, 2024   1430 EKG Interpretation  Interpreted by emergency department physician, Dr. Mariano    Rhythm: normal sinus   Rate: normal  Axis: normal  Ectopy: none  Conduction: normal  ST Segments: no acute change  T Waves: no acute change  Q Waves: none    Clinical Impression: no acute changes   [HH]   1820 Chest x-ray interpreted by me, no acute process [HH]      ED Course User Index  [HH] Kierra Mariano, DO       Medical Decision Making  Amount and/or Complexity of Data Reviewed  Labs: ordered.  Radiology: ordered.  ECG/medicine tests: ordered.    Risk  Prescription drug management.        MDM:  The differential diagnosis associated with the patient’s presentation includes: Drug or alcohol use or abuse, psychosis, behavioral mental illness,  metabolic disturbance, infection, neurologic emergency, other      My independent interpretation of the EKG and/or imaging in ED Course.     Discussed with radiology regarding test interpretation.na    Additional history obtained from or confirmed by: EMS    Management of the patient was discussed with: social work    Escalation of care including admission/observation considered:admission    Patient seen and examined, presents for evaluation of lightheadedness that began this morning.  He describes it as intermittent with nausea and vomiting.  He also notes occasional vertiginous symptoms.  Denies any fever or chills.  Denies any nausea vomiting.    Evaluation was unrevealing.  She was given prednisone as well as a DuoNeb with good improvement in her wheezing.  Chest x-ray was negative.    The patient has been medically cleared for any acute or serious medical emergency. Therefore,  the patient is medically stable for inpatient psychiatric care.      CONSULTS:   IP CONSULT TO SOCIAL WORK        PROCEDURES   Unless otherwise noted below, none       CRITICAL CARE TIME (.cct)   na        I am the Primary Clinician of

## 2024-05-10 LAB
ANION GAP SERPL CALCULATED.3IONS-SCNC: 9 MMOL/L (ref 7–16)
B PARAP IS1001 DNA NPH QL NAA+NON-PROBE: NOT DETECTED
B PERT DNA SPEC QL NAA+PROBE: NOT DETECTED
BASOPHILS # BLD: 0.02 K/UL (ref 0–0.2)
BASOPHILS NFR BLD: 0 % (ref 0–2)
BUN SERPL-MCNC: 18 MG/DL (ref 6–20)
C PNEUM DNA NPH QL NAA+NON-PROBE: NOT DETECTED
CALCIUM SERPL-MCNC: 9.6 MG/DL (ref 8.6–10.2)
CHLORIDE SERPL-SCNC: 98 MMOL/L (ref 98–107)
CO2 SERPL-SCNC: 33 MMOL/L (ref 22–29)
CREAT SERPL-MCNC: 0.7 MG/DL (ref 0.5–1)
EKG ATRIAL RATE: 95 BPM
EKG P AXIS: 69 DEGREES
EKG P-R INTERVAL: 156 MS
EKG Q-T INTERVAL: 346 MS
EKG QRS DURATION: 82 MS
EKG QTC CALCULATION (BAZETT): 434 MS
EKG R AXIS: 35 DEGREES
EKG T AXIS: 49 DEGREES
EKG VENTRICULAR RATE: 95 BPM
EOSINOPHIL # BLD: 0.04 K/UL (ref 0.05–0.5)
EOSINOPHILS RELATIVE PERCENT: 0 % (ref 0–6)
ERYTHROCYTE [DISTWIDTH] IN BLOOD BY AUTOMATED COUNT: 13.6 % (ref 11.5–15)
FLUAV RNA NPH QL NAA+NON-PROBE: NOT DETECTED
FLUBV RNA NPH QL NAA+NON-PROBE: NOT DETECTED
GFR, ESTIMATED: >90 ML/MIN/1.73M2
GLUCOSE BLD-MCNC: 150 MG/DL (ref 74–99)
GLUCOSE SERPL-MCNC: 100 MG/DL (ref 74–99)
HADV DNA NPH QL NAA+NON-PROBE: NOT DETECTED
HCOV 229E RNA NPH QL NAA+NON-PROBE: NOT DETECTED
HCOV HKU1 RNA NPH QL NAA+NON-PROBE: NOT DETECTED
HCOV NL63 RNA NPH QL NAA+NON-PROBE: NOT DETECTED
HCOV OC43 RNA NPH QL NAA+NON-PROBE: NOT DETECTED
HCT VFR BLD AUTO: 40.4 % (ref 34–48)
HGB BLD-MCNC: 12.4 G/DL (ref 11.5–15.5)
HMPV RNA NPH QL NAA+NON-PROBE: NOT DETECTED
HPIV1 RNA NPH QL NAA+NON-PROBE: NOT DETECTED
HPIV2 RNA NPH QL NAA+NON-PROBE: NOT DETECTED
HPIV3 RNA NPH QL NAA+NON-PROBE: NOT DETECTED
HPIV4 RNA NPH QL NAA+NON-PROBE: NOT DETECTED
IMM GRANULOCYTES # BLD AUTO: 0.07 K/UL (ref 0–0.58)
IMM GRANULOCYTES NFR BLD: 1 % (ref 0–5)
LYMPHOCYTES NFR BLD: 2.02 K/UL (ref 1.5–4)
LYMPHOCYTES RELATIVE PERCENT: 19 % (ref 20–42)
M PNEUMO DNA NPH QL NAA+NON-PROBE: NOT DETECTED
MCH RBC QN AUTO: 27.8 PG (ref 26–35)
MCHC RBC AUTO-ENTMCNC: 30.7 G/DL (ref 32–34.5)
MCV RBC AUTO: 90.6 FL (ref 80–99.9)
MONOCYTES NFR BLD: 0.73 K/UL (ref 0.1–0.95)
MONOCYTES NFR BLD: 7 % (ref 2–12)
NEUTROPHILS NFR BLD: 74 % (ref 43–80)
NEUTS SEG NFR BLD: 7.97 K/UL (ref 1.8–7.3)
PLATELET # BLD AUTO: 212 K/UL (ref 130–450)
PMV BLD AUTO: 10.3 FL (ref 7–12)
POTASSIUM SERPL-SCNC: 3.9 MMOL/L (ref 3.5–5)
RBC # BLD AUTO: 4.46 M/UL (ref 3.5–5.5)
RSV RNA NPH QL NAA+NON-PROBE: NOT DETECTED
RV+EV RNA NPH QL NAA+NON-PROBE: NOT DETECTED
SARS-COV-2 RNA NPH QL NAA+NON-PROBE: NOT DETECTED
SODIUM SERPL-SCNC: 140 MMOL/L (ref 132–146)
SPECIMEN DESCRIPTION: NORMAL
WBC OTHER # BLD: 10.9 K/UL (ref 4.5–11.5)

## 2024-05-10 PROCEDURE — 6370000000 HC RX 637 (ALT 250 FOR IP)

## 2024-05-10 PROCEDURE — 6360000002 HC RX W HCPCS

## 2024-05-10 PROCEDURE — 99221 1ST HOSP IP/OBS SF/LOW 40: CPT | Performed by: INTERNAL MEDICINE

## 2024-05-10 PROCEDURE — 82962 GLUCOSE BLOOD TEST: CPT

## 2024-05-10 PROCEDURE — 80048 BASIC METABOLIC PNL TOTAL CA: CPT

## 2024-05-10 PROCEDURE — 85025 COMPLETE CBC W/AUTO DIFF WBC: CPT

## 2024-05-10 PROCEDURE — 2580000003 HC RX 258

## 2024-05-10 PROCEDURE — 36415 COLL VENOUS BLD VENIPUNCTURE: CPT

## 2024-05-10 PROCEDURE — 2060000000 HC ICU INTERMEDIATE R&B

## 2024-05-10 PROCEDURE — 6370000000 HC RX 637 (ALT 250 FOR IP): Performed by: EMERGENCY MEDICINE

## 2024-05-10 PROCEDURE — 93010 ELECTROCARDIOGRAM REPORT: CPT | Performed by: INTERNAL MEDICINE

## 2024-05-10 PROCEDURE — 94640 AIRWAY INHALATION TREATMENT: CPT

## 2024-05-10 PROCEDURE — 94660 CPAP INITIATION&MGMT: CPT

## 2024-05-10 PROCEDURE — 0202U NFCT DS 22 TRGT SARS-COV-2: CPT

## 2024-05-10 RX ORDER — ENOXAPARIN SODIUM 100 MG/ML
30 INJECTION SUBCUTANEOUS 2 TIMES DAILY
Status: DISCONTINUED | OUTPATIENT
Start: 2024-05-10 | End: 2024-05-13 | Stop reason: HOSPADM

## 2024-05-10 RX ORDER — POLYETHYLENE GLYCOL 3350 17 G/17G
17 POWDER, FOR SOLUTION ORAL DAILY PRN
Status: DISCONTINUED | OUTPATIENT
Start: 2024-05-10 | End: 2024-05-13 | Stop reason: HOSPADM

## 2024-05-10 RX ORDER — HYDROXYZINE PAMOATE 25 MG/1
25 CAPSULE ORAL 3 TIMES DAILY PRN
Status: DISCONTINUED | OUTPATIENT
Start: 2024-05-10 | End: 2024-05-11

## 2024-05-10 RX ORDER — MECOBALAMIN 5000 MCG
10 TABLET,DISINTEGRATING ORAL NIGHTLY
Status: DISCONTINUED | OUTPATIENT
Start: 2024-05-10 | End: 2024-05-13 | Stop reason: HOSPADM

## 2024-05-10 RX ORDER — BUDESONIDE 0.5 MG/2ML
0.5 INHALANT ORAL
Status: DISCONTINUED | OUTPATIENT
Start: 2024-05-10 | End: 2024-05-13 | Stop reason: HOSPADM

## 2024-05-10 RX ORDER — CHOLECALCIFEROL (VITAMIN D3) 50 MCG
2000 TABLET ORAL DAILY
Status: DISCONTINUED | OUTPATIENT
Start: 2024-05-10 | End: 2024-05-13 | Stop reason: HOSPADM

## 2024-05-10 RX ORDER — ONDANSETRON 4 MG/1
4 TABLET, ORALLY DISINTEGRATING ORAL EVERY 8 HOURS PRN
Status: DISCONTINUED | OUTPATIENT
Start: 2024-05-10 | End: 2024-05-13 | Stop reason: HOSPADM

## 2024-05-10 RX ORDER — SODIUM CHLORIDE 9 MG/ML
INJECTION, SOLUTION INTRAVENOUS PRN
Status: DISCONTINUED | OUTPATIENT
Start: 2024-05-10 | End: 2024-05-13 | Stop reason: HOSPADM

## 2024-05-10 RX ORDER — PRAZOSIN HYDROCHLORIDE 1 MG/1
1 CAPSULE ORAL NIGHTLY
Status: DISCONTINUED | OUTPATIENT
Start: 2024-05-10 | End: 2024-05-13 | Stop reason: HOSPADM

## 2024-05-10 RX ORDER — NICOTINE 21 MG/24HR
1 PATCH, TRANSDERMAL 24 HOURS TRANSDERMAL DAILY
Status: DISCONTINUED | OUTPATIENT
Start: 2024-05-11 | End: 2024-05-13 | Stop reason: HOSPADM

## 2024-05-10 RX ORDER — HYDROXYZINE PAMOATE 25 MG/1
25 CAPSULE ORAL
Status: COMPLETED | OUTPATIENT
Start: 2024-05-10 | End: 2024-05-10

## 2024-05-10 RX ORDER — ACETAMINOPHEN 325 MG/1
650 TABLET ORAL EVERY 6 HOURS PRN
Status: DISCONTINUED | OUTPATIENT
Start: 2024-05-10 | End: 2024-05-13 | Stop reason: HOSPADM

## 2024-05-10 RX ORDER — PREDNISONE 20 MG/1
40 TABLET ORAL DAILY
Status: ON HOLD | COMMUNITY
Start: 2024-05-05 | End: 2024-05-13 | Stop reason: HOSPADM

## 2024-05-10 RX ORDER — HYDROXYZINE HYDROCHLORIDE 25 MG/1
25 TABLET, FILM COATED ORAL EVERY 12 HOURS PRN
Status: ON HOLD | COMMUNITY
End: 2024-05-13

## 2024-05-10 RX ORDER — ARFORMOTEROL TARTRATE 15 UG/2ML
15 SOLUTION RESPIRATORY (INHALATION)
Status: DISCONTINUED | OUTPATIENT
Start: 2024-05-10 | End: 2024-05-13 | Stop reason: HOSPADM

## 2024-05-10 RX ORDER — PREDNISONE 20 MG/1
40 TABLET ORAL DAILY
Status: DISCONTINUED | OUTPATIENT
Start: 2024-05-10 | End: 2024-05-11

## 2024-05-10 RX ORDER — ACETAMINOPHEN 650 MG/1
650 SUPPOSITORY RECTAL EVERY 6 HOURS PRN
Status: DISCONTINUED | OUTPATIENT
Start: 2024-05-10 | End: 2024-05-13 | Stop reason: HOSPADM

## 2024-05-10 RX ORDER — ARIPIPRAZOLE 5 MG/1
5 TABLET ORAL DAILY
Status: DISCONTINUED | OUTPATIENT
Start: 2024-05-10 | End: 2024-05-13 | Stop reason: HOSPADM

## 2024-05-10 RX ORDER — SODIUM CHLORIDE 0.9 % (FLUSH) 0.9 %
5-40 SYRINGE (ML) INJECTION PRN
Status: DISCONTINUED | OUTPATIENT
Start: 2024-05-10 | End: 2024-05-13 | Stop reason: HOSPADM

## 2024-05-10 RX ORDER — GLUCAGON 1 MG/ML
1 KIT INJECTION PRN
Status: DISCONTINUED | OUTPATIENT
Start: 2024-05-10 | End: 2024-05-13 | Stop reason: HOSPADM

## 2024-05-10 RX ORDER — IPRATROPIUM BROMIDE AND ALBUTEROL SULFATE 2.5; .5 MG/3ML; MG/3ML
1 SOLUTION RESPIRATORY (INHALATION)
Status: COMPLETED | OUTPATIENT
Start: 2024-05-10 | End: 2024-05-10

## 2024-05-10 RX ORDER — ONDANSETRON 2 MG/ML
4 INJECTION INTRAMUSCULAR; INTRAVENOUS EVERY 6 HOURS PRN
Status: DISCONTINUED | OUTPATIENT
Start: 2024-05-10 | End: 2024-05-13 | Stop reason: HOSPADM

## 2024-05-10 RX ORDER — ROFLUMILAST 500 UG/1
250 TABLET ORAL DAILY
Status: DISCONTINUED | OUTPATIENT
Start: 2024-05-10 | End: 2024-05-12

## 2024-05-10 RX ORDER — SODIUM CHLORIDE 0.9 % (FLUSH) 0.9 %
5-40 SYRINGE (ML) INJECTION EVERY 12 HOURS SCHEDULED
Status: DISCONTINUED | OUTPATIENT
Start: 2024-05-10 | End: 2024-05-13 | Stop reason: HOSPADM

## 2024-05-10 RX ORDER — GUAIFENESIN 400 MG/1
400 TABLET ORAL 3 TIMES DAILY
COMMUNITY
End: 2024-05-17

## 2024-05-10 RX ORDER — DEXTROSE MONOHYDRATE 100 MG/ML
INJECTION, SOLUTION INTRAVENOUS CONTINUOUS PRN
Status: DISCONTINUED | OUTPATIENT
Start: 2024-05-10 | End: 2024-05-13 | Stop reason: HOSPADM

## 2024-05-10 RX ORDER — NICOTINE 21 MG/24HR
1 PATCH, TRANSDERMAL 24 HOURS TRANSDERMAL ONCE
Status: COMPLETED | OUTPATIENT
Start: 2024-05-10 | End: 2024-05-11

## 2024-05-10 RX ADMIN — ROFLUMILAST 250 MCG: 500 TABLET ORAL at 10:51

## 2024-05-10 RX ADMIN — IPRATROPIUM BROMIDE AND ALBUTEROL SULFATE 1 DOSE: .5; 2.5 SOLUTION RESPIRATORY (INHALATION) at 19:00

## 2024-05-10 RX ADMIN — BUDESONIDE INHALATION 500 MCG: 0.5 SUSPENSION RESPIRATORY (INHALATION) at 19:00

## 2024-05-10 RX ADMIN — IPRATROPIUM BROMIDE AND ALBUTEROL SULFATE 1 DOSE: .5; 2.5 SOLUTION RESPIRATORY (INHALATION) at 15:23

## 2024-05-10 RX ADMIN — HYDROXYZINE PAMOATE 25 MG: 25 CAPSULE ORAL at 10:52

## 2024-05-10 RX ADMIN — SODIUM CHLORIDE, PRESERVATIVE FREE 10 ML: 5 INJECTION INTRAVENOUS at 11:00

## 2024-05-10 RX ADMIN — ARIPIPRAZOLE 5 MG: 10 TABLET ORAL at 11:53

## 2024-05-10 RX ADMIN — IPRATROPIUM BROMIDE AND ALBUTEROL SULFATE 1 DOSE: .5; 3 SOLUTION RESPIRATORY (INHALATION) at 00:32

## 2024-05-10 RX ADMIN — Medication 10 MG: at 21:01

## 2024-05-10 RX ADMIN — IPRATROPIUM BROMIDE AND ALBUTEROL SULFATE 1 DOSE: .5; 2.5 SOLUTION RESPIRATORY (INHALATION) at 08:50

## 2024-05-10 RX ADMIN — Medication 2000 UNITS: at 10:53

## 2024-05-10 RX ADMIN — SODIUM CHLORIDE, PRESERVATIVE FREE 10 ML: 5 INJECTION INTRAVENOUS at 21:01

## 2024-05-10 RX ADMIN — IPRATROPIUM BROMIDE AND ALBUTEROL SULFATE 1 DOSE: .5; 3 SOLUTION RESPIRATORY (INHALATION) at 00:31

## 2024-05-10 RX ADMIN — ARFORMOTEROL TARTRATE 15 MCG: 15 SOLUTION RESPIRATORY (INHALATION) at 19:00

## 2024-05-10 RX ADMIN — IPRATROPIUM BROMIDE AND ALBUTEROL SULFATE 1 DOSE: .5; 3 SOLUTION RESPIRATORY (INHALATION) at 00:30

## 2024-05-10 RX ADMIN — ENOXAPARIN SODIUM 30 MG: 100 INJECTION SUBCUTANEOUS at 21:00

## 2024-05-10 RX ADMIN — BUDESONIDE INHALATION 500 MCG: 0.5 SUSPENSION RESPIRATORY (INHALATION) at 08:55

## 2024-05-10 RX ADMIN — ENOXAPARIN SODIUM 30 MG: 100 INJECTION SUBCUTANEOUS at 10:54

## 2024-05-10 RX ADMIN — PRAZOSIN HYDROCHLORIDE 1 MG: 1 CAPSULE ORAL at 22:59

## 2024-05-10 RX ADMIN — PREDNISONE 40 MG: 20 TABLET ORAL at 10:53

## 2024-05-10 RX ADMIN — ARFORMOTEROL TARTRATE 15 MCG: 15 SOLUTION RESPIRATORY (INHALATION) at 08:55

## 2024-05-10 RX ADMIN — HYDROXYZINE PAMOATE 25 MG: 25 CAPSULE ORAL at 21:01

## 2024-05-10 NOTE — ED NOTES
SW received a call from [facility] ([facility]) who said she saw that patient was being medically admitted, AC to cancel referral.

## 2024-05-10 NOTE — PROGRESS NOTES
Message sent to house team resident regarding patient's request for melatonin at night and anxiety medications.

## 2024-05-10 NOTE — ED NOTES
Patient was seen by Dr. Mariano earlier I became involved the patient due to the fact the patient requiring CPAP at nighttime also still having expiratory wheezes she was already given breathing treatments and steroids I ordered another round of DuoNeb treatments patient still having expiratory wheezes.  Patient reporting no chest pain.  She is awake alert here.  Patient plan will be to admit due to the fact the patient requiring breathing treatments as well as CPAP patient will be admitted to medical floor plan will be for psychiatry to see patient once admitted I did speak to Dr Carroll and they will admit     James Estrella MD  05/10/24 0044

## 2024-05-10 NOTE — H&P
heart failure) (HCC), COPD (chronic obstructive pulmonary disease) (HCC), Depression, and Hypertension.    Came with CC: SOB and suicidal ideation      Assessment:  Concern for suicidal ideation  COPD exacerbation  Hypomagnesemia  Leukocytosis likely reactive  Hx chronic respiratory failure on home oxygen  Hx BENJAMIN  Hx pre-DM (A1c 5.7%)  Hx GERD  Hx tobacco abuse  Hx obesity  Hx bipolar 1 disorder   Hx vitamin D deficiency      Plan:   Follow daily labs and replace electrolytes as needed  Continue breathing treatment  Continue home medications  Continue CPAP  Continue solumedrol 40 mg daily, taper when stable   Follow psych recommends  Monitor respiratory status  Psych cancel transfer as being medically admitted.         PT/OT: Not indicated at this time  DVT ppx: Lovenox  GI ppx: Diet    Jane Jiménez MD, PGY-2   Attending physician: Dr. Lopez  Case discussed with Dr Carroll

## 2024-05-10 NOTE — PROGRESS NOTES
05/10/24 1542   NIV Type   NIV Started/Stopped On   Equipment Type v60   Mode Bilevel   Mask Type Full face mask   Mask Size Medium   Assessment   SpO2 95 %   Level of Consciousness 0   Comfort Level Good   Using Accessory Muscles No   Mask Compliance Good   Skin Assessment Clean, dry, & intact   Skin Protection for O2 Device Yes   Orientation Middle   Location Nose   Intervention(s) Skin Barrier   Settings/Measurements   IPAP 12 cmH20   CPAP/EPAP 6 cmH2O   Vt (Measured) 529 mL   Rate Ordered 14   FiO2  30 %   Minute Volume (L/min) 18.2 Liters   Mask Leak (lpm) 28 lpm   Patient's Home Machine No   Alarm Settings   Alarms On Y   Low Pressure (cmH2O) 8 cmH2O   High Pressure (cmH2O) 24 cmH2O   RR Low (bpm) 10   RR High (bpm) 40 br/min     Date: 5/10/2024    Time: 3:43 PM    Patient Placed On BIPAP/CPAP/ Non-Invasive Ventilation?  Yes    If no must comment.  Facial area red/color change? No           If YES are Blister/Lesion present?No   If yes must notify nursing staff  BIPAP/CPAP skin barrier?  Yes    Skin barrier type:mepilexlite       Comments:        Yessenia Coleman RCP

## 2024-05-10 NOTE — ED NOTES
Behavioral Health Crisis Assessment      Chief Complaint: The patient is a 58-year-old  female who presents today with suicidal ideation and a plan to overdose on her medications.    Mental Status Exam: The patient presents calm and cooperative with a flat affect and depressed mood.  She is oriented x 4 and is a good historian.  She has poor judgment and insight.  She has fair hygiene and eye contact.  Her speech is normal and her thought process is linear.  She denies a history of AVH and HI.  She reports current SI    Legal Status:  [] Voluntary:  [x] Involuntary, Issued by: ED doctor    Gender:  [] Male [x] Female [] Transgender  [] Other    Sexual Orientation:  [x] Heterosexual [] Homosexual [] Bisexual [] Other    Brief Clinical Summary: The patient is a 58-year-old  female who presents to the emergency room tonight due to suicidal ideation with a plan to overdose on pills.  She stated she has been feeling this way for the last 2 days since she started taking BuSpar.  She reported a history of 5 attempts in the past which she stated were \"mostly OD \".  She reported that every time she attempted she got help for herself.  She stated that she moved to [place] in October 2023 from [place] to live with her daughter.  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  She stated she stayed sober but had a relapse in February 1 time.  She was admitted to [facility] around that time.  She was also admitted to [facility] on 7 W. in November 2023.  She stated she never followed up with an outpatient provider because she did not have transportation.  She denied a history of HI, AVH and EtOH.  She stated that she has COPD and she feels hopeless and helpless and that is the main reason that she has suicidal thoughts.    Once medically cleared the patient will be [facility] to [facility] for [facility].    Collateral Information:

## 2024-05-10 NOTE — PROGRESS NOTES
4 Eyes Skin Assessment     NAME:  Brenda Nunn  YOB: 1965  MEDICAL RECORD NUMBER:  [id number]    The patient is being assessed for  Admission    I agree that at least one RN has performed a thorough Head to Toe Skin Assessment on the patient. ALL assessment sites listed below have been assessed.      Areas assessed by both nurses:    Head, Face, Ears, Shoulders, Back, Chest, Arms, Elbows, Hands, Sacrum. Buttock, Coccyx, Ischium, Legs. Feet and Heels, and Under Medical Devices         Does the Patient have a Wound? No noted wound(s)       Gabe Prevention initiated by RN: Yes  Wound Care Orders initiated by RN: No    Pressure Injury (Stage 3,4, Unstageable, DTI, NWPT, and Complex wounds) if present, place Wound referral order by RN under : No    New Ostomies, if present place, Ostomy referral order under : No     Nurse 1 eSignature: Electronically signed by Tara M Wilms, RN on 5/10/24 at 4:50 PM EDT    **SHARE this note so that the co-signing nurse can place an eSignature**    Nurse 2 eSignature: Electronically signed by Kimi Walker RN on 5/10/24 at 6:33 PM EDT

## 2024-05-10 NOTE — PROGRESS NOTES
[facility]  Internal Medicine Residency / House Medicine Service    Attending Physician Statement  I have discussed the case, including pertinent history and exam findings with the resident and the team.  I have seen and examined the patient and the key elements of the encounter have been performed by me.  I agree with the assessment, plan and orders as documented by the resident.      Alert and baseline  VS stable   Still smoking , off steroids  Viral panel negative  H&L diffuse wheezing  Impression; Re exacerbation of COPD   Plan: Aerosols, Steroids            Home meds restarted   Remainder of medical problems as per resident note.       Jens Fong MD FRCP [facility]  Internal Medicine Residency Faculty

## 2024-05-11 PROBLEM — F31.62 BIPOLAR MIXED AFFECTIVE DISORDER, MODERATE (HCC): Status: ACTIVE | Noted: 2023-11-12

## 2024-05-11 LAB
ANION GAP SERPL CALCULATED.3IONS-SCNC: 11 MMOL/L (ref 7–16)
BASOPHILS # BLD: 0.02 K/UL (ref 0–0.2)
BASOPHILS NFR BLD: 0 % (ref 0–2)
BUN SERPL-MCNC: 19 MG/DL (ref 6–20)
CALCIUM SERPL-MCNC: 9.3 MG/DL (ref 8.6–10.2)
CHLORIDE SERPL-SCNC: 100 MMOL/L (ref 98–107)
CO2 SERPL-SCNC: 29 MMOL/L (ref 22–29)
CREAT SERPL-MCNC: 0.7 MG/DL (ref 0.5–1)
EOSINOPHIL # BLD: 0.05 K/UL (ref 0.05–0.5)
EOSINOPHILS RELATIVE PERCENT: 1 % (ref 0–6)
ERYTHROCYTE [DISTWIDTH] IN BLOOD BY AUTOMATED COUNT: 13.8 % (ref 11.5–15)
GFR, ESTIMATED: >90 ML/MIN/1.73M2
GLUCOSE SERPL-MCNC: 88 MG/DL (ref 74–99)
HCT VFR BLD AUTO: 39.8 % (ref 34–48)
HGB BLD-MCNC: 12.4 G/DL (ref 11.5–15.5)
IMM GRANULOCYTES # BLD AUTO: 0.05 K/UL (ref 0–0.58)
IMM GRANULOCYTES NFR BLD: 1 % (ref 0–5)
LYMPHOCYTES NFR BLD: 2.8 K/UL (ref 1.5–4)
LYMPHOCYTES RELATIVE PERCENT: 29 % (ref 20–42)
MCH RBC QN AUTO: 28.5 PG (ref 26–35)
MCHC RBC AUTO-ENTMCNC: 31.2 G/DL (ref 32–34.5)
MCV RBC AUTO: 91.5 FL (ref 80–99.9)
MONOCYTES NFR BLD: 0.66 K/UL (ref 0.1–0.95)
MONOCYTES NFR BLD: 7 % (ref 2–12)
NEUTROPHILS NFR BLD: 64 % (ref 43–80)
NEUTS SEG NFR BLD: 6.23 K/UL (ref 1.8–7.3)
PLATELET # BLD AUTO: 182 K/UL (ref 130–450)
PMV BLD AUTO: 10.6 FL (ref 7–12)
POTASSIUM SERPL-SCNC: 3.9 MMOL/L (ref 3.5–5)
RBC # BLD AUTO: 4.35 M/UL (ref 3.5–5.5)
SODIUM SERPL-SCNC: 140 MMOL/L (ref 132–146)
WBC OTHER # BLD: 9.8 K/UL (ref 4.5–11.5)

## 2024-05-11 PROCEDURE — 6370000000 HC RX 637 (ALT 250 FOR IP)

## 2024-05-11 PROCEDURE — 6370000000 HC RX 637 (ALT 250 FOR IP): Performed by: PSYCHIATRY & NEUROLOGY

## 2024-05-11 PROCEDURE — 90792 PSYCH DIAG EVAL W/MED SRVCS: CPT | Performed by: PSYCHIATRY & NEUROLOGY

## 2024-05-11 PROCEDURE — 80048 BASIC METABOLIC PNL TOTAL CA: CPT

## 2024-05-11 PROCEDURE — 6360000002 HC RX W HCPCS

## 2024-05-11 PROCEDURE — 36415 COLL VENOUS BLD VENIPUNCTURE: CPT

## 2024-05-11 PROCEDURE — 94660 CPAP INITIATION&MGMT: CPT

## 2024-05-11 PROCEDURE — 85025 COMPLETE CBC W/AUTO DIFF WBC: CPT

## 2024-05-11 PROCEDURE — 99232 SBSQ HOSP IP/OBS MODERATE 35: CPT | Performed by: INTERNAL MEDICINE

## 2024-05-11 PROCEDURE — 2700000000 HC OXYGEN THERAPY PER DAY

## 2024-05-11 PROCEDURE — 6370000000 HC RX 637 (ALT 250 FOR IP): Performed by: EMERGENCY MEDICINE

## 2024-05-11 PROCEDURE — 2580000003 HC RX 258

## 2024-05-11 PROCEDURE — 94640 AIRWAY INHALATION TREATMENT: CPT

## 2024-05-11 PROCEDURE — 2060000000 HC ICU INTERMEDIATE R&B

## 2024-05-11 PROCEDURE — 5A09357 ASSISTANCE WITH RESPIRATORY VENTILATION, LESS THAN 24 CONSECUTIVE HOURS, CONTINUOUS POSITIVE AIRWAY PRESSURE: ICD-10-PCS | Performed by: INTERNAL MEDICINE

## 2024-05-11 RX ORDER — PREDNISONE 20 MG/1
40 TABLET ORAL 2 TIMES DAILY
Status: DISCONTINUED | OUTPATIENT
Start: 2024-05-11 | End: 2024-05-11

## 2024-05-11 RX ORDER — SODIUM CHLORIDE FOR INHALATION 3 %
4 VIAL, NEBULIZER (ML) INHALATION PRN
Status: DISCONTINUED | OUTPATIENT
Start: 2024-05-11 | End: 2024-05-11

## 2024-05-11 RX ORDER — PREDNISONE 20 MG/1
40 TABLET ORAL DAILY
Status: DISCONTINUED | OUTPATIENT
Start: 2024-05-12 | End: 2024-05-12

## 2024-05-11 RX ORDER — BUSPIRONE HYDROCHLORIDE 7.5 MG/1
7.5 TABLET ORAL 2 TIMES DAILY
Status: DISCONTINUED | OUTPATIENT
Start: 2024-05-11 | End: 2024-05-13 | Stop reason: HOSPADM

## 2024-05-11 RX ORDER — CALCIUM CARBONATE 500 MG/1
500 TABLET, CHEWABLE ORAL 3 TIMES DAILY PRN
Status: DISCONTINUED | OUTPATIENT
Start: 2024-05-11 | End: 2024-05-13

## 2024-05-11 RX ORDER — SODIUM CHLORIDE FOR INHALATION 3 %
4 VIAL, NEBULIZER (ML) INHALATION
Status: DISCONTINUED | OUTPATIENT
Start: 2024-05-11 | End: 2024-05-11

## 2024-05-11 RX ORDER — HYDROXYZINE PAMOATE 25 MG/1
25 CAPSULE ORAL 3 TIMES DAILY
Status: DISCONTINUED | OUTPATIENT
Start: 2024-05-11 | End: 2024-05-13 | Stop reason: HOSPADM

## 2024-05-11 RX ADMIN — Medication 10 MG: at 20:22

## 2024-05-11 RX ADMIN — SODIUM CHLORIDE, PRESERVATIVE FREE 10 ML: 5 INJECTION INTRAVENOUS at 09:08

## 2024-05-11 RX ADMIN — HYDROXYZINE PAMOATE 25 MG: 25 CAPSULE ORAL at 20:22

## 2024-05-11 RX ADMIN — ROFLUMILAST 250 MCG: 500 TABLET ORAL at 09:07

## 2024-05-11 RX ADMIN — BUDESONIDE INHALATION 500 MCG: 0.5 SUSPENSION RESPIRATORY (INHALATION) at 19:10

## 2024-05-11 RX ADMIN — IPRATROPIUM BROMIDE AND ALBUTEROL SULFATE 1 DOSE: .5; 2.5 SOLUTION RESPIRATORY (INHALATION) at 19:10

## 2024-05-11 RX ADMIN — PRAZOSIN HYDROCHLORIDE 1 MG: 1 CAPSULE ORAL at 20:22

## 2024-05-11 RX ADMIN — ARIPIPRAZOLE 5 MG: 10 TABLET ORAL at 09:08

## 2024-05-11 RX ADMIN — HYDROXYZINE PAMOATE 25 MG: 25 CAPSULE ORAL at 09:11

## 2024-05-11 RX ADMIN — ENOXAPARIN SODIUM 30 MG: 100 INJECTION SUBCUTANEOUS at 09:07

## 2024-05-11 RX ADMIN — IPRATROPIUM BROMIDE AND ALBUTEROL SULFATE 1 DOSE: .5; 2.5 SOLUTION RESPIRATORY (INHALATION) at 12:52

## 2024-05-11 RX ADMIN — IPRATROPIUM BROMIDE AND ALBUTEROL SULFATE 1 DOSE: .5; 2.5 SOLUTION RESPIRATORY (INHALATION) at 15:48

## 2024-05-11 RX ADMIN — ENOXAPARIN SODIUM 30 MG: 100 INJECTION SUBCUTANEOUS at 20:23

## 2024-05-11 RX ADMIN — IPRATROPIUM BROMIDE AND ALBUTEROL SULFATE 1 DOSE: .5; 2.5 SOLUTION RESPIRATORY (INHALATION) at 08:57

## 2024-05-11 RX ADMIN — PREDNISONE 40 MG: 20 TABLET ORAL at 09:08

## 2024-05-11 RX ADMIN — ARFORMOTEROL TARTRATE 15 MCG: 15 SOLUTION RESPIRATORY (INHALATION) at 19:10

## 2024-05-11 RX ADMIN — HYDROXYZINE PAMOATE 25 MG: 25 CAPSULE ORAL at 13:34

## 2024-05-11 RX ADMIN — Medication 2000 UNITS: at 09:08

## 2024-05-11 RX ADMIN — BUDESONIDE INHALATION 500 MCG: 0.5 SUSPENSION RESPIRATORY (INHALATION) at 08:57

## 2024-05-11 RX ADMIN — ARFORMOTEROL TARTRATE 15 MCG: 15 SOLUTION RESPIRATORY (INHALATION) at 08:57

## 2024-05-11 NOTE — PROGRESS NOTES
[facility]  Internal Medicine Residency Program  Progress Note - House Team       Patient:  Brenda Nunn 58 y.o. female   MRN: [id number]       Date of Service: 5/11/2024    CC: Suicidal (Suicidal ideations with a plan to take all of her prescribed medications. Denies HI. Patient states she was recently started on Buspar) and Shortness of Breath (Hx COPD. 4L NC at baseline. +wheezing. Duoneb given by EMS)    Overnight events: none     Subjective     Patient was seen and examined at bedside. She was sitting up at the side of her bed for breakfast. She looked fatigued. She feels SOB and wheezy. Denied chest pain, abdominal pain, N/V/D.    Objective     I & O - 24hr:  No intake or output data in the 24 hours ending 05/11/24 0752  Net IO Since Admission: No IO data has been entered for this period [05/11/24 0752]    Physical Exam  Vitals: BP (!) 141/89   Pulse 88   Temp 96.8 °F (36 °C) (Temporal)   Resp 22   Ht 1.702 m (5' 7\")   Wt 113.4 kg (250 lb)   SpO2 93%   BMI 39.16 kg/m²     General Appearance: alert, appears stated age, and cooperative on 4 L oxygen via NC  HEENT:  Head: Normal, normocephalic, atraumatic.  Lung: wheezes bilaterally  Heart: regular rate and rhythm and S1, S2 normal  Abdomen:  normal BS  Extremities:  extremities normal, atraumatic, no cyanosis or edema  Neurologic: Alert, oriented, thought content appropriate    Diet:   ADULT DIET; Regular      Pertinent Labs & Imaging Studies     Labs    Recent Labs     05/09/24  1355 05/10/24  0742 05/11/24  0523   WBC 14.0* 10.9 9.8   HGB 12.7 12.4 12.4   HCT 40.6 40.4 39.8   MCV 89.8 90.6 91.5    212 182     Recent Labs     05/09/24  1355 05/10/24  0742 05/11/24  0523    140 140   K 4.3 3.9 3.9   CL 98 98 100   CO2 28 33* 29   BUN 21* 18 19   CREATININE 0.7 0.7 0.7     Recent Labs     05/09/24  1355 05/10/24  0742 05/11/24  0523   GLUCOSE 160* 100* 88     Recent Labs     05/09/24  1355   ALT 19   AST 14   ALKPHOS 79

## 2024-05-11 NOTE — PROGRESS NOTES
New Psych consult sent to Yasmine Irving NP for depression with suicidal ideations.     Stepan Morrell RN

## 2024-05-11 NOTE — PROGRESS NOTES
Date: 5/11/2024    Time: 7:36 AM    Patient Placed On BIPAP/CPAP/ Non-Invasive Ventilation?  Yes    If no must comment.  Facial area red/color change? No           If YES are Blister/Lesion present?No   If yes must notify nursing staff  BIPAP/CPAP skin barrier?  Yes    Skin barrier type:mepilex       Comments:     05/10/24 7186   NIV Type   NIV Started/Stopped On   Mode Biphasic   Mask Type Full face mask   Mask Size Medium   Assessment   Pulse 70   Respirations 25   SpO2 98 %   Level of Consciousness 0   Comfort Level Good   Using Accessory Muscles No   Mask Compliance Good   Skin Assessment Clean, dry, & intact   Skin Protection for O2 Device Yes   Orientation Middle   Location Nose   Intervention(s) Skin Barrier   Settings/Measurements   PIP Observed 14 cm H20   IPAP 12 cmH20   CPAP/EPAP 6 cmH2O   Vt (Measured) 639 mL   Rate Ordered 14   Insp Rise Time (%) 2 %   FiO2  30 %   I Time/ I Time % 0.8 s   Minute Volume (L/min) 15 Liters   Mask Leak (lpm) 31 lpm           MARIO THURSTON RCP

## 2024-05-12 LAB
ANION GAP SERPL CALCULATED.3IONS-SCNC: 9 MMOL/L (ref 7–16)
BASOPHILS # BLD: 0.03 K/UL (ref 0–0.2)
BASOPHILS NFR BLD: 0 % (ref 0–2)
BUN SERPL-MCNC: 19 MG/DL (ref 6–20)
CALCIUM SERPL-MCNC: 9.5 MG/DL (ref 8.6–10.2)
CHLORIDE SERPL-SCNC: 102 MMOL/L (ref 98–107)
CO2 SERPL-SCNC: 27 MMOL/L (ref 22–29)
CREAT SERPL-MCNC: 0.7 MG/DL (ref 0.5–1)
EOSINOPHIL # BLD: 0.07 K/UL (ref 0.05–0.5)
EOSINOPHILS RELATIVE PERCENT: 1 % (ref 0–6)
ERYTHROCYTE [DISTWIDTH] IN BLOOD BY AUTOMATED COUNT: 13.8 % (ref 11.5–15)
GFR, ESTIMATED: >90 ML/MIN/1.73M2
GLUCOSE SERPL-MCNC: 82 MG/DL (ref 74–99)
HCT VFR BLD AUTO: 38.6 % (ref 34–48)
HGB BLD-MCNC: 12 G/DL (ref 11.5–15.5)
IMM GRANULOCYTES # BLD AUTO: 0.04 K/UL (ref 0–0.58)
IMM GRANULOCYTES NFR BLD: 0 % (ref 0–5)
LYMPHOCYTES NFR BLD: 2.77 K/UL (ref 1.5–4)
LYMPHOCYTES RELATIVE PERCENT: 30 % (ref 20–42)
MAGNESIUM SERPL-MCNC: 1.7 MG/DL (ref 1.6–2.6)
MCH RBC QN AUTO: 28.6 PG (ref 26–35)
MCHC RBC AUTO-ENTMCNC: 31.1 G/DL (ref 32–34.5)
MCV RBC AUTO: 91.9 FL (ref 80–99.9)
MONOCYTES NFR BLD: 0.84 K/UL (ref 0.1–0.95)
MONOCYTES NFR BLD: 9 % (ref 2–12)
NEUTROPHILS NFR BLD: 60 % (ref 43–80)
NEUTS SEG NFR BLD: 5.63 K/UL (ref 1.8–7.3)
PHOSPHATE SERPL-MCNC: 3.5 MG/DL (ref 2.5–4.5)
PLATELET # BLD AUTO: 172 K/UL (ref 130–450)
PMV BLD AUTO: 10.5 FL (ref 7–12)
POTASSIUM SERPL-SCNC: 3.7 MMOL/L (ref 3.5–5)
RBC # BLD AUTO: 4.2 M/UL (ref 3.5–5.5)
SODIUM SERPL-SCNC: 138 MMOL/L (ref 132–146)
WBC OTHER # BLD: 9.4 K/UL (ref 4.5–11.5)

## 2024-05-12 PROCEDURE — 94660 CPAP INITIATION&MGMT: CPT

## 2024-05-12 PROCEDURE — 6360000002 HC RX W HCPCS

## 2024-05-12 PROCEDURE — 94640 AIRWAY INHALATION TREATMENT: CPT

## 2024-05-12 PROCEDURE — 6370000000 HC RX 637 (ALT 250 FOR IP)

## 2024-05-12 PROCEDURE — 84100 ASSAY OF PHOSPHORUS: CPT

## 2024-05-12 PROCEDURE — 2580000003 HC RX 258

## 2024-05-12 PROCEDURE — 83735 ASSAY OF MAGNESIUM: CPT

## 2024-05-12 PROCEDURE — 99232 SBSQ HOSP IP/OBS MODERATE 35: CPT | Performed by: INTERNAL MEDICINE

## 2024-05-12 PROCEDURE — 2060000000 HC ICU INTERMEDIATE R&B

## 2024-05-12 PROCEDURE — 85025 COMPLETE CBC W/AUTO DIFF WBC: CPT

## 2024-05-12 PROCEDURE — 80048 BASIC METABOLIC PNL TOTAL CA: CPT

## 2024-05-12 PROCEDURE — 2700000000 HC OXYGEN THERAPY PER DAY

## 2024-05-12 PROCEDURE — 6370000000 HC RX 637 (ALT 250 FOR IP): Performed by: PSYCHIATRY & NEUROLOGY

## 2024-05-12 PROCEDURE — 94669 MECHANICAL CHEST WALL OSCILL: CPT

## 2024-05-12 PROCEDURE — 36415 COLL VENOUS BLD VENIPUNCTURE: CPT

## 2024-05-12 PROCEDURE — 6370000000 HC RX 637 (ALT 250 FOR IP): Performed by: EMERGENCY MEDICINE

## 2024-05-12 RX ORDER — QUETIAPINE FUMARATE 25 MG/1
25 TABLET, FILM COATED ORAL ONCE
Status: COMPLETED | OUTPATIENT
Start: 2024-05-12 | End: 2024-05-12

## 2024-05-12 RX ORDER — LANOLIN ALCOHOL/MO/W.PET/CERES
400 CREAM (GRAM) TOPICAL 2 TIMES DAILY
Status: DISCONTINUED | OUTPATIENT
Start: 2024-05-12 | End: 2024-05-13 | Stop reason: HOSPADM

## 2024-05-12 RX ORDER — ALPRAZOLAM 0.25 MG/1
0.25 TABLET ORAL 3 TIMES DAILY PRN
Status: DISCONTINUED | OUTPATIENT
Start: 2024-05-12 | End: 2024-05-13 | Stop reason: HOSPADM

## 2024-05-12 RX ORDER — ROFLUMILAST 500 UG/1
500 TABLET ORAL DAILY
Status: DISCONTINUED | OUTPATIENT
Start: 2024-05-13 | End: 2024-05-13 | Stop reason: HOSPADM

## 2024-05-12 RX ORDER — PREDNISONE 20 MG/1
20 TABLET ORAL DAILY
Status: DISCONTINUED | OUTPATIENT
Start: 2024-05-13 | End: 2024-05-13 | Stop reason: HOSPADM

## 2024-05-12 RX ORDER — POTASSIUM CHLORIDE 20 MEQ/1
20 TABLET, EXTENDED RELEASE ORAL ONCE
Status: COMPLETED | OUTPATIENT
Start: 2024-05-12 | End: 2024-05-12

## 2024-05-12 RX ADMIN — BUSPIRONE HYDROCHLORIDE 7.5 MG: 7.5 TABLET ORAL at 20:25

## 2024-05-12 RX ADMIN — Medication 10 MG: at 20:25

## 2024-05-12 RX ADMIN — PREDNISONE 40 MG: 20 TABLET ORAL at 09:13

## 2024-05-12 RX ADMIN — Medication 2000 UNITS: at 09:13

## 2024-05-12 RX ADMIN — QUETIAPINE FUMARATE 25 MG: 25 TABLET ORAL at 13:27

## 2024-05-12 RX ADMIN — ROFLUMILAST 250 MCG: 500 TABLET ORAL at 09:13

## 2024-05-12 RX ADMIN — IPRATROPIUM BROMIDE AND ALBUTEROL SULFATE 1 DOSE: .5; 2.5 SOLUTION RESPIRATORY (INHALATION) at 11:23

## 2024-05-12 RX ADMIN — HYDROXYZINE PAMOATE 25 MG: 25 CAPSULE ORAL at 20:28

## 2024-05-12 RX ADMIN — SALINE NASAL SPRAY 1 SPRAY: 1.5 SOLUTION NASAL at 20:33

## 2024-05-12 RX ADMIN — BUDESONIDE INHALATION 500 MCG: 0.5 SUSPENSION RESPIRATORY (INHALATION) at 19:20

## 2024-05-12 RX ADMIN — HYDROXYZINE PAMOATE 25 MG: 25 CAPSULE ORAL at 09:13

## 2024-05-12 RX ADMIN — ARFORMOTEROL TARTRATE 15 MCG: 15 SOLUTION RESPIRATORY (INHALATION) at 08:55

## 2024-05-12 RX ADMIN — Medication 400 MG: at 13:02

## 2024-05-12 RX ADMIN — ENOXAPARIN SODIUM 30 MG: 100 INJECTION SUBCUTANEOUS at 20:25

## 2024-05-12 RX ADMIN — PRAZOSIN HYDROCHLORIDE 1 MG: 1 CAPSULE ORAL at 20:25

## 2024-05-12 RX ADMIN — IPRATROPIUM BROMIDE AND ALBUTEROL SULFATE 1 DOSE: .5; 2.5 SOLUTION RESPIRATORY (INHALATION) at 15:41

## 2024-05-12 RX ADMIN — HYDROXYZINE PAMOATE 25 MG: 25 CAPSULE ORAL at 14:00

## 2024-05-12 RX ADMIN — Medication 400 MG: at 20:25

## 2024-05-12 RX ADMIN — POTASSIUM CHLORIDE 20 MEQ: 1500 TABLET, EXTENDED RELEASE ORAL at 06:45

## 2024-05-12 RX ADMIN — BUDESONIDE INHALATION 500 MCG: 0.5 SUSPENSION RESPIRATORY (INHALATION) at 08:55

## 2024-05-12 RX ADMIN — SODIUM CHLORIDE, PRESERVATIVE FREE 10 ML: 5 INJECTION INTRAVENOUS at 20:26

## 2024-05-12 RX ADMIN — SALINE NASAL SPRAY 1 SPRAY: 1.5 SOLUTION NASAL at 09:15

## 2024-05-12 RX ADMIN — SODIUM CHLORIDE, PRESERVATIVE FREE 10 ML: 5 INJECTION INTRAVENOUS at 09:14

## 2024-05-12 RX ADMIN — ARIPIPRAZOLE 5 MG: 10 TABLET ORAL at 09:13

## 2024-05-12 RX ADMIN — IPRATROPIUM BROMIDE AND ALBUTEROL SULFATE 1 DOSE: .5; 2.5 SOLUTION RESPIRATORY (INHALATION) at 19:20

## 2024-05-12 RX ADMIN — ARFORMOTEROL TARTRATE 15 MCG: 15 SOLUTION RESPIRATORY (INHALATION) at 19:20

## 2024-05-12 RX ADMIN — IPRATROPIUM BROMIDE AND ALBUTEROL SULFATE 1 DOSE: .5; 2.5 SOLUTION RESPIRATORY (INHALATION) at 08:55

## 2024-05-12 ASSESSMENT — PAIN SCALES - GENERAL: PAINLEVEL_OUTOF10: 0

## 2024-05-12 NOTE — PROGRESS NOTES
[facility]  Internal Medicine Residency Program  Progress Note - House Team       Patient:  Brenda Nunn 58 y.o. female   MRN: [id number]       Date of Service: 5/12/2024    CC: Suicidal (Suicidal ideations with a plan to take all of her prescribed medications. Denies HI. Patient states she was recently started on Buspar) and Shortness of Breath (Hx COPD. 4L NC at baseline. +wheezing. Duoneb given by EMS)    Overnight events: none     Subjective     Patient was seen and examined at bedside. She was sitting up in bed uncomfortable. She looked fatigued. She feels SOB. Denied chest pain, wheezes, abdominal pain, N/V/D.     Objective     I & O - 24hr:  No intake or output data in the 24 hours ending 05/12/24 0807  Net IO Since Admission: No IO data has been entered for this period [05/12/24 0807]    Physical Exam  Vitals: /66   Pulse 75   Temp 97.8 °F (36.6 °C) (Oral)   Resp 22   Ht 1.702 m (5' 7\")   Wt 113.4 kg (250 lb)   SpO2 96%   BMI 39.16 kg/m²     General Appearance: alert, appears stated age, and cooperative on 4 L oxygen via NC  HEENT:  Head: Normal, normocephalic, atraumatic.  Lung: no wheezes or rhonchi but diminished breath sounds bilaterally (soon after breathing treatments)  Heart: regular rate and rhythm and S1, S2 normal  Abdomen:  normal BS  Extremities:  extremities normal, atraumatic, no cyanosis or edema  Neurologic: Alert, oriented, thought content appropriate    Diet:   ADULT DIET; Regular      Pertinent Labs & Imaging Studies     Labs    Recent Labs     05/09/24  1355 05/10/24  0742 05/11/24  0523   WBC 14.0* 10.9 9.8   HGB 12.7 12.4 12.4   HCT 40.6 40.4 39.8   MCV 89.8 90.6 91.5    212 182     Recent Labs     05/10/24  0742 05/11/24  0523 05/12/24  0419    140 138   K 3.9 3.9 3.7   CL 98 100 102   CO2 33* 29 27   BUN 18 19 19   CREATININE 0.7 0.7 0.7     Recent Labs     05/10/24  0742 05/11/24  0523 05/12/24  0419   GLUCOSE 100* 88 82     Recent Labs

## 2024-05-12 NOTE — CONSULTS
Referring Physician:     Reason for Psych Consult: Anxiety. suicidal ideation      Patient is a 58-year-old woman known to us from her previous admission at psych unit discharge elevated stable condition but failed to go to the outpatient because of the transportation issue, medication being managed by the primary care physician, who added and increased the dose of BuSpar recently.  Patient also have history of chronic respiratory failure requires home oxygen, obstructive sleep apnea, obesity, tobacco use, and was diagnosed with bipolar disorder.  She came to the emergency room stating that she has been depressed lately and started taking BuSpar in the morning.  She said that she started feeling more anxious and suicidal after taking more BuSpar.  She said she had no intention to overdose herself but she says she was taking more than usual dose because of the anxiety.  She was having shortness of breath due to exacerbation of COPD and was found to have wheezing.  She was admitted to the medical floor and psychiatry was consulted.  She was placed on 4 L nasal cannula and placed on CPAP.  Labs were mildly hyperglycemic and leukocytosis as well.  Chest x-ray showed mild increased markings in the lung bases.  She was given steroids and breathing treatment.  She is being managed the medical floor for shortness of breath which is getting better.  I saw the patient this morning with my nurse practitioner and she immediately recognized us from last admission.  She said she did not go to his psychiatric appointment because she did not have a transport.  She says she is working on getting the [place] Medicaid and that way she will get the transportation.  She said she is working with her daughter on that.  She said that her medication was managed by the primary care physician and there were some significant changes in that medication.  She says she is on Abilify which is helping her.  She also said that she was given 
05/03/2024 10:35 PM     Hemoglobin A1C: No components found for: \"HGBA1C\"    IMAGING:  Imaging tests were completed and reviewed and discussed radiology and care team involved and reveals   XR CHEST PORTABLE    Result Date: 3/6/2024  EXAMINATION: ONE XRAY VIEW OF THE CHEST PORTABLE UPRIGHT 3/6/2024 11:47 am COMPARISON: Portable chest 02/18/2020 for and two-view chest 02/07/2024 2024 HISTORY: ORDERING SYSTEM PROVIDED HISTORY: shortness of breath TECHNOLOGIST PROVIDED HISTORY: Reason for exam:->shortness of breath What reading provider will be dictating this exam?->CRC FINDINGS: Large body habitus and limited portable technique.  Mild rotation of the patient to the right. Right basilar platelike opacity compatible with discoid atelectasis and not seen previously.  Upper lobe emphysema.  The left lung shows no focal infiltrate.  The heart is upper normal in size.  The pulmonary vascularity is not congested.  No pneumothorax.     1.  Right basilar discoid atelectasis, not seen previously. 2.  Chronic lung disease with emphysema. 3.  Poor penetration of the lung bases related to portable technique and large body habitus.     Echo (TTE) complete (PRN contrast/bubble/strain/3D)    Result Date: 2/20/2024    Left Ventricle: Normal left ventricular systolic function with a visually estimated EF of 55 - 60%. Left ventricle size is normal. Normal wall thickness. Normal wall motion. Grade I diastolic dysfunction.   Right Ventricle: Normal systolic function.   Mitral Valve: Trace regurgitation.   Tricuspid Valve: Trace regurgitation. The estimated RVSP is 41 mmHg.   Pericardium: No pericardial effusion.         Assessment: 58 year old with moderate COPD and freq (extrafreq) exacerbations confound by drugs, smoking and noncompliance with pulmonary rehab and some continued anxiety.   COPD,? Exacerbation, pH normal, Not hypoxemic in ER  Anxiety - seen by psy a few time, limited helpfulness  Smoking  Canibus abuse  Emphysema.

## 2024-05-13 VITALS
WEIGHT: 248.2 LBS | HEART RATE: 95 BPM | TEMPERATURE: 98 F | HEIGHT: 67 IN | BODY MASS INDEX: 38.96 KG/M2 | RESPIRATION RATE: 18 BRPM | DIASTOLIC BLOOD PRESSURE: 88 MMHG | OXYGEN SATURATION: 98 % | SYSTOLIC BLOOD PRESSURE: 140 MMHG

## 2024-05-13 LAB
ANION GAP SERPL CALCULATED.3IONS-SCNC: 12 MMOL/L (ref 7–16)
BASOPHILS # BLD: 0.02 K/UL (ref 0–0.2)
BASOPHILS NFR BLD: 0 % (ref 0–2)
BUN SERPL-MCNC: 14 MG/DL (ref 6–20)
CALCIUM SERPL-MCNC: 9.4 MG/DL (ref 8.6–10.2)
CHLORIDE SERPL-SCNC: 102 MMOL/L (ref 98–107)
CO2 SERPL-SCNC: 28 MMOL/L (ref 22–29)
CREAT SERPL-MCNC: 0.7 MG/DL (ref 0.5–1)
EOSINOPHIL # BLD: 0.1 K/UL (ref 0.05–0.5)
EOSINOPHILS RELATIVE PERCENT: 1 % (ref 0–6)
ERYTHROCYTE [DISTWIDTH] IN BLOOD BY AUTOMATED COUNT: 13.6 % (ref 11.5–15)
GFR, ESTIMATED: >90 ML/MIN/1.73M2
GLUCOSE SERPL-MCNC: 97 MG/DL (ref 74–99)
HCT VFR BLD AUTO: 37.8 % (ref 34–48)
HGB BLD-MCNC: 11.8 G/DL (ref 11.5–15.5)
IMM GRANULOCYTES # BLD AUTO: 0.04 K/UL (ref 0–0.58)
IMM GRANULOCYTES NFR BLD: 1 % (ref 0–5)
LYMPHOCYTES NFR BLD: 2.56 K/UL (ref 1.5–4)
LYMPHOCYTES RELATIVE PERCENT: 31 % (ref 20–42)
MCH RBC QN AUTO: 28.4 PG (ref 26–35)
MCHC RBC AUTO-ENTMCNC: 31.2 G/DL (ref 32–34.5)
MCV RBC AUTO: 91.1 FL (ref 80–99.9)
MONOCYTES NFR BLD: 0.75 K/UL (ref 0.1–0.95)
MONOCYTES NFR BLD: 9 % (ref 2–12)
NEUTROPHILS NFR BLD: 58 % (ref 43–80)
NEUTS SEG NFR BLD: 4.83 K/UL (ref 1.8–7.3)
PLATELET # BLD AUTO: 170 K/UL (ref 130–450)
PMV BLD AUTO: 10.6 FL (ref 7–12)
POTASSIUM SERPL-SCNC: 3.8 MMOL/L (ref 3.5–5)
RBC # BLD AUTO: 4.15 M/UL (ref 3.5–5.5)
SODIUM SERPL-SCNC: 142 MMOL/L (ref 132–146)
WBC OTHER # BLD: 8.3 K/UL (ref 4.5–11.5)

## 2024-05-13 PROCEDURE — 6370000000 HC RX 637 (ALT 250 FOR IP): Performed by: PSYCHIATRY & NEUROLOGY

## 2024-05-13 PROCEDURE — 6370000000 HC RX 637 (ALT 250 FOR IP)

## 2024-05-13 PROCEDURE — 6370000000 HC RX 637 (ALT 250 FOR IP): Performed by: EMERGENCY MEDICINE

## 2024-05-13 PROCEDURE — 99238 HOSP IP/OBS DSCHRG MGMT 30/<: CPT | Performed by: INTERNAL MEDICINE

## 2024-05-13 PROCEDURE — 94640 AIRWAY INHALATION TREATMENT: CPT

## 2024-05-13 PROCEDURE — 6370000000 HC RX 637 (ALT 250 FOR IP): Performed by: INTERNAL MEDICINE

## 2024-05-13 PROCEDURE — 36415 COLL VENOUS BLD VENIPUNCTURE: CPT

## 2024-05-13 PROCEDURE — 2580000003 HC RX 258

## 2024-05-13 PROCEDURE — 80048 BASIC METABOLIC PNL TOTAL CA: CPT

## 2024-05-13 PROCEDURE — 2700000000 HC OXYGEN THERAPY PER DAY

## 2024-05-13 PROCEDURE — 94660 CPAP INITIATION&MGMT: CPT

## 2024-05-13 PROCEDURE — 6360000002 HC RX W HCPCS

## 2024-05-13 PROCEDURE — 85025 COMPLETE CBC W/AUTO DIFF WBC: CPT

## 2024-05-13 RX ORDER — HYDROXYZINE HYDROCHLORIDE 25 MG/1
25 TABLET, FILM COATED ORAL EVERY 8 HOURS PRN
Qty: 42 TABLET | Refills: 1 | Status: SHIPPED | OUTPATIENT
Start: 2024-05-13

## 2024-05-13 RX ORDER — ROFLUMILAST 500 UG/1
500 TABLET ORAL DAILY
Qty: 30 TABLET | Refills: 2 | Status: SHIPPED | OUTPATIENT
Start: 2024-05-14

## 2024-05-13 RX ORDER — CALCIUM CARBONATE 500 MG/1
500 TABLET, CHEWABLE ORAL 2 TIMES DAILY
Status: DISCONTINUED | OUTPATIENT
Start: 2024-05-13 | End: 2024-05-13 | Stop reason: HOSPADM

## 2024-05-13 RX ORDER — CALCIUM CARBONATE 500 MG/1
500 TABLET, CHEWABLE ORAL EVERY 8 HOURS PRN
Qty: 60 TABLET | Refills: 0 | Status: SHIPPED | OUTPATIENT
Start: 2024-05-13 | End: 2024-06-12

## 2024-05-13 RX ORDER — PREDNISONE 5 MG/1
TABLET ORAL
Qty: 15 TABLET | Refills: 0 | Status: SHIPPED | OUTPATIENT
Start: 2024-05-13 | End: 2024-05-17

## 2024-05-13 RX ORDER — POTASSIUM CHLORIDE 20 MEQ/1
20 TABLET, EXTENDED RELEASE ORAL ONCE
Status: COMPLETED | OUTPATIENT
Start: 2024-05-13 | End: 2024-05-13

## 2024-05-13 RX ADMIN — PREDNISONE 20 MG: 20 TABLET ORAL at 08:37

## 2024-05-13 RX ADMIN — ARIPIPRAZOLE 5 MG: 10 TABLET ORAL at 08:37

## 2024-05-13 RX ADMIN — Medication 400 MG: at 08:37

## 2024-05-13 RX ADMIN — SALINE NASAL SPRAY 1 SPRAY: 1.5 SOLUTION NASAL at 08:37

## 2024-05-13 RX ADMIN — HYDROXYZINE PAMOATE 25 MG: 25 CAPSULE ORAL at 14:06

## 2024-05-13 RX ADMIN — HYDROXYZINE PAMOATE 25 MG: 25 CAPSULE ORAL at 08:36

## 2024-05-13 RX ADMIN — Medication 2000 UNITS: at 08:37

## 2024-05-13 RX ADMIN — IPRATROPIUM BROMIDE AND ALBUTEROL SULFATE 1 DOSE: .5; 2.5 SOLUTION RESPIRATORY (INHALATION) at 11:28

## 2024-05-13 RX ADMIN — BUDESONIDE INHALATION 500 MCG: 0.5 SUSPENSION RESPIRATORY (INHALATION) at 08:06

## 2024-05-13 RX ADMIN — IPRATROPIUM BROMIDE AND ALBUTEROL SULFATE 1 DOSE: .5; 2.5 SOLUTION RESPIRATORY (INHALATION) at 08:06

## 2024-05-13 RX ADMIN — ROFLUMILAST 500 MCG: 500 TABLET ORAL at 08:36

## 2024-05-13 RX ADMIN — BUSPIRONE HYDROCHLORIDE 7.5 MG: 7.5 TABLET ORAL at 08:37

## 2024-05-13 RX ADMIN — POTASSIUM CHLORIDE 20 MEQ: 1500 TABLET, EXTENDED RELEASE ORAL at 10:02

## 2024-05-13 RX ADMIN — SODIUM CHLORIDE, PRESERVATIVE FREE 10 ML: 5 INJECTION INTRAVENOUS at 08:37

## 2024-05-13 RX ADMIN — ARFORMOTEROL TARTRATE 15 MCG: 15 SOLUTION RESPIRATORY (INHALATION) at 08:06

## 2024-05-13 RX ADMIN — CALCIUM CARBONATE 500 MG: 500 TABLET, CHEWABLE ORAL at 11:25

## 2024-05-13 RX ADMIN — ALPRAZOLAM 0.25 MG: 0.25 TABLET ORAL at 11:25

## 2024-05-13 ASSESSMENT — PAIN SCALES - GENERAL
PAINLEVEL_OUTOF10: 0

## 2024-05-13 NOTE — PROGRESS NOTES
Pt is aware about discharge but prefers to go home at 6pm or after, as her daughter would be available to pick her up at this time.    2:28PM: This pt informed this RN that her daughter would pick her up instead at 3pm or after.      Discharge instructions/papers given to pt. Informed she would have to pick her home meds at her preferred pharmacy indicated on dc papers. Understood. Peripheral IV line and portable telemetry monitor removed without complications. Arranged to pick on wheelchair with transport at 3PM.

## 2024-05-13 NOTE — CARE COORDINATION
Pt lives with her daughter, oziel, who is not working and pt is on disability. Pt has 3 grown children with only oziel local. Pt is not a . Her pcp is dr. Enriquez and saw her about 1 month ago. Pt is active with [facility] for nsg and therapy., will need roe orders on discharge. Pt said her daughter makes her do her own adl's. Pt sponge bathes but daughter cooks. Pt has and uses a rollator, o2 at 3-4 liters from [facility] with portables, nebulizer , and 3:1 commode. Pt has a 2 story home with 1st floor bedroom and the bathroom is on the 2nd floor. She enters thru the side door with 5 steps and 1 rail. Plan per pt is home via oziel. PT and OT to [facility]. Pulmonary is following. Psych signed off for suicidal ideations. VIOLETTE Lr  5/13/2024  Discharge home today. Rn to place roe orders. I called cherise from [facility] and she will inform the staff there and obtain the orders since she has access to epic..VIOLETTE Lr  5/13/2024    Pt was denied by [facility] when they called me back to say pt was noncompliant. I met with pt and she said that they never called her but called her daughter. She also said that she doesn't need PT and OT. She is to follow up with her pcp asap. I called her daughter but didn't answer, she is on her way here and I will try and speak to her. I sent a perfect serve to dr. Fong about her sob and should pt be discharged today and asked for him to make a referral to pulmonary rehab. The rn said pt is very anxious. I have reached out to [facility].     I spoke with oziel the daughter  and she said no one called her. She is working on getting pt on medicaid so that she can go to pulmonary rehab. She will try and get pt in to the pcp office asap. [facility] will not take pt. Hx of noncompliance, suicideal ideations and drug usage. Hx with [facility] and [facility]. VIOLETTE Lr]  5/13/2024      Case Management Assessment  Initial Evaluation    Date/Time of Evaluation: 5/13/2024 10:56

## 2024-05-13 NOTE — DISCHARGE INSTRUCTIONS
Internal medicine    Follow ups  Please follow up with the internal medicine clinic at [facility].Your appointment has been scheduled for May 20 at 8 am  Please keep all other follow up appointments:  Pulmonology follow up with Dr. Bedolla on June 19    Changes in healthcare   Please take all medications as indicated  Diet: regular diet   Activity: activity as tolerated  New Medications started during this hospital stay  Tums 500 mg chewable tablet as needed every 8 hours  Prednisone 5 mg tablet- Take 4 tablets daily for 2 days followed by 2 tablets daily for 2 days followed by 1 tablet daily for 3 days  Changes to your medications  Hydroxyzine HCL 25 MG every 8 hours as needed for anxiety  Roflumilast 500 MCG tablet daily  Medications you should stop taking   none  Additional labs, testing or imaging needed after discharge   None  Please contact us if you have any concerns, wish to change or make an appointment:  Internal medicine clinic   Phone: [phone]  Fax: [phone] [address] [address]  Or please call the nurse line at [phone].  Should you have further questions in regards to this visit, you can review your clinical note and after visit summary document on your [employer] account.  Other questions can be directed to our nurse line at [phone].     Other than any new prescriptions given to you today, the list of home medications on this After Visit Summary are based on information provided to us from you and your healthcare providers. This information, including the list, dose, and frequency of medications is only as accurate as the information you provided. If you have any questions or concerns about your home medications, please contact your Primary Care Physician for further clarification.

## 2024-05-13 NOTE — PLAN OF CARE
Problem: Chronic Conditions and Co-morbidities  Goal: Patient's chronic conditions and co-morbidity symptoms are monitored and maintained or improved  Outcome: Adequate for Discharge     Problem: Discharge Planning  Goal: Discharge to home or other facility with appropriate resources  Outcome: Adequate for Discharge     Problem: Skin/Tissue Integrity  Goal: Absence of new skin breakdown  Description: 1.  Monitor for areas of redness and/or skin breakdown  2.  Assess vascular access sites hourly  3.  Every 4-6 hours minimum:  Change oxygen saturation probe site  4.  Every 4-6 hours:  If on nasal continuous positive airway pressure, respiratory therapy assess nares and determine need for appliance change or resting period.  5/13/2024 1417 by Deni Tatum RN  Outcome: Adequate for Discharge     Problem: Safety - Adult  Goal: Free from fall injury  5/13/2024 1417 by Deni Tatum RN  Outcome: Adequate for Discharge

## 2024-05-13 NOTE — ACP (ADVANCE CARE PLANNING)
Advance Care Planning   Healthcare Decision Maker:    Primary Decision Maker: BRAIN SCHREIBER - Axel - [phone]    Click here to complete Healthcare Decision Makers including selection of the Healthcare Decision Maker Relationship (ie \"Primary\").          no advance directives and declined when offered. VIOLETTE Lr  5/13/2024

## 2024-05-13 NOTE — DISCHARGE SUMMARY
Summary are based on information provided to us from you and your healthcare providers. This information, including the list, dose, and frequency of medications is only as accurate as the information you provided. If you have any questions or concerns about your home medications, please contact your Primary Care Physician for further clarification.       Mary Jane Jolley MD  PGY- 1   2:30 PM 5/13/2024      Attending physician: Dr. Fong

## 2024-05-13 NOTE — PROGRESS NOTES
[facility]  Internal Medicine Residency / House Medicine Service    Attending Physician Statement  I have discussed the case, including pertinent history and exam findings with the resident and the team.  I have seen and examined the patient and the key elements of the encounter have been performed by me.  I agree with the assessment, plan and orders as documented by the resident.      A&O  Breathing better  Now with diarrhea, Daliresp?  VS stable   Steroids continue at discharge  GERD, Try TUMS , hopefully that will help diarrhea   Plan: Discharge planning  Remainder of medical problems as per resident note.      Jens Fong MD FRCP [facility]  Internal Medicine Residency Faculty

## 2024-05-14 ENCOUNTER — TELEPHONE (OUTPATIENT)
Dept: PHARMACY | Facility: CLINIC | Age: 59
End: 2024-05-14

## 2024-05-14 ENCOUNTER — CARE COORDINATION (OUTPATIENT)
Dept: CARE COORDINATION | Age: 59
End: 2024-05-14

## 2024-05-14 DIAGNOSIS — J44.1 COPD WITH EXACERBATION (HCC): Primary | ICD-10-CM

## 2024-05-14 NOTE — CARE COORDINATION
Care Transitions Initial Follow Up Call    Call within 2 business days of discharge: Yes    Patient Current Location:  Home: [address] [place] [id number]    Care Transition Nurse contacted the patient by telephone to perform post hospital discharge assessment. Verified name and  with patient as identifiers. Provided introduction to self, and explanation of the Care Transition Nurse role.     Patient: Brenda Nunn Patient : 1965   MRN: [id number]  Reason for Admission: 2024 - 2024 [facility] IP. Acute COPD, Suicidal ideation. Chronic hypoxic respiratory failure - on 3-4 L oxygen at baseline and BENJAMIN on CPAP.  Discharge Date: 24 RARS: Readmission Risk Score: 35  Readmit  CT  [facility] Payer report as a high risk patient. Hand off to Oliva MORALEZ RN.     Go to [facility] Internal Medicine (Internal Medicine) on 2024; For hospital Follow Up at 8 am   Pulm/Graeme  11:30    START taking:  calcium carbonate (TUMS)  CHANGE how you take:  hydrOXYzine HCl (ATARAX)  predniSONE (DELTASONE)  Roflumilast (DALIRESP)  Last Discharge Facility       Date Complaint Diagnosis Description Type Department Provider    24 Suicidal; Shortness of Breath COPD exacerbation (HCC) ... ED to Hosp-Admission (Discharged) (ADMITTED) [facility] Lorenzo Carroll Jr., DO; García, ...            Was this an external facility discharge? No Discharge Facility: [facility]    Challenges to be reviewed by the provider   Additional needs identified to be addressed with provider: No  none               Method of communication with provider: none.    CTN spoke w/ Brenda. Pt denies any current feelings of suicidal ideation or plan for self harm or harm to others. States she has been depressed. HHC declined pt. Pt states she does not feel HHC warranted. She states she is up w/ rollator. Enc pt to be active and consider gentle exercise/walking, v/u. Pt states she is wearing 3-4L NC

## 2024-05-14 NOTE — TELEPHONE ENCOUNTER
Received a referral:  from Care Coordinator to review patient’s medications. Called patient to schedule a time to speak with a pharmacist over the telephone.     No answer left VM on both home and mobile: Please contact us at  [phone] to schedule this appointment. Pharmacists are available Monday thru Friday 7:30 AM till 5:30 PM.      Chelsy Hawkins CPhT.   Population Health Clinical   [facility]  Toll free: [phone]

## 2024-05-15 ENCOUNTER — CARE COORDINATION (OUTPATIENT)
Dept: CARE COORDINATION | Age: 59
End: 2024-05-15

## 2024-05-15 NOTE — CARE COORDINATION
Call to pt to to initiate SW referral for transportation and pulse oximeter. Informed pt of insurance provider transportation. She indicated that as of June 1 she will have [employer]. Informed her that [employer] Medicare offer transportation as well. Suggested reviewing her booklet for the benefit or contacting customer service when she receives her card. Discussed the guidelines of transportation and calling a few days ahead. She stated that her dtr may be able to take her to her appt on June 1. Offered to f/u with pt to address transportation scheduling or concerns.

## 2024-05-17 ENCOUNTER — APPOINTMENT (OUTPATIENT)
Dept: GENERAL RADIOLOGY | Age: 59
End: 2024-05-17
Payer: MEDICARE

## 2024-05-17 ENCOUNTER — HOSPITAL ENCOUNTER (OUTPATIENT)
Age: 59
Setting detail: OBSERVATION
Discharge: HOME | End: 2024-05-19
Attending: EMERGENCY MEDICINE | Admitting: INTERNAL MEDICINE
Payer: MEDICARE

## 2024-05-17 DIAGNOSIS — E55.9 VITAMIN D DEFICIENCY: ICD-10-CM

## 2024-05-17 DIAGNOSIS — J44.9 CHRONIC OBSTRUCTIVE PULMONARY DISEASE, UNSPECIFIED COPD TYPE (HCC): ICD-10-CM

## 2024-05-17 DIAGNOSIS — J96.11 CHRONIC RESPIRATORY FAILURE WITH HYPOXIA (HCC): ICD-10-CM

## 2024-05-17 DIAGNOSIS — R06.00 DYSPNEA AND RESPIRATORY ABNORMALITIES: ICD-10-CM

## 2024-05-17 DIAGNOSIS — F41.9 ANXIETY: ICD-10-CM

## 2024-05-17 DIAGNOSIS — F31.9 BIPOLAR 1 DISORDER (HCC): ICD-10-CM

## 2024-05-17 DIAGNOSIS — R06.89 DYSPNEA AND RESPIRATORY ABNORMALITIES: ICD-10-CM

## 2024-05-17 DIAGNOSIS — G47.00 INSOMNIA, UNSPECIFIED TYPE: ICD-10-CM

## 2024-05-17 DIAGNOSIS — J44.1 COPD EXACERBATION (HCC): Primary | ICD-10-CM

## 2024-05-17 LAB
ANION GAP SERPL CALCULATED.3IONS-SCNC: 12 MMOL/L (ref 7–16)
B PARAP IS1001 DNA NPH QL NAA+NON-PROBE: NOT DETECTED
B PERT DNA SPEC QL NAA+PROBE: NOT DETECTED
BASOPHILS # BLD: 0.02 K/UL (ref 0–0.2)
BASOPHILS NFR BLD: 0 % (ref 0–2)
BNP SERPL-MCNC: 38 PG/ML (ref 0–125)
BUN SERPL-MCNC: 11 MG/DL (ref 6–20)
C PNEUM DNA NPH QL NAA+NON-PROBE: NOT DETECTED
CALCIUM SERPL-MCNC: 9.4 MG/DL (ref 8.6–10.2)
CHLORIDE SERPL-SCNC: 101 MMOL/L (ref 98–107)
CO2 SERPL-SCNC: 28 MMOL/L (ref 22–29)
CREAT SERPL-MCNC: 0.7 MG/DL (ref 0.5–1)
EKG ATRIAL RATE: 102 BPM
EKG P AXIS: 69 DEGREES
EKG P-R INTERVAL: 124 MS
EKG Q-T INTERVAL: 334 MS
EKG QRS DURATION: 80 MS
EKG QTC CALCULATION (BAZETT): 435 MS
EKG R AXIS: 48 DEGREES
EKG T AXIS: 55 DEGREES
EKG VENTRICULAR RATE: 102 BPM
EOSINOPHIL # BLD: 0.03 K/UL (ref 0.05–0.5)
EOSINOPHILS RELATIVE PERCENT: 0 % (ref 0–6)
ERYTHROCYTE [DISTWIDTH] IN BLOOD BY AUTOMATED COUNT: 13.7 % (ref 11.5–15)
FLUAV RNA NPH QL NAA+NON-PROBE: NOT DETECTED
FLUBV RNA NPH QL NAA+NON-PROBE: NOT DETECTED
GFR, ESTIMATED: >90 ML/MIN/1.73M2
GLUCOSE SERPL-MCNC: 138 MG/DL (ref 74–99)
HADV DNA NPH QL NAA+NON-PROBE: NOT DETECTED
HCOV 229E RNA NPH QL NAA+NON-PROBE: NOT DETECTED
HCOV HKU1 RNA NPH QL NAA+NON-PROBE: NOT DETECTED
HCOV NL63 RNA NPH QL NAA+NON-PROBE: NOT DETECTED
HCOV OC43 RNA NPH QL NAA+NON-PROBE: NOT DETECTED
HCT VFR BLD AUTO: 42.3 % (ref 34–48)
HGB BLD-MCNC: 12.9 G/DL (ref 11.5–15.5)
HMPV RNA NPH QL NAA+NON-PROBE: NOT DETECTED
HPIV1 RNA NPH QL NAA+NON-PROBE: NOT DETECTED
HPIV2 RNA NPH QL NAA+NON-PROBE: NOT DETECTED
HPIV3 RNA NPH QL NAA+NON-PROBE: NOT DETECTED
HPIV4 RNA NPH QL NAA+NON-PROBE: NOT DETECTED
IMM GRANULOCYTES # BLD AUTO: 0.08 K/UL (ref 0–0.58)
IMM GRANULOCYTES NFR BLD: 1 % (ref 0–5)
LYMPHOCYTES NFR BLD: 0.79 K/UL (ref 1.5–4)
LYMPHOCYTES RELATIVE PERCENT: 8 % (ref 20–42)
M PNEUMO DNA NPH QL NAA+NON-PROBE: NOT DETECTED
MAGNESIUM SERPL-MCNC: 1.6 MG/DL (ref 1.6–2.6)
MCH RBC QN AUTO: 27.4 PG (ref 26–35)
MCHC RBC AUTO-ENTMCNC: 30.5 G/DL (ref 32–34.5)
MCV RBC AUTO: 89.8 FL (ref 80–99.9)
MONOCYTES NFR BLD: 0.26 K/UL (ref 0.1–0.95)
MONOCYTES NFR BLD: 3 % (ref 2–12)
NEUTROPHILS NFR BLD: 88 % (ref 43–80)
NEUTS SEG NFR BLD: 8.34 K/UL (ref 1.8–7.3)
PLATELET # BLD AUTO: 221 K/UL (ref 130–450)
PMV BLD AUTO: 10.3 FL (ref 7–12)
POTASSIUM SERPL-SCNC: 3.9 MMOL/L (ref 3.5–5)
RBC # BLD AUTO: 4.71 M/UL (ref 3.5–5.5)
RSV BY PCR: NOT DETECTED
RSV RNA NPH QL NAA+NON-PROBE: NOT DETECTED
RV+EV RNA NPH QL NAA+NON-PROBE: NOT DETECTED
SARS-COV-2 RDRP RESP QL NAA+PROBE: NOT DETECTED
SARS-COV-2 RNA NPH QL NAA+NON-PROBE: NOT DETECTED
SODIUM SERPL-SCNC: 141 MMOL/L (ref 132–146)
SPECIMEN DESCRIPTION: NORMAL
SPECIMEN DESCRIPTION: NORMAL
SPECIMEN SOURCE: NORMAL
TROPONIN I SERPL HS-MCNC: 9 NG/L (ref 0–9)
WBC OTHER # BLD: 9.5 K/UL (ref 4.5–11.5)

## 2024-05-17 PROCEDURE — 6360000002 HC RX W HCPCS: Performed by: EMERGENCY MEDICINE

## 2024-05-17 PROCEDURE — 71045 X-RAY EXAM CHEST 1 VIEW: CPT

## 2024-05-17 PROCEDURE — 0202U NFCT DS 22 TRGT SARS-COV-2: CPT

## 2024-05-17 PROCEDURE — 6360000002 HC RX W HCPCS

## 2024-05-17 PROCEDURE — 80048 BASIC METABOLIC PNL TOTAL CA: CPT

## 2024-05-17 PROCEDURE — 2700000000 HC OXYGEN THERAPY PER DAY

## 2024-05-17 PROCEDURE — 87634 RSV DNA/RNA AMP PROBE: CPT

## 2024-05-17 PROCEDURE — 84484 ASSAY OF TROPONIN QUANT: CPT

## 2024-05-17 PROCEDURE — 94640 AIRWAY INHALATION TREATMENT: CPT

## 2024-05-17 PROCEDURE — 93010 ELECTROCARDIOGRAM REPORT: CPT | Performed by: INTERNAL MEDICINE

## 2024-05-17 PROCEDURE — 83880 ASSAY OF NATRIURETIC PEPTIDE: CPT

## 2024-05-17 PROCEDURE — 2580000003 HC RX 258: Performed by: EMERGENCY MEDICINE

## 2024-05-17 PROCEDURE — 96375 TX/PRO/DX INJ NEW DRUG ADDON: CPT

## 2024-05-17 PROCEDURE — 2580000003 HC RX 258

## 2024-05-17 PROCEDURE — 96372 THER/PROPH/DIAG INJ SC/IM: CPT

## 2024-05-17 PROCEDURE — 6370000000 HC RX 637 (ALT 250 FOR IP): Performed by: EMERGENCY MEDICINE

## 2024-05-17 PROCEDURE — 94660 CPAP INITIATION&MGMT: CPT

## 2024-05-17 PROCEDURE — 6370000000 HC RX 637 (ALT 250 FOR IP)

## 2024-05-17 PROCEDURE — 99222 1ST HOSP IP/OBS MODERATE 55: CPT | Performed by: INTERNAL MEDICINE

## 2024-05-17 PROCEDURE — 96365 THER/PROPH/DIAG IV INF INIT: CPT

## 2024-05-17 PROCEDURE — G0378 HOSPITAL OBSERVATION PER HR: HCPCS

## 2024-05-17 PROCEDURE — 96366 THER/PROPH/DIAG IV INF ADDON: CPT

## 2024-05-17 PROCEDURE — 93005 ELECTROCARDIOGRAM TRACING: CPT | Performed by: EMERGENCY MEDICINE

## 2024-05-17 PROCEDURE — 83735 ASSAY OF MAGNESIUM: CPT

## 2024-05-17 PROCEDURE — 87635 SARS-COV-2 COVID-19 AMP PRB: CPT

## 2024-05-17 PROCEDURE — 85025 COMPLETE CBC W/AUTO DIFF WBC: CPT

## 2024-05-17 PROCEDURE — 99285 EMERGENCY DEPT VISIT HI MDM: CPT

## 2024-05-17 RX ORDER — ARIPIPRAZOLE 5 MG/1
5 TABLET ORAL DAILY
Status: DISCONTINUED | OUTPATIENT
Start: 2024-05-18 | End: 2024-05-19 | Stop reason: HOSPADM

## 2024-05-17 RX ORDER — MAGNESIUM SULFATE IN WATER 40 MG/ML
2000 INJECTION, SOLUTION INTRAVENOUS ONCE
Status: COMPLETED | OUTPATIENT
Start: 2024-05-17 | End: 2024-05-17

## 2024-05-17 RX ORDER — PRAZOSIN HYDROCHLORIDE 1 MG/1
1 CAPSULE ORAL NIGHTLY
Status: DISCONTINUED | OUTPATIENT
Start: 2024-05-17 | End: 2024-05-19 | Stop reason: HOSPADM

## 2024-05-17 RX ORDER — PREDNISONE 20 MG/1
40 TABLET ORAL DAILY
Status: DISCONTINUED | OUTPATIENT
Start: 2024-05-18 | End: 2024-05-19 | Stop reason: HOSPADM

## 2024-05-17 RX ORDER — ONDANSETRON 4 MG/1
4 TABLET, ORALLY DISINTEGRATING ORAL EVERY 8 HOURS PRN
Status: DISCONTINUED | OUTPATIENT
Start: 2024-05-17 | End: 2024-05-19 | Stop reason: HOSPADM

## 2024-05-17 RX ORDER — HYDROXYZINE PAMOATE 25 MG/1
25 CAPSULE ORAL 3 TIMES DAILY
Status: DISCONTINUED | OUTPATIENT
Start: 2024-05-17 | End: 2024-05-18

## 2024-05-17 RX ORDER — IPRATROPIUM BROMIDE AND ALBUTEROL SULFATE 2.5; .5 MG/3ML; MG/3ML
1 SOLUTION RESPIRATORY (INHALATION)
Status: DISCONTINUED | OUTPATIENT
Start: 2024-05-17 | End: 2024-05-17

## 2024-05-17 RX ORDER — MAGNESIUM SULFATE IN WATER 40 MG/ML
2000 INJECTION, SOLUTION INTRAVENOUS ONCE
Status: DISCONTINUED | OUTPATIENT
Start: 2024-05-17 | End: 2024-05-17

## 2024-05-17 RX ORDER — ENOXAPARIN SODIUM 100 MG/ML
40 INJECTION SUBCUTANEOUS DAILY
Status: DISCONTINUED | OUTPATIENT
Start: 2024-05-17 | End: 2024-05-17 | Stop reason: DRUGHIGH

## 2024-05-17 RX ORDER — PREDNISONE 20 MG/1
40 TABLET ORAL DAILY
Status: DISCONTINUED | OUTPATIENT
Start: 2024-05-17 | End: 2024-05-17

## 2024-05-17 RX ORDER — PREDNISONE 20 MG/1
10 TABLET ORAL 2 TIMES DAILY
COMMUNITY
Start: 2024-05-13 | End: 2024-05-18 | Stop reason: HOSPADM

## 2024-05-17 RX ORDER — ACETAMINOPHEN 650 MG/1
650 SUPPOSITORY RECTAL EVERY 6 HOURS PRN
Status: DISCONTINUED | OUTPATIENT
Start: 2024-05-17 | End: 2024-05-19 | Stop reason: HOSPADM

## 2024-05-17 RX ORDER — BUDESONIDE 0.5 MG/2ML
0.5 INHALANT ORAL
Status: DISCONTINUED | OUTPATIENT
Start: 2024-05-17 | End: 2024-05-19 | Stop reason: HOSPADM

## 2024-05-17 RX ORDER — ENOXAPARIN SODIUM 100 MG/ML
30 INJECTION SUBCUTANEOUS 2 TIMES DAILY
Status: DISCONTINUED | OUTPATIENT
Start: 2024-05-17 | End: 2024-05-19 | Stop reason: HOSPADM

## 2024-05-17 RX ORDER — ACETAMINOPHEN 325 MG/1
650 TABLET ORAL EVERY 6 HOURS PRN
Status: DISCONTINUED | OUTPATIENT
Start: 2024-05-17 | End: 2024-05-19 | Stop reason: HOSPADM

## 2024-05-17 RX ORDER — ACETAMINOPHEN 160 MG
2000 TABLET,DISINTEGRATING ORAL DAILY
Status: DISCONTINUED | OUTPATIENT
Start: 2024-05-17 | End: 2024-05-17

## 2024-05-17 RX ORDER — SODIUM CHLORIDE 0.9 % (FLUSH) 0.9 %
5-40 SYRINGE (ML) INJECTION PRN
Status: DISCONTINUED | OUTPATIENT
Start: 2024-05-17 | End: 2024-05-19 | Stop reason: HOSPADM

## 2024-05-17 RX ORDER — HYDROXYZINE HYDROCHLORIDE 10 MG/1
25 TABLET, FILM COATED ORAL EVERY 8 HOURS PRN
Status: DISCONTINUED | OUTPATIENT
Start: 2024-05-17 | End: 2024-05-17

## 2024-05-17 RX ORDER — POLYETHYLENE GLYCOL 3350 17 G/17G
17 POWDER, FOR SOLUTION ORAL DAILY PRN
Status: DISCONTINUED | OUTPATIENT
Start: 2024-05-17 | End: 2024-05-19 | Stop reason: HOSPADM

## 2024-05-17 RX ORDER — SODIUM CHLORIDE 9 MG/ML
INJECTION, SOLUTION INTRAVENOUS PRN
Status: DISCONTINUED | OUTPATIENT
Start: 2024-05-17 | End: 2024-05-19 | Stop reason: HOSPADM

## 2024-05-17 RX ORDER — IPRATROPIUM BROMIDE AND ALBUTEROL SULFATE 2.5; .5 MG/3ML; MG/3ML
1 SOLUTION RESPIRATORY (INHALATION)
Status: COMPLETED | OUTPATIENT
Start: 2024-05-17 | End: 2024-05-17

## 2024-05-17 RX ORDER — MIRTAZAPINE 15 MG/1
15 TABLET, FILM COATED ORAL NIGHTLY
Status: DISCONTINUED | OUTPATIENT
Start: 2024-05-17 | End: 2024-05-17

## 2024-05-17 RX ORDER — ROFLUMILAST 500 UG/1
500 TABLET ORAL DAILY
Status: DISCONTINUED | OUTPATIENT
Start: 2024-05-18 | End: 2024-05-19 | Stop reason: HOSPADM

## 2024-05-17 RX ORDER — MECOBALAMIN 5000 MCG
10 TABLET,DISINTEGRATING ORAL NIGHTLY
Status: DISCONTINUED | OUTPATIENT
Start: 2024-05-17 | End: 2024-05-19 | Stop reason: HOSPADM

## 2024-05-17 RX ORDER — ARFORMOTEROL TARTRATE 15 UG/2ML
15 SOLUTION RESPIRATORY (INHALATION)
Status: DISCONTINUED | OUTPATIENT
Start: 2024-05-17 | End: 2024-05-19 | Stop reason: HOSPADM

## 2024-05-17 RX ORDER — SODIUM CHLORIDE 0.9 % (FLUSH) 0.9 %
5-40 SYRINGE (ML) INJECTION EVERY 12 HOURS SCHEDULED
Status: DISCONTINUED | OUTPATIENT
Start: 2024-05-17 | End: 2024-05-19 | Stop reason: HOSPADM

## 2024-05-17 RX ORDER — PANTOPRAZOLE SODIUM 40 MG/1
40 TABLET, DELAYED RELEASE ORAL
Status: DISCONTINUED | OUTPATIENT
Start: 2024-05-18 | End: 2024-05-19 | Stop reason: HOSPADM

## 2024-05-17 RX ORDER — ONDANSETRON 2 MG/ML
4 INJECTION INTRAMUSCULAR; INTRAVENOUS EVERY 6 HOURS PRN
Status: DISCONTINUED | OUTPATIENT
Start: 2024-05-17 | End: 2024-05-19 | Stop reason: HOSPADM

## 2024-05-17 RX ORDER — LEVALBUTEROL INHALATION SOLUTION 1.25 MG/3ML
1.25 SOLUTION RESPIRATORY (INHALATION)
Status: DISCONTINUED | OUTPATIENT
Start: 2024-05-17 | End: 2024-05-18

## 2024-05-17 RX ADMIN — SODIUM CHLORIDE, PRESERVATIVE FREE 10 ML: 5 INJECTION INTRAVENOUS at 21:19

## 2024-05-17 RX ADMIN — ENOXAPARIN SODIUM 30 MG: 100 INJECTION SUBCUTANEOUS at 20:40

## 2024-05-17 RX ADMIN — MAGNESIUM SULFATE HEPTAHYDRATE 2000 MG: 40 INJECTION, SOLUTION INTRAVENOUS at 14:37

## 2024-05-17 RX ADMIN — WATER 125 MG: 1 INJECTION INTRAMUSCULAR; INTRAVENOUS; SUBCUTANEOUS at 14:38

## 2024-05-17 RX ADMIN — MAGNESIUM SULFATE HEPTAHYDRATE 2000 MG: 40 INJECTION, SOLUTION INTRAVENOUS at 20:42

## 2024-05-17 RX ADMIN — IPRATROPIUM BROMIDE AND ALBUTEROL SULFATE 1 DOSE: .5; 2.5 SOLUTION RESPIRATORY (INHALATION) at 14:42

## 2024-05-17 RX ADMIN — ARFORMOTEROL TARTRATE 15 MCG: 15 SOLUTION RESPIRATORY (INHALATION) at 20:13

## 2024-05-17 RX ADMIN — IPRATROPIUM BROMIDE AND ALBUTEROL SULFATE 1 DOSE: .5; 2.5 SOLUTION RESPIRATORY (INHALATION) at 14:41

## 2024-05-17 RX ADMIN — BUDESONIDE INHALATION 500 MCG: 0.5 SUSPENSION RESPIRATORY (INHALATION) at 20:13

## 2024-05-17 RX ADMIN — PRAZOSIN HYDROCHLORIDE 1 MG: 1 CAPSULE ORAL at 20:40

## 2024-05-17 RX ADMIN — LEVALBUTEROL 1.25 MG: 1.25 SOLUTION RESPIRATORY (INHALATION) at 20:14

## 2024-05-17 RX ADMIN — IPRATROPIUM BROMIDE AND ALBUTEROL SULFATE 1 DOSE: .5; 2.5 SOLUTION RESPIRATORY (INHALATION) at 14:40

## 2024-05-17 RX ADMIN — HYDROXYZINE PAMOATE 25 MG: 25 CAPSULE ORAL at 20:39

## 2024-05-17 RX ADMIN — Medication 10 MG: at 20:39

## 2024-05-17 ASSESSMENT — PAIN - FUNCTIONAL ASSESSMENT: PAIN_FUNCTIONAL_ASSESSMENT: NONE - DENIES PAIN

## 2024-05-17 NOTE — ED PROVIDER NOTES
Insomnia, unspecified type    3. Bipolar 1 disorder (HCC)    4. Vitamin D deficiency    5. Dyspnea and respiratory abnormalities    6. Chronic respiratory failure with hypoxia (HCC)        DISPOSITION  Disposition: Admit to telemetry  Patient condition is serious      NOTE: This report was transcribed using voice recognition software. Every effort was made to ensure accuracy; however, inadvertent computerized transcription errors may be present       Paulie Huerta DO  05/18/24 0900

## 2024-05-17 NOTE — H&P
[facility]  Internal Medicine Residency Program  History and Physical    Patient:  Brenda Nunn 58 y.o. female   MRN: [id number]       Date of Service: 2024          Chief complaint: had concerns including Shortness of Breath (Patient c/o SOB patient normally wears 4 L NC , patient was 97% for EMS . Patient was given 2 duoneb treatments .).    History of Present Illness   Brenda Nunn is a 58 y.o. female with PMH of poorly controlled COPD, chronic hypoxic respiratory failure on 3-4 L oxygen, BENJAMIN on CPAP, pre-DM, GERD, tobacco use, and bipolar 1 disorder who presented to the ED for SOB. She was discharged 4 days ago after a 4-day long admission for COPD exacerbation; roflumilast was increased to 500 mg daily and she was discharged on a steroid taper. Her albuterol use increased over the past 2 days and today she was having increasing SOB and QUIJANO. She denied increased cough or increased mucus. She denied fevers, chills, chest pain, leg swelling. On arrival to the ED, she was tachycardic (106) but otherwise vitals were stable, afebrile and she was saturating 97% on her baseline 4 L oxygen supplementation. Labs were pertinent for normal WBC count, normal troponin and proBNP, negative COVID and RSV. EKG showed sinus tachycardia. CXR showed no acute process (stable compared to last CXR). She received breathing treatments, methylprednisolone 125 mg, and was admitted to the floor.    Of note, she mentioned that she got a new dog in the house 2 months ago. Denies new plants or visible mold in the house.      Past Medical History:      Diagnosis Date    CHF (congestive heart failure) (HCC)     COPD (chronic obstructive pulmonary disease) (HCC)     Depression     Hypertension        Past Surgical History:        Procedure Laterality Date     SECTION      HYSTERECTOMY (CERVIX STATUS UNKNOWN)      Ovaries retained. Unknown cervix status. performed for uterine fibroids       Medications Prior

## 2024-05-17 NOTE — TELEPHONE ENCOUNTER
Reached DTR Farhana who assists with medications and scheduled call with her 5/21/24 at 1:30PM      Chelsy Hawkins CP.   [facility] Clinical   [facility]  Toll free: [phone] Option 2     For Pharmacy Admin Tracking Only    Program: [employer]  CPA in place:  No  Recommendation Provided To: Patient/Caregiver: 1 via Telephone  Intervention Detail: Scheduled Appointment  Intervention Accepted By: Patient/Caregiver: 1  Gap Closed?: Yes   Time Spent (min): 15

## 2024-05-18 LAB
25(OH)D3 SERPL-MCNC: 22 NG/ML (ref 30–100)
AMPHET UR QL SCN: NEGATIVE
ANION GAP SERPL CALCULATED.3IONS-SCNC: 11 MMOL/L (ref 7–16)
ANION GAP SERPL CALCULATED.3IONS-SCNC: 13 MMOL/L (ref 7–16)
BARBITURATES UR QL SCN: NEGATIVE
BASOPHILS # BLD: 0.01 K/UL (ref 0–0.2)
BASOPHILS NFR BLD: 0 % (ref 0–2)
BENZODIAZ UR QL: NEGATIVE
BUN SERPL-MCNC: 13 MG/DL (ref 6–20)
BUN SERPL-MCNC: 14 MG/DL (ref 6–20)
BUPRENORPHINE UR QL: NEGATIVE
CALCIUM SERPL-MCNC: 9.4 MG/DL (ref 8.6–10.2)
CALCIUM SERPL-MCNC: 9.7 MG/DL (ref 8.6–10.2)
CANNABINOIDS UR QL SCN: POSITIVE
CHLORIDE SERPL-SCNC: 100 MMOL/L (ref 98–107)
CHLORIDE SERPL-SCNC: 102 MMOL/L (ref 98–107)
CO2 SERPL-SCNC: 29 MMOL/L (ref 22–29)
CO2 SERPL-SCNC: 29 MMOL/L (ref 22–29)
COCAINE UR QL SCN: NEGATIVE
CREAT SERPL-MCNC: 0.7 MG/DL (ref 0.5–1)
CREAT SERPL-MCNC: 0.7 MG/DL (ref 0.5–1)
EOSINOPHIL # BLD: 0 K/UL (ref 0.05–0.5)
EOSINOPHILS RELATIVE PERCENT: 0 % (ref 0–6)
ERYTHROCYTE [DISTWIDTH] IN BLOOD BY AUTOMATED COUNT: 13.7 % (ref 11.5–15)
FENTANYL UR QL: NEGATIVE
GFR, ESTIMATED: >90 ML/MIN/1.73M2
GFR, ESTIMATED: >90 ML/MIN/1.73M2
GLUCOSE SERPL-MCNC: 124 MG/DL (ref 74–99)
GLUCOSE SERPL-MCNC: 135 MG/DL (ref 74–99)
HCT VFR BLD AUTO: 40.4 % (ref 34–48)
HGB BLD-MCNC: 12.5 G/DL (ref 11.5–15.5)
IMM GRANULOCYTES # BLD AUTO: 0.07 K/UL (ref 0–0.58)
IMM GRANULOCYTES NFR BLD: 1 % (ref 0–5)
LYMPHOCYTES NFR BLD: 0.85 K/UL (ref 1.5–4)
LYMPHOCYTES RELATIVE PERCENT: 9 % (ref 20–42)
MCH RBC QN AUTO: 28.2 PG (ref 26–35)
MCHC RBC AUTO-ENTMCNC: 30.9 G/DL (ref 32–34.5)
MCV RBC AUTO: 91 FL (ref 80–99.9)
METHADONE UR QL: NEGATIVE
MONOCYTES NFR BLD: 0.5 K/UL (ref 0.1–0.95)
MONOCYTES NFR BLD: 5 % (ref 2–12)
NEUTROPHILS NFR BLD: 85 % (ref 43–80)
NEUTS SEG NFR BLD: 7.96 K/UL (ref 1.8–7.3)
OPIATES UR QL SCN: NEGATIVE
OXYCODONE UR QL SCN: NEGATIVE
PCP UR QL SCN: NEGATIVE
PLATELET # BLD AUTO: 221 K/UL (ref 130–450)
PMV BLD AUTO: 9.9 FL (ref 7–12)
POTASSIUM SERPL-SCNC: 4 MMOL/L (ref 3.5–5)
POTASSIUM SERPL-SCNC: 5.2 MMOL/L (ref 3.5–5)
RBC # BLD AUTO: 4.44 M/UL (ref 3.5–5.5)
SODIUM SERPL-SCNC: 140 MMOL/L (ref 132–146)
SODIUM SERPL-SCNC: 144 MMOL/L (ref 132–146)
TEST INFORMATION: ABNORMAL
WBC OTHER # BLD: 9.4 K/UL (ref 4.5–11.5)

## 2024-05-18 PROCEDURE — 36415 COLL VENOUS BLD VENIPUNCTURE: CPT

## 2024-05-18 PROCEDURE — 6370000000 HC RX 637 (ALT 250 FOR IP)

## 2024-05-18 PROCEDURE — 82306 VITAMIN D 25 HYDROXY: CPT

## 2024-05-18 PROCEDURE — G0378 HOSPITAL OBSERVATION PER HR: HCPCS

## 2024-05-18 PROCEDURE — 2580000003 HC RX 258

## 2024-05-18 PROCEDURE — 6360000002 HC RX W HCPCS

## 2024-05-18 PROCEDURE — 94640 AIRWAY INHALATION TREATMENT: CPT

## 2024-05-18 PROCEDURE — 96372 THER/PROPH/DIAG INJ SC/IM: CPT

## 2024-05-18 PROCEDURE — 80048 BASIC METABOLIC PNL TOTAL CA: CPT

## 2024-05-18 PROCEDURE — 85025 COMPLETE CBC W/AUTO DIFF WBC: CPT

## 2024-05-18 PROCEDURE — 80307 DRUG TEST PRSMV CHEM ANLYZR: CPT

## 2024-05-18 RX ORDER — DEXTROSE MONOHYDRATE 100 MG/ML
INJECTION, SOLUTION INTRAVENOUS CONTINUOUS PRN
Status: DISCONTINUED | OUTPATIENT
Start: 2024-05-18 | End: 2024-05-19 | Stop reason: HOSPADM

## 2024-05-18 RX ORDER — ALPRAZOLAM 0.25 MG/1
0.25 TABLET ORAL 3 TIMES DAILY PRN
Qty: 15 TABLET | Refills: 0 | Status: SHIPPED | OUTPATIENT
Start: 2024-05-18 | End: 2024-05-18

## 2024-05-18 RX ORDER — NICOTINE 21 MG/24HR
1 PATCH, TRANSDERMAL 24 HOURS TRANSDERMAL DAILY
Status: DISCONTINUED | OUTPATIENT
Start: 2024-05-18 | End: 2024-05-19 | Stop reason: HOSPADM

## 2024-05-18 RX ORDER — MIRTAZAPINE 15 MG/1
15 TABLET, FILM COATED ORAL NIGHTLY
Qty: 30 TABLET | Refills: 0 | Status: SHIPPED | OUTPATIENT
Start: 2024-05-18

## 2024-05-18 RX ORDER — ALPRAZOLAM 0.25 MG/1
0.25 TABLET ORAL 3 TIMES DAILY PRN
Status: DISCONTINUED | OUTPATIENT
Start: 2024-05-18 | End: 2024-05-19 | Stop reason: HOSPADM

## 2024-05-18 RX ORDER — ACETAMINOPHEN 160 MG
2000 TABLET,DISINTEGRATING ORAL DAILY
Qty: 30 CAPSULE | Refills: 5 | Status: SHIPPED | OUTPATIENT
Start: 2024-05-18

## 2024-05-18 RX ORDER — ALPRAZOLAM 0.25 MG/1
0.25 TABLET ORAL 3 TIMES DAILY PRN
Qty: 10 TABLET | Refills: 0 | Status: SHIPPED | OUTPATIENT
Start: 2024-05-18 | End: 2024-05-18

## 2024-05-18 RX ORDER — ALPRAZOLAM 0.25 MG/1
0.25 TABLET ORAL 3 TIMES DAILY PRN
Qty: 10 TABLET | Refills: 0 | Status: SHIPPED | OUTPATIENT
Start: 2024-05-18 | End: 2024-06-02

## 2024-05-18 RX ORDER — HYDROXYZINE PAMOATE 25 MG/1
25 CAPSULE ORAL 3 TIMES DAILY PRN
Status: DISCONTINUED | OUTPATIENT
Start: 2024-05-18 | End: 2024-05-18

## 2024-05-18 RX ORDER — IPRATROPIUM BROMIDE AND ALBUTEROL SULFATE 2.5; .5 MG/3ML; MG/3ML
1 SOLUTION RESPIRATORY (INHALATION)
Status: DISCONTINUED | OUTPATIENT
Start: 2024-05-18 | End: 2024-05-19 | Stop reason: HOSPADM

## 2024-05-18 RX ORDER — GLUCAGON 1 MG/ML
1 KIT INJECTION PRN
Status: DISCONTINUED | OUTPATIENT
Start: 2024-05-18 | End: 2024-05-19 | Stop reason: HOSPADM

## 2024-05-18 RX ORDER — ALPRAZOLAM 0.25 MG/1
0.25 TABLET ORAL 3 TIMES DAILY PRN
Qty: 30 TABLET | Refills: 0 | Status: SHIPPED | OUTPATIENT
Start: 2024-05-18 | End: 2024-05-18

## 2024-05-18 RX ORDER — ALBUTEROL SULFATE 90 UG/1
2 AEROSOL, METERED RESPIRATORY (INHALATION) EVERY 6 HOURS PRN
Qty: 18 G | Refills: 2 | Status: SHIPPED | OUTPATIENT
Start: 2024-05-18

## 2024-05-18 RX ORDER — PREDNISONE 20 MG/1
TABLET ORAL
Qty: 7 TABLET | Refills: 0 | Status: SHIPPED | OUTPATIENT
Start: 2024-05-19 | End: 2024-05-24

## 2024-05-18 RX ADMIN — PANTOPRAZOLE SODIUM 40 MG: 40 TABLET, DELAYED RELEASE ORAL at 08:38

## 2024-05-18 RX ADMIN — BUDESONIDE INHALATION 500 MCG: 0.5 SUSPENSION RESPIRATORY (INHALATION) at 08:36

## 2024-05-18 RX ADMIN — HYDROXYZINE PAMOATE 25 MG: 25 CAPSULE ORAL at 08:39

## 2024-05-18 RX ADMIN — SODIUM CHLORIDE, PRESERVATIVE FREE 10 ML: 5 INJECTION INTRAVENOUS at 20:36

## 2024-05-18 RX ADMIN — ARIPIPRAZOLE 5 MG: 10 TABLET ORAL at 08:43

## 2024-05-18 RX ADMIN — PREDNISONE 40 MG: 20 TABLET ORAL at 08:39

## 2024-05-18 RX ADMIN — ARFORMOTEROL TARTRATE 15 MCG: 15 SOLUTION RESPIRATORY (INHALATION) at 08:36

## 2024-05-18 RX ADMIN — Medication 10 MG: at 20:38

## 2024-05-18 RX ADMIN — ROFLUMILAST 500 MCG: 500 TABLET ORAL at 10:25

## 2024-05-18 RX ADMIN — PRAZOSIN HYDROCHLORIDE 1 MG: 1 CAPSULE ORAL at 21:38

## 2024-05-18 RX ADMIN — SODIUM ZIRCONIUM CYCLOSILICATE 10 G: 10 POWDER, FOR SUSPENSION ORAL at 10:25

## 2024-05-18 RX ADMIN — IPRATROPIUM BROMIDE AND ALBUTEROL SULFATE 1 DOSE: 2.5; .5 SOLUTION RESPIRATORY (INHALATION) at 22:11

## 2024-05-18 RX ADMIN — BUDESONIDE INHALATION 500 MCG: 0.5 SUSPENSION RESPIRATORY (INHALATION) at 22:11

## 2024-05-18 RX ADMIN — IPRATROPIUM BROMIDE AND ALBUTEROL SULFATE 1 DOSE: 2.5; .5 SOLUTION RESPIRATORY (INHALATION) at 12:03

## 2024-05-18 RX ADMIN — ARFORMOTEROL TARTRATE 15 MCG: 15 SOLUTION RESPIRATORY (INHALATION) at 22:11

## 2024-05-18 RX ADMIN — ALPRAZOLAM 0.25 MG: 0.25 TABLET ORAL at 14:50

## 2024-05-18 RX ADMIN — IPRATROPIUM BROMIDE AND ALBUTEROL SULFATE 1 DOSE: 2.5; .5 SOLUTION RESPIRATORY (INHALATION) at 15:57

## 2024-05-18 RX ADMIN — ENOXAPARIN SODIUM 30 MG: 100 INJECTION SUBCUTANEOUS at 08:38

## 2024-05-18 RX ADMIN — ALPRAZOLAM 0.25 MG: 0.25 TABLET ORAL at 23:05

## 2024-05-18 RX ADMIN — LEVALBUTEROL 1.25 MG: 1.25 SOLUTION RESPIRATORY (INHALATION) at 08:36

## 2024-05-18 NOTE — PROGRESS NOTES
Report was called to floor, stating the pt. Was supposed to be discharged but had been canceled so thus admitting to intermediate unit arrived on unit att approx. 1450.At 1600 internal medicine resident Byron served and said pt. Was discharged in the ER at 1100, I informed the residient what pt. and ER said. Patient now is refusing discharge stating she can't breathe. VS are stable , using 4L of oxygen with sat at 96%. Perfect served resident to inform pt. Refusing to discahrge.

## 2024-05-18 NOTE — PLAN OF CARE
PS received - pt does not want to leave because she gets short of breath when standing up, she wants to be admitted.     Pt was agreeable for discharge less than 1 hour ago and discussed with resident and attending at bedside.   Vitally stable, afebrile, currently on her baseline oxygen wo increased WOB, negative respiratory panel, no leukocytosis, CXR without acute process and stable when compared to piror.    At this time, pt does not have an indication for admission.  Will check ambulatory pulse ox.     Pt continues to smoke cigarettes and marijuana.   Numerous similar admissions, resistant to placement LTAC/SNF.   Recently denied on previous admission 4 days prior for C due to noncompliance/will not participate in PT/OT.   Pt has been referred for OP sleep study and pulmonary rehab, noncompliant.   Has follow-up scheduled with IM clinic 5/20, clinical pharmacy f/u 5/21, and pulmonology f/u 6/3 for which transportation scheduling has been discussed with pt by EVY.       Electronically signed by Mariana Maldonado MD on 5/18/2024 at 1:25 PM

## 2024-05-18 NOTE — DISCHARGE SUMMARY
[place]  Discharge Summary    PCP: Genesis Enriquez MD    Admit Date:5/17/2024  Discharge Date: 5/19/2024    Admission Diagnosis:   AECOPD  Chronic hypoxic respiratory failure - 3-4 L oxygen at baseline  BENJAMIN on CPAP  Sinus tachycardia  Tobacco use  Pre-DM  Bipolar 1 disorder  Vitamin D deficiency    Discharge Diagnosis:  SOB likely 2/2 COPD, on roflumilast 500 mcg OD, symbicort BID, albuterol inhaler Q6 prn, duoneb Q4  Chronic hypoxic respiratory failure - 3-4 L oxygen at baseline  BENJAMIN, on CPAP (sleep study not done)  Sinus tachycardia  HTN, not on medications  HFpEF (EF 55-60% 2/2020)  Pre-DM, A1c 5.7 (11/23), not on medictaions  Bipolar 1 disorder, on Buspirone 7.5 BID, remeron 15 mg OD, aripiprazole 5 mg OD, prazosin 1 mg   Vitamin D deficiency, on Vit D3 2000 U OD  Tobacco use - 5-6 per day  Hx of substance use (prev UDS (+) cannabinoid)    Hospital Course:   Brenda Nunn is a 58 y.o. female with PMH of poorly controlled COPD, chronic hypoxic respiratory failure on 3-4 L oxygen, BENJAMIN on CPAP, pre-DM, GERD, tobacco use, and bipolar 1 disorder who presented to the ED for SOB. She was discharged 4 days ago after a 4-day long admission for COPD exacerbation and has had multiple admissions prior to that. On most recent admission, roflumilast was increased to 500 mg daily and she was discharged on a steroid taper. Her albuterol use increased over the past 2 days and today she was having increasing SOB and QUIJANO. She denied increased cough or increased mucus. She denied fevers, chills, chest pain, leg swelling. On arrival to the ED, she was tachycardic (106) but otherwise vitals were stable, afebrile and she was saturating 97% on her baseline 4 L oxygen supplementation. Labs were pertinent for normal WBC count, normal troponin and proBNP, negative respiratory panel. EKG showed sinus tachycardia. CXR showed no acute process (stable compared to last CXR). She received

## 2024-05-18 NOTE — PROGRESS NOTES
Perfect Serve sent to Dr. Maldonado asking them to come speak with patient due to patient refusing discharge

## 2024-05-18 NOTE — PLAN OF CARE
While reviewing current pt list, noticed pt is now on 8500.   Nursing communication sent, pt is for discharge order placed this morning at 11 AM, order remains in.     Received PS from floor nursing stating \"pt brought up from ER, they stated her discharged was cancelled this am, thus her admission.\"     There is a plan of care from writer at 13:27 today and also was explained to ED nursing staff that there is no indication for admission, discharge order remains in place.     Notified floor nursing discharge was not cancelled nor was admission discussed with writer or attending on call.     Received another PS from floor staff \"since she was brought to the floor she refuses to go home stating she can't breath.\"   Pt remains vitally stable, on baseline 4L NC saturating high 90s.       Discussed with attending on call-discharge remains in place.      Electronically signed by Mariana Maldonado MD on 5/18/2024 at 7:26 PM

## 2024-05-18 NOTE — PROGRESS NOTES
[facility]  Internal Medicine Residency Program  Progress Note - House Team       Patient:  Brenda Nunn 58 y.o. female   MRN: [id number]       Date of Service: 5/18/2024    CC: SOB    Subjective     Patient seen and examined at bedside this morning, still with SOB. Denies chest pain, lightheadedness. No increase in cough or sputum production. No fever overnight.     Objective   Physical Exam  Vitals: BP (!) 142/99   Pulse 84   Temp 97.5 °F (36.4 °C) (Oral)   Resp 19   Ht 1.702 m (5' 7\")   Wt 112.5 kg (248 lb)   SpO2 99%   BMI 38.84 kg/m²     I & O - 24hr: No intake/output data recorded.   General Appearance: alert, appears stated age, and cooperative  HEENT:  Head: Normocephalic, no lesions, without obvious abnormality.  Neck: no adenopathy, no carotid bruit, no JVD, and supple, symmetrical, trachea midline  Lung: (+) expiratory wheezing, chronic  Heart: regular rate and rhythm, S1, S2 normal, no murmur, click, rub or gallop  Abdomen: soft, non-tender; bowel sounds normal; no masses,  no organomegaly  Extremities:  extremities normal, atraumatic, no cyanosis or edema  Musculokeletal: No joint swelling, no muscle tenderness. ROM normal in all joints of extremities.   Neurologic: Mental status: Alert, oriented, thought content appropriate    Diet:   ADULT DIET; Regular    Pertinent Labs & Imaging Studies     Labs  Recent Labs     05/17/24  1427   WBC 9.5   RBC 4.71   HGB 12.9   HCT 42.3   MCV 89.8   MCH 27.4   MCHC 30.5*   RDW 13.7      MPV 10.3     Recent Labs     05/17/24  1427 05/17/24  1450     --    K 3.9  --      --    MG  --  1.6   CO2 28  --    BUN 11  --    CREATININE 0.7  --    ANIONGAP 12  --    GLUCOSE 138*  --    CALCIUM 9.4  --      Recent Labs     05/17/24  1427   TROPHS 9   PROBNP 38     Imaging Studies:    XR CHEST PORTABLE   Final Result   No acute process.            Resident's Assessment and Plan     Brenda Nunn is a 58 y.o. female with PMH of poorly

## 2024-05-18 NOTE — DISCHARGE INSTRUCTIONS
[facility] Internal Medicine Resident Service  Activity as tolerated  Diet: common adult  Be compliant with your medications and take them as prescribed.    Special Instructions:   - Complete prednisone taper   - Continue breathing treatments and Roflumilast   - Take xanax as needed for anxiety  - Advised to stop smoking  - Recommending pulmonary rehabilitation  - For sleep study to assess BEJNAMIN     Hospital Follow up: [facility] with House Team   Call to confirm appointment Tel: [phone] ([facility])     Pulmonary rehabilitation: [phone]. Please call to confirm appointment.     Other Follow-Ups:  Future Appointments   Date Time Provider Department Center   5/21/2024  1:30 PM SCHEDULE, [facility] CLINICAL PHARMACY [facility] Clin Rx None   6/3/2024  3:00 PM Dago Ga MD [facility] IM [facility]   6/19/2024 11:30 AM Pedro Bedolla MD [facility] Pulm [facility]   7/1/2024  2:30 PM Paulie Chavez MD [place] SLEEP [facility]     Other than any new prescriptions given to you today, the list of home going meds on this After Visit Summary are based on information provided to us from you. This information, including the list, dose, and frequency of medications is only as accurate as the information you provided. If you have any questions or concerns about your home medications, please contact your Primary Care Physician for further clarification.    Vincent Flowers MD PGY-1  5/18/2024  9:11 AM

## 2024-05-18 NOTE — CARE COORDINATION
5/18/24  Discharge order noted call to unit.  Nursing states that the discharge order was likely placed in ER and they decided to admit as patient just came up to the floor.    Electronically signed by VIOLETTE Aceves on 5/18/2024 at 3:44 PM

## 2024-05-18 NOTE — PROGRESS NOTES
4 Eyes Skin Assessment     NAME:  Brenda Nunn  YOB: 1965  MEDICAL RECORD NUMBER:  [id number]    The patient is being assessed for  Admission    I agree that at least one RN has performed a thorough Head to Toe Skin Assessment on the patient. ALL assessment sites listed below have been assessed.      Areas assessed by both nurses:    Head, Face, Ears, Shoulders, Back, Chest, Arms, Elbows, Hands, Sacrum. Buttock, Coccyx, Ischium, Legs. Feet and Heels, and Under Medical Devices         Does the Patient have a Wound? No noted wound(s)       Gabe Prevention initiated by RN: Yes  Wound Care Orders initiated by RN: No    Pressure Injury (Stage 3,4, Unstageable, DTI, NWPT, and Complex wounds) if present, place Wound referral order by RN under : No    New Ostomies, if present place, Ostomy referral order under : No     Nurse 1 eSignature: Electronically signed by Paulie Au RN on 5/18/24 at 3:51 PM EDT    **SHARE this note so that the co-signing nurse can place an eSignature**    Nurse 2 eSignature: Electronically signed by Tara M Wilms, RN on 5/18/24 at 3:53 PM EDT

## 2024-05-19 VITALS
HEIGHT: 67 IN | WEIGHT: 248 LBS | DIASTOLIC BLOOD PRESSURE: 81 MMHG | RESPIRATION RATE: 17 BRPM | TEMPERATURE: 98.1 F | SYSTOLIC BLOOD PRESSURE: 124 MMHG | HEART RATE: 87 BPM | BODY MASS INDEX: 38.92 KG/M2 | OXYGEN SATURATION: 95 %

## 2024-05-19 PROCEDURE — 6360000002 HC RX W HCPCS

## 2024-05-19 PROCEDURE — 6370000000 HC RX 637 (ALT 250 FOR IP)

## 2024-05-19 PROCEDURE — 2700000000 HC OXYGEN THERAPY PER DAY

## 2024-05-19 PROCEDURE — 94640 AIRWAY INHALATION TREATMENT: CPT

## 2024-05-19 PROCEDURE — G0378 HOSPITAL OBSERVATION PER HR: HCPCS

## 2024-05-19 PROCEDURE — 99239 HOSP IP/OBS DSCHRG MGMT >30: CPT | Performed by: INTERNAL MEDICINE

## 2024-05-19 PROCEDURE — 2580000003 HC RX 258

## 2024-05-19 PROCEDURE — 2500000003 HC RX 250 WO HCPCS: Performed by: INTERNAL MEDICINE

## 2024-05-19 RX ORDER — VITAMIN B COMPLEX
1000 TABLET ORAL DAILY
Status: DISCONTINUED | OUTPATIENT
Start: 2024-05-19 | End: 2024-05-19 | Stop reason: SDUPTHER

## 2024-05-19 RX ORDER — VITAMIN B COMPLEX
1000 TABLET ORAL DAILY
Status: DISCONTINUED | OUTPATIENT
Start: 2024-05-19 | End: 2024-05-19 | Stop reason: HOSPADM

## 2024-05-19 RX ADMIN — ARFORMOTEROL TARTRATE 15 MCG: 15 SOLUTION RESPIRATORY (INHALATION) at 09:29

## 2024-05-19 RX ADMIN — MICONAZOLE NITRATE: 20.6 POWDER TOPICAL at 09:14

## 2024-05-19 RX ADMIN — BUDESONIDE INHALATION 500 MCG: 0.5 SUSPENSION RESPIRATORY (INHALATION) at 09:29

## 2024-05-19 RX ADMIN — PREDNISONE 40 MG: 20 TABLET ORAL at 09:11

## 2024-05-19 RX ADMIN — SODIUM CHLORIDE, PRESERVATIVE FREE 10 ML: 5 INJECTION INTRAVENOUS at 09:15

## 2024-05-19 RX ADMIN — ALPRAZOLAM 0.25 MG: 0.25 TABLET ORAL at 10:20

## 2024-05-19 RX ADMIN — ARIPIPRAZOLE 5 MG: 10 TABLET ORAL at 09:11

## 2024-05-19 RX ADMIN — PANTOPRAZOLE SODIUM 40 MG: 40 TABLET, DELAYED RELEASE ORAL at 06:47

## 2024-05-19 RX ADMIN — Medication 1000 UNITS: at 09:11

## 2024-05-19 RX ADMIN — IPRATROPIUM BROMIDE AND ALBUTEROL SULFATE 1 DOSE: 2.5; .5 SOLUTION RESPIRATORY (INHALATION) at 09:29

## 2024-05-19 NOTE — CARE COORDINATION
Chart reviewed and with Dr Castro.  Patient to discharge home today.  Medical staff spoke with patient and she stated her mother is on her way at this time to pick the patient up.  AVS completed.

## 2024-05-19 NOTE — PROGRESS NOTES
Date: 5/19/2024    Time: 1:38 AM    Patient Placed On BIPAP/CPAP/ Non-Invasive Ventilation?  No    If no must comment      Comments: patient is refusing to wear BIPAP at this time, on standby in room if needed        Eveline Tolentino RCP

## 2024-05-19 NOTE — PLAN OF CARE
Patient decided that she does not want to leave the hospital although discharge order was placed in the morning and explanations provided to patient that she is currently at her base and didn't need an admission. However, given how reluctant the patient is to discharge saying that her daughter won't be able to pick her up. It's almost 8 PM. We'll keep patient under observation for tonight, we'll cancel discharge order and place it tomorrow morning.     Reji Love MD   Internal Medicine   Resident, PGY-2

## 2024-05-19 NOTE — PROGRESS NOTES
[facility]  Internal Medicine Residency Program  Progress Note - House Team       Patient:  Brenda Nunn 58 y.o. female   MRN: [id number]       Date of Service: 5/19/2024    CC: SOB  Subjective     Patient seen and examined at bedside this morning, alert and oriented x3. Reports some exertional SOB, not in cardiorespiratory distress. Currently saturating well on baseline O2.     Objective   Physical Exam  Vitals: /86   Pulse 90   Temp 98.2 °F (36.8 °C) (Temporal)   Resp 22   Ht 1.702 m (5' 7\")   Wt 112.5 kg (248 lb)   SpO2 96%   BMI 38.84 kg/m²     I & O - 24hr: No intake/output data recorded.   General Appearance: alert, appears stated age, and cooperative  HEENT:  Head: Normocephalic, no lesions, without obvious abnormality.  Neck: no adenopathy, no carotid bruit, no JVD, and supple, symmetrical, trachea midline  Lung: (+) expiratory wheezing, chronic; speaks in full sentences  Heart: regular rate and rhythm, S1, S2 normal, no murmur, click, rub or gallop  Abdomen: soft, non-tender; bowel sounds normal; no masses,  no organomegaly  Extremities:  extremities normal, atraumatic, no cyanosis or edema  Musculokeletal: No joint swelling, no muscle tenderness. ROM normal in all joints of extremities.   Neurologic: Mental status: Alert, oriented, thought content appropriate    Diet:   ADULT DIET; Regular    Pertinent Labs & Imaging Studies     Labs  Recent Labs     05/17/24  1427 05/18/24  0742   WBC 9.5 9.4   RBC 4.71 4.44   HGB 12.9 12.5   HCT 42.3 40.4   MCV 89.8 91.0   MCH 27.4 28.2   MCHC 30.5* 30.9*   RDW 13.7 13.7    221   MPV 10.3 9.9     Recent Labs     05/17/24  1427 05/17/24  1450 05/18/24  0742 05/18/24  1037     --  144 140   K 3.9  --  5.2* 4.0     --  102 100   MG  --  1.6  --   --    CO2 28  --  29 29   BUN 11  --  13 14   CREATININE 0.7  --  0.7 0.7   ANIONGAP 12  --  13 11   GLUCOSE 138*  --  135* 124*   CALCIUM 9.4  --  9.4 9.7     Recent Labs

## 2024-05-21 ENCOUNTER — TELEPHONE (OUTPATIENT)
Dept: PHARMACY | Facility: CLINIC | Age: 59
End: 2024-05-21

## 2024-05-21 ENCOUNTER — CARE COORDINATION (OUTPATIENT)
Dept: CARE COORDINATION | Age: 59
End: 2024-05-21

## 2024-05-21 DIAGNOSIS — J44.1 COPD WITH ACUTE EXACERBATION (HCC): Primary | ICD-10-CM

## 2024-05-21 PROCEDURE — 1111F DSCHRG MED/CURRENT MED MERGE: CPT

## 2024-05-21 RX ORDER — CHOLECALCIFEROL (VITAMIN D3) 125 MCG
10 CAPSULE ORAL NIGHTLY
COMMUNITY

## 2024-05-21 NOTE — TELEPHONE ENCOUNTER
[facility] CLINICAL PHARMACY REVIEW: Post-Discharge Transitions of Care (TALON)  Subjective/Objective:  Brenda Nunn is a 58 y.o. female. Patient was discharged from [facility] on 05/19/2024 with a diagnosis of SOB secondary to COPD.    Patient outreach to review discharge medications and provide medication review and management.    Spoke with patient.    Allergies   Allergen Reactions    Pcn [Penicillins] Rash       Discharge Medications (as per discharging medication list found in AVS):  There are NEW medications for you. START taking them after you leave the hospital:  Calcium carbonate chewable 500 mg take 1 tablet every 8 hours as needed for heartburn    These are medications you told us you were taking at home, CONTINUE taking them after you leave the hospital:  Current Outpatient Medications   Medication Sig Dispense Refill    melatonin 5 MG TABS tablet Take 2 tablets by mouth nightly      miconazole (MICOTIN) 2 % powder Apply topically 2 times daily. 45 g 1    predniSONE (DELTASONE) 20 MG tablet Take 2 tablets by mouth daily for 2 days, THEN 1 tablet daily for 2 days, THEN 0.5 tablets daily for 1 day. 7 tablet 0    mirtazapine (REMERON) 15 MG tablet Take 1 tablet by mouth nightly at bedtime. 30 tablet 0    albuterol sulfate HFA (VENTOLIN HFA) 108 (90 Base) MCG/ACT inhaler Inhale 2 puffs into the lungs every 6 hours as needed for Wheezing or Shortness of Breath 18 g 2    ALPRAZolam (XANAX) 0.25 MG tablet Take 1 tablet by mouth 3 times daily as needed for Anxiety for up to 15 days. Max Daily Amount: 0.75 mg 10 tablet 0    OXYGEN Inhale 3-4 L into the lungs continuous      hydrOXYzine HCl (ATARAX) 25 MG tablet Take 1 tablet by mouth every 8 hours as needed for Anxiety or Itching 42 tablet 1    Roflumilast (DALIRESP) 500 MCG tablet Take 1 tablet by mouth daily 30 tablet 2    budesonide-formoterol (SYMBICORT) 160-4.5 MCG/ACT AERO Inhale 2 puffs into the lungs 2 times daily 3 each 0

## 2024-05-21 NOTE — CARE COORDINATION
Care Transitions Initial Follow Up Call    Call within 2 business days of discharge: Yes    Care Transition Nurse attempted initial obs readmission outreach leaving HIPAA VM, purpose of call, my contact and appt reminders. Check home oxygen use and sats (3-4L NC, using CPAP at night?), has rollator, and last smoking report as 5-10 cigs/day.     Patient: Brenda Nunn Patient : 1965   MRN: [id number]  Reason for Admission: 2024 - 2024 [facility]  Obs. AECOPD   Discharge Date: 24 RARS: Readmission Risk Score: 35  NR  CT  [facility] Payer report as a high risk patient. Hand off to ACOliva MOHAN RN.      Hosp FU/M [facility] 6/3 3:00  Pulm/[place]  11:30  Sleep/J [place]  2:30     START taking:  ALPRAZolam (XANAX)  miconazole (MICOTIN)  CHANGE how you take:  predniSONE (DELTASONE)  STOP taking:  busPIRone 7.5 MG tablet (BUSPAR)  Last Discharge Facility       Date Complaint Diagnosis Description Type Department Provider    24 Shortness of Breath COPD exacerbation (HCC) ... ED to Hosp-Admission (Discharged) (DISCHARGE) [facility] Lorenzo Carroll Jr., DO; Deanna, ...            Allyssa Jewell RN

## 2024-05-22 ENCOUNTER — CARE COORDINATION (OUTPATIENT)
Dept: CARE COORDINATION | Age: 59
End: 2024-05-22

## 2024-05-22 NOTE — CARE COORDINATION
Ambulatory Care Coordination Note     5/22/2024 2:47 PM     Patient outreach attempt by this AC today to offer care management services. AC was unable to reach the patient by telephone today; left voice message requesting a return phone call to this ACM. and letter mailed requesting patient to contact this ACM.      ACM: Ave Ordaz RN    PCP/Specialist follow up:   Future Appointments         Provider Specialty Dept Phone    6/3/2024 3:00 PM Dago Ga MD Internal Medicine [phone]    6/19/2024 11:30 AM Pedro Bedolla MD Pulmonology [phone]    7/1/2024 2:30 PM Paulie Chavez MD Sleep Medicine [phone]            Follow Up:   Plan for next AC outreach in approximately 2 weeks to complete:  - outreach attempt to offer care management services.

## 2024-05-22 NOTE — CARE COORDINATION
Care Transitions Initial ED Follow Up Call    Call within 2 business days of discharge: Yes    Care Transition Nurse attempted second ED follow up outreach leaving HIPAA VM, purpose of call, my contact, and appt reminders. CTN s/o. Routed hand off to Oliva MROALEZ RN.     Patient: Brenda Nunn Patient : 1965   MRN: [id number]  Reason for Admission:  2024 - 2024 [facility]  Obs. AECOPD   Discharge Date: 24 RARS: Readmission Risk Score: 35  NR  CT  [facility] Payer report as a high risk patient. Hand off to Oliva MORALEZ RN.      Hosp FU/M [facility] 6/3 3:00  Pulm/[place]  11:30  Sleep/J [place]  2:30     START taking:  ALPRAZolam (XANAX)  miconazole (MICOTIN)  CHANGE how you take:  predniSONE (DELTASONE)  STOP taking:  busPIRone 7.5 MG tablet (BUSPAR)    Last Discharge Facility       Date Complaint Diagnosis Description Type Department Provider    24 Shortness of Breath COPD exacerbation (HCC) ... ED to Hosp-Admission (Discharged) (DISCHARGE) [facility] Lorenzo Carroll Jr., DO; Deanna, ...            Allyssa Jewell RN

## 2024-05-24 NOTE — TELEPHONE ENCOUNTER
I have discussed the care of this patient with the resident and I agree with the assessment/plan as documented.    ADAL Romano, PharmD, BCACP  Ambulatory Care Clinical Pharmacist- [facility] Clinical Pharmacy  Department, toll free: [phone]    For Pharmacy Admin Tracking Only    Program: [facility]  CPA in place:  No  Gap Closed?: Yes   Time Spent (min): 30

## 2024-05-28 RX ORDER — BUSPIRONE HYDROCHLORIDE 7.5 MG/1
7.5 TABLET ORAL 2 TIMES DAILY
Qty: 60 TABLET | Refills: 0 | OUTPATIENT
Start: 2024-05-28

## 2024-05-29 ENCOUNTER — APPOINTMENT (OUTPATIENT)
Dept: GENERAL RADIOLOGY | Age: 59
DRG: 190 | End: 2024-05-29
Payer: COMMERCIAL

## 2024-05-29 ENCOUNTER — HOSPITAL ENCOUNTER (INPATIENT)
Age: 59
LOS: 5 days | Discharge: HOME HEALTH | DRG: 190 | End: 2024-06-03
Attending: STUDENT IN AN ORGANIZED HEALTH CARE EDUCATION/TRAINING PROGRAM | Admitting: INTERNAL MEDICINE
Payer: COMMERCIAL

## 2024-05-29 DIAGNOSIS — J44.1 COPD WITH EXACERBATION (HCC): ICD-10-CM

## 2024-05-29 DIAGNOSIS — J44.9 CHRONIC OBSTRUCTIVE PULMONARY DISEASE, UNSPECIFIED COPD TYPE (HCC): ICD-10-CM

## 2024-05-29 DIAGNOSIS — J44.1 COPD EXACERBATION (HCC): Primary | ICD-10-CM

## 2024-05-29 DIAGNOSIS — F41.9 ANXIETY: ICD-10-CM

## 2024-05-29 LAB
AADO2: 288.3 MMHG
AADO2: 447.4 MMHG
ALBUMIN SERPL-MCNC: 4.2 G/DL (ref 3.5–5.2)
ALP SERPL-CCNC: 89 U/L (ref 35–104)
ALT SERPL-CCNC: 15 U/L (ref 0–32)
ANION GAP SERPL CALCULATED.3IONS-SCNC: 11 MMOL/L (ref 7–16)
AST SERPL-CCNC: 14 U/L (ref 0–31)
B PARAP IS1001 DNA NPH QL NAA+NON-PROBE: NOT DETECTED
B PERT DNA SPEC QL NAA+PROBE: NOT DETECTED
B.E.: -0.3 MMOL/L (ref -3–3)
B.E.: 2.2 MMOL/L (ref -3–3)
B.E.: 3.3 MMOL/L (ref -3–3)
BASOPHILS # BLD: 0.04 K/UL (ref 0–0.2)
BASOPHILS NFR BLD: 0 % (ref 0–2)
BILIRUB SERPL-MCNC: 0.3 MG/DL (ref 0–1.2)
BNP SERPL-MCNC: 38 PG/ML (ref 0–125)
BUN SERPL-MCNC: 19 MG/DL (ref 6–20)
C PNEUM DNA NPH QL NAA+NON-PROBE: NOT DETECTED
CALCIUM SERPL-MCNC: 9.4 MG/DL (ref 8.6–10.2)
CHLORIDE SERPL-SCNC: 103 MMOL/L (ref 98–107)
CO2 SERPL-SCNC: 29 MMOL/L (ref 22–29)
COHB: 0.9 % (ref 0–1.5)
COHB: 1.1 % (ref 0–1.5)
COHB: 2.4 % (ref 0–1.5)
CREAT SERPL-MCNC: 0.7 MG/DL (ref 0.5–1)
CRITICAL: ABNORMAL
DATE ANALYZED: ABNORMAL
DATE OF COLLECTION: ABNORMAL
EOSINOPHIL # BLD: 0.05 K/UL (ref 0.05–0.5)
EOSINOPHILS RELATIVE PERCENT: 1 % (ref 0–6)
ERYTHROCYTE [DISTWIDTH] IN BLOOD BY AUTOMATED COUNT: 13.5 % (ref 11.5–15)
FIO2: 100 %
FIO2: 80 %
FLUAV RNA NPH QL NAA+NON-PROBE: NOT DETECTED
FLUBV RNA NPH QL NAA+NON-PROBE: NOT DETECTED
GFR, ESTIMATED: >90 ML/MIN/1.73M2
GLUCOSE SERPL-MCNC: 135 MG/DL (ref 74–99)
HADV DNA NPH QL NAA+NON-PROBE: NOT DETECTED
HCO3: 25.3 MMOL/L (ref 22–26)
HCO3: 28.1 MMOL/L (ref 22–26)
HCO3: 28.5 MMOL/L (ref 22–26)
HCOV 229E RNA NPH QL NAA+NON-PROBE: NOT DETECTED
HCOV HKU1 RNA NPH QL NAA+NON-PROBE: NOT DETECTED
HCOV NL63 RNA NPH QL NAA+NON-PROBE: NOT DETECTED
HCOV OC43 RNA NPH QL NAA+NON-PROBE: NOT DETECTED
HCT VFR BLD AUTO: 39.7 % (ref 34–48)
HGB BLD-MCNC: 12.5 G/DL (ref 11.5–15.5)
HHB: 0.6 % (ref 0–5)
HHB: 0.9 % (ref 0–5)
HHB: 8.8 % (ref 0–5)
HMPV RNA NPH QL NAA+NON-PROBE: NOT DETECTED
HPIV1 RNA NPH QL NAA+NON-PROBE: NOT DETECTED
HPIV2 RNA NPH QL NAA+NON-PROBE: NOT DETECTED
HPIV3 RNA NPH QL NAA+NON-PROBE: NOT DETECTED
HPIV4 RNA NPH QL NAA+NON-PROBE: NOT DETECTED
IMM GRANULOCYTES # BLD AUTO: 0.06 K/UL (ref 0–0.58)
IMM GRANULOCYTES NFR BLD: 1 % (ref 0–5)
LAB: ABNORMAL
LYMPHOCYTES NFR BLD: 1.26 K/UL (ref 1.5–4)
LYMPHOCYTES RELATIVE PERCENT: 13 % (ref 20–42)
Lab: 1818
Lab: 2123
Lab: 2245
M PNEUMO DNA NPH QL NAA+NON-PROBE: NOT DETECTED
MCH RBC QN AUTO: 28.4 PG (ref 26–35)
MCHC RBC AUTO-ENTMCNC: 31.5 G/DL (ref 32–34.5)
MCV RBC AUTO: 90.2 FL (ref 80–99.9)
METHB: 0.2 % (ref 0–1.5)
METHB: 0.4 % (ref 0–1.5)
METHB: 0.5 % (ref 0–1.5)
MODE: ABNORMAL
MONOCYTES NFR BLD: 0.56 K/UL (ref 0.1–0.95)
MONOCYTES NFR BLD: 6 % (ref 2–12)
NEUTROPHILS NFR BLD: 80 % (ref 43–80)
NEUTS SEG NFR BLD: 7.73 K/UL (ref 1.8–7.3)
O2 SATURATION: 91.1 % (ref 92–98.5)
O2 SATURATION: 99.1 % (ref 92–98.5)
O2 SATURATION: 99.4 % (ref 92–98.5)
O2HB: 89.9 % (ref 94–97)
O2HB: 96.5 % (ref 94–97)
O2HB: 97.8 % (ref 94–97)
OPERATOR ID: ABNORMAL
PATIENT TEMP: 37 C
PCO2: 43.4 MMHG (ref 35–45)
PCO2: 45.1 MMHG (ref 35–45)
PCO2: 51.2 MMHG (ref 35–45)
PEEP/CPAP: 8 CMH2O
PEEP/CPAP: 8 CMH2O
PFO2: 1.97 MMHG/%
PFO2: 2.6 MMHG/%
PH BLOOD GAS: 7.36 (ref 7.35–7.45)
PH BLOOD GAS: 7.37 (ref 7.35–7.45)
PH BLOOD GAS: 7.43 (ref 7.35–7.45)
PLATELET # BLD AUTO: 232 K/UL (ref 130–450)
PMV BLD AUTO: 10.2 FL (ref 7–12)
PO2: 197.2 MMHG (ref 75–100)
PO2: 208.3 MMHG (ref 75–100)
PO2: 60.8 MMHG (ref 75–100)
POTASSIUM SERPL-SCNC: 3.77 MMOL/L (ref 3.5–5)
POTASSIUM SERPL-SCNC: 4 MMOL/L (ref 3.5–5)
PROT SERPL-MCNC: 7.1 G/DL (ref 6.4–8.3)
PS: 14 CMH20
PS: 14 CMH20
RBC # BLD AUTO: 4.4 M/UL (ref 3.5–5.5)
RI(T): 1.38
RI(T): 2.27
RSV RNA NPH QL NAA+NON-PROBE: NOT DETECTED
RV+EV RNA NPH QL NAA+NON-PROBE: NOT DETECTED
SARS-COV-2 RNA NPH QL NAA+NON-PROBE: NOT DETECTED
SODIUM SERPL-SCNC: 143 MMOL/L (ref 132–146)
SOURCE, BLOOD GAS: ABNORMAL
SPECIMEN DESCRIPTION: NORMAL
THB: 13 G/DL (ref 11.5–16.5)
THB: 13.6 G/DL (ref 11.5–16.5)
THB: 13.6 G/DL (ref 11.5–16.5)
TIME ANALYZED: 1819
TIME ANALYZED: 2129
TIME ANALYZED: 2256
TROPONIN I SERPL HS-MCNC: 10 NG/L (ref 0–9)
WBC OTHER # BLD: 9.7 K/UL (ref 4.5–11.5)

## 2024-05-29 PROCEDURE — 6360000002 HC RX W HCPCS: Performed by: STUDENT IN AN ORGANIZED HEALTH CARE EDUCATION/TRAINING PROGRAM

## 2024-05-29 PROCEDURE — 6370000000 HC RX 637 (ALT 250 FOR IP): Performed by: STUDENT IN AN ORGANIZED HEALTH CARE EDUCATION/TRAINING PROGRAM

## 2024-05-29 PROCEDURE — 84484 ASSAY OF TROPONIN QUANT: CPT

## 2024-05-29 PROCEDURE — 0202U NFCT DS 22 TRGT SARS-COV-2: CPT

## 2024-05-29 PROCEDURE — 94660 CPAP INITIATION&MGMT: CPT

## 2024-05-29 PROCEDURE — 93005 ELECTROCARDIOGRAM TRACING: CPT | Performed by: STUDENT IN AN ORGANIZED HEALTH CARE EDUCATION/TRAINING PROGRAM

## 2024-05-29 PROCEDURE — 85025 COMPLETE CBC W/AUTO DIFF WBC: CPT

## 2024-05-29 PROCEDURE — 2140000000 HC CCU INTERMEDIATE R&B

## 2024-05-29 PROCEDURE — 94640 AIRWAY INHALATION TREATMENT: CPT

## 2024-05-29 PROCEDURE — 82805 BLOOD GASES W/O2 SATURATION: CPT

## 2024-05-29 PROCEDURE — 96365 THER/PROPH/DIAG IV INF INIT: CPT

## 2024-05-29 PROCEDURE — 99285 EMERGENCY DEPT VISIT HI MDM: CPT

## 2024-05-29 PROCEDURE — 83880 ASSAY OF NATRIURETIC PEPTIDE: CPT

## 2024-05-29 PROCEDURE — 71045 X-RAY EXAM CHEST 1 VIEW: CPT

## 2024-05-29 PROCEDURE — 5A09457 ASSISTANCE WITH RESPIRATORY VENTILATION, 24-96 CONSECUTIVE HOURS, CONTINUOUS POSITIVE AIRWAY PRESSURE: ICD-10-PCS | Performed by: INTERNAL MEDICINE

## 2024-05-29 PROCEDURE — 80053 COMPREHEN METABOLIC PANEL: CPT

## 2024-05-29 PROCEDURE — 84132 ASSAY OF SERUM POTASSIUM: CPT

## 2024-05-29 RX ORDER — ALPRAZOLAM 0.25 MG/1
0.25 TABLET ORAL 3 TIMES DAILY PRN
Status: DISCONTINUED | OUTPATIENT
Start: 2024-05-29 | End: 2024-06-03 | Stop reason: HOSPADM

## 2024-05-29 RX ORDER — ROFLUMILAST 500 UG/1
500 TABLET ORAL DAILY
Status: DISCONTINUED | OUTPATIENT
Start: 2024-05-30 | End: 2024-06-03 | Stop reason: HOSPADM

## 2024-05-29 RX ORDER — ARIPIPRAZOLE 5 MG/1
5 TABLET ORAL DAILY
Status: DISCONTINUED | OUTPATIENT
Start: 2024-05-30 | End: 2024-06-03 | Stop reason: HOSPADM

## 2024-05-29 RX ORDER — PANTOPRAZOLE SODIUM 40 MG/1
40 TABLET, DELAYED RELEASE ORAL
Status: DISCONTINUED | OUTPATIENT
Start: 2024-05-30 | End: 2024-06-03 | Stop reason: HOSPADM

## 2024-05-29 RX ORDER — ACETAMINOPHEN 325 MG/1
650 TABLET ORAL ONCE
Status: COMPLETED | OUTPATIENT
Start: 2024-05-29 | End: 2024-05-29

## 2024-05-29 RX ORDER — BENZONATATE 100 MG/1
100 CAPSULE ORAL 3 TIMES DAILY PRN
Status: DISCONTINUED | OUTPATIENT
Start: 2024-05-29 | End: 2024-06-03 | Stop reason: HOSPADM

## 2024-05-29 RX ORDER — ARFORMOTEROL TARTRATE 15 UG/2ML
15 SOLUTION RESPIRATORY (INHALATION)
Status: DISCONTINUED | OUTPATIENT
Start: 2024-05-30 | End: 2024-06-03 | Stop reason: HOSPADM

## 2024-05-29 RX ORDER — HYDROXYZINE PAMOATE 25 MG/1
25 CAPSULE ORAL EVERY 8 HOURS PRN
Status: DISCONTINUED | OUTPATIENT
Start: 2024-05-29 | End: 2024-06-03 | Stop reason: HOSPADM

## 2024-05-29 RX ORDER — BUDESONIDE 0.25 MG/2ML
250 INHALANT ORAL 2 TIMES DAILY
Status: DISCONTINUED | OUTPATIENT
Start: 2024-05-30 | End: 2024-06-03 | Stop reason: HOSPADM

## 2024-05-29 RX ORDER — MAGNESIUM SULFATE IN WATER 40 MG/ML
2000 INJECTION, SOLUTION INTRAVENOUS ONCE
Status: COMPLETED | OUTPATIENT
Start: 2024-05-29 | End: 2024-05-29

## 2024-05-29 RX ORDER — IPRATROPIUM BROMIDE AND ALBUTEROL SULFATE 2.5; .5 MG/3ML; MG/3ML
1 SOLUTION RESPIRATORY (INHALATION)
Status: COMPLETED | OUTPATIENT
Start: 2024-05-29 | End: 2024-05-29

## 2024-05-29 RX ORDER — MIRTAZAPINE 15 MG/1
15 TABLET, FILM COATED ORAL NIGHTLY
Status: DISCONTINUED | OUTPATIENT
Start: 2024-05-30 | End: 2024-06-03 | Stop reason: HOSPADM

## 2024-05-29 RX ORDER — IPRATROPIUM BROMIDE AND ALBUTEROL SULFATE 2.5; .5 MG/3ML; MG/3ML
1 SOLUTION RESPIRATORY (INHALATION)
Status: DISCONTINUED | OUTPATIENT
Start: 2024-05-30 | End: 2024-06-03 | Stop reason: HOSPADM

## 2024-05-29 RX ORDER — MECOBALAMIN 5000 MCG
10 TABLET,DISINTEGRATING ORAL NIGHTLY
Status: DISCONTINUED | OUTPATIENT
Start: 2024-05-30 | End: 2024-06-03 | Stop reason: HOSPADM

## 2024-05-29 RX ADMIN — ACETAMINOPHEN 650 MG: 325 TABLET ORAL at 21:16

## 2024-05-29 RX ADMIN — IPRATROPIUM BROMIDE AND ALBUTEROL SULFATE 1 DOSE: 2.5; .5 SOLUTION RESPIRATORY (INHALATION) at 19:01

## 2024-05-29 RX ADMIN — IPRATROPIUM BROMIDE AND ALBUTEROL SULFATE 1 DOSE: 2.5; .5 SOLUTION RESPIRATORY (INHALATION) at 18:58

## 2024-05-29 RX ADMIN — MAGNESIUM SULFATE HEPTAHYDRATE 2000 MG: 40 INJECTION, SOLUTION INTRAVENOUS at 18:44

## 2024-05-29 RX ADMIN — IPRATROPIUM BROMIDE AND ALBUTEROL SULFATE 1 DOSE: 2.5; .5 SOLUTION RESPIRATORY (INHALATION) at 19:00

## 2024-05-29 ASSESSMENT — PAIN DESCRIPTION - DESCRIPTORS: DESCRIPTORS: ACHING

## 2024-05-29 ASSESSMENT — PAIN SCALES - GENERAL: PAINLEVEL_OUTOF10: 8

## 2024-05-29 ASSESSMENT — PAIN DESCRIPTION - LOCATION: LOCATION: HEAD

## 2024-05-29 ASSESSMENT — PAIN - FUNCTIONAL ASSESSMENT: PAIN_FUNCTIONAL_ASSESSMENT: NONE - DENIES PAIN

## 2024-05-29 NOTE — ED PROVIDER NOTES
date: 1/1/1977    Smokeless tobacco: Never   Vaping Use    Vaping Use: Former   Substance Use Topics    Alcohol use: Never    Drug use: Not Currently     Types: Marijuana (Weed), Methamphetamines (Crystal Meth)     Comment: Sober for 3 weeks       SCREENINGS        Lawn Coma Scale  Eye Opening: Spontaneous  Best Verbal Response: Oriented  Best Motor Response: Obeys commands  Lawn Coma Scale Score: 15                CIWA Assessment  BP: 130/73  Pulse: 84           PHYSICAL EXAM  1 or more Elements     ED Triage Vitals   BP Temp Temp src Pulse Resp SpO2 Height Weight   -- -- -- -- -- -- -- --       Physical Exam      Constitutional/General: Alert and oriented x3, chronically ill in appearance with increased work of breathing  Head: Normocephalic and atraumatic  Eyes:  EOMI, conjunctiva normal, sclera non icteric  ENT:  Oropharynx clear, handling secretions, no trismus, no asymmetry of the posterior oropharynx or uvular edema  Neck: Supple, full ROM, no stridor, no meningeal signs  Respiratory: Coarse breath sounds throughout with inspiratory and expiratory wheezing present.  There are diminished at the bilateral bases.  Increased work of breathing.  Conversational dyspnea.  Cardiovascular:  Regular rate. Regular rhythm. No murmurs, no gallops, no rubs. 2+ distal pulses. Equal extremity pulses.   Chest: No chest wall tenderness  GI:  Abdomen Soft, Non tender, Non distended.  +BS.   No rebound, guarding, or rigidity. No pulsatile masses.  Musculoskeletal: Moves all extremities x 4. Warm and well perfused, no clubbing, no cyanosis, no edema. Capillary refill <3 seconds  Integument: skin warm and dry. No rashes.   Neurologic: GCS 15, no focal deficits, symmetric strength 5/5 in the upper and lower extremities bilaterally  Psychiatric: Normal Affect      DIAGNOSTIC RESULTS   LABS:    Labs Reviewed   CBC WITH AUTO DIFFERENTIAL - Abnormal; Notable for the following components:       Result Value    MCHC 31.5 (*)

## 2024-05-30 LAB
ANION GAP SERPL CALCULATED.3IONS-SCNC: 12 MMOL/L (ref 7–16)
BASOPHILS # BLD: 0.02 K/UL (ref 0–0.2)
BASOPHILS NFR BLD: 0 % (ref 0–2)
BUN SERPL-MCNC: 18 MG/DL (ref 6–20)
CALCIUM SERPL-MCNC: 9.2 MG/DL (ref 8.6–10.2)
CHLORIDE SERPL-SCNC: 101 MMOL/L (ref 98–107)
CO2 SERPL-SCNC: 26 MMOL/L (ref 22–29)
CREAT SERPL-MCNC: 0.6 MG/DL (ref 0.5–1)
EKG ATRIAL RATE: 93 BPM
EKG P AXIS: 41 DEGREES
EKG P-R INTERVAL: 144 MS
EKG Q-T INTERVAL: 354 MS
EKG QRS DURATION: 78 MS
EKG QTC CALCULATION (BAZETT): 440 MS
EKG R AXIS: 34 DEGREES
EKG T AXIS: 50 DEGREES
EKG VENTRICULAR RATE: 93 BPM
EOSINOPHIL # BLD: 0 K/UL (ref 0.05–0.5)
EOSINOPHILS RELATIVE PERCENT: 0 % (ref 0–6)
ERYTHROCYTE [DISTWIDTH] IN BLOOD BY AUTOMATED COUNT: 13.5 % (ref 11.5–15)
GFR, ESTIMATED: >90 ML/MIN/1.73M2
GLUCOSE SERPL-MCNC: 198 MG/DL (ref 74–99)
HCT VFR BLD AUTO: 39.8 % (ref 34–48)
HGB BLD-MCNC: 12.3 G/DL (ref 11.5–15.5)
IMM GRANULOCYTES # BLD AUTO: 0.06 K/UL (ref 0–0.58)
IMM GRANULOCYTES NFR BLD: 1 % (ref 0–5)
LYMPHOCYTES NFR BLD: 0.53 K/UL (ref 1.5–4)
LYMPHOCYTES RELATIVE PERCENT: 7 % (ref 20–42)
MAGNESIUM SERPL-MCNC: 1.9 MG/DL (ref 1.6–2.6)
MCH RBC QN AUTO: 28.3 PG (ref 26–35)
MCHC RBC AUTO-ENTMCNC: 30.9 G/DL (ref 32–34.5)
MCV RBC AUTO: 91.5 FL (ref 80–99.9)
MONOCYTES NFR BLD: 0.29 K/UL (ref 0.1–0.95)
MONOCYTES NFR BLD: 4 % (ref 2–12)
NEUTROPHILS NFR BLD: 88 % (ref 43–80)
NEUTS SEG NFR BLD: 6.53 K/UL (ref 1.8–7.3)
PHOSPHATE SERPL-MCNC: 2.4 MG/DL (ref 2.5–4.5)
PLATELET # BLD AUTO: 206 K/UL (ref 130–450)
PMV BLD AUTO: 10.1 FL (ref 7–12)
POTASSIUM SERPL-SCNC: 4.5 MMOL/L (ref 3.5–5)
RBC # BLD AUTO: 4.35 M/UL (ref 3.5–5.5)
RBC # BLD: ABNORMAL 10*6/UL
RBC # BLD: ABNORMAL 10*6/UL
SODIUM SERPL-SCNC: 139 MMOL/L (ref 132–146)
WBC OTHER # BLD: 7.4 K/UL (ref 4.5–11.5)

## 2024-05-30 PROCEDURE — 2580000003 HC RX 258: Performed by: INTERNAL MEDICINE

## 2024-05-30 PROCEDURE — 6370000000 HC RX 637 (ALT 250 FOR IP)

## 2024-05-30 PROCEDURE — 6370000000 HC RX 637 (ALT 250 FOR IP): Performed by: INTERNAL MEDICINE

## 2024-05-30 PROCEDURE — 6360000002 HC RX W HCPCS

## 2024-05-30 PROCEDURE — 6360000002 HC RX W HCPCS: Performed by: INTERNAL MEDICINE

## 2024-05-30 PROCEDURE — 94660 CPAP INITIATION&MGMT: CPT

## 2024-05-30 PROCEDURE — 2140000000 HC CCU INTERMEDIATE R&B

## 2024-05-30 PROCEDURE — 2700000000 HC OXYGEN THERAPY PER DAY

## 2024-05-30 PROCEDURE — 36415 COLL VENOUS BLD VENIPUNCTURE: CPT

## 2024-05-30 PROCEDURE — 94640 AIRWAY INHALATION TREATMENT: CPT

## 2024-05-30 PROCEDURE — 85025 COMPLETE CBC W/AUTO DIFF WBC: CPT

## 2024-05-30 PROCEDURE — 80048 BASIC METABOLIC PNL TOTAL CA: CPT

## 2024-05-30 PROCEDURE — 84100 ASSAY OF PHOSPHORUS: CPT

## 2024-05-30 PROCEDURE — 99221 1ST HOSP IP/OBS SF/LOW 40: CPT | Performed by: INTERNAL MEDICINE

## 2024-05-30 PROCEDURE — 2500000003 HC RX 250 WO HCPCS: Performed by: INTERNAL MEDICINE

## 2024-05-30 PROCEDURE — 99222 1ST HOSP IP/OBS MODERATE 55: CPT | Performed by: INTERNAL MEDICINE

## 2024-05-30 PROCEDURE — 2580000003 HC RX 258

## 2024-05-30 PROCEDURE — 83735 ASSAY OF MAGNESIUM: CPT

## 2024-05-30 PROCEDURE — 93010 ELECTROCARDIOGRAM REPORT: CPT | Performed by: INTERNAL MEDICINE

## 2024-05-30 RX ORDER — HYDRALAZINE HYDROCHLORIDE 20 MG/ML
10 INJECTION INTRAMUSCULAR; INTRAVENOUS EVERY 6 HOURS PRN
Status: DISCONTINUED | OUTPATIENT
Start: 2024-05-30 | End: 2024-06-03 | Stop reason: HOSPADM

## 2024-05-30 RX ORDER — MIRTAZAPINE 15 MG/1
15 TABLET, FILM COATED ORAL NIGHTLY
COMMUNITY

## 2024-05-30 RX ORDER — ACETAMINOPHEN 325 MG/1
650 TABLET ORAL EVERY 6 HOURS PRN
Status: DISCONTINUED | OUTPATIENT
Start: 2024-05-30 | End: 2024-06-03 | Stop reason: HOSPADM

## 2024-05-30 RX ORDER — PANTOPRAZOLE SODIUM 40 MG/1
40 TABLET, DELAYED RELEASE ORAL DAILY
COMMUNITY

## 2024-05-30 RX ORDER — LABETALOL HYDROCHLORIDE 5 MG/ML
10 INJECTION, SOLUTION INTRAVENOUS EVERY 6 HOURS PRN
Status: DISCONTINUED | OUTPATIENT
Start: 2024-05-30 | End: 2024-06-03 | Stop reason: HOSPADM

## 2024-05-30 RX ORDER — MAGNESIUM SULFATE 1 G/100ML
1000 INJECTION INTRAVENOUS ONCE
Status: COMPLETED | OUTPATIENT
Start: 2024-05-30 | End: 2024-05-30

## 2024-05-30 RX ORDER — ONDANSETRON 2 MG/ML
INJECTION INTRAMUSCULAR; INTRAVENOUS
Status: COMPLETED
Start: 2024-05-30 | End: 2024-05-30

## 2024-05-30 RX ORDER — ACETAMINOPHEN 650 MG/1
650 SUPPOSITORY RECTAL EVERY 6 HOURS PRN
Status: DISCONTINUED | OUTPATIENT
Start: 2024-05-30 | End: 2024-06-03 | Stop reason: HOSPADM

## 2024-05-30 RX ORDER — SODIUM CHLORIDE 0.9 % (FLUSH) 0.9 %
5-40 SYRINGE (ML) INJECTION EVERY 12 HOURS SCHEDULED
Status: DISCONTINUED | OUTPATIENT
Start: 2024-05-30 | End: 2024-06-03 | Stop reason: HOSPADM

## 2024-05-30 RX ORDER — ONDANSETRON 2 MG/ML
4 INJECTION INTRAMUSCULAR; INTRAVENOUS EVERY 6 HOURS PRN
Status: DISCONTINUED | OUTPATIENT
Start: 2024-05-30 | End: 2024-06-03 | Stop reason: HOSPADM

## 2024-05-30 RX ORDER — SODIUM CHLORIDE 0.9 % (FLUSH) 0.9 %
5-40 SYRINGE (ML) INJECTION PRN
Status: DISCONTINUED | OUTPATIENT
Start: 2024-05-30 | End: 2024-06-03 | Stop reason: HOSPADM

## 2024-05-30 RX ORDER — IPRATROPIUM BROMIDE AND ALBUTEROL SULFATE 2.5; .5 MG/3ML; MG/3ML
1 SOLUTION RESPIRATORY (INHALATION) EVERY 4 HOURS PRN
COMMUNITY

## 2024-05-30 RX ORDER — POLYETHYLENE GLYCOL 3350 17 G/17G
17 POWDER, FOR SOLUTION ORAL DAILY PRN
Status: DISCONTINUED | OUTPATIENT
Start: 2024-05-30 | End: 2024-06-03 | Stop reason: HOSPADM

## 2024-05-30 RX ORDER — ENOXAPARIN SODIUM 100 MG/ML
30 INJECTION SUBCUTANEOUS 2 TIMES DAILY
Status: DISCONTINUED | OUTPATIENT
Start: 2024-05-30 | End: 2024-06-03 | Stop reason: HOSPADM

## 2024-05-30 RX ORDER — HYDROXYZINE HYDROCHLORIDE 25 MG/1
25 TABLET, FILM COATED ORAL EVERY 8 HOURS PRN
Status: ON HOLD | COMMUNITY
End: 2024-06-05

## 2024-05-30 RX ORDER — VITAMIN B COMPLEX
1000 TABLET ORAL DAILY
Status: DISCONTINUED | OUTPATIENT
Start: 2024-05-30 | End: 2024-06-03 | Stop reason: HOSPADM

## 2024-05-30 RX ORDER — ONDANSETRON 4 MG/1
4 TABLET, ORALLY DISINTEGRATING ORAL EVERY 8 HOURS PRN
Status: DISCONTINUED | OUTPATIENT
Start: 2024-05-30 | End: 2024-06-03 | Stop reason: HOSPADM

## 2024-05-30 RX ORDER — NICOTINE 21 MG/24HR
1 PATCH, TRANSDERMAL 24 HOURS TRANSDERMAL DAILY
Status: DISCONTINUED | OUTPATIENT
Start: 2024-05-30 | End: 2024-06-03 | Stop reason: HOSPADM

## 2024-05-30 RX ORDER — SODIUM CHLORIDE 9 MG/ML
INJECTION, SOLUTION INTRAVENOUS PRN
Status: DISCONTINUED | OUTPATIENT
Start: 2024-05-30 | End: 2024-06-03 | Stop reason: HOSPADM

## 2024-05-30 RX ADMIN — IPRATROPIUM BROMIDE AND ALBUTEROL SULFATE 1 DOSE: 2.5; .5 SOLUTION RESPIRATORY (INHALATION) at 16:04

## 2024-05-30 RX ADMIN — BUDESONIDE 250 MCG: 0.25 INHALANT RESPIRATORY (INHALATION) at 00:21

## 2024-05-30 RX ADMIN — ENOXAPARIN SODIUM 30 MG: 100 INJECTION SUBCUTANEOUS at 09:22

## 2024-05-30 RX ADMIN — ONDANSETRON 4 MG: 2 INJECTION INTRAMUSCULAR; INTRAVENOUS at 06:11

## 2024-05-30 RX ADMIN — AZITHROMYCIN MONOHYDRATE 500 MG: 500 INJECTION, POWDER, LYOPHILIZED, FOR SOLUTION INTRAVENOUS at 04:37

## 2024-05-30 RX ADMIN — ALPRAZOLAM 0.25 MG: 0.25 TABLET ORAL at 18:00

## 2024-05-30 RX ADMIN — ALPRAZOLAM 0.25 MG: 0.25 TABLET ORAL at 09:31

## 2024-05-30 RX ADMIN — WATER 40 MG: 1 INJECTION INTRAMUSCULAR; INTRAVENOUS; SUBCUTANEOUS at 09:23

## 2024-05-30 RX ADMIN — ARIPIPRAZOLE 5 MG: 10 TABLET ORAL at 09:22

## 2024-05-30 RX ADMIN — ENOXAPARIN SODIUM 30 MG: 100 INJECTION SUBCUTANEOUS at 21:17

## 2024-05-30 RX ADMIN — Medication 10 MG: at 00:35

## 2024-05-30 RX ADMIN — ARFORMOTEROL TARTRATE 15 MCG: 15 SOLUTION RESPIRATORY (INHALATION) at 08:18

## 2024-05-30 RX ADMIN — BUDESONIDE 250 MCG: 0.25 INHALANT RESPIRATORY (INHALATION) at 19:53

## 2024-05-30 RX ADMIN — IPRATROPIUM BROMIDE AND ALBUTEROL SULFATE 1 DOSE: 2.5; .5 SOLUTION RESPIRATORY (INHALATION) at 00:20

## 2024-05-30 RX ADMIN — Medication 10 MG: at 21:17

## 2024-05-30 RX ADMIN — IPRATROPIUM BROMIDE AND ALBUTEROL SULFATE 1 DOSE: 2.5; .5 SOLUTION RESPIRATORY (INHALATION) at 19:53

## 2024-05-30 RX ADMIN — ROFLUMILAST 500 MCG: 500 TABLET ORAL at 09:23

## 2024-05-30 RX ADMIN — ARFORMOTEROL TARTRATE 15 MCG: 15 SOLUTION RESPIRATORY (INHALATION) at 19:53

## 2024-05-30 RX ADMIN — PANTOPRAZOLE SODIUM 40 MG: 40 TABLET, DELAYED RELEASE ORAL at 09:22

## 2024-05-30 RX ADMIN — Medication 1000 UNITS: at 14:26

## 2024-05-30 RX ADMIN — BUDESONIDE 250 MCG: 0.25 INHALANT RESPIRATORY (INHALATION) at 08:18

## 2024-05-30 RX ADMIN — MIRTAZAPINE 15 MG: 15 TABLET, FILM COATED ORAL at 21:17

## 2024-05-30 RX ADMIN — ARFORMOTEROL TARTRATE 15 MCG: 15 SOLUTION RESPIRATORY (INHALATION) at 00:20

## 2024-05-30 RX ADMIN — HYDROXYZINE PAMOATE 25 MG: 25 CAPSULE ORAL at 14:42

## 2024-05-30 RX ADMIN — SODIUM CHLORIDE, PRESERVATIVE FREE 10 ML: 5 INJECTION INTRAVENOUS at 09:32

## 2024-05-30 RX ADMIN — MIRTAZAPINE 15 MG: 15 TABLET, FILM COATED ORAL at 00:35

## 2024-05-30 RX ADMIN — IPRATROPIUM BROMIDE AND ALBUTEROL SULFATE 1 DOSE: 2.5; .5 SOLUTION RESPIRATORY (INHALATION) at 13:02

## 2024-05-30 RX ADMIN — SODIUM CHLORIDE, PRESERVATIVE FREE 10 ML: 5 INJECTION INTRAVENOUS at 21:18

## 2024-05-30 RX ADMIN — LABETALOL HYDROCHLORIDE 10 MG: 5 INJECTION INTRAVENOUS at 09:24

## 2024-05-30 RX ADMIN — MAGNESIUM SULFATE HEPTAHYDRATE 1000 MG: 1 INJECTION, SOLUTION INTRAVENOUS at 09:35

## 2024-05-30 RX ADMIN — ANTI-FUNGAL POWDER MICONAZOLE NITRATE TALC FREE: 1.42 POWDER TOPICAL at 21:16

## 2024-05-30 RX ADMIN — IPRATROPIUM BROMIDE AND ALBUTEROL SULFATE 1 DOSE: 2.5; .5 SOLUTION RESPIRATORY (INHALATION) at 08:18

## 2024-05-30 ASSESSMENT — PAIN DESCRIPTION - DESCRIPTORS: DESCRIPTORS: ACHING

## 2024-05-30 ASSESSMENT — PAIN SCALES - GENERAL
PAINLEVEL_OUTOF10: 0
PAINLEVEL_OUTOF10: 0
PAINLEVEL_OUTOF10: 4

## 2024-05-30 ASSESSMENT — PAIN DESCRIPTION - LOCATION: LOCATION: HEAD

## 2024-05-30 NOTE — CONSULTS
Pulmonary Critical Care Medicine      PULMONARY CRITICAL CARE CONSULTATION NOTE.  05/30/24    CONSULTING  PHYSICIAN: Dr. Sofie Brown   REASON FOR REFERRAL:  COPD exacerbation, patient known to Pulm     Assessment-  Acute on chronic hypoxic hypercapnic respiratory failure likely 2/2 COPD exacerbation - respiratory panel negative, recent smoke exposure -  on 4L at baseline  2.  COPD Gold stage III - FEV1/FVC 40% per last PFT  3.  BENJAMIN on CPAP  4.  Elevated right ventricular systolic pressure at 41 mmHg  5.  Stage I diastolic dysfunction per most recent echo, EF 55-60%  6.  Hx of bipolar disorder  7.  Hx of suicidal ideation  8.  Hx of vitamin D deficiency      Recommendations-   Continue with breathing treatment  Continue with steroids, taper when deemed appropriate  Possible right heart cath in the outpatient setting  Reinforced need for smoking cessation  Pulmonary rehab in the outpatient setting (will discuss with patient)   Resume home vitamin D   Continue with nicotine patch  Pulmonology will follow  CPAP qhs    Monitor respiratory status    History of Present Illness  Ms. Brenda Nunn is a 58-year-old woman with PMH of oxygen-dependent COPD (4L) at baseline, BENJAMIN on CPAP, HFpEF, bipolar 1 disorder, anxiety, depression, vitamin D deficiency and current smoker who presented to the emergency department with chief complaints of dyspnea, increased sputum production and cough. Patient reports that she was in her usual state of health when she was discharged on 05/17 on steroids taper until Monday when she was attending [facility] with friends and she was exposed to the smoke and started to experience shortness of breath, cough and increased sputum production. When seen in the emergency department by me patient reports that she is currently experiencing only clear mucus production, no hemoptysis, no fevers, no night sweats, no weight loss.  She reports nasal congestion

## 2024-05-30 NOTE — ED NOTES
Patient wanted to take a break from BiPAP, placed patient on 4L O2 via nasal cannula, states this is what she wears at home. Patient tolerating well at 98%.

## 2024-05-30 NOTE — FLOWSHEET NOTE
Patient arrived to PCCU from ED with the following belongings.   Patient oriented to room and use of call light.   05/30/24 1730   Belongings   Dental Appliances Uppers;Lowers   Vision - Corrective Lenses Eyeglasses;At home  (Reading)   Hearing Aid None   Clothing Footwear;Jacket/Coat;Pants;Shirt;At bedside   Jewelry Earrings;At bedside  (Left ear)   Electronic Devices Cell Phone;   Weapons (Notify Protective Services/Security) None   Home Medications None   Valuables Given To Patient   Provide Name(s) of Who Valuable(s) Were Given To Brenda Nunn   Orientation to Room   Orientation to Room Performed Yes

## 2024-05-30 NOTE — PLAN OF CARE
Problem: Chronic Conditions and Co-morbidities  Goal: Patient's chronic conditions and co-morbidity symptoms are monitored and maintained or improved  Outcome: Progressing     Problem: Pain  Goal: Verbalizes/displays adequate comfort level or baseline comfort level  Outcome: Progressing     Problem: Safety - Adult  Goal: Free from fall injury  Outcome: Progressing     Problem: ABCDS Injury Assessment  Goal: Absence of physical injury  Outcome: Progressing     Problem: Discharge Planning  Goal: Discharge to home or other facility with appropriate resources  Outcome: Progressing

## 2024-05-30 NOTE — H&P
Meds:   azithromycin  500 mg IntraVENous Q24H    ARIPiprazole  5 mg Oral Daily    melatonin  10 mg Oral Nightly    miconazole   Topical BID    mirtazapine  15 mg Oral Nightly    pantoprazole  40 mg Oral QAM AC    Roflumilast  500 mcg Oral Daily    ipratropium 0.5 mg-albuterol 2.5 mg  1 Dose Inhalation 4x Daily RT    budesonide  250 mcg Nebulization BID    arformoterol tartrate  15 mcg Nebulization BID RT    methylPREDNISolone  40 mg IntraVENous Daily     PRN Meds: ALPRAZolam, hydrOXYzine pamoate, benzonatate      Imaging studies     XR CHEST PORTABLE   Final Result   No acute process.                Resident's Assessment and Plan     Assessment and Plan:  Assessment:  COPD exacerbation  Previously discharged on albuterol, symbicort, duoneb, prednisone taper, and roflumilast 500mg per pulmonology  Chronic hypoxic respiratory failure - 3-4 L oxygen at baseline  BENJAMIN, on CPAP (sleep study not done)  HFpEF (EF 55-60% 2/2020)  Pre-DM, A1c 5.7 (11/23), not on medictaions  Bipolar 1 disorder  on Buspirone 7.5 BID, remeron 15 mg OD, aripiprazole 5 mg OD, prazosin 1 mg   Vitamin D deficiency, on Vit D3 2000 U OD  Tobacco use - 1 per day  Hx of substance use (prev UDS (+) cannabinoid)  Hx of suicidal ideation    Plan:  Resp panel (-)  Breathing treatments, solumedrol, Azithro, tessalon pearls  Pt already had nicotine patch on    Discharge planning:  Has upcoming appointments with our clinic, pulmonology and sleep medicine    DVT prophylaxis: lovenox  GI prophylaxis: diet  Bowel regimen: glycolax  Diet:   ADULT DIET; Regular; Low Fat/Low Chol/High Fiber/AUDREY   Pain management: as needed  Code status: Prior   Disposition: Continue Current Care  Family: updated as available    Sofie Brown MD, PGY-1   Attending physician: Dr. Oliveira  Precepted With: Dr. Oliveira

## 2024-05-30 NOTE — ED NOTES
Patient starting to feel mild difficulty breathing on a nasal cannula. Patient given breathing treatment. Patient stated she still feels mild difficulty breathing. Patient requesting to be put back on BiPAP. BiPAP placed back on patient.

## 2024-05-31 ENCOUNTER — TELEPHONE (OUTPATIENT)
Dept: INTERNAL MEDICINE | Age: 59
End: 2024-05-31

## 2024-05-31 PROBLEM — N17.9 STAGE 1 ACUTE KIDNEY INJURY (HCC): Status: ACTIVE | Noted: 2024-05-31

## 2024-05-31 LAB
ANION GAP SERPL CALCULATED.3IONS-SCNC: 11 MMOL/L (ref 7–16)
BASOPHILS NFR BLD: 0 % (ref 0–2)
BUN SERPL-MCNC: 21 MG/DL (ref 6–20)
BUN SERPL-MCNC: 22 MG/DL (ref 6–20)
CALCIUM SERPL-MCNC: 9.5 MG/DL (ref 8.6–10.2)
CALCIUM SERPL-MCNC: 9.7 MG/DL (ref 8.6–10.2)
CHLORIDE SERPL-SCNC: 103 MMOL/L (ref 98–107)
CO2 SERPL-SCNC: 28 MMOL/L (ref 22–29)
CO2 SERPL-SCNC: 28 MMOL/L (ref 22–29)
CREAT SERPL-MCNC: 0.8 MG/DL (ref 0.5–1)
CREAT SERPL-MCNC: 1 MG/DL (ref 0.5–1)
ERYTHROCYTE [DISTWIDTH] IN BLOOD BY AUTOMATED COUNT: 13.6 % (ref 11.5–15)
GFR, ESTIMATED: 69 ML/MIN/1.73M2
GFR, ESTIMATED: 82 ML/MIN/1.73M2
GLUCOSE SERPL-MCNC: 101 MG/DL (ref 74–99)
GLUCOSE SERPL-MCNC: 210 MG/DL (ref 74–99)
HCT VFR BLD AUTO: 38.7 % (ref 34–48)
HGB BLD-MCNC: 11.8 G/DL (ref 11.5–15.5)
IMM GRANULOCYTES NFR BLD: 1 % (ref 0–5)
LYMPHOCYTES NFR BLD: 2.26 K/UL (ref 1.5–4)
LYMPHOCYTES RELATIVE PERCENT: 24 % (ref 20–42)
MAGNESIUM SERPL-MCNC: 1.9 MG/DL (ref 1.6–2.6)
MCH RBC QN AUTO: 28 PG (ref 26–35)
MCHC RBC AUTO-ENTMCNC: 30.5 G/DL (ref 32–34.5)
MCV RBC AUTO: 91.7 FL (ref 80–99.9)
MONOCYTES NFR BLD: 0.64 K/UL (ref 0.1–0.95)
MONOCYTES NFR BLD: 7 % (ref 2–12)
NEUTROPHILS NFR BLD: 68 % (ref 43–80)
NEUTS SEG NFR BLD: 6.44 K/UL (ref 1.8–7.3)
PHOSPHATE SERPL-MCNC: 3.1 MG/DL (ref 2.5–4.5)
PLATELET # BLD AUTO: 231 K/UL (ref 130–450)
PMV BLD AUTO: 9.7 FL (ref 7–12)
POTASSIUM SERPL-SCNC: 4.6 MMOL/L (ref 3.5–5)
POTASSIUM SERPL-SCNC: 5.2 MMOL/L (ref 3.5–5)
RBC # BLD AUTO: 4.22 M/UL (ref 3.5–5.5)
SODIUM SERPL-SCNC: 140 MMOL/L (ref 132–146)
SODIUM SERPL-SCNC: 143 MMOL/L (ref 132–146)
WBC OTHER # BLD: 9.5 K/UL (ref 4.5–11.5)

## 2024-05-31 PROCEDURE — 2140000000 HC CCU INTERMEDIATE R&B

## 2024-05-31 PROCEDURE — 6370000000 HC RX 637 (ALT 250 FOR IP)

## 2024-05-31 PROCEDURE — 94640 AIRWAY INHALATION TREATMENT: CPT

## 2024-05-31 PROCEDURE — 83735 ASSAY OF MAGNESIUM: CPT

## 2024-05-31 PROCEDURE — 2580000003 HC RX 258: Performed by: INTERNAL MEDICINE

## 2024-05-31 PROCEDURE — 36415 COLL VENOUS BLD VENIPUNCTURE: CPT

## 2024-05-31 PROCEDURE — 97165 OT EVAL LOW COMPLEX 30 MIN: CPT

## 2024-05-31 PROCEDURE — 6370000000 HC RX 637 (ALT 250 FOR IP): Performed by: INTERNAL MEDICINE

## 2024-05-31 PROCEDURE — 6360000002 HC RX W HCPCS: Performed by: INTERNAL MEDICINE

## 2024-05-31 PROCEDURE — 94660 CPAP INITIATION&MGMT: CPT

## 2024-05-31 PROCEDURE — 97530 THERAPEUTIC ACTIVITIES: CPT

## 2024-05-31 PROCEDURE — 2700000000 HC OXYGEN THERAPY PER DAY

## 2024-05-31 PROCEDURE — 85025 COMPLETE CBC W/AUTO DIFF WBC: CPT

## 2024-05-31 PROCEDURE — 97161 PT EVAL LOW COMPLEX 20 MIN: CPT

## 2024-05-31 PROCEDURE — 99232 SBSQ HOSP IP/OBS MODERATE 35: CPT | Performed by: INTERNAL MEDICINE

## 2024-05-31 PROCEDURE — 6360000002 HC RX W HCPCS

## 2024-05-31 PROCEDURE — 84100 ASSAY OF PHOSPHORUS: CPT

## 2024-05-31 PROCEDURE — 99233 SBSQ HOSP IP/OBS HIGH 50: CPT | Performed by: INTERNAL MEDICINE

## 2024-05-31 PROCEDURE — 80048 BASIC METABOLIC PNL TOTAL CA: CPT

## 2024-05-31 PROCEDURE — 2580000003 HC RX 258

## 2024-05-31 RX ORDER — 0.9 % SODIUM CHLORIDE 0.9 %
500 INTRAVENOUS SOLUTION INTRAVENOUS ONCE
Status: COMPLETED | OUTPATIENT
Start: 2024-05-31 | End: 2024-05-31

## 2024-05-31 RX ADMIN — SODIUM CHLORIDE 500 ML: 9 INJECTION, SOLUTION INTRAVENOUS at 11:11

## 2024-05-31 RX ADMIN — ENOXAPARIN SODIUM 30 MG: 100 INJECTION SUBCUTANEOUS at 09:29

## 2024-05-31 RX ADMIN — IPRATROPIUM BROMIDE AND ALBUTEROL SULFATE 1 DOSE: 2.5; .5 SOLUTION RESPIRATORY (INHALATION) at 08:06

## 2024-05-31 RX ADMIN — HYDROXYZINE PAMOATE 25 MG: 25 CAPSULE ORAL at 22:02

## 2024-05-31 RX ADMIN — PANTOPRAZOLE SODIUM 40 MG: 40 TABLET, DELAYED RELEASE ORAL at 05:54

## 2024-05-31 RX ADMIN — AZITHROMYCIN MONOHYDRATE 500 MG: 500 INJECTION, POWDER, LYOPHILIZED, FOR SOLUTION INTRAVENOUS at 05:53

## 2024-05-31 RX ADMIN — IPRATROPIUM BROMIDE AND ALBUTEROL SULFATE 1 DOSE: 2.5; .5 SOLUTION RESPIRATORY (INHALATION) at 20:37

## 2024-05-31 RX ADMIN — IPRATROPIUM BROMIDE AND ALBUTEROL SULFATE 1 DOSE: 2.5; .5 SOLUTION RESPIRATORY (INHALATION) at 16:54

## 2024-05-31 RX ADMIN — ARFORMOTEROL TARTRATE 15 MCG: 15 SOLUTION RESPIRATORY (INHALATION) at 20:37

## 2024-05-31 RX ADMIN — Medication 10 MG: at 21:52

## 2024-05-31 RX ADMIN — ARIPIPRAZOLE 5 MG: 10 TABLET ORAL at 11:08

## 2024-05-31 RX ADMIN — SODIUM ZIRCONIUM CYCLOSILICATE 10 G: 10 POWDER, FOR SUSPENSION ORAL at 14:42

## 2024-05-31 RX ADMIN — Medication 1000 UNITS: at 09:29

## 2024-05-31 RX ADMIN — MIRTAZAPINE 15 MG: 15 TABLET, FILM COATED ORAL at 21:52

## 2024-05-31 RX ADMIN — ROFLUMILAST 500 MCG: 500 TABLET ORAL at 09:29

## 2024-05-31 RX ADMIN — ENOXAPARIN SODIUM 30 MG: 100 INJECTION SUBCUTANEOUS at 21:52

## 2024-05-31 RX ADMIN — SODIUM CHLORIDE, PRESERVATIVE FREE 10 ML: 5 INJECTION INTRAVENOUS at 21:53

## 2024-05-31 RX ADMIN — ALPRAZOLAM 0.25 MG: 0.25 TABLET ORAL at 09:31

## 2024-05-31 RX ADMIN — HYDROXYZINE PAMOATE 25 MG: 25 CAPSULE ORAL at 13:28

## 2024-05-31 RX ADMIN — ALPRAZOLAM 0.25 MG: 0.25 TABLET ORAL at 17:59

## 2024-05-31 RX ADMIN — BUDESONIDE 250 MCG: 0.25 INHALANT RESPIRATORY (INHALATION) at 20:37

## 2024-05-31 RX ADMIN — ARFORMOTEROL TARTRATE 15 MCG: 15 SOLUTION RESPIRATORY (INHALATION) at 08:06

## 2024-05-31 RX ADMIN — WATER 40 MG: 1 INJECTION INTRAMUSCULAR; INTRAVENOUS; SUBCUTANEOUS at 09:28

## 2024-05-31 RX ADMIN — ANTI-FUNGAL POWDER MICONAZOLE NITRATE TALC FREE: 1.42 POWDER TOPICAL at 09:31

## 2024-05-31 RX ADMIN — BUDESONIDE 250 MCG: 0.25 INHALANT RESPIRATORY (INHALATION) at 08:06

## 2024-05-31 RX ADMIN — ANTI-FUNGAL POWDER MICONAZOLE NITRATE TALC FREE: 1.42 POWDER TOPICAL at 21:53

## 2024-05-31 RX ADMIN — SODIUM CHLORIDE, PRESERVATIVE FREE 10 ML: 5 INJECTION INTRAVENOUS at 09:29

## 2024-05-31 RX ADMIN — IPRATROPIUM BROMIDE AND ALBUTEROL SULFATE 1 DOSE: 2.5; .5 SOLUTION RESPIRATORY (INHALATION) at 12:48

## 2024-05-31 ASSESSMENT — PAIN SCALES - GENERAL
PAINLEVEL_OUTOF10: 0

## 2024-05-31 NOTE — ACP (ADVANCE CARE PLANNING)
Advance Care Planning   The patient has the following advanced directives on file:  Advance Directives       Power of  Living Will ACP-Advance Directive ACP-Power of     Not on File Not on File Not on File Not on File            CONTACT:    Primary Decision Maker: BRAIN SCHREIBER - Child - [phone]    The Patient has the following current code status:    Code Status: Full Code

## 2024-05-31 NOTE — PLAN OF CARE
Problem: Chronic Conditions and Co-morbidities  Goal: Patient's chronic conditions and co-morbidity symptoms are monitored and maintained or improved  5/31/2024 1249 by Miguel Delgado RN  Outcome: Progressing  5/31/2024 0226 by Renee Brandt RN  Outcome: Progressing     Problem: Discharge Planning  Goal: Discharge to home or other facility with appropriate resources  5/31/2024 1249 by Miguel Delgado RN  Outcome: Progressing  5/31/2024 0226 by Renee Brandt RN  Outcome: Progressing     Problem: Pain  Goal: Verbalizes/displays adequate comfort level or baseline comfort level  5/31/2024 1249 by Miguel Delgado RN  Outcome: Progressing  5/31/2024 0226 by Renee Brandt RN  Outcome: Progressing     Problem: Safety - Adult  Goal: Free from fall injury  5/31/2024 1249 by Miguel Delgado RN  Outcome: Progressing  5/31/2024 0226 by Renee Brandt RN  Outcome: Progressing     Problem: ABCDS Injury Assessment  Goal: Absence of physical injury  5/31/2024 1249 by Miguel Delgado RN  Outcome: Progressing  5/31/2024 0226 by Renee Brandt RN  Outcome: Progressing

## 2024-05-31 NOTE — HOME CARE
[facility] referral received, awaiting final orders. Will follow.   Glenna Avery LPN [facility].

## 2024-05-31 NOTE — TELEPHONE ENCOUNTER
[facility] called stating patient will be receiving home health services upon discharge from hospital and needs verbal to follow for care.

## 2024-05-31 NOTE — PLAN OF CARE
Problem: Chronic Conditions and Co-morbidities  Goal: Patient's chronic conditions and co-morbidity symptoms are monitored and maintained or improved  5/31/2024 0226 by Renee Brandt RN  Outcome: Progressing  5/30/2024 1828 by Ave Li RN  Outcome: Progressing     Problem: Discharge Planning  Goal: Discharge to home or other facility with appropriate resources  5/31/2024 0226 by Renee Brandt RN  Outcome: Progressing  5/30/2024 1828 by Ave Li RN  Outcome: Progressing     Problem: Pain  Goal: Verbalizes/displays adequate comfort level or baseline comfort level  5/31/2024 0226 by Renee Brandt RN  Outcome: Progressing  5/30/2024 1828 by Ave Li RN  Outcome: Progressing     Problem: Safety - Adult  Goal: Free from fall injury  5/31/2024 0226 by Renee Brandt RN  Outcome: Progressing  5/30/2024 1828 by Ave Li RN  Outcome: Progressing     Problem: ABCDS Injury Assessment  Goal: Absence of physical injury  5/31/2024 0226 by Renee Brandt RN  Outcome: Progressing  5/30/2024 1828 by Ave Li RN  Outcome: Progressing

## 2024-06-01 LAB
ANION GAP SERPL CALCULATED.3IONS-SCNC: 12 MMOL/L (ref 7–16)
BASOPHILS # BLD: 0.03 K/UL (ref 0–0.2)
BASOPHILS NFR BLD: 0 % (ref 0–2)
CALCIUM SERPL-MCNC: 9.5 MG/DL (ref 8.6–10.2)
CHLORIDE SERPL-SCNC: 102 MMOL/L (ref 98–107)
CO2 SERPL-SCNC: 27 MMOL/L (ref 22–29)
CREAT SERPL-MCNC: 0.6 MG/DL (ref 0.5–1)
EOSINOPHIL # BLD: 0.03 K/UL (ref 0.05–0.5)
EOSINOPHILS RELATIVE PERCENT: 0 % (ref 0–6)
ERYTHROCYTE [DISTWIDTH] IN BLOOD BY AUTOMATED COUNT: 13.3 % (ref 11.5–15)
GFR, ESTIMATED: >90 ML/MIN/1.73M2
GLUCOSE SERPL-MCNC: 127 MG/DL (ref 74–99)
HGB BLD-MCNC: 11.3 G/DL (ref 11.5–15.5)
IMM GRANULOCYTES # BLD AUTO: 0.06 K/UL (ref 0–0.58)
IMM GRANULOCYTES NFR BLD: 1 % (ref 0–5)
LYMPHOCYTES NFR BLD: 2.17 K/UL (ref 1.5–4)
LYMPHOCYTES RELATIVE PERCENT: 25 % (ref 20–42)
MAGNESIUM SERPL-MCNC: 1.7 MG/DL (ref 1.6–2.6)
MCH RBC QN AUTO: 27.3 PG (ref 26–35)
MCHC RBC AUTO-ENTMCNC: 30.3 G/DL (ref 32–34.5)
MCV RBC AUTO: 90.1 FL (ref 80–99.9)
MONOCYTES NFR BLD: 0.66 K/UL (ref 0.1–0.95)
NEUTROPHILS NFR BLD: 66 % (ref 43–80)
NEUTS SEG NFR BLD: 5.76 K/UL (ref 1.8–7.3)
PHOSPHATE SERPL-MCNC: 2.9 MG/DL (ref 2.5–4.5)
PLATELET # BLD AUTO: 230 K/UL (ref 130–450)
PMV BLD AUTO: 10.1 FL (ref 7–12)
POTASSIUM SERPL-SCNC: 3.9 MMOL/L (ref 3.5–5)
RBC # BLD AUTO: 4.14 M/UL (ref 3.5–5.5)
WBC OTHER # BLD: 8.7 K/UL (ref 4.5–11.5)

## 2024-06-01 PROCEDURE — 2500000003 HC RX 250 WO HCPCS: Performed by: INTERNAL MEDICINE

## 2024-06-01 PROCEDURE — 6370000000 HC RX 637 (ALT 250 FOR IP)

## 2024-06-01 PROCEDURE — 36415 COLL VENOUS BLD VENIPUNCTURE: CPT

## 2024-06-01 PROCEDURE — 99232 SBSQ HOSP IP/OBS MODERATE 35: CPT | Performed by: INTERNAL MEDICINE

## 2024-06-01 PROCEDURE — 94640 AIRWAY INHALATION TREATMENT: CPT

## 2024-06-01 PROCEDURE — 2580000003 HC RX 258: Performed by: INTERNAL MEDICINE

## 2024-06-01 PROCEDURE — 83735 ASSAY OF MAGNESIUM: CPT

## 2024-06-01 PROCEDURE — 85025 COMPLETE CBC W/AUTO DIFF WBC: CPT

## 2024-06-01 PROCEDURE — 6370000000 HC RX 637 (ALT 250 FOR IP): Performed by: INTERNAL MEDICINE

## 2024-06-01 PROCEDURE — 2140000000 HC CCU INTERMEDIATE R&B

## 2024-06-01 PROCEDURE — 2700000000 HC OXYGEN THERAPY PER DAY

## 2024-06-01 PROCEDURE — 6360000002 HC RX W HCPCS: Performed by: INTERNAL MEDICINE

## 2024-06-01 PROCEDURE — 6360000002 HC RX W HCPCS

## 2024-06-01 PROCEDURE — 80048 BASIC METABOLIC PNL TOTAL CA: CPT

## 2024-06-01 PROCEDURE — 99231 SBSQ HOSP IP/OBS SF/LOW 25: CPT | Performed by: INTERNAL MEDICINE

## 2024-06-01 PROCEDURE — 84100 ASSAY OF PHOSPHORUS: CPT

## 2024-06-01 PROCEDURE — 94660 CPAP INITIATION&MGMT: CPT

## 2024-06-01 PROCEDURE — 2580000003 HC RX 258

## 2024-06-01 RX ORDER — CALCIUM CARBONATE 500 MG/1
500 TABLET, CHEWABLE ORAL 3 TIMES DAILY PRN
Status: DISCONTINUED | OUTPATIENT
Start: 2024-06-01 | End: 2024-06-03 | Stop reason: HOSPADM

## 2024-06-01 RX ORDER — LANOLIN ALCOHOL/MO/W.PET/CERES
400 CREAM (GRAM) TOPICAL 2 TIMES DAILY
Status: COMPLETED | OUTPATIENT
Start: 2024-06-01 | End: 2024-06-02

## 2024-06-01 RX ADMIN — HYDROXYZINE PAMOATE 25 MG: 25 CAPSULE ORAL at 15:51

## 2024-06-01 RX ADMIN — ARFORMOTEROL TARTRATE 15 MCG: 15 SOLUTION RESPIRATORY (INHALATION) at 08:33

## 2024-06-01 RX ADMIN — ROFLUMILAST 500 MCG: 500 TABLET ORAL at 09:37

## 2024-06-01 RX ADMIN — ARIPIPRAZOLE 5 MG: 10 TABLET ORAL at 09:37

## 2024-06-01 RX ADMIN — IPRATROPIUM BROMIDE AND ALBUTEROL SULFATE 1 DOSE: 2.5; .5 SOLUTION RESPIRATORY (INHALATION) at 19:50

## 2024-06-01 RX ADMIN — ENOXAPARIN SODIUM 30 MG: 100 INJECTION SUBCUTANEOUS at 20:36

## 2024-06-01 RX ADMIN — IPRATROPIUM BROMIDE AND ALBUTEROL SULFATE 1 DOSE: 2.5; .5 SOLUTION RESPIRATORY (INHALATION) at 15:38

## 2024-06-01 RX ADMIN — SODIUM CHLORIDE, PRESERVATIVE FREE 10 ML: 5 INJECTION INTRAVENOUS at 09:40

## 2024-06-01 RX ADMIN — ENOXAPARIN SODIUM 30 MG: 100 INJECTION SUBCUTANEOUS at 09:36

## 2024-06-01 RX ADMIN — CALCIUM CARBONATE 500 MG: 500 TABLET, CHEWABLE ORAL at 20:36

## 2024-06-01 RX ADMIN — SODIUM CHLORIDE, PRESERVATIVE FREE 10 ML: 5 INJECTION INTRAVENOUS at 20:36

## 2024-06-01 RX ADMIN — Medication 400 MG: at 20:36

## 2024-06-01 RX ADMIN — ALPRAZOLAM 0.25 MG: 0.25 TABLET ORAL at 09:37

## 2024-06-01 RX ADMIN — Medication 400 MG: at 09:37

## 2024-06-01 RX ADMIN — Medication 1000 UNITS: at 09:37

## 2024-06-01 RX ADMIN — BUDESONIDE 250 MCG: 0.25 INHALANT RESPIRATORY (INHALATION) at 19:49

## 2024-06-01 RX ADMIN — ALPRAZOLAM 0.25 MG: 0.25 TABLET ORAL at 19:33

## 2024-06-01 RX ADMIN — ANTI-FUNGAL POWDER MICONAZOLE NITRATE TALC FREE: 1.42 POWDER TOPICAL at 20:37

## 2024-06-01 RX ADMIN — MIRTAZAPINE 15 MG: 15 TABLET, FILM COATED ORAL at 20:36

## 2024-06-01 RX ADMIN — ARFORMOTEROL TARTRATE 15 MCG: 15 SOLUTION RESPIRATORY (INHALATION) at 19:49

## 2024-06-01 RX ADMIN — Medication 10 MG: at 20:36

## 2024-06-01 RX ADMIN — AZITHROMYCIN MONOHYDRATE 500 MG: 500 INJECTION, POWDER, LYOPHILIZED, FOR SOLUTION INTRAVENOUS at 05:42

## 2024-06-01 RX ADMIN — IPRATROPIUM BROMIDE AND ALBUTEROL SULFATE 1 DOSE: 2.5; .5 SOLUTION RESPIRATORY (INHALATION) at 12:15

## 2024-06-01 RX ADMIN — WATER 40 MG: 1 INJECTION INTRAMUSCULAR; INTRAVENOUS; SUBCUTANEOUS at 09:36

## 2024-06-01 RX ADMIN — PANTOPRAZOLE SODIUM 40 MG: 40 TABLET, DELAYED RELEASE ORAL at 05:42

## 2024-06-01 RX ADMIN — IPRATROPIUM BROMIDE AND ALBUTEROL SULFATE 1 DOSE: 2.5; .5 SOLUTION RESPIRATORY (INHALATION) at 08:33

## 2024-06-01 RX ADMIN — ANTI-FUNGAL POWDER MICONAZOLE NITRATE TALC FREE: 1.42 POWDER TOPICAL at 09:38

## 2024-06-01 RX ADMIN — BUDESONIDE 250 MCG: 0.25 INHALANT RESPIRATORY (INHALATION) at 08:33

## 2024-06-01 ASSESSMENT — PAIN SCALES - GENERAL: PAINLEVEL_OUTOF10: 0

## 2024-06-01 NOTE — PLAN OF CARE
Problem: Chronic Conditions and Co-morbidities  Goal: Patient's chronic conditions and co-morbidity symptoms are monitored and maintained or improved  6/1/2024 0117 by Renee Brandt RN  Outcome: Progressing  5/31/2024 1249 by Miguel Delgado RN  Outcome: Progressing     Problem: Discharge Planning  Goal: Discharge to home or other facility with appropriate resources  6/1/2024 0117 by Renee Brandt RN  Outcome: Progressing  5/31/2024 1249 by Miguel Delgado RN  Outcome: Progressing     Problem: Pain  Goal: Verbalizes/displays adequate comfort level or baseline comfort level  6/1/2024 0117 by Renee Brandt RN  Outcome: Progressing  5/31/2024 1249 by Miguel Delgado RN  Outcome: Progressing     Problem: Safety - Adult  Goal: Free from fall injury  6/1/2024 0117 by Renee Brandt RN  Outcome: Progressing  5/31/2024 1249 by Miguel Delgado RN  Outcome: Progressing     Problem: ABCDS Injury Assessment  Goal: Absence of physical injury  6/1/2024 0117 by Renee Brandt RN  Outcome: Progressing  5/31/2024 1249 by Miguel Delgado RN  Outcome: Progressing     Problem: Skin/Tissue Integrity  Goal: Absence of new skin breakdown  Description: 1.  Monitor for areas of redness and/or skin breakdown  2.  Assess vascular access sites hourly  3.  Every 4-6 hours minimum:  Change oxygen saturation probe site  4.  Every 4-6 hours:  If on nasal continuous positive airway pressure, respiratory therapy assess nares and determine need for appliance change or resting period.  Outcome: Progressing

## 2024-06-01 NOTE — PATIENT CARE CONFERENCE
P Quality Flow/Interdisciplinary Rounds Progress Note        Quality Flow Rounds held on June 1, 2024    Disciplines Attending:  Bedside Nurse and Nursing Unit Leadership    Brenda Nunn was admitted on 5/29/2024  6:16 PM    Anticipated Discharge Date:       Disposition:    Gabe Score:  Gabe Scale Score: 18    Readmission Risk              Risk of Unplanned Readmission:  67           Discussed patient goal for the day, patient clinical progression, and barriers to discharge.  The following Goal(s) of the Day/Commitment(s) have been identified:  Labs - Report Results      Brandi Clark RN  June 1, 2024

## 2024-06-02 LAB
ANION GAP SERPL CALCULATED.3IONS-SCNC: 14 MMOL/L (ref 7–16)
BASOPHILS # BLD: 0.03 K/UL (ref 0–0.2)
BASOPHILS NFR BLD: 0 % (ref 0–2)
BUN SERPL-MCNC: 15 MG/DL (ref 6–20)
CALCIUM SERPL-MCNC: 9.9 MG/DL (ref 8.6–10.2)
CHLORIDE SERPL-SCNC: 100 MMOL/L (ref 98–107)
CO2 SERPL-SCNC: 26 MMOL/L (ref 22–29)
CREAT SERPL-MCNC: 0.6 MG/DL (ref 0.5–1)
EOSINOPHIL # BLD: 0.05 K/UL (ref 0.05–0.5)
EOSINOPHILS RELATIVE PERCENT: 1 % (ref 0–6)
ERYTHROCYTE [DISTWIDTH] IN BLOOD BY AUTOMATED COUNT: 13.2 % (ref 11.5–15)
GFR, ESTIMATED: >90 ML/MIN/1.73M2
GLUCOSE SERPL-MCNC: 89 MG/DL (ref 74–99)
HCT VFR BLD AUTO: 39 % (ref 34–48)
HGB BLD-MCNC: 12.1 G/DL (ref 11.5–15.5)
IMM GRANULOCYTES # BLD AUTO: 0.06 K/UL (ref 0–0.58)
IMM GRANULOCYTES NFR BLD: 1 % (ref 0–5)
LYMPHOCYTES NFR BLD: 2.26 K/UL (ref 1.5–4)
LYMPHOCYTES RELATIVE PERCENT: 27 % (ref 20–42)
MAGNESIUM SERPL-MCNC: 1.9 MG/DL (ref 1.6–2.6)
MCH RBC QN AUTO: 28.2 PG (ref 26–35)
MCHC RBC AUTO-ENTMCNC: 31 G/DL (ref 32–34.5)
MCV RBC AUTO: 90.9 FL (ref 80–99.9)
MONOCYTES NFR BLD: 0.58 K/UL (ref 0.1–0.95)
MONOCYTES NFR BLD: 7 % (ref 2–12)
NEUTROPHILS NFR BLD: 65 % (ref 43–80)
NEUTS SEG NFR BLD: 5.51 K/UL (ref 1.8–7.3)
PHOSPHATE SERPL-MCNC: 3.5 MG/DL (ref 2.5–4.5)
PLATELET # BLD AUTO: 226 K/UL (ref 130–450)
PMV BLD AUTO: 10 FL (ref 7–12)
POTASSIUM SERPL-SCNC: 4.1 MMOL/L (ref 3.5–5)
RBC # BLD AUTO: 4.29 M/UL (ref 3.5–5.5)
SODIUM SERPL-SCNC: 140 MMOL/L (ref 132–146)
WBC OTHER # BLD: 8.5 K/UL (ref 4.5–11.5)

## 2024-06-02 PROCEDURE — 99231 SBSQ HOSP IP/OBS SF/LOW 25: CPT | Performed by: INTERNAL MEDICINE

## 2024-06-02 PROCEDURE — 6360000002 HC RX W HCPCS

## 2024-06-02 PROCEDURE — 99232 SBSQ HOSP IP/OBS MODERATE 35: CPT | Performed by: INTERNAL MEDICINE

## 2024-06-02 PROCEDURE — 6370000000 HC RX 637 (ALT 250 FOR IP)

## 2024-06-02 PROCEDURE — 80048 BASIC METABOLIC PNL TOTAL CA: CPT

## 2024-06-02 PROCEDURE — 6360000002 HC RX W HCPCS: Performed by: INTERNAL MEDICINE

## 2024-06-02 PROCEDURE — 94660 CPAP INITIATION&MGMT: CPT

## 2024-06-02 PROCEDURE — 85025 COMPLETE CBC W/AUTO DIFF WBC: CPT

## 2024-06-02 PROCEDURE — 2580000003 HC RX 258

## 2024-06-02 PROCEDURE — 94640 AIRWAY INHALATION TREATMENT: CPT

## 2024-06-02 PROCEDURE — 2580000003 HC RX 258: Performed by: INTERNAL MEDICINE

## 2024-06-02 PROCEDURE — 6370000000 HC RX 637 (ALT 250 FOR IP): Performed by: INTERNAL MEDICINE

## 2024-06-02 PROCEDURE — 2700000000 HC OXYGEN THERAPY PER DAY

## 2024-06-02 PROCEDURE — 83735 ASSAY OF MAGNESIUM: CPT

## 2024-06-02 PROCEDURE — 36415 COLL VENOUS BLD VENIPUNCTURE: CPT

## 2024-06-02 PROCEDURE — 84100 ASSAY OF PHOSPHORUS: CPT

## 2024-06-02 PROCEDURE — 2140000000 HC CCU INTERMEDIATE R&B

## 2024-06-02 RX ADMIN — ARFORMOTEROL TARTRATE 15 MCG: 15 SOLUTION RESPIRATORY (INHALATION) at 20:33

## 2024-06-02 RX ADMIN — ALPRAZOLAM 0.25 MG: 0.25 TABLET ORAL at 18:05

## 2024-06-02 RX ADMIN — ARFORMOTEROL TARTRATE 15 MCG: 15 SOLUTION RESPIRATORY (INHALATION) at 08:21

## 2024-06-02 RX ADMIN — ENOXAPARIN SODIUM 30 MG: 100 INJECTION SUBCUTANEOUS at 09:51

## 2024-06-02 RX ADMIN — HYDROXYZINE PAMOATE 25 MG: 25 CAPSULE ORAL at 14:00

## 2024-06-02 RX ADMIN — ARIPIPRAZOLE 5 MG: 10 TABLET ORAL at 09:51

## 2024-06-02 RX ADMIN — ANTI-FUNGAL POWDER MICONAZOLE NITRATE TALC FREE: 1.42 POWDER TOPICAL at 20:23

## 2024-06-02 RX ADMIN — IPRATROPIUM BROMIDE AND ALBUTEROL SULFATE 1 DOSE: 2.5; .5 SOLUTION RESPIRATORY (INHALATION) at 16:51

## 2024-06-02 RX ADMIN — PANTOPRAZOLE SODIUM 40 MG: 40 TABLET, DELAYED RELEASE ORAL at 05:52

## 2024-06-02 RX ADMIN — IPRATROPIUM BROMIDE AND ALBUTEROL SULFATE 1 DOSE: 2.5; .5 SOLUTION RESPIRATORY (INHALATION) at 13:00

## 2024-06-02 RX ADMIN — ENOXAPARIN SODIUM 30 MG: 100 INJECTION SUBCUTANEOUS at 20:23

## 2024-06-02 RX ADMIN — SODIUM CHLORIDE, PRESERVATIVE FREE 10 ML: 5 INJECTION INTRAVENOUS at 09:55

## 2024-06-02 RX ADMIN — IPRATROPIUM BROMIDE AND ALBUTEROL SULFATE 1 DOSE: 2.5; .5 SOLUTION RESPIRATORY (INHALATION) at 08:21

## 2024-06-02 RX ADMIN — AZITHROMYCIN MONOHYDRATE 500 MG: 500 INJECTION, POWDER, LYOPHILIZED, FOR SOLUTION INTRAVENOUS at 05:51

## 2024-06-02 RX ADMIN — BUDESONIDE 250 MCG: 0.25 INHALANT RESPIRATORY (INHALATION) at 08:21

## 2024-06-02 RX ADMIN — Medication 10 MG: at 20:22

## 2024-06-02 RX ADMIN — Medication 400 MG: at 20:23

## 2024-06-02 RX ADMIN — Medication 1000 UNITS: at 09:51

## 2024-06-02 RX ADMIN — ANTI-FUNGAL POWDER MICONAZOLE NITRATE TALC FREE: 1.42 POWDER TOPICAL at 09:55

## 2024-06-02 RX ADMIN — ROFLUMILAST 500 MCG: 500 TABLET ORAL at 09:51

## 2024-06-02 RX ADMIN — WATER 40 MG: 1 INJECTION INTRAMUSCULAR; INTRAVENOUS; SUBCUTANEOUS at 09:50

## 2024-06-02 RX ADMIN — MIRTAZAPINE 15 MG: 15 TABLET, FILM COATED ORAL at 20:22

## 2024-06-02 RX ADMIN — Medication 400 MG: at 09:51

## 2024-06-02 RX ADMIN — ALPRAZOLAM 0.25 MG: 0.25 TABLET ORAL at 09:50

## 2024-06-02 RX ADMIN — IPRATROPIUM BROMIDE AND ALBUTEROL SULFATE 1 DOSE: 2.5; .5 SOLUTION RESPIRATORY (INHALATION) at 20:33

## 2024-06-02 RX ADMIN — BUDESONIDE 250 MCG: 0.25 INHALANT RESPIRATORY (INHALATION) at 20:33

## 2024-06-02 RX ADMIN — SODIUM CHLORIDE, PRESERVATIVE FREE 10 ML: 5 INJECTION INTRAVENOUS at 20:23

## 2024-06-02 NOTE — PATIENT CARE CONFERENCE
P Quality Flow/Interdisciplinary Rounds Progress Note        Quality Flow Rounds held on June 2, 2024    Disciplines Attending:  Bedside Nurse and Nursing Unit Leadership    Brenda Nunn was admitted on 5/29/2024  6:16 PM    Anticipated Discharge Date:       Disposition:    Gabe Score:  Gabe Scale Score: 18    Readmission Risk              Risk of Unplanned Readmission:  66           Discussed patient goal for the day, patient clinical progression, and barriers to discharge.  The following Goal(s) of the Day/Commitment(s) have been identified:  Labs - Report Results and be calm      Brandi Clark RN  June 2, 2024

## 2024-06-02 NOTE — PLAN OF CARE
Problem: Chronic Conditions and Co-morbidities  Goal: Patient's chronic conditions and co-morbidity symptoms are monitored and maintained or improved  6/1/2024 2234 by Vanda Pryor RN  Outcome: Progressing     Problem: Discharge Planning  Goal: Discharge to home or other facility with appropriate resources  6/1/2024 2234 by Vanda Pryor RN  Outcome: Progressing     Problem: Pain  Goal: Verbalizes/displays adequate comfort level or baseline comfort level  6/1/2024 2234 by Vanda Pryor RN  Outcome: Progressing     Problem: Safety - Adult  Goal: Free from fall injury  6/1/2024 2234 by Vanda Pryor RN  Outcome: Progressing     Problem: ABCDS Injury Assessment  Goal: Absence of physical injury  6/1/2024 2234 by Vanda Pryor RN  Outcome: Progressing     Problem: Skin/Tissue Integrity  Goal: Absence of new skin breakdown  Description: 1.  Monitor for areas of redness and/or skin breakdown  2.  Assess vascular access sites hourly  3.  Every 4-6 hours minimum:  Change oxygen saturation probe site  4.  Every 4-6 hours:  If on nasal continuous positive airway pressure, respiratory therapy assess nares and determine need for appliance change or resting period.  6/1/2024 2234 by Vanda Pryor RN  Outcome: Progressing

## 2024-06-03 VITALS
DIASTOLIC BLOOD PRESSURE: 77 MMHG | SYSTOLIC BLOOD PRESSURE: 117 MMHG | TEMPERATURE: 97.7 F | OXYGEN SATURATION: 94 % | HEIGHT: 67 IN | HEART RATE: 84 BPM | BODY MASS INDEX: 38.92 KG/M2 | WEIGHT: 248 LBS | RESPIRATION RATE: 18 BRPM

## 2024-06-03 PROBLEM — F31.62 BIPOLAR MIXED AFFECTIVE DISORDER, MODERATE (HCC): Status: RESOLVED | Noted: 2023-11-12 | Resolved: 2024-06-03

## 2024-06-03 PROBLEM — B37.31 VAGINAL CANDIDIASIS: Status: RESOLVED | Noted: 2023-12-16 | Resolved: 2024-06-03

## 2024-06-03 PROBLEM — J96.22 ACUTE ON CHRONIC RESPIRATORY FAILURE WITH HYPOXIA AND HYPERCAPNIA (HCC): Status: RESOLVED | Noted: 2023-11-20 | Resolved: 2024-06-03

## 2024-06-03 PROBLEM — J96.21 ACUTE ON CHRONIC RESPIRATORY FAILURE WITH HYPOXIA AND HYPERCAPNIA (HCC): Status: RESOLVED | Noted: 2023-11-20 | Resolved: 2024-06-03

## 2024-06-03 PROBLEM — J96.02 ACUTE RESPIRATORY FAILURE WITH HYPOXIA AND HYPERCAPNIA (HCC): Status: RESOLVED | Noted: 2023-12-17 | Resolved: 2024-06-03

## 2024-06-03 PROBLEM — N17.9 STAGE 1 ACUTE KIDNEY INJURY (HCC): Status: RESOLVED | Noted: 2024-05-31 | Resolved: 2024-06-03

## 2024-06-03 PROBLEM — F39 MOOD DISORDER (HCC): Status: RESOLVED | Noted: 2023-11-08 | Resolved: 2024-06-03

## 2024-06-03 PROBLEM — J44.1 COPD WITH ACUTE EXACERBATION (HCC): Status: RESOLVED | Noted: 2023-11-04 | Resolved: 2024-06-03

## 2024-06-03 PROBLEM — L30.4 INTERTRIGO: Status: RESOLVED | Noted: 2023-12-16 | Resolved: 2024-06-03

## 2024-06-03 PROBLEM — J44.1 COPD WITH EXACERBATION (HCC): Status: RESOLVED | Noted: 2024-04-20 | Resolved: 2024-06-03

## 2024-06-03 PROBLEM — J96.01 ACUTE HYPOXIC RESPIRATORY FAILURE (HCC): Status: RESOLVED | Noted: 2023-11-19 | Resolved: 2024-06-03

## 2024-06-03 PROBLEM — K22.9 IRREGULAR Z LINE OF ESOPHAGUS: Status: RESOLVED | Noted: 2023-11-17 | Resolved: 2024-06-03

## 2024-06-03 PROBLEM — F60.3 BORDERLINE PERSONALITY DISORDER (HCC): Status: RESOLVED | Noted: 2023-11-29 | Resolved: 2024-06-03

## 2024-06-03 PROBLEM — J96.02 ACUTE RESPIRATORY FAILURE WITH HYPERCAPNIA (HCC): Status: RESOLVED | Noted: 2023-12-16 | Resolved: 2024-06-03

## 2024-06-03 PROBLEM — J44.9 CHRONIC OBSTRUCTIVE PULMONARY DISEASE (HCC): Status: RESOLVED | Noted: 2024-01-07 | Resolved: 2024-06-03

## 2024-06-03 PROBLEM — J96.01 ACUTE RESPIRATORY FAILURE WITH HYPOXIA AND HYPERCAPNIA (HCC): Status: RESOLVED | Noted: 2023-12-17 | Resolved: 2024-06-03

## 2024-06-03 PROBLEM — J96.11 CHRONIC HYPOXIC RESPIRATORY FAILURE (HCC): Status: RESOLVED | Noted: 2024-01-07 | Resolved: 2024-06-03

## 2024-06-03 LAB
ANION GAP SERPL CALCULATED.3IONS-SCNC: 18 MMOL/L (ref 7–16)
BASOPHILS # BLD: 0.04 K/UL (ref 0–0.2)
BASOPHILS NFR BLD: 1 % (ref 0–2)
BUN SERPL-MCNC: 16 MG/DL (ref 6–20)
CALCIUM SERPL-MCNC: 10 MG/DL (ref 8.6–10.2)
CHLORIDE SERPL-SCNC: 100 MMOL/L (ref 98–107)
CO2 SERPL-SCNC: 24 MMOL/L (ref 22–29)
CREAT SERPL-MCNC: 0.6 MG/DL (ref 0.5–1)
EOSINOPHIL # BLD: 0.06 K/UL (ref 0.05–0.5)
EOSINOPHILS RELATIVE PERCENT: 1 % (ref 0–6)
ERYTHROCYTE [DISTWIDTH] IN BLOOD BY AUTOMATED COUNT: 13.2 % (ref 11.5–15)
GFR, ESTIMATED: >90 ML/MIN/1.73M2
GLUCOSE SERPL-MCNC: 98 MG/DL (ref 74–99)
HCT VFR BLD AUTO: 38.4 % (ref 34–48)
HGB BLD-MCNC: 11.9 G/DL (ref 11.5–15.5)
IMM GRANULOCYTES # BLD AUTO: 0.07 K/UL (ref 0–0.58)
IMM GRANULOCYTES NFR BLD: 1 % (ref 0–5)
LYMPHOCYTES NFR BLD: 2.3 K/UL (ref 1.5–4)
LYMPHOCYTES RELATIVE PERCENT: 26 % (ref 20–42)
MAGNESIUM SERPL-MCNC: 1.8 MG/DL (ref 1.6–2.6)
MCH RBC QN AUTO: 27.7 PG (ref 26–35)
MCHC RBC AUTO-ENTMCNC: 31 G/DL (ref 32–34.5)
MCV RBC AUTO: 89.5 FL (ref 80–99.9)
MONOCYTES NFR BLD: 0.6 K/UL (ref 0.1–0.95)
MONOCYTES NFR BLD: 7 % (ref 2–12)
NEUTROPHILS NFR BLD: 65 % (ref 43–80)
NEUTS SEG NFR BLD: 5.7 K/UL (ref 1.8–7.3)
PHOSPHATE SERPL-MCNC: 3.6 MG/DL (ref 2.5–4.5)
PLATELET # BLD AUTO: 224 K/UL (ref 130–450)
PMV BLD AUTO: 10.1 FL (ref 7–12)
POTASSIUM SERPL-SCNC: 4 MMOL/L (ref 3.5–5)
RBC # BLD AUTO: 4.29 M/UL (ref 3.5–5.5)
SODIUM SERPL-SCNC: 142 MMOL/L (ref 132–146)
WBC OTHER # BLD: 8.8 K/UL (ref 4.5–11.5)

## 2024-06-03 PROCEDURE — 2580000003 HC RX 258

## 2024-06-03 PROCEDURE — 85025 COMPLETE CBC W/AUTO DIFF WBC: CPT

## 2024-06-03 PROCEDURE — 6370000000 HC RX 637 (ALT 250 FOR IP)

## 2024-06-03 PROCEDURE — 97530 THERAPEUTIC ACTIVITIES: CPT

## 2024-06-03 PROCEDURE — 6360000002 HC RX W HCPCS

## 2024-06-03 PROCEDURE — 99232 SBSQ HOSP IP/OBS MODERATE 35: CPT | Performed by: INTERNAL MEDICINE

## 2024-06-03 PROCEDURE — 6370000000 HC RX 637 (ALT 250 FOR IP): Performed by: INTERNAL MEDICINE

## 2024-06-03 PROCEDURE — 80048 BASIC METABOLIC PNL TOTAL CA: CPT

## 2024-06-03 PROCEDURE — 6360000002 HC RX W HCPCS: Performed by: INTERNAL MEDICINE

## 2024-06-03 PROCEDURE — 99238 HOSP IP/OBS DSCHRG MGMT 30/<: CPT | Performed by: INTERNAL MEDICINE

## 2024-06-03 PROCEDURE — 94660 CPAP INITIATION&MGMT: CPT

## 2024-06-03 PROCEDURE — 92557 COMPREHENSIVE HEARING TEST: CPT | Performed by: AUDIOLOGIST

## 2024-06-03 PROCEDURE — 36415 COLL VENOUS BLD VENIPUNCTURE: CPT

## 2024-06-03 PROCEDURE — 92567 TYMPANOMETRY: CPT | Performed by: AUDIOLOGIST

## 2024-06-03 PROCEDURE — 84100 ASSAY OF PHOSPHORUS: CPT

## 2024-06-03 PROCEDURE — 94640 AIRWAY INHALATION TREATMENT: CPT

## 2024-06-03 PROCEDURE — 97535 SELF CARE MNGMENT TRAINING: CPT

## 2024-06-03 PROCEDURE — 83735 ASSAY OF MAGNESIUM: CPT

## 2024-06-03 PROCEDURE — 2580000003 HC RX 258: Performed by: INTERNAL MEDICINE

## 2024-06-03 PROCEDURE — 2700000000 HC OXYGEN THERAPY PER DAY

## 2024-06-03 RX ORDER — ROFLUMILAST 500 UG/1
500 TABLET ORAL DAILY
Qty: 90 TABLET | Refills: 2 | Status: SHIPPED | OUTPATIENT
Start: 2024-06-03

## 2024-06-03 RX ORDER — AZITHROMYCIN 500 MG/1
500 TABLET, FILM COATED ORAL
Qty: 30 TABLET | Refills: 1 | Status: SHIPPED | OUTPATIENT
Start: 2024-06-03

## 2024-06-03 RX ORDER — ALPRAZOLAM 0.25 MG/1
0.25 TABLET ORAL 3 TIMES DAILY PRN
Qty: 10 TABLET | Refills: 0 | Status: CANCELLED | OUTPATIENT
Start: 2024-06-03 | End: 2024-06-18

## 2024-06-03 RX ORDER — IPRATROPIUM BROMIDE AND ALBUTEROL SULFATE 2.5; .5 MG/3ML; MG/3ML
1 SOLUTION RESPIRATORY (INHALATION) EVERY 4 HOURS
Qty: 540 ML | Refills: 2 | Status: SHIPPED | OUTPATIENT
Start: 2024-06-03 | End: 2024-09-01

## 2024-06-03 RX ORDER — CHOLECALCIFEROL (VITAMIN D3) 25 MCG
1000 TABLET ORAL DAILY
Qty: 90 TABLET | Refills: 1 | Status: SHIPPED | OUTPATIENT
Start: 2024-06-04

## 2024-06-03 RX ADMIN — SODIUM CHLORIDE, PRESERVATIVE FREE 10 ML: 5 INJECTION INTRAVENOUS at 09:00

## 2024-06-03 RX ADMIN — IPRATROPIUM BROMIDE AND ALBUTEROL SULFATE 1 DOSE: 2.5; .5 SOLUTION RESPIRATORY (INHALATION) at 11:54

## 2024-06-03 RX ADMIN — ROFLUMILAST 500 MCG: 500 TABLET ORAL at 08:59

## 2024-06-03 RX ADMIN — HYDROXYZINE PAMOATE 25 MG: 25 CAPSULE ORAL at 12:46

## 2024-06-03 RX ADMIN — ARIPIPRAZOLE 5 MG: 10 TABLET ORAL at 08:59

## 2024-06-03 RX ADMIN — AZITHROMYCIN MONOHYDRATE 500 MG: 500 INJECTION, POWDER, LYOPHILIZED, FOR SOLUTION INTRAVENOUS at 05:56

## 2024-06-03 RX ADMIN — PANTOPRAZOLE SODIUM 40 MG: 40 TABLET, DELAYED RELEASE ORAL at 05:56

## 2024-06-03 RX ADMIN — ARFORMOTEROL TARTRATE 15 MCG: 15 SOLUTION RESPIRATORY (INHALATION) at 08:21

## 2024-06-03 RX ADMIN — ENOXAPARIN SODIUM 30 MG: 100 INJECTION SUBCUTANEOUS at 08:58

## 2024-06-03 RX ADMIN — ALPRAZOLAM 0.25 MG: 0.25 TABLET ORAL at 09:03

## 2024-06-03 RX ADMIN — IPRATROPIUM BROMIDE AND ALBUTEROL SULFATE 1 DOSE: 2.5; .5 SOLUTION RESPIRATORY (INHALATION) at 08:21

## 2024-06-03 RX ADMIN — ANTI-FUNGAL POWDER MICONAZOLE NITRATE TALC FREE: 1.42 POWDER TOPICAL at 08:58

## 2024-06-03 RX ADMIN — BUDESONIDE 250 MCG: 0.25 INHALANT RESPIRATORY (INHALATION) at 08:20

## 2024-06-03 RX ADMIN — Medication 1000 UNITS: at 08:59

## 2024-06-03 RX ADMIN — WATER 40 MG: 1 INJECTION INTRAMUSCULAR; INTRAVENOUS; SUBCUTANEOUS at 08:59

## 2024-06-03 RX ADMIN — IPRATROPIUM BROMIDE AND ALBUTEROL SULFATE 1 DOSE: 2.5; .5 SOLUTION RESPIRATORY (INHALATION) at 16:23

## 2024-06-03 NOTE — PLAN OF CARE
Problem: Chronic Conditions and Co-morbidities  Goal: Patient's chronic conditions and co-morbidity symptoms are monitored and maintained or improved  6/2/2024 2207 by Vanda Pryor RN  Outcome: Progressing     Problem: Discharge Planning  Goal: Discharge to home or other facility with appropriate resources  6/2/2024 2207 by Vanda Pryor RN  Outcome: Progressing     Problem: Pain  Goal: Verbalizes/displays adequate comfort level or baseline comfort level  6/2/2024 2207 by Vanda Pryor RN  Outcome: Progressing     Problem: Safety - Adult  Goal: Free from fall injury  6/2/2024 2207 by Vanda Pryor RN  Outcome: Progressing     Problem: ABCDS Injury Assessment  Goal: Absence of physical injury  6/2/2024 2207 by Vanda Pryor RN  Outcome: Progressing     Problem: Skin/Tissue Integrity  Goal: Absence of new skin breakdown  Description: 1.  Monitor for areas of redness and/or skin breakdown  2.  Assess vascular access sites hourly  3.  Every 4-6 hours minimum:  Change oxygen saturation probe site  4.  Every 4-6 hours:  If on nasal continuous positive airway pressure, respiratory therapy assess nares and determine need for appliance change or resting period.  6/2/2024 2207 by Vanda Pryor, RN  Outcome: Progressing

## 2024-06-03 NOTE — PLAN OF CARE
Problem: Discharge Planning  Goal: Discharge to home or other facility with appropriate resources  6/3/2024 0944 by Yessica Quinteros, RN  Outcome: Progressing     Problem: Pain  Goal: Verbalizes/displays adequate comfort level or baseline comfort level  6/3/2024 0944 by Yessica Quinteros, RN  Outcome: Progressing     Problem: Safety - Adult  Goal: Free from fall injury  6/3/2024 0944 by Yessica Quinteros, RN  Outcome: Progressing     Problem: ABCDS Injury Assessment  Goal: Absence of physical injury  6/3/2024 0944 by Yessica Quinteros, RN  Outcome: Progressing     Problem: Skin/Tissue Integrity  Goal: Absence of new skin breakdown  Description: 1.  Monitor for areas of redness and/or skin breakdown  2.  Assess vascular access sites hourly  3.  Every 4-6 hours minimum:  Change oxygen saturation probe site  4.  Every 4-6 hours:  If on nasal continuous positive airway pressure, respiratory therapy assess nares and determine need for appliance change or resting period.  6/3/2024 0944 by Yessica Quinteros, RN  Outcome: Progressing

## 2024-06-03 NOTE — DISCHARGE INSTRUCTIONS
Internal medicine    Follow ups  Please follow up with the internal medicine clinic at [facility].Your appointment has been scheduled for 10 am on 12th June  Please keep all other follow up appointments:  F/U with pulmonology on 19th June  F/U with internal medicine on 12th June     Changes in healthcare   Please take all medications as indicated  Diet: regular diet   Activity: activity as tolerated  New Medications started during this hospital stay  Azithromycin 500mg three times per week  Roflumilast     Additional labs, testing or imaging needed after discharge   Pulmonary function test    Please contact us if you have any concerns, wish to change or make an appointment:  Internal medicine clinic   Phone: [phone]  Fax: [phone]  [address] [address]  Or please call the nurse line at [phone].  Should you have further questions in regards to this visit, you can review your clinical note and after visit summary document on your [employer] account.  Other questions can be directed to our nurse line at [phone].     Other than any new prescriptions given to you today, the list of home medications on this After Visit Summary are based on information provided to us from you and your healthcare providers. This information, including the list, dose, and frequency of medications is only as accurate as the information you provided. If you have any questions or concerns about your home medications, please contact your Primary Care Physician for further clarification.

## 2024-06-03 NOTE — PLAN OF CARE
Problem: Chronic Conditions and Co-morbidities  Goal: Patient's chronic conditions and co-morbidity symptoms are monitored and maintained or improved  6/3/2024 1651 by Yessica Quinteros RN  Outcome: Adequate for Discharge  6/3/2024 0944 by Yessica Quinteros RN  Outcome: Progressing     Problem: Discharge Planning  Goal: Discharge to home or other facility with appropriate resources  6/3/2024 1651 by Yessica Quinteros RN  Outcome: Adequate for Discharge  6/3/2024 0944 by Yessica Quinteros RN  Outcome: Progressing     Problem: Pain  Goal: Verbalizes/displays adequate comfort level or baseline comfort level  6/3/2024 1651 by Yessica Quinteros RN  Outcome: Adequate for Discharge  6/3/2024 0944 by Yessica Quinteros RN  Outcome: Progressing     Problem: Safety - Adult  Goal: Free from fall injury  6/3/2024 1651 by Yessica Quinteros RN  Outcome: Adequate for Discharge  6/3/2024 0944 by Yessica Quinteros RN  Outcome: Progressing     Problem: ABCDS Injury Assessment  Goal: Absence of physical injury  6/3/2024 1651 by Yessica Quinteros RN  Outcome: Adequate for Discharge  6/3/2024 0944 by Yessica Quinteros RN  Outcome: Progressing     Problem: Skin/Tissue Integrity  Goal: Absence of new skin breakdown  Description: 1.  Monitor for areas of redness and/or skin breakdown  2.  Assess vascular access sites hourly  3.  Every 4-6 hours minimum:  Change oxygen saturation probe site  4.  Every 4-6 hours:  If on nasal continuous positive airway pressure, respiratory therapy assess nares and determine need for appliance change or resting period.  6/3/2024 1651 by Yessica Quinteros, RN  Outcome: Adequate for Discharge  6/3/2024 0944 by Yessica Quinteros RN  Outcome: Progressing

## 2024-06-03 NOTE — PROGRESS NOTES
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         [x] Safety Awareness   [x]Endurance    [] Fine Coordination                        [x] Balance      [] Vision/perception   []Sensation      []Gross Motor Coordination            [] ROM           [] Delirium                   [] Motor Control      OT PLAN OF CARE   OT POC based on physician orders, patient diagnosis and results of clinical assessment     Frequency/Duration 1-3 days/wk for 2 weeks PRN   Specific OT Treatment Interventions to include:   * Instruction/training on adapted ADL techniques and AE recommendations to increase functional independence within precautions       * Training on energy conservation strategies, correct breathing pattern and techniques to improve independence/tolerance for self-care routine  * Functional transfer/mobility training/DME recommendations for increased independence, safety, and fall prevention  * Patient/Family education to increase follow through with safety techniques and functional independence  * Recommendation of environmental modifications for increased safety with functional 
Physical Therapy  Physical Therapy treatment note       Name: Brenda Nunn  : 1965  MRN: [id number]      Date of Service: 6/3/2024    Evaluating PT:  Phylicia Nolen PT, DPT [id number]    Room #:  6515/6515-A  Diagnosis:  COPD exacerbation (HCC) [J44.1]  PMHx/PSHx:   has a past medical history of CHF (congestive heart failure) (HCC), COPD (chronic obstructive pulmonary disease) ([facility]), Depression, and Hypertension.    Procedure/Surgery:  none this admission  Precautions: Falls,  O2, continuous pulse ox, monitor SPO2%     SUBJECTIVE:    Pt lives with daughter in a 2 story home with 5 stairs to enter and 1 rail(s).  Bed is on 1st floor and bath is on 1st floor (uses BSC, sponge bathes).  Pt ambulated with rollator short household distance; WC use in community PTA.    Equipment Owned: rollator, WC  Equipment Needs:  none    OBJECTIVE:   Initial Evaluation  Date: 24 Treatment  Date: 6/3/24 Short Term/ Long Term   Goals   AM-PAC 6 Clicks     Was pt agreeable to Eval/treatment? Yes  Yes     Does pt have pain? Denies pain Denies pain     Bed Mobility  Rolling: SBA  Supine to sit: SBA  Sit to supine: SBA  Scooting: SBA Rolling: Independent  Supine to sit: Independent  Sit to supine: NT  Scooting: NT Rolling: Independent  Supine to sit: Independent  Sit to supine: Independent  Scooting: Independent   Transfers Sit to stand: SBA  Stand to sit: SBA  Stand pivot: NT Sit to stand: SBA  Stand to sit: SBA  Stand pivot: SBA with WW and with no AD  Sit to stand: Independent  Stand to sit: Independent  Stand pivot: Modified Independent   AAD   Ambulation    15 feet with WW SBA 30 feet with WW and SBA  75 feet with AAD Modified Independent   Stair negotiation: ascended and descended  NT NT 5 steps with 1 rail Modified Independent   ROM BUE:  Defer to OT  BLE:  WFL     Strength BUE:  Defer to OT  BLE:  4+/5  Improve 1 MMT   Balance Sitting EOB:  SBA  Dynamic Standing:  SBA WW Sitting EOB: Independent  Dynamic Standing:  
Put in consult to audiology. Miguel Delgado RN    
This patient was referred for audiometric and tympanometric testing by  James Thomas MD  due to azithromycin treatment s/p COPD exacerbation .   She notes no hearing difficulties at this time.  She was tested as in inpatient and at the bedside.      Audiometry using pure tone air and bone conduction testing revealed a borderline normal-to-mild  sensorineural hearing loss, bilaterally. Reliability was good. Speech reception thresholds were in good agreement with the pure tone averages, bilaterally. Speech discrimination scores were good, bilaterally.    Tympanometry revealed normal middle ear peak pressure and compliance, in the right ear (-137 daPa).  The left ear showed mild negative pressure -239 daPa.  However she does not note any stuffiness or congestion in her ears.       The results were reviewed with the patient.   She was instructed to report any hearing or discrimination issues to her physician.  Recommend re evaluation if and changes are noted.       Electronically signed by Carley Brady on 6/3/2024 at 10:18 AM        
Visited the patient and she indicated that she is doing better, that she came on Wednesday last week but want to go home now to take care of some family problem with her daughter. She appreciated the visit and I prayed for her.    . GEO.PH,B.Th,[id number]  
process.                Resident's Assessment and Plan     Assessment and Plan:    AECOPD  Chronic hypoxic respiratory failure - 3-4 L oxygen at baseline  BENJAMIN on CPAP  Chronic HFpEF - TTE 2/24: LVEF 55-60%  Tobacco use  Pre-DM - HbA1c 5.7%  Bipolar 1 disorder  Vitamin D deficiency  History of substance use     Pulmonology on board  Nebulized HUGH, LAMA, ICS  Continue methylprednisolone   Continue Roflumilast 500 mg daily  Continue azithromycin for 5 days  Continue nightly CPAP  Continue home meds  Planned for chronic azithromycin 3 times weekly  Follow cardiology consult for hearing assessment      PT/OT evaluation: on board   DVT prophylaxis: enoxaparin 30 mg SC BID  GI prophylaxis: pantoprazole 40 mg PO daily  Diet:   ADULT DIET; Regular   Bowel regimen: polyethylene glycol  Pain management: as needed  Code status: Full Code   Disposition: Continue Current Care  Family: updated as available    James Thakur MD, PGY-1   Attending physician: Dr. Fong  
Sitting:     Static:  Sup    Dynamic:SBA  Standing: SBA     Activity Tolerance F  G   Visual/  Perceptual wfl                    Hand Dominance right   Strength ROM Additional Info:    RUE   4-/5 wfl good  and wfl FMC/dexterity noted during ADL tasks     LUE 4-/5 wfl good  and wfl FMC/dexterity noted during ADL tasks     Hearing: wfl  Sensation:wfl  Tone: wfl  Edema:none noted     Comments: Upon arrival patient supine in bed and agreeable to OT Session.  Therapist educated pt on role of OT. At end of session, patient semi-supine in bed with call light and phone within reach, all lines and tubes intact.  Overall patient demonstrated decreased independence and safety during completion of ADL/functional transfer/mobility tasks.  Pt would benefit from continued skilled OT to increase safety and independence with completion of ADL/IADL tasks for functional independence and quality of life.    Treatment: OT treatment provided this date includes: Facilitation of bed mobility (rolling, supine<>sit), sitting balance at EOB (impacting ADLs; addressing posture, weight shifting, dynamic reaching), functional sit to stand transfers, standing tolerance tasks at w/w (addressing posture, balance and activity tolerance while incorporating light functional reaching; impacting ADLs and functional activity) and short functional ambulation tasks with w/w (O2 sat 93-86%) - skilled cuing on sequencing, hand placement, posture, body mechanics, energy conservation techniques and safety.  Therapist facilitated self-care retraining: UB/LB self-care tasks (robe, socks) and grooming tasks while educating pt on modified techniques, posture, safety and energy conservation techniques. Skilled monitoring of HR, O2 sats and pts response to treatment (O2 sat 93-86%, + SOB reported; 1-2 minutes to recover on 5L).        Rehab Potential: Good for established goals     Patient / Family Goal: return home      Patient and/or family were instructed 
with maximum independence.   Gait Training: Verbal cues for proper positioning and sequencing with WW to maximize functional mobility independence.   Skillful positioning in bed to protect skin and joint integrity.   Pt education for safety with all mobility and energy conservation.   Vitals and symptoms monitored during session.     Pt's/ family goals   1. Get better    Prognosis is good for reaching above PT goals.    Patient and or family understand(s) diagnosis, prognosis, and plan of care.  Yes     PHYSICAL THERAPY PLAN OF CARE:    PT POC is established based on physician order and patient diagnosis     Referring provider/PT Order:    05/30/24 1000  PT eval and treat  Start:  05/30/24 1000,   End:  05/30/24 1000,   ONE TIME,   Standing Count:  1 Occurrences,   R         Yessica Ramirez MD       Diagnosis:  COPD exacerbation (HCC) [J44.1]  Specific instructions for next treatment:  progress functional mobility as tolerated.     Current Treatment Recommendations:     [x] Strengthening to improve independence with functional mobility   [] ROM to improve independence with functional mobility   [x] Balance Training to improve static/dynamic balance and to reduce fall risk  [x] Endurance Training to improve activity tolerance during functional mobility   [x] Transfer Training to improve safety and independence with all functional transfers   [x] Gait Training to improve gait mechanics, endurance and assess need for appropriate assistive device  [x] Stair Training in preparation for safe discharge home and/or into the community   [x] Positioning to prevent skin breakdown and contractures  [x] Safety and Education Training   [x] Patient/Caregiver Education   [] HEP  [] Other     PT long term treatment goals are located in above grid    Frequency of treatments: 2-5x/week x 1-2 weeks.    Time in  0845  Time out  0908    Total Treatment Time  8 minutes     Evaluation Time includes thorough review of current medical

## 2024-06-03 NOTE — DISCHARGE SUMMARY
patient or coverage preference. Dispense full nebulizer kit - compressor, tubing, mouthpiece, mask, ancillary supplies - per requirements of ordered product, patient preference, or coverage allowances.           CONTINUE these medications which have CHANGED    Details   !! ipratropium 0.5 mg-albuterol 2.5 mg (DUONEB) 0.5-2.5 (3) MG/3ML SOLN nebulizer solution Inhale 3 mLs into the lungs every 4 hours Indications: Treatment to Prevent COPD with Bronchospasms  Qty: 540 mL, Refills: 2    Associated Diagnoses: Chronic obstructive pulmonary disease, unspecified COPD type ([facility])      Roflumilast (DALIRESP) 500 MCG tablet Take 1 tablet by mouth daily  Qty: 90 tablet, Refills: 2       !! - Potential duplicate medications found. Please discuss with provider.        CONTINUE these medications which have NOT CHANGED    Details   hydrOXYzine HCl (ATARAX) 25 MG tablet Take 1 tablet by mouth every 8 hours as needed for Anxiety      !! ipratropium 0.5 mg-albuterol 2.5 mg (DUONEB) 0.5-2.5 (3) MG/3ML SOLN nebulizer solution Inhale 3 mLs into the lungs every 4 hours as needed for Shortness of Breath      mirtazapine (REMERON) 15 MG tablet Take 1 tablet by mouth nightly      pantoprazole (PROTONIX) 40 MG tablet Take 1 tablet by mouth daily      Melatonin 10 MG TABS Take 10 mg by mouth nightly      albuterol sulfate HFA (VENTOLIN HFA) 108 (90 Base) MCG/ACT inhaler Inhale 2 puffs into the lungs every 6 hours as needed for Wheezing or Shortness of Breath  Qty: 18 g, Refills: 2    Associated Diagnoses: Chronic obstructive pulmonary disease, unspecified COPD type ([facility])      OXYGEN Inhale 4 L into the lungs continuous      budesonide-formoterol (SYMBICORT) 160-4.5 MCG/ACT AERO Inhale 2 puffs into the lungs 2 times daily  Qty: 3 each, Refills: 0      ARIPiprazole (ABILIFY) 5 MG tablet Take 1 tablet by mouth daily  Qty: 14 tablet, Refills: 0    Associated Diagnoses: Bipolar 1 disorder ([facility])      prazosin (MINIPRESS) 1 MG capsule Take 1 capsule

## 2024-06-03 NOTE — CARE COORDINATION
Transition of care update: o2 at 4l/nc. Labs noted and chart reviewed. Met with pt in room. Up in chair. She is asking to be discharged today. Pt is crying and upset. Pt states \"my daughter is taking all of my money. I checked my bank account.\" Pt has not authorized her daughter to withdraw money from her account. Offered to have a police report filed and pt declined. Pt said she does not want to get her daughter in trouble. Pt to talk with her daughter when she gets home. Spoke with Lucie with [facility] and she is aware pt is for discharge today and will be adding  to [facility] order. Updated pt/ot notes reviewed.     1420  Met with pt in room. Pt stated she has a nebulizer at home. Also, pt has o2 at 4l/nc from [facility].  Pt is aware a  will see her at home through [facility].     1530  Informed per charge rn, pt's daughter is unable to pick pt up. I called pt's daughter, Farhana, ph#[phone] and she was called out to work 16 hr shift. Farhana said there is no one available to pick pt up. Pt has [facility] Medicare coverage in Epic. Spoke with Isidra with [facility] and they will be dropping a portable tank in pt's room. Isidra is aware pt is ready for discharge. Messaged GRECIA Cuevas rn manager of CM dept to see if taxi voucher can be given.       1546  OK to use taxi voucher per GRECIA Cuevas. Taxi voucher was given to pt's nurse. Informed pt's nurse [facility] will be dropping off portable tank in pt's room.   
in the last 30 days (Readmission):  Yes    If yes, Readmission Assessment in  Navigator will be completed.    Advance Directives:      Code Status: Full Code     Discharge Planning:    Patient lives with: Family Members Type of Home: Apartment  Primary Care Giver: Self  Patient Support Systems include: Children     ADLS  Prior functional level: Independent in ADLs/IADLs      Family can provide assistance at DC: Yes  Would you like Case Management to discuss the discharge plan with any other family members/significant others, and if so, who? No  Plans to Return to Present Housing: Unknown at present      Financial    Payor: [facility] MEDICARE / Plan: [facility] MEDICARE ADVANTAGE / Product Type: *No Product type* /     Does insurance require precert for SNF: Yes        The Plan for Transition of Care is related to the following treatment goals of COPD exacerbation (HCC) [J44.1]      The Patient and/or Patient Representative Agree with the Discharge Plan?  Yes     Margaux Caballero RN  Case Management Department  Ph: [phone]

## 2024-06-04 ENCOUNTER — HOSPITAL ENCOUNTER (INPATIENT)
Age: 59
LOS: 2 days | Discharge: HOME HEALTH | DRG: 191 | End: 2024-06-06
Attending: EMERGENCY MEDICINE | Admitting: INTERNAL MEDICINE
Payer: COMMERCIAL

## 2024-06-04 ENCOUNTER — CARE COORDINATION (OUTPATIENT)
Dept: CARE COORDINATION | Age: 59
End: 2024-06-04

## 2024-06-04 ENCOUNTER — APPOINTMENT (OUTPATIENT)
Dept: GENERAL RADIOLOGY | Age: 59
DRG: 191 | End: 2024-06-04

## 2024-06-04 DIAGNOSIS — J44.1 COPD WITH ACUTE EXACERBATION (HCC): Primary | ICD-10-CM

## 2024-06-04 DIAGNOSIS — F31.9 BIPOLAR 1 DISORDER (HCC): ICD-10-CM

## 2024-06-04 DIAGNOSIS — G47.33 OSA (OBSTRUCTIVE SLEEP APNEA): ICD-10-CM

## 2024-06-04 DIAGNOSIS — F41.9 ANXIETY: ICD-10-CM

## 2024-06-04 DIAGNOSIS — J44.1 COPD EXACERBATION (HCC): ICD-10-CM

## 2024-06-04 LAB
ALBUMIN SERPL-MCNC: 4.1 G/DL (ref 3.5–5.2)
ALP SERPL-CCNC: 82 U/L (ref 35–104)
ALT SERPL-CCNC: 15 U/L (ref 0–32)
ANION GAP SERPL CALCULATED.3IONS-SCNC: 11 MMOL/L (ref 7–16)
AST SERPL-CCNC: 13 U/L (ref 0–31)
BASOPHILS # BLD: 0.04 K/UL (ref 0–0.2)
BASOPHILS NFR BLD: 0 % (ref 0–2)
BILIRUB SERPL-MCNC: 0.2 MG/DL (ref 0–1.2)
BNP SERPL-MCNC: 63 PG/ML (ref 0–125)
BUN SERPL-MCNC: 22 MG/DL (ref 6–20)
CALCIUM SERPL-MCNC: 10 MG/DL (ref 8.6–10.2)
CHLORIDE SERPL-SCNC: 99 MMOL/L (ref 98–107)
CO2 SERPL-SCNC: 31 MMOL/L (ref 22–29)
CREAT SERPL-MCNC: 0.8 MG/DL (ref 0.5–1)
EOSINOPHIL # BLD: 0.12 K/UL (ref 0.05–0.5)
EOSINOPHILS RELATIVE PERCENT: 1 % (ref 0–6)
ERYTHROCYTE [DISTWIDTH] IN BLOOD BY AUTOMATED COUNT: 13.4 % (ref 11.5–15)
GFR, ESTIMATED: 83 ML/MIN/1.73M2
GLUCOSE SERPL-MCNC: 145 MG/DL (ref 74–99)
HCT VFR BLD AUTO: 42 % (ref 34–48)
HGB BLD-MCNC: 12.8 G/DL (ref 11.5–15.5)
IMM GRANULOCYTES # BLD AUTO: 0.12 K/UL (ref 0–0.58)
IMM GRANULOCYTES NFR BLD: 1 % (ref 0–5)
LYMPHOCYTES NFR BLD: 2.1 K/UL (ref 1.5–4)
LYMPHOCYTES RELATIVE PERCENT: 19 % (ref 20–42)
MCH RBC QN AUTO: 27.6 PG (ref 26–35)
MCHC RBC AUTO-ENTMCNC: 30.5 G/DL (ref 32–34.5)
MCV RBC AUTO: 90.5 FL (ref 80–99.9)
MONOCYTES NFR BLD: 0.88 K/UL (ref 0.1–0.95)
MONOCYTES NFR BLD: 8 % (ref 2–12)
NEUTROPHILS NFR BLD: 71 % (ref 43–80)
NEUTS SEG NFR BLD: 8.04 K/UL (ref 1.8–7.3)
PLATELET # BLD AUTO: 255 K/UL (ref 130–450)
PMV BLD AUTO: 9.9 FL (ref 7–12)
POTASSIUM SERPL-SCNC: 3.8 MMOL/L (ref 3.5–5)
PROT SERPL-MCNC: 7.1 G/DL (ref 6.4–8.3)
RBC # BLD AUTO: 4.64 M/UL (ref 3.5–5.5)
SODIUM SERPL-SCNC: 141 MMOL/L (ref 132–146)
TROPONIN I SERPL HS-MCNC: 11 NG/L (ref 0–9)
WBC OTHER # BLD: 11.3 K/UL (ref 4.5–11.5)

## 2024-06-04 PROCEDURE — 6370000000 HC RX 637 (ALT 250 FOR IP): Performed by: NURSE PRACTITIONER

## 2024-06-04 PROCEDURE — 71046 X-RAY EXAM CHEST 2 VIEWS: CPT

## 2024-06-04 PROCEDURE — 94640 AIRWAY INHALATION TREATMENT: CPT

## 2024-06-04 PROCEDURE — 0202U NFCT DS 22 TRGT SARS-COV-2: CPT

## 2024-06-04 PROCEDURE — 2580000003 HC RX 258: Performed by: NURSE PRACTITIONER

## 2024-06-04 PROCEDURE — 99285 EMERGENCY DEPT VISIT HI MDM: CPT

## 2024-06-04 PROCEDURE — 85025 COMPLETE CBC W/AUTO DIFF WBC: CPT

## 2024-06-04 PROCEDURE — 83880 ASSAY OF NATRIURETIC PEPTIDE: CPT

## 2024-06-04 PROCEDURE — 93005 ELECTROCARDIOGRAM TRACING: CPT | Performed by: NURSE PRACTITIONER

## 2024-06-04 PROCEDURE — 6360000002 HC RX W HCPCS: Performed by: NURSE PRACTITIONER

## 2024-06-04 PROCEDURE — 2140000000 HC CCU INTERMEDIATE R&B

## 2024-06-04 PROCEDURE — 84484 ASSAY OF TROPONIN QUANT: CPT

## 2024-06-04 PROCEDURE — 80053 COMPREHEN METABOLIC PANEL: CPT

## 2024-06-04 PROCEDURE — 96374 THER/PROPH/DIAG INJ IV PUSH: CPT

## 2024-06-04 RX ORDER — IPRATROPIUM BROMIDE AND ALBUTEROL SULFATE 2.5; .5 MG/3ML; MG/3ML
1 SOLUTION RESPIRATORY (INHALATION)
Status: COMPLETED | OUTPATIENT
Start: 2024-06-05 | End: 2024-06-05

## 2024-06-04 RX ORDER — IPRATROPIUM BROMIDE AND ALBUTEROL SULFATE 2.5; .5 MG/3ML; MG/3ML
3 SOLUTION RESPIRATORY (INHALATION) ONCE
Status: COMPLETED | OUTPATIENT
Start: 2024-06-04 | End: 2024-06-04

## 2024-06-04 RX ADMIN — WATER 125 MG: 1 INJECTION INTRAMUSCULAR; INTRAVENOUS; SUBCUTANEOUS at 23:30

## 2024-06-04 RX ADMIN — IPRATROPIUM BROMIDE AND ALBUTEROL SULFATE 3 DOSE: 2.5; .5 SOLUTION RESPIRATORY (INHALATION) at 22:45

## 2024-06-04 NOTE — CARE COORDINATION
Care Transitions Note    Initial Call - Call within 2 business days of discharge: Yes     First attempt to reach the patient for initial Care Transition call post hospital discharge. HIPAA compliant message left with CTN's contact information requesting return phone call. Spoke with Kayleen at [facility], she reports that the nurse also made several attempts to reach today without success. They will attempt again tomorrow.     Patient: Brenda Nunn    Patient : 1965   MRN: [id number]    Reason for Admission: COPD exacerbation   Discharge Date: 6/3/24  RURS: Readmission Risk Score: 37.6    Last Discharge Facility       Date Complaint Diagnosis Description Type Department Provider    24 Shortness of Breath COPD exacerbation (HCC) ... ED to Hosp-Admission (Discharged) [facility]ADMITT[facility]; Alicia...        Follow Up Appointment:   Patient has hospital follow up appointment scheduled within 14 days of discharge.    Future Appointments         Provider Specialty Dept Phone    2024 10:00 AM URGENT CARE 1 Internal Medicine [phone]    2024 11:30 AM Pedro Bedolla MD Pulmonology [phone]    2024 2:30 PM Paulie Chavez MD Sleep Medicine [phone]        Wendy Brandon RN

## 2024-06-05 LAB
ANION GAP SERPL CALCULATED.3IONS-SCNC: 16 MMOL/L (ref 7–16)
B PARAP IS1001 DNA NPH QL NAA+NON-PROBE: NOT DETECTED
B PERT DNA SPEC QL NAA+PROBE: NOT DETECTED
B.E.: 2.8 MMOL/L (ref -3–3)
BASOPHILS # BLD: 0.01 K/UL (ref 0–0.2)
BASOPHILS NFR BLD: 0 % (ref 0–2)
BUN SERPL-MCNC: 18 MG/DL (ref 6–20)
C PNEUM DNA NPH QL NAA+NON-PROBE: NOT DETECTED
CALCIUM SERPL-MCNC: 9.8 MG/DL (ref 8.6–10.2)
CHLORIDE SERPL-SCNC: 97 MMOL/L (ref 98–107)
CO2 SERPL-SCNC: 25 MMOL/L (ref 22–29)
COHB: 1.3 % (ref 0–1.5)
CREAT SERPL-MCNC: 0.6 MG/DL (ref 0.5–1)
CRITICAL: ABNORMAL
DATE ANALYZED: ABNORMAL
DATE OF COLLECTION: ABNORMAL
EKG ATRIAL RATE: 94 BPM
EKG P AXIS: 42 DEGREES
EKG P-R INTERVAL: 142 MS
EKG Q-T INTERVAL: 360 MS
EKG QRS DURATION: 72 MS
EKG QTC CALCULATION (BAZETT): 450 MS
EKG R AXIS: 36 DEGREES
EKG T AXIS: 43 DEGREES
EKG VENTRICULAR RATE: 94 BPM
EOSINOPHIL # BLD: 0 K/UL (ref 0.05–0.5)
EOSINOPHILS RELATIVE PERCENT: 0 % (ref 0–6)
ERYTHROCYTE [DISTWIDTH] IN BLOOD BY AUTOMATED COUNT: 13.2 % (ref 11.5–15)
FLUAV RNA NPH QL NAA+NON-PROBE: NOT DETECTED
FLUBV RNA NPH QL NAA+NON-PROBE: NOT DETECTED
GFR, ESTIMATED: >90 ML/MIN/1.73M2
GLUCOSE SERPL-MCNC: 209 MG/DL (ref 74–99)
HADV DNA NPH QL NAA+NON-PROBE: NOT DETECTED
HCO3: 28.5 MMOL/L (ref 22–26)
HCOV 229E RNA NPH QL NAA+NON-PROBE: NOT DETECTED
HCOV HKU1 RNA NPH QL NAA+NON-PROBE: NOT DETECTED
HCOV NL63 RNA NPH QL NAA+NON-PROBE: NOT DETECTED
HCOV OC43 RNA NPH QL NAA+NON-PROBE: NOT DETECTED
HCT VFR BLD AUTO: 40.9 % (ref 34–48)
HGB BLD-MCNC: 12.7 G/DL (ref 11.5–15.5)
HHB: 16.4 % (ref 0–5)
HMPV RNA NPH QL NAA+NON-PROBE: NOT DETECTED
HPIV1 RNA NPH QL NAA+NON-PROBE: NOT DETECTED
HPIV2 RNA NPH QL NAA+NON-PROBE: NOT DETECTED
HPIV3 RNA NPH QL NAA+NON-PROBE: NOT DETECTED
HPIV4 RNA NPH QL NAA+NON-PROBE: NOT DETECTED
IMM GRANULOCYTES # BLD AUTO: 0.1 K/UL (ref 0–0.58)
IMM GRANULOCYTES NFR BLD: 1 % (ref 0–5)
LAB: ABNORMAL
LYMPHOCYTES NFR BLD: 0.44 K/UL (ref 1.5–4)
LYMPHOCYTES RELATIVE PERCENT: 5 % (ref 20–42)
Lab: 102
M PNEUMO DNA NPH QL NAA+NON-PROBE: NOT DETECTED
MAGNESIUM SERPL-MCNC: 1.6 MG/DL (ref 1.6–2.6)
MCH RBC QN AUTO: 27.6 PG (ref 26–35)
MCHC RBC AUTO-ENTMCNC: 31.1 G/DL (ref 32–34.5)
MCV RBC AUTO: 88.9 FL (ref 80–99.9)
METHB: 0.3 % (ref 0–1.5)
MODE: ABNORMAL
MONOCYTES NFR BLD: 0.08 K/UL (ref 0.1–0.95)
MONOCYTES NFR BLD: 1 % (ref 2–12)
NEUTROPHILS NFR BLD: 93 % (ref 43–80)
NEUTS SEG NFR BLD: 8.65 K/UL (ref 1.8–7.3)
O2 SATURATION: 83.3 % (ref 92–98.5)
O2HB: 82 % (ref 94–97)
OPERATOR ID: 2860
PATIENT TEMP: 37 C
PCO2: 47.7 MMHG (ref 35–45)
PH BLOOD GAS: 7.39 (ref 7.35–7.45)
PHOSPHATE SERPL-MCNC: 3.7 MG/DL (ref 2.5–4.5)
PLATELET # BLD AUTO: 251 K/UL (ref 130–450)
PMV BLD AUTO: 10.1 FL (ref 7–12)
PO2: 47.1 MMHG (ref 75–100)
POTASSIUM SERPL-SCNC: 4.1 MMOL/L (ref 3.5–5)
RBC # BLD AUTO: 4.6 M/UL (ref 3.5–5.5)
RBC # BLD: ABNORMAL 10*6/UL
RSV RNA NPH QL NAA+NON-PROBE: NOT DETECTED
RV+EV RNA NPH QL NAA+NON-PROBE: NOT DETECTED
SARS-COV-2 RNA NPH QL NAA+NON-PROBE: NOT DETECTED
SODIUM SERPL-SCNC: 138 MMOL/L (ref 132–146)
SOURCE, BLOOD GAS: ABNORMAL
SPECIMEN DESCRIPTION: NORMAL
THB: 13.7 G/DL (ref 11.5–16.5)
TIME ANALYZED: 105
WBC OTHER # BLD: 9.3 K/UL (ref 4.5–11.5)

## 2024-06-05 PROCEDURE — 80048 BASIC METABOLIC PNL TOTAL CA: CPT

## 2024-06-05 PROCEDURE — 99223 1ST HOSP IP/OBS HIGH 75: CPT | Performed by: INTERNAL MEDICINE

## 2024-06-05 PROCEDURE — 83735 ASSAY OF MAGNESIUM: CPT

## 2024-06-05 PROCEDURE — 97161 PT EVAL LOW COMPLEX 20 MIN: CPT

## 2024-06-05 PROCEDURE — 94667 MNPJ CHEST WALL 1ST: CPT

## 2024-06-05 PROCEDURE — 85025 COMPLETE CBC W/AUTO DIFF WBC: CPT

## 2024-06-05 PROCEDURE — 6370000000 HC RX 637 (ALT 250 FOR IP)

## 2024-06-05 PROCEDURE — 6360000002 HC RX W HCPCS

## 2024-06-05 PROCEDURE — 97535 SELF CARE MNGMENT TRAINING: CPT

## 2024-06-05 PROCEDURE — 97165 OT EVAL LOW COMPLEX 30 MIN: CPT

## 2024-06-05 PROCEDURE — 2580000003 HC RX 258

## 2024-06-05 PROCEDURE — 97530 THERAPEUTIC ACTIVITIES: CPT

## 2024-06-05 PROCEDURE — 5A09357 ASSISTANCE WITH RESPIRATORY VENTILATION, LESS THAN 24 CONSECUTIVE HOURS, CONTINUOUS POSITIVE AIRWAY PRESSURE: ICD-10-PCS

## 2024-06-05 PROCEDURE — 36415 COLL VENOUS BLD VENIPUNCTURE: CPT

## 2024-06-05 PROCEDURE — 2700000000 HC OXYGEN THERAPY PER DAY

## 2024-06-05 PROCEDURE — 82805 BLOOD GASES W/O2 SATURATION: CPT

## 2024-06-05 PROCEDURE — 2140000000 HC CCU INTERMEDIATE R&B

## 2024-06-05 PROCEDURE — 6370000000 HC RX 637 (ALT 250 FOR IP): Performed by: EMERGENCY MEDICINE

## 2024-06-05 PROCEDURE — 94640 AIRWAY INHALATION TREATMENT: CPT

## 2024-06-05 PROCEDURE — 93010 ELECTROCARDIOGRAM REPORT: CPT | Performed by: INTERNAL MEDICINE

## 2024-06-05 PROCEDURE — 84100 ASSAY OF PHOSPHORUS: CPT

## 2024-06-05 PROCEDURE — 94669 MECHANICAL CHEST WALL OSCILL: CPT

## 2024-06-05 RX ORDER — ROFLUMILAST 500 UG/1
500 TABLET ORAL DAILY
Status: DISCONTINUED | OUTPATIENT
Start: 2024-06-05 | End: 2024-06-06 | Stop reason: HOSPADM

## 2024-06-05 RX ORDER — PRAZOSIN HYDROCHLORIDE 1 MG/1
1 CAPSULE ORAL NIGHTLY
Status: DISCONTINUED | OUTPATIENT
Start: 2024-06-05 | End: 2024-06-06 | Stop reason: HOSPADM

## 2024-06-05 RX ORDER — DEXTROSE MONOHYDRATE 100 MG/ML
INJECTION, SOLUTION INTRAVENOUS CONTINUOUS PRN
Status: DISCONTINUED | OUTPATIENT
Start: 2024-06-05 | End: 2024-06-06 | Stop reason: HOSPADM

## 2024-06-05 RX ORDER — SODIUM CHLORIDE 0.9 % (FLUSH) 0.9 %
5-40 SYRINGE (ML) INJECTION EVERY 12 HOURS SCHEDULED
Status: DISCONTINUED | OUTPATIENT
Start: 2024-06-05 | End: 2024-06-06 | Stop reason: HOSPADM

## 2024-06-05 RX ORDER — PREDNISONE 20 MG/1
40 TABLET ORAL DAILY
Status: DISCONTINUED | OUTPATIENT
Start: 2024-06-05 | End: 2024-06-06 | Stop reason: HOSPADM

## 2024-06-05 RX ORDER — SENNOSIDES A AND B 8.6 MG/1
2 TABLET, FILM COATED ORAL NIGHTLY
Status: DISCONTINUED | OUTPATIENT
Start: 2024-06-05 | End: 2024-06-06 | Stop reason: HOSPADM

## 2024-06-05 RX ORDER — ONDANSETRON 2 MG/ML
4 INJECTION INTRAMUSCULAR; INTRAVENOUS EVERY 6 HOURS PRN
Status: DISCONTINUED | OUTPATIENT
Start: 2024-06-05 | End: 2024-06-06 | Stop reason: HOSPADM

## 2024-06-05 RX ORDER — ALPRAZOLAM 0.25 MG/1
0.25 TABLET ORAL ONCE
Status: COMPLETED | OUTPATIENT
Start: 2024-06-05 | End: 2024-06-05

## 2024-06-05 RX ORDER — ARIPIPRAZOLE 5 MG/1
5 TABLET ORAL DAILY
Status: DISCONTINUED | OUTPATIENT
Start: 2024-06-05 | End: 2024-06-06 | Stop reason: HOSPADM

## 2024-06-05 RX ORDER — PANTOPRAZOLE SODIUM 40 MG/1
40 TABLET, DELAYED RELEASE ORAL DAILY
Status: DISCONTINUED | OUTPATIENT
Start: 2024-06-05 | End: 2024-06-06 | Stop reason: HOSPADM

## 2024-06-05 RX ORDER — MECOBALAMIN 5000 MCG
10 TABLET,DISINTEGRATING ORAL NIGHTLY
Status: DISCONTINUED | OUTPATIENT
Start: 2024-06-05 | End: 2024-06-06 | Stop reason: HOSPADM

## 2024-06-05 RX ORDER — NICOTINE 21 MG/24HR
1 PATCH, TRANSDERMAL 24 HOURS TRANSDERMAL DAILY
Status: DISCONTINUED | OUTPATIENT
Start: 2024-06-05 | End: 2024-06-06 | Stop reason: HOSPADM

## 2024-06-05 RX ORDER — PREDNISONE 20 MG/1
TABLET ORAL
Qty: 9 TABLET | Refills: 0 | Status: CANCELLED | OUTPATIENT
Start: 2024-06-06 | End: 2024-06-14

## 2024-06-05 RX ORDER — MIRTAZAPINE 15 MG/1
15 TABLET, FILM COATED ORAL NIGHTLY
Status: DISCONTINUED | OUTPATIENT
Start: 2024-06-05 | End: 2024-06-06 | Stop reason: HOSPADM

## 2024-06-05 RX ORDER — ARFORMOTEROL TARTRATE 15 UG/2ML
15 SOLUTION RESPIRATORY (INHALATION)
Status: DISCONTINUED | OUTPATIENT
Start: 2024-06-05 | End: 2024-06-06 | Stop reason: HOSPADM

## 2024-06-05 RX ORDER — IPRATROPIUM BROMIDE AND ALBUTEROL SULFATE 2.5; .5 MG/3ML; MG/3ML
1 SOLUTION RESPIRATORY (INHALATION)
Status: DISCONTINUED | OUTPATIENT
Start: 2024-06-05 | End: 2024-06-06 | Stop reason: HOSPADM

## 2024-06-05 RX ORDER — HYDROXYZINE PAMOATE 25 MG/1
25 CAPSULE ORAL EVERY 8 HOURS PRN
Status: DISCONTINUED | OUTPATIENT
Start: 2024-06-05 | End: 2024-06-06 | Stop reason: HOSPADM

## 2024-06-05 RX ORDER — ENOXAPARIN SODIUM 100 MG/ML
30 INJECTION SUBCUTANEOUS 2 TIMES DAILY
Status: DISCONTINUED | OUTPATIENT
Start: 2024-06-05 | End: 2024-06-06 | Stop reason: HOSPADM

## 2024-06-05 RX ORDER — GLUCAGON 1 MG/ML
1 KIT INJECTION PRN
Status: DISCONTINUED | OUTPATIENT
Start: 2024-06-05 | End: 2024-06-06 | Stop reason: HOSPADM

## 2024-06-05 RX ORDER — SODIUM CHLORIDE 0.9 % (FLUSH) 0.9 %
5-40 SYRINGE (ML) INJECTION PRN
Status: DISCONTINUED | OUTPATIENT
Start: 2024-06-05 | End: 2024-06-06 | Stop reason: HOSPADM

## 2024-06-05 RX ORDER — ARIPIPRAZOLE 5 MG/1
5 TABLET ORAL DAILY
Qty: 60 TABLET | Refills: 0 | Status: CANCELLED | OUTPATIENT
Start: 2024-06-05

## 2024-06-05 RX ORDER — AZITHROMYCIN 250 MG/1
500 TABLET, FILM COATED ORAL
Status: DISCONTINUED | OUTPATIENT
Start: 2024-06-05 | End: 2024-06-06 | Stop reason: HOSPADM

## 2024-06-05 RX ORDER — BUDESONIDE 0.25 MG/2ML
0.5 INHALANT ORAL 2 TIMES DAILY
Status: DISCONTINUED | OUTPATIENT
Start: 2024-06-05 | End: 2024-06-06 | Stop reason: HOSPADM

## 2024-06-05 RX ORDER — POLYETHYLENE GLYCOL 3350 17 G/17G
17 POWDER, FOR SOLUTION ORAL DAILY
Status: DISCONTINUED | OUTPATIENT
Start: 2024-06-05 | End: 2024-06-06 | Stop reason: HOSPADM

## 2024-06-05 RX ORDER — HYDROXYZINE HYDROCHLORIDE 25 MG/1
25 TABLET, FILM COATED ORAL EVERY 8 HOURS PRN
Qty: 90 TABLET | Refills: 0 | Status: CANCELLED | OUTPATIENT
Start: 2024-06-05

## 2024-06-05 RX ORDER — ACETAMINOPHEN 650 MG/1
650 SUPPOSITORY RECTAL EVERY 6 HOURS PRN
Status: DISCONTINUED | OUTPATIENT
Start: 2024-06-05 | End: 2024-06-06 | Stop reason: HOSPADM

## 2024-06-05 RX ORDER — ONDANSETRON 4 MG/1
4 TABLET, ORALLY DISINTEGRATING ORAL EVERY 8 HOURS PRN
Status: DISCONTINUED | OUTPATIENT
Start: 2024-06-05 | End: 2024-06-06 | Stop reason: HOSPADM

## 2024-06-05 RX ORDER — ACETAMINOPHEN 325 MG/1
650 TABLET ORAL EVERY 6 HOURS PRN
Status: DISCONTINUED | OUTPATIENT
Start: 2024-06-05 | End: 2024-06-06 | Stop reason: HOSPADM

## 2024-06-05 RX ORDER — SODIUM CHLORIDE 9 MG/ML
INJECTION, SOLUTION INTRAVENOUS PRN
Status: DISCONTINUED | OUTPATIENT
Start: 2024-06-05 | End: 2024-06-06 | Stop reason: HOSPADM

## 2024-06-05 RX ADMIN — SENNOSIDES 17.2 MG: 8.6 TABLET, FILM COATED ORAL at 19:58

## 2024-06-05 RX ADMIN — IPRATROPIUM BROMIDE AND ALBUTEROL SULFATE 1 DOSE: 2.5; .5 SOLUTION RESPIRATORY (INHALATION) at 11:45

## 2024-06-05 RX ADMIN — HYDROXYZINE PAMOATE 25 MG: 25 CAPSULE ORAL at 18:41

## 2024-06-05 RX ADMIN — ALPRAZOLAM 0.25 MG: 0.25 TABLET ORAL at 16:12

## 2024-06-05 RX ADMIN — ACETAMINOPHEN 650 MG: 325 TABLET ORAL at 11:05

## 2024-06-05 RX ADMIN — PANTOPRAZOLE SODIUM 40 MG: 40 TABLET, DELAYED RELEASE ORAL at 11:06

## 2024-06-05 RX ADMIN — ENOXAPARIN SODIUM 30 MG: 100 INJECTION SUBCUTANEOUS at 02:53

## 2024-06-05 RX ADMIN — HYDROXYZINE PAMOATE 25 MG: 25 CAPSULE ORAL at 11:05

## 2024-06-05 RX ADMIN — SODIUM CHLORIDE, PRESERVATIVE FREE 10 ML: 5 INJECTION INTRAVENOUS at 11:06

## 2024-06-05 RX ADMIN — IPRATROPIUM BROMIDE AND ALBUTEROL SULFATE 1 DOSE: 2.5; .5 SOLUTION RESPIRATORY (INHALATION) at 06:54

## 2024-06-05 RX ADMIN — ARFORMOTEROL TARTRATE 15 MCG: 15 SOLUTION RESPIRATORY (INHALATION) at 20:05

## 2024-06-05 RX ADMIN — MIRTAZAPINE 15 MG: 15 TABLET, FILM COATED ORAL at 19:58

## 2024-06-05 RX ADMIN — PREDNISONE 40 MG: 20 TABLET ORAL at 11:05

## 2024-06-05 RX ADMIN — IPRATROPIUM BROMIDE AND ALBUTEROL SULFATE 1 DOSE: 2.5; .5 SOLUTION RESPIRATORY (INHALATION) at 15:59

## 2024-06-05 RX ADMIN — IPRATROPIUM BROMIDE AND ALBUTEROL SULFATE 1 DOSE: 2.5; .5 SOLUTION RESPIRATORY (INHALATION) at 00:27

## 2024-06-05 RX ADMIN — ARFORMOTEROL TARTRATE 15 MCG: 15 SOLUTION RESPIRATORY (INHALATION) at 02:10

## 2024-06-05 RX ADMIN — Medication 10 MG: at 02:54

## 2024-06-05 RX ADMIN — Medication 10 MG: at 19:57

## 2024-06-05 RX ADMIN — SODIUM CHLORIDE, PRESERVATIVE FREE 10 ML: 5 INJECTION INTRAVENOUS at 19:58

## 2024-06-05 RX ADMIN — ACETAMINOPHEN 650 MG: 325 TABLET ORAL at 20:07

## 2024-06-05 RX ADMIN — IPRATROPIUM BROMIDE AND ALBUTEROL SULFATE 1 DOSE: 2.5; .5 SOLUTION RESPIRATORY (INHALATION) at 20:00

## 2024-06-05 RX ADMIN — MIRTAZAPINE 15 MG: 15 TABLET, FILM COATED ORAL at 02:53

## 2024-06-05 RX ADMIN — AZITHROMYCIN DIHYDRATE 500 MG: 250 TABLET ORAL at 21:37

## 2024-06-05 RX ADMIN — BUDESONIDE 500 MCG: 0.25 SUSPENSION RESPIRATORY (INHALATION) at 20:00

## 2024-06-05 RX ADMIN — PRAZOSIN HYDROCHLORIDE 1 MG: 1 CAPSULE ORAL at 04:53

## 2024-06-05 RX ADMIN — IPRATROPIUM BROMIDE AND ALBUTEROL SULFATE 1 DOSE: 2.5; .5 SOLUTION RESPIRATORY (INHALATION) at 00:25

## 2024-06-05 RX ADMIN — BUDESONIDE 500 MCG: 0.25 SUSPENSION RESPIRATORY (INHALATION) at 02:12

## 2024-06-05 RX ADMIN — PRAZOSIN HYDROCHLORIDE 1 MG: 1 CAPSULE ORAL at 20:07

## 2024-06-05 RX ADMIN — ARFORMOTEROL TARTRATE 15 MCG: 15 SOLUTION RESPIRATORY (INHALATION) at 06:54

## 2024-06-05 RX ADMIN — ARIPIPRAZOLE 5 MG: 5 TABLET ORAL at 14:25

## 2024-06-05 RX ADMIN — IPRATROPIUM BROMIDE AND ALBUTEROL SULFATE 1 DOSE: 2.5; .5 SOLUTION RESPIRATORY (INHALATION) at 00:20

## 2024-06-05 RX ADMIN — ROFLUMILAST 500 MCG: 500 TABLET ORAL at 11:05

## 2024-06-05 ASSESSMENT — PAIN DESCRIPTION - ORIENTATION: ORIENTATION: MID

## 2024-06-05 ASSESSMENT — PAIN SCALES - GENERAL
PAINLEVEL_OUTOF10: 5
PAINLEVEL_OUTOF10: 0
PAINLEVEL_OUTOF10: 3

## 2024-06-05 ASSESSMENT — PAIN DESCRIPTION - LOCATION
LOCATION: HEAD
LOCATION: HEAD

## 2024-06-05 ASSESSMENT — PAIN DESCRIPTION - DESCRIPTORS
DESCRIPTORS: ACHING;DISCOMFORT;SORE
DESCRIPTORS: ACHING;NAGGING;DISCOMFORT

## 2024-06-05 ASSESSMENT — PAIN DESCRIPTION - FREQUENCY: FREQUENCY: CONTINUOUS

## 2024-06-05 ASSESSMENT — PAIN DESCRIPTION - PAIN TYPE: TYPE: ACUTE PAIN

## 2024-06-05 NOTE — PROGRESS NOTES
Physical Therapy  Physical Therapy Initial Assessment       Name: Brenda Nunn  : 1965  MRN: [id number]      Date of Service: 2024    Evaluating PT:  Phylicia Nolen PT, DPT [id number]    Room #:  6424/6424-B  Diagnosis:  COPD with acute exacerbation (HCC) [J44.1]  PMHx/PSHx:   has a past medical history of CHF (congestive heart failure) (HCC), COPD (chronic obstructive pulmonary disease) (HCC), Depression, and Hypertension.    Procedure/Surgery:  none this admission  Precautions: Falls,  anxiety, continuous pulse ox, O2     SUBJECTIVE:    Pt lives with daughter in a 2 story home with 5 stairs to enter and 1 rail(s).  Bed is on 1st floor and bath is on 1st floor.  Pt ambulated with rollator PTA.    Equipment Owned: rollator  Equipment Needs:  TBD    OBJECTIVE:   Initial Evaluation  Date: 24 Treatment  Date:  Short Term/ Long Term   Goals   AM-PAC 6 Clicks      Was pt agreeable to Eval/treatment? Yes      Does pt have pain? Denies pain      Bed Mobility  Rolling: SBA  Supine to sit: SBA  Sit to supine: SBA  Scooting: SBA  Rolling: Independent  Supine to sit: Independent  Sit to supine: Independent  Scooting: Independent   Transfers Sit to stand: SBA  Stand to sit: SBA  Stand pivot: SBA WW  Sit to stand: Independent  Stand to sit: Independent  Stand pivot: Modified Independent   AAD   Ambulation    25 feet with WW Linda   X2 reps with seated rest break    Pt with SOB and quick fatigue  100 feet with AAD Modified Independent   Stair negotiation: ascended and descended  NT  4-8 steps with 1-2 rail Modified Independent   ROM BUE:  Defer to OT  BLE:  WFL     Strength BUE:  Defer to OT  BLE:  4+/5   Improve 1 MMT   Balance Sitting EOB:  Independent  Dynamic Standing:  Linda WW  Sitting EOB:  Independent  Dynamic Standing:  Modified Independent AAD     Pt is A & O x 4. Pt follows all commands. Pt upset with current situation this date, comfort provided.   Sensation: no abnormalities noted   Edema:  unremarkable

## 2024-06-05 NOTE — DISCHARGE INSTRUCTIONS
Internal medicine    Follow ups  Please follow up with the internal medicine clinic at [facility].  Please keep all other follow up appointments:  Internal medicine clinic on 6/12  Pulmonology clinic on 6/19  Sleep medicine clinic on 7/1    Changes in healthcare   Please take all medications as indicated  Diet: regular diet   Activity: activity as tolerated  New Medications started during this hospital stay  Prednisone, 40 mg for 2 more days, then 20 mg for 3 more days and 10 mg for 3 more days before stopping.   Please contact us if you have any concerns, wish to change or make an appointment:  Internal medicine clinic   Phone: [phone]  Fax: [phone] [address] [address]  Or please call the nurse line at [phone].    Should you have further questions in regards to this visit, you can review your clinical note and after visit summary document on your [employer] account.  Other questions can be directed to our nurse line at [phone].       Other than any new prescriptions given to you today, the list of home medications on this After Visit Summary are based on information provided to us from you and your healthcare providers. This information, including the list, dose, and frequency of medications is only as accurate as the information you provided. If you have any questions or concerns about your home medications, please contact your Primary Care Physician for further clarification.

## 2024-06-05 NOTE — HOME CARE
Current with [facility]. Will need NEW C  ORDERS PRIOR TO DISCHARGE. Patient was admitted back to [facility] [place] before being seen by [facility].  Lucie Carlson LPN, [facility]

## 2024-06-05 NOTE — H&P
[facility]  Internal Medicine Residency Program  History and Physical    Patient:  Brenda Nunn 58 y.o. female MRN: [id number]     Date of Service: 2024    Hospital Day: 2    History of Present Illness   Brenda Nunn is a 58 y.o. female with PMHx CHF, COPD, depression, hypertension, bipolar disorder presented with CC of SOB since   Patient is extremely well known to house medicine.  In fact patient was just admitted -6/3 for COPD exacerbation.  Patient has had many COPD exacerbations over the last year.  Upon discharge she was to complete 5 doses of azithromycin and start chronic azithromycin 3 times a week however however she said she had not started it yet.  Last admission she also underwent audiology evaluation for clearance for the azithromycin.  Patient lives with her daughter and came in after feeling increasing shortness of breath.  She denies any change in her cough, increased oxygen demand, sputum production.  She is at 4 L of oxygen at baseline.   In the ED /106, heart rate 101, afebrile, saturating 96% on 6 L.  Troponin 11, proBNP 63, WBC 11.3, respiratory panel negative, ABG showed 7.39, 48, 47, 28.5, 83% but Hhb is 16 so likely venous.  Patient is wheezy even after breathing treatments but even on the best of days Ms. Nunn is wheezy.  Chest x-ray is unchanged from previous imaging couple days prior.    Past Medical History:       Diagnosis Date    CHF (congestive heart failure) (HCC)     COPD (chronic obstructive pulmonary disease) (HCC)     Depression     Hypertension        Past Surgical History:        Procedure Laterality Date     SECTION      HYSTERECTOMY (CERVIX STATUS UNKNOWN)      Ovaries retained. Unknown cervix status. performed for uterine fibroids       Medications Prior to Admission:    Prior to Admission medications    Medication Sig Start Date End Date Taking? Authorizing Provider   ipratropium 0.5 mg-albuterol 2.5 mg (DUONEB) 0.5-2.5

## 2024-06-05 NOTE — PROGRESS NOTES
Patient ambulated to bathroom with standby assistance and tolerated well. Purewick removed. Encouraged patient to ambulate to bathroom with assistance instead of using purewick. Patient agreeable.    PRADIP JIM RN

## 2024-06-05 NOTE — PLAN OF CARE
Problem: Skin/Tissue Integrity  Goal: Absence of new skin breakdown  Description: 1.  Monitor for areas of redness and/or skin breakdown  2.  Assess vascular access sites hourly  3.  Every 4-6 hours minimum:  Change oxygen saturation probe site  4.  Every 4-6 hours:  If on nasal continuous positive airway pressure, respiratory therapy assess nares and determine need for appliance change or resting period.  Outcome: Progressing     Problem: Safety - Adult  Goal: Free from fall injury  Outcome: Progressing     Problem: Chronic Conditions and Co-morbidities  Goal: Patient's chronic conditions and co-morbidity symptoms are monitored and maintained or improved  Outcome: Progressing     Problem: Discharge Planning  Goal: Discharge to home or other facility with appropriate resources  Outcome: Progressing

## 2024-06-05 NOTE — ED PROVIDER NOTES
COPD with acute exacerbation (HCC)        DISPOSITION  Disposition: Admit to monitored  Patient condition is stable        NOTE: This report was transcribed using voice recognition software. Every effort was made to ensure accuracy; however, inadvertent computerized transcription errors may be present          James Estrella MD  06/04/24 5540       James Estrella MD  06/04/24 7439

## 2024-06-05 NOTE — ED NOTES
Report given to receiving, RN from 64.   Report method by phone   The following was reviewed with receiving RN:   Current vital signs:  /82   Pulse (!) 101   Temp 97.9 °F (36.6 °C) (Oral)   Resp 15   Ht 1.702 m (5' 7\")   Wt 113.4 kg (250 lb)   SpO2 97%   BMI 39.16 kg/m²                MEWS Score: 2     Any medication or safety alerts were reviewed. Any pending diagnostics and notifications were also reviewed, as well as any safety concerns or issues, abnormal labs, abnormal imaging, and abnormal assessment findings. Questions were answered.

## 2024-06-05 NOTE — PROGRESS NOTES
Occupational Therapy    OCCUPATIONAL THERAPY INITIAL EVALUATION    [facility]  [address], [place]        Date:2024                                                  Patient Name: Brenda Nunn    MRN: [id number]    : 1965    Room: [address] [place] [address]          Evaluating OT: Marta Patton, ANDREED, OTR/L; [id number]      Referring Provider: Robin Johnston MD     Specific Provider Orders/Date: OT Eval and Treat 2024      Diagnosis: COPD with acute exacerbation (HCC) [J44.1]       Surgery: none this admission        Pertinent Medical History:  has a past medical history of CHF (congestive heart failure) (HCC), COPD (chronic obstructive pulmonary disease) (HCC), Depression, and Hypertension.      Recommended Adaptive Equipment: TBD     Precautions:  Fall Risk, monitor O2, continuous pulse ox, SOB     Assessment of current deficits    [x] Functional mobility  [x]ADLs  [x] Strength               []Cognition    [x] Functional transfers   [x] IADLs         [x] Safety Awareness   [x]Endurance    [x] Fine Coordination              [x] Balance      [] Vision/perception   []Sensation     []Gross Motor Coordination  [] ROM  [] Delirium                   [] Motor Control     OT PLAN OF CARE   OT POC based on physician orders, patient diagnosis and results of clinical assessment    Frequency/Duration 1-3 days/wk for 2 weeks PRN   Specific OT Treatment Interventions to include:   * Instruction/training on adapted ADL techniques and AE recommendations to increase functional independence within precautions       * Training on energy conservation strategies, correct breathing pattern and techniques to improve independence/tolerance for self-care routine  * Functional transfer/mobility training/DME recommendations for increased independence, safety, and fall prevention  * Patient/Family education to increase follow through with safety techniques and functional

## 2024-06-05 NOTE — CARE COORDINATION
06/05/24 Transition of Care: patient is a readmission after being home for less than a day. She is with worsening shortness of breath with c/o coughing and c/o lungs hurting. She is alert and oriented. Her baseline is 4lnc but was needing 6lnc at home thru [facility]. Despite aerosols at home she still was without improvement. She continues to live with her daughter and her daughters kids. She has a two story home but stays on first floor. Bed/bath on main floor. She does use oxygen at home. She has a nebulizer, pulse oximeter and wheelchair, she uses a rollator for short distances. Her pcp is Dr Arshad and her pharmacy is [facility] on [address]. She was to be opened with [facility] but returned to the hospital. [facility] to continue to follow for discharge.Electronically signed by Brenna Chambers RN CM on 6/5/2024 at 1:15 PM    Case Management Assessment  Initial Evaluation    Date/Time of Evaluation: 6/5/2024 1:16 PM  Assessment Completed by: Brenna Chambers RN    If patient is discharged prior to next notation, then this note serves as note for discharge by case management.    Patient Name: Brenda Nunn                   YOB: 1965  Diagnosis: COPD with acute exacerbation (HCC) [J44.1]                   Date / Time: 6/4/2024 10:24 PM    Patient Admission Status: Inpatient   Readmission Risk (Low < 19, Mod (19-27), High > 27): Readmission Risk Score: 35.8    Current PCP: Genesis Enriquez MD  PCP verified by ?      Chart Reviewed: Yes      History Provided by:    Patient Orientation:      Patient Cognition:      Hospitalization in the last 30 days (Readmission):  Yes    If yes, Readmission Assessment in  Navigator will be completed.    Advance Directives:      Code Status: Full Code   Patient's Primary Decision Maker is: Legal Next of Kin    Primary Decision Maker: BRAIN SCHREIBER - Child - [phone]    Discharge Planning:    Patient lives with: Children (daughter) Type of Home: House  Primary Care

## 2024-06-05 NOTE — PROGRESS NOTES
4 Eyes Skin Assessment     NAME:  Brenda Nunn  YOB: 1965  MEDICAL RECORD NUMBER:  [id number]    The patient is being assessed for  Admission    I agree that at least one RN has performed a thorough Head to Toe Skin Assessment on the patient. ALL assessment sites listed below have been assessed.      Areas assessed by both nurses:    Head, Face, Ears, Shoulders, Back, Chest, Arms, Elbows, Hands, Sacrum. Buttock, Coccyx, Ischium, Legs. Feet and Heels, and Under Medical Devices         Does the Patient have a Wound? No noted wound(s)       Gabe Prevention initiated by RN: Yes  Wound Care Orders initiated by RN: No    Pressure Injury (Stage 3,4, Unstageable, DTI, NWPT, and Complex wounds) if present, place Wound referral order by RN under : No    New Ostomies, if present place, Ostomy referral order under : No     Nurse 1 eSignature: Electronically signed by PRADIP JIM RN on 6/5/24 at 11:21 AM EDT    **SHARE this note so that the co-signing nurse can place an eSignature**    Nurse 2 eSignature: Electronically signed by Rosie Thomas RN on 6/5/24 at 2:05 PM EDT

## 2024-06-06 VITALS
RESPIRATION RATE: 16 BRPM | DIASTOLIC BLOOD PRESSURE: 82 MMHG | SYSTOLIC BLOOD PRESSURE: 124 MMHG | BODY MASS INDEX: 39.24 KG/M2 | OXYGEN SATURATION: 97 % | WEIGHT: 250 LBS | TEMPERATURE: 98.6 F | HEART RATE: 95 BPM | HEIGHT: 67 IN

## 2024-06-06 LAB
ANION GAP SERPL CALCULATED.3IONS-SCNC: 17 MMOL/L (ref 7–16)
BASOPHILS # BLD: 0.04 K/UL (ref 0–0.2)
BASOPHILS NFR BLD: 0 % (ref 0–2)
BUN SERPL-MCNC: 18 MG/DL (ref 6–20)
CALCIUM SERPL-MCNC: 10.1 MG/DL (ref 8.6–10.2)
CHLORIDE SERPL-SCNC: 100 MMOL/L (ref 98–107)
CO2 SERPL-SCNC: 25 MMOL/L (ref 22–29)
CREAT SERPL-MCNC: 0.7 MG/DL (ref 0.5–1)
EOSINOPHIL # BLD: 0.03 K/UL (ref 0.05–0.5)
EOSINOPHILS RELATIVE PERCENT: 0 % (ref 0–6)
ERYTHROCYTE [DISTWIDTH] IN BLOOD BY AUTOMATED COUNT: 13.2 % (ref 11.5–15)
GFR, ESTIMATED: >90 ML/MIN/1.73M2
GLUCOSE SERPL-MCNC: 137 MG/DL (ref 74–99)
HCT VFR BLD AUTO: 39.4 % (ref 34–48)
HGB BLD-MCNC: 12.2 G/DL (ref 11.5–15.5)
IMM GRANULOCYTES # BLD AUTO: 0.13 K/UL (ref 0–0.58)
IMM GRANULOCYTES NFR BLD: 1 % (ref 0–5)
LYMPHOCYTES NFR BLD: 1.71 K/UL (ref 1.5–4)
LYMPHOCYTES RELATIVE PERCENT: 16 % (ref 20–42)
MAGNESIUM SERPL-MCNC: 1.9 MG/DL (ref 1.6–2.6)
MCH RBC QN AUTO: 27.6 PG (ref 26–35)
MCHC RBC AUTO-ENTMCNC: 31 G/DL (ref 32–34.5)
MCV RBC AUTO: 89.1 FL (ref 80–99.9)
MONOCYTES NFR BLD: 0.67 K/UL (ref 0.1–0.95)
MONOCYTES NFR BLD: 6 % (ref 2–12)
NEUTROPHILS NFR BLD: 77 % (ref 43–80)
NEUTS SEG NFR BLD: 8.43 K/UL (ref 1.8–7.3)
PHOSPHATE SERPL-MCNC: 3.6 MG/DL (ref 2.5–4.5)
PLATELET # BLD AUTO: 232 K/UL (ref 130–450)
PMV BLD AUTO: 10.2 FL (ref 7–12)
POTASSIUM SERPL-SCNC: 3.7 MMOL/L (ref 3.5–5)
RBC # BLD AUTO: 4.42 M/UL (ref 3.5–5.5)
SODIUM SERPL-SCNC: 142 MMOL/L (ref 132–146)
WBC OTHER # BLD: 11 K/UL (ref 4.5–11.5)

## 2024-06-06 PROCEDURE — 85025 COMPLETE CBC W/AUTO DIFF WBC: CPT

## 2024-06-06 PROCEDURE — 99238 HOSP IP/OBS DSCHRG MGMT 30/<: CPT | Performed by: INTERNAL MEDICINE

## 2024-06-06 PROCEDURE — 94640 AIRWAY INHALATION TREATMENT: CPT

## 2024-06-06 PROCEDURE — 6360000002 HC RX W HCPCS

## 2024-06-06 PROCEDURE — 6370000000 HC RX 637 (ALT 250 FOR IP)

## 2024-06-06 PROCEDURE — 80048 BASIC METABOLIC PNL TOTAL CA: CPT

## 2024-06-06 PROCEDURE — 36415 COLL VENOUS BLD VENIPUNCTURE: CPT

## 2024-06-06 PROCEDURE — 2580000003 HC RX 258

## 2024-06-06 PROCEDURE — 2700000000 HC OXYGEN THERAPY PER DAY

## 2024-06-06 PROCEDURE — 83735 ASSAY OF MAGNESIUM: CPT

## 2024-06-06 PROCEDURE — 94660 CPAP INITIATION&MGMT: CPT

## 2024-06-06 PROCEDURE — 84100 ASSAY OF PHOSPHORUS: CPT

## 2024-06-06 RX ORDER — PREDNISONE 20 MG/1
TABLET ORAL
Qty: 7 TABLET | Refills: 0 | Status: SHIPPED | OUTPATIENT
Start: 2024-06-06 | End: 2024-06-13

## 2024-06-06 RX ORDER — ARIPIPRAZOLE 5 MG/1
5 TABLET ORAL DAILY
Qty: 60 TABLET | Refills: 0 | Status: SHIPPED | OUTPATIENT
Start: 2024-06-06

## 2024-06-06 RX ORDER — ALPRAZOLAM 0.25 MG/1
0.25 TABLET ORAL 3 TIMES DAILY PRN
Qty: 10 TABLET | Refills: 0 | Status: SHIPPED | OUTPATIENT
Start: 2024-06-06 | End: 2024-06-09

## 2024-06-06 RX ORDER — HYDROXYZINE HYDROCHLORIDE 25 MG/1
25 TABLET, FILM COATED ORAL EVERY 8 HOURS PRN
Qty: 30 TABLET | Refills: 0 | Status: SHIPPED | OUTPATIENT
Start: 2024-06-06

## 2024-06-06 RX ADMIN — PANTOPRAZOLE SODIUM 40 MG: 40 TABLET, DELAYED RELEASE ORAL at 10:16

## 2024-06-06 RX ADMIN — ARFORMOTEROL TARTRATE 15 MCG: 15 SOLUTION RESPIRATORY (INHALATION) at 09:32

## 2024-06-06 RX ADMIN — POLYETHYLENE GLYCOL 3350 17 G: 17 POWDER, FOR SOLUTION ORAL at 10:16

## 2024-06-06 RX ADMIN — IPRATROPIUM BROMIDE AND ALBUTEROL SULFATE 1 DOSE: 2.5; .5 SOLUTION RESPIRATORY (INHALATION) at 12:27

## 2024-06-06 RX ADMIN — BUDESONIDE 500 MCG: 0.25 SUSPENSION RESPIRATORY (INHALATION) at 09:32

## 2024-06-06 RX ADMIN — ARIPIPRAZOLE 5 MG: 5 TABLET ORAL at 10:20

## 2024-06-06 RX ADMIN — HYDROXYZINE PAMOATE 25 MG: 25 CAPSULE ORAL at 10:16

## 2024-06-06 RX ADMIN — IPRATROPIUM BROMIDE AND ALBUTEROL SULFATE 1 DOSE: 2.5; .5 SOLUTION RESPIRATORY (INHALATION) at 09:31

## 2024-06-06 RX ADMIN — PREDNISONE 40 MG: 20 TABLET ORAL at 10:16

## 2024-06-06 RX ADMIN — IPRATROPIUM BROMIDE AND ALBUTEROL SULFATE 1 DOSE: 2.5; .5 SOLUTION RESPIRATORY (INHALATION) at 16:18

## 2024-06-06 RX ADMIN — ROFLUMILAST 500 MCG: 500 TABLET ORAL at 10:16

## 2024-06-06 RX ADMIN — SODIUM CHLORIDE, PRESERVATIVE FREE 10 ML: 5 INJECTION INTRAVENOUS at 10:16

## 2024-06-06 NOTE — CARE COORDINATION
06/06/24 Update CM Note; Met with patient who states she would go to a snf but really wants to return home with her daughter. She says she wishes to return home today. She is agreeable again to the homecare. She is asking for the aide to come also. She states if she goes to a skilled facility she will not be home for her Birthday. Her therapy scores were discussed with her as well. She states she will be home with her daughter and her daughters children. Spoke with medical resident and homecare was ordered. Patient will discharge today per medical resident. Electronically signed by Brenna Chambers RN CM on 6/6/2024 at 2:32 PM

## 2024-06-06 NOTE — PROGRESS NOTES
CLINICAL PHARMACY NOTE: MEDS TO BEDS    Total # of Prescriptions Filled: 4   The following medications were delivered to the patient:  Alprazolam 0.25  Prednisone 20  Hydroxyzine 25  Aripiprazole 5    Additional Documentation:  Delivered to pt

## 2024-06-06 NOTE — PROGRESS NOTES
Physician Progress Note      PATIENT:               ARUNA MADRID  [facility] #:                  [id number]  :                       1965  ADMIT DATE:       2024 10:24 PM  DISCH DATE:  RESPONDING  PROVIDER #:        Lorenzo Manuel DO          QUERY TEXT:    Noted documentation of acute respiratory failure. Notes also say not in   distress, unlabored. In order to support the diagnosis of acute respiratory   failure, please include additional clinical indicators in your documentation.    Or please document if the diagnosis of acute respiratory failure has been   ruled out after further study.    The medical record reflects the following:  Risk Factors: COPD  Clinical Indicators: Per H&P \"Acute on chronic hypoxic resp failure, 3-4L   oxygen baseline\", \"No acute distress.\" Nursing: unlabored.  Treatment: 3-4L at baseline. Inpatient 6L-4L.    Thank You,  Tory Jackson, RN, BSN, CRCR, Clinical Documentation Improvement    Acute Respiratory Failure Clinical Indicators per 3M MS-DRG Training Guide and   Quick Reference Guide:  pO2 < 60 mmHg or SpO2 (pulse oximetry) < 91% breathing room air  pCO2 > 50 and pH < 7.35  P/F ratio (pO2 / FIO2) < 300  pO2 decrease or pCO2 increase by 10 mmHg from baseline (if known)  Supplemental oxygen of 40% or more  Presence of respiratory distress, tachypnea, dyspnea, shortness of breath,   wheezing  Unable to speak in complete sentences  Use of accessory muscles to breathe  Extreme anxiety and feeling of impending doom  Tripod position  Confusion/altered mental status/obtunded  Options provided:  -- Acute Respiratory Failure as evidenced by, Please document evidence.  -- Acute Respiratory Failure ruled out after study and Chronic Respiratory   Failure confirmed  -- Other - I will add my own diagnosis  -- Disagree - Not applicable / Not valid  -- Disagree - Clinically unable to determine / Unknown  -- Refer to Clinical Documentation Reviewer    PROVIDER RESPONSE

## 2024-06-06 NOTE — CARE COORDINATION
06/06/24 Update CM Note: patient remains on telemetry unit. She is being started on NIV and cpap on hold. She is feeling better. She did work with therapies. She is voicing wanting to return home. She is using the nicotine patient and refuses any other smoking cessation therapy. [facility] and [facility] are in [facility]. [facility] is following. Confirmed with patient that she has her oxygen/cpap/neublizer at home thru [facility] and all are working. She states all equipment is working and she plans on returning home. Electronically signed by Brenna Chambers RN CM on 6/6/2024 at 2:12 PM

## 2024-06-06 NOTE — PLAN OF CARE
Problem: Skin/Tissue Integrity  Goal: Absence of new skin breakdown  Description: 1.  Monitor for areas of redness and/or skin breakdown  2.  Assess vascular access sites hourly  3.  Every 4-6 hours minimum:  Change oxygen saturation probe site  4.  Every 4-6 hours:  If on nasal continuous positive airway pressure, respiratory therapy assess nares and determine need for appliance change or resting period.  6/5/2024 2154 by Tiffany Begum RN  Outcome: Progressing  6/5/2024 0904 by Rosalinda Glass RN  Outcome: Progressing     Problem: Safety - Adult  Goal: Free from fall injury  6/5/2024 2154 by Tiffany Begum RN  Outcome: Progressing  6/5/2024 0904 by Rosalinda Glass RN  Outcome: Progressing     Problem: Chronic Conditions and Co-morbidities  Goal: Patient's chronic conditions and co-morbidity symptoms are monitored and maintained or improved  6/5/2024 2154 by Tiffany Begum RN  Outcome: Progressing  Flowsheets (Taken 6/5/2024 2005)  Care Plan - Patient's Chronic Conditions and Co-Morbidity Symptoms are Monitored and Maintained or Improved: Monitor and assess patient's chronic conditions and comorbid symptoms for stability, deterioration, or improvement  6/5/2024 0904 by Rosalinda Glass RN  Outcome: Progressing     Problem: Discharge Planning  Goal: Discharge to home or other facility with appropriate resources  6/5/2024 2154 by Tiffany Begum RN  Outcome: Progressing  Flowsheets (Taken 6/5/2024 2005)  Discharge to home or other facility with appropriate resources: Identify barriers to discharge with patient and caregiver  6/5/2024 0904 by Rosalinda Glass RN  Outcome: Progressing     Problem: Pain  Goal: Verbalizes/displays adequate comfort level or baseline comfort level  Outcome: Progressing

## 2024-06-06 NOTE — PROGRESS NOTES
[facility]  Internal Medicine Department     Attending Physician Statement:  Lorenzo Carroll Jr. D.O.     I have discussed the case, including pertinent history and exam findings with the resident. I have reviewed all past medical history, past surgical history, family history, social history, medications, and allergies and updated as appropriate in the history section of the chart. I have seen and examined the patient and the key elements of the encounter have been performed by me. I agree with the assessment, plan and orders as documented by the resident.      COPD   -not in exacerbation  -continue pulmonary hygiene   -continue inhalers and daliresp  -starting home azithromycin and continue steroids which jason was to have on DC 2 days prior to this admission   -discussed tobacco cessation; jason using patch at this time and declines other treatment   -patient on baseline O2     Depression  -continue outpatient followup with psychaitrist   -continue current regimen      Jason interested in SNF; patient stable for DC to SNF or home per social work     Remainder of medical problems as per resident note.

## 2024-06-06 NOTE — DISCHARGE SUMMARY
edema  Neurologic: Mental status: Alert, oriented, thought content appropriate    Condition at discharge: Stable    Disposition: home    Discharge Medications:  Current Discharge Medication List        START taking these medications    Details   predniSONE (DELTASONE) 20 MG tablet Take 2 tablets by mouth daily for 1 day, THEN 1 tablet daily for 3 days, THEN 0.5 tablets daily for 3 days.  Qty: 7 tablet, Refills: 0      ALPRAZolam (XANAX) 0.25 MG tablet Take 1 tablet by mouth 3 times daily as needed for Anxiety for up to 3 days. Max Daily Amount: 0.75 mg  Qty: 10 tablet, Refills: 0    Associated Diagnoses: Anxiety           CONTINUE these medications which have CHANGED    Details   hydrOXYzine HCl (ATARAX) 25 MG tablet Take 1 tablet by mouth every 8 hours as needed for Anxiety  Qty: 30 tablet, Refills: 0      ARIPiprazole (ABILIFY) 5 MG tablet Take 1 tablet by mouth daily  Qty: 60 tablet, Refills: 0    Associated Diagnoses: Bipolar 1 disorder ([facility])           CONTINUE these medications which have NOT CHANGED    Details   !! ipratropium 0.5 mg-albuterol 2.5 mg (DUONEB) 0.5-2.5 (3) MG/3ML SOLN nebulizer solution Inhale 3 mLs into the lungs every 4 hours Indications: Treatment to Prevent COPD with Bronchospasms  Qty: 540 mL, Refills: 2    Associated Diagnoses: Chronic obstructive pulmonary disease, unspecified COPD type ([facility])      Cholecalciferol (VITAMIN D) 25 MCG TABS Take 1 tablet by mouth daily  Qty: 90 tablet, Refills: 1    Comments: Labeling may look different.  25 mcg=1000 Units. Please double check dosages.      Roflumilast (DALIRESP) 500 MCG tablet Take 1 tablet by mouth daily  Qty: 90 tablet, Refills: 2      azithromycin (ZITHROMAX) 500 MG tablet Take 1 tablet by mouth three times a week  Qty: 30 tablet, Refills: 1      Respiratory Therapy Supplies (FULL KIT NEBULIZER SET) MISC Use as directed with nebulized medication.  Qty: 1 each, Refills: 0    Comments: Supply prescription to Pharmacy or [facility]

## 2024-06-06 NOTE — PROGRESS NOTES
[facility]  Internal Medicine Residency Program  Progress Note - House Team       Patient:  Brenda Nunn 58 y.o. female   MRN: [id number]       Date of Service: 6/6/2024    Subjective     Patient was seen and examined at bedside this morning, was laying in bed, not in distress, saturating in 4L o2 via NC.  No significant or concerning events overnight.  Objective       Physical Exam  Vitals: /82   Pulse 95   Temp 98.6 °F (37 °C) (Oral)   Resp 16   Ht 1.702 m (5' 7\")   Wt 113.4 kg (250 lb)   SpO2 97%   BMI 39.16 kg/m²     I & O - 24hr: No intake/output data recorded.   General Appearance: alert, appears stated age, and cooperative  HEENT:  Head: Normal, normocephalic, atraumatic.  Lung: B/L decreased air entry with minimal diffuse end expiratory wheeze  Heart: S1, S2 normal  Abdomen: soft, non-tender; bowel sounds normal; no masses,  no organomegaly  Extremities:  extremities normal, atraumatic, no cyanosis or edema  Neurologic: Mental status: Alert, oriented, thought content appropriate    Diet:   ADULT DIET; Regular      Pertinent Labs & Imaging Studies     Labs    CBC with Differential:    Lab Results   Component Value Date/Time    WBC 11.0 06/06/2024 04:05 AM    RBC 4.42 06/06/2024 04:05 AM    HGB 12.2 06/06/2024 04:05 AM    HCT 39.4 06/06/2024 04:05 AM     06/06/2024 04:05 AM    MCV 89.1 06/06/2024 04:05 AM    MCH 27.6 06/06/2024 04:05 AM    MCHC 31.0 06/06/2024 04:05 AM    RDW 13.2 06/06/2024 04:05 AM    LYMPHOPCT 16 06/06/2024 04:05 AM    MONOPCT 6 06/06/2024 04:05 AM    EOSPCT 0 06/06/2024 04:05 AM    BASOPCT 0 06/06/2024 04:05 AM    MONOSABS 0.67 06/06/2024 04:05 AM    EOSABS 0.03 06/06/2024 04:05 AM    BASOSABS 0.04 06/06/2024 04:05 AM     CMP:    Lab Results   Component Value Date/Time     06/06/2024 04:05 AM    K 3.7 06/06/2024 04:05 AM     06/06/2024 04:05 AM    CO2 25 06/06/2024 04:05 AM    BUN 18 06/06/2024 04:05 AM    CREATININE 0.7 06/06/2024 04:05

## 2024-06-07 ENCOUNTER — CARE COORDINATION (OUTPATIENT)
Dept: CARE COORDINATION | Age: 59
End: 2024-06-07

## 2024-06-07 NOTE — CARE COORDINATION
Care Transitions Note  Initial Call - Call within 2 business days of discharge: Yes    First attempt to reach the patient for initial Care Transition call post hospital discharge. HIPAA compliant message left with CTN's contact information requesting return phone call.   Message left at [facility] to verify services.    Received an incoming vm from Kayleen with [facility], she confirmed SOC is 24.   Patient: Brenda Nunn    Patient : 1965   MRN: [id number]    Reason for Admission: COPD exac  Discharge Date: 24  RURS: Readmission Risk Score: 37.6    Last Discharge Facility       Date Complaint Diagnosis Description Type Department Provider    24 Shortness of Breath COPD with acute exacerbation (HCC) ... ED to Hosp-Admission (Discharged) (ADMITTED) [facility] [place] Carroll Jr., DO; Jose...   Follow Up Appointment:   Patient has hospital follow up appointment scheduled within 7 days of discharge.    Future Appointments         Provider Specialty Dept Phone    2024 10:00 AM URGENT CARE 1 Internal Medicine [phone]    2024 11:30 AM Pedro Bedolla MD Pulmonology [phone]    2024 2:30 PM Paulie Chavez MD Sleep Medicine [phone]        Wendy Brandon RN

## 2024-06-08 ENCOUNTER — APPOINTMENT (OUTPATIENT)
Dept: GENERAL RADIOLOGY | Age: 59
End: 2024-06-08
Payer: COMMERCIAL

## 2024-06-08 ENCOUNTER — HOSPITAL ENCOUNTER (EMERGENCY)
Age: 59
Discharge: OTHER INSTITUTION | End: 2024-06-09
Attending: EMERGENCY MEDICINE
Payer: COMMERCIAL

## 2024-06-08 DIAGNOSIS — J44.1 COPD EXACERBATION (HCC): Primary | ICD-10-CM

## 2024-06-08 DIAGNOSIS — R45.851 SUICIDAL IDEATION: ICD-10-CM

## 2024-06-08 LAB
ALBUMIN SERPL-MCNC: 4.5 G/DL (ref 3.5–5.2)
ALP SERPL-CCNC: 83 U/L (ref 35–104)
ALT SERPL-CCNC: 17 U/L (ref 0–32)
AMPHET UR QL SCN: NEGATIVE
ANION GAP SERPL CALCULATED.3IONS-SCNC: 12 MMOL/L (ref 7–16)
APAP SERPL-MCNC: <5 UG/ML (ref 10–30)
AST SERPL-CCNC: 12 U/L (ref 0–31)
BACTERIA URNS QL MICRO: ABNORMAL
BARBITURATES UR QL SCN: NEGATIVE
BASOPHILS # BLD: 0.04 K/UL (ref 0–0.2)
BASOPHILS NFR BLD: 0 % (ref 0–2)
BENZODIAZ UR QL: POSITIVE
BILIRUB SERPL-MCNC: 0.4 MG/DL (ref 0–1.2)
BILIRUB UR QL STRIP: NEGATIVE
BNP SERPL-MCNC: 59 PG/ML (ref 0–125)
BUN SERPL-MCNC: 21 MG/DL (ref 6–20)
BUPRENORPHINE UR QL: NEGATIVE
CALCIUM SERPL-MCNC: 10.1 MG/DL (ref 8.6–10.2)
CANNABINOIDS UR QL SCN: NEGATIVE
CHLORIDE SERPL-SCNC: 102 MMOL/L (ref 98–107)
CLARITY UR: ABNORMAL
CO2 SERPL-SCNC: 28 MMOL/L (ref 22–29)
COCAINE UR QL SCN: NEGATIVE
COLOR UR: YELLOW
CREAT SERPL-MCNC: 0.7 MG/DL (ref 0.5–1)
EOSINOPHIL # BLD: 0 K/UL (ref 0.05–0.5)
EOSINOPHILS RELATIVE PERCENT: 0 % (ref 0–6)
EPI CELLS #/AREA URNS HPF: ABNORMAL /HPF
ERYTHROCYTE [DISTWIDTH] IN BLOOD BY AUTOMATED COUNT: 13.5 % (ref 11.5–15)
ETHANOLAMINE SERPL-MCNC: <10 MG/DL (ref 0–0.08)
FENTANYL UR QL: NEGATIVE
GFR, ESTIMATED: >90 ML/MIN/1.73M2
GLUCOSE SERPL-MCNC: 150 MG/DL (ref 74–99)
GLUCOSE UR STRIP-MCNC: NEGATIVE MG/DL
HCG, URINE, POC: NEGATIVE
HCT VFR BLD AUTO: 41.2 % (ref 34–48)
HGB BLD-MCNC: 12.8 G/DL (ref 11.5–15.5)
HGB UR QL STRIP.AUTO: NEGATIVE
IMM GRANULOCYTES # BLD AUTO: 0.2 K/UL (ref 0–0.58)
IMM GRANULOCYTES NFR BLD: 2 % (ref 0–5)
KETONES UR STRIP-MCNC: NEGATIVE MG/DL
LEUKOCYTE ESTERASE UR QL STRIP: ABNORMAL
LYMPHOCYTES NFR BLD: 0.84 K/UL (ref 1.5–4)
LYMPHOCYTES RELATIVE PERCENT: 7 % (ref 20–42)
Lab: NORMAL
MCH RBC QN AUTO: 27.6 PG (ref 26–35)
MCHC RBC AUTO-ENTMCNC: 31.1 G/DL (ref 32–34.5)
MCV RBC AUTO: 88.8 FL (ref 80–99.9)
METHADONE UR QL: NEGATIVE
MONOCYTES NFR BLD: 0.36 K/UL (ref 0.1–0.95)
MONOCYTES NFR BLD: 3 % (ref 2–12)
NEGATIVE QC PASS/FAIL: NORMAL
NEUTROPHILS NFR BLD: 88 % (ref 43–80)
NEUTS SEG NFR BLD: 10.02 K/UL (ref 1.8–7.3)
NITRITE UR QL STRIP: NEGATIVE
OPIATES UR QL SCN: NEGATIVE
OXYCODONE UR QL SCN: NEGATIVE
PCP UR QL SCN: NEGATIVE
PH UR STRIP: 7.5 [PH] (ref 5–9)
PLATELET # BLD AUTO: 271 K/UL (ref 130–450)
PMV BLD AUTO: 10.1 FL (ref 7–12)
POSITIVE QC PASS/FAIL: NORMAL
POTASSIUM SERPL-SCNC: 4.2 MMOL/L (ref 3.5–5)
PROT SERPL-MCNC: 7.4 G/DL (ref 6.4–8.3)
PROT UR STRIP-MCNC: NEGATIVE MG/DL
RBC # BLD AUTO: 4.64 M/UL (ref 3.5–5.5)
RBC #/AREA URNS HPF: ABNORMAL /HPF
SALICYLATES SERPL-MCNC: <0.3 MG/DL (ref 0–30)
SODIUM SERPL-SCNC: 142 MMOL/L (ref 132–146)
SP GR UR STRIP: 1.02 (ref 1–1.03)
T4 SERPL-MCNC: 7 UG/DL (ref 4.5–11.7)
TEST INFORMATION: ABNORMAL
TOXIC TRICYCLIC SC,BLOOD: NEGATIVE
TSH SERPL DL<=0.05 MIU/L-ACNC: 1.47 UIU/ML (ref 0.27–4.2)
UROBILINOGEN UR STRIP-ACNC: 0.2 EU/DL (ref 0–1)
WBC #/AREA URNS HPF: ABNORMAL /HPF
WBC OTHER # BLD: 11.5 K/UL (ref 4.5–11.5)

## 2024-06-08 PROCEDURE — 85025 COMPLETE CBC W/AUTO DIFF WBC: CPT

## 2024-06-08 PROCEDURE — 93005 ELECTROCARDIOGRAM TRACING: CPT | Performed by: EMERGENCY MEDICINE

## 2024-06-08 PROCEDURE — 80143 DRUG ASSAY ACETAMINOPHEN: CPT

## 2024-06-08 PROCEDURE — 6370000000 HC RX 637 (ALT 250 FOR IP): Performed by: EMERGENCY MEDICINE

## 2024-06-08 PROCEDURE — 6360000002 HC RX W HCPCS: Performed by: EMERGENCY MEDICINE

## 2024-06-08 PROCEDURE — G0480 DRUG TEST DEF 1-7 CLASSES: HCPCS

## 2024-06-08 PROCEDURE — 81001 URINALYSIS AUTO W/SCOPE: CPT

## 2024-06-08 PROCEDURE — 83880 ASSAY OF NATRIURETIC PEPTIDE: CPT

## 2024-06-08 PROCEDURE — 80307 DRUG TEST PRSMV CHEM ANLYZR: CPT

## 2024-06-08 PROCEDURE — 71045 X-RAY EXAM CHEST 1 VIEW: CPT

## 2024-06-08 PROCEDURE — 84443 ASSAY THYROID STIM HORMONE: CPT

## 2024-06-08 PROCEDURE — 80053 COMPREHEN METABOLIC PANEL: CPT

## 2024-06-08 PROCEDURE — 80179 DRUG ASSAY SALICYLATE: CPT

## 2024-06-08 PROCEDURE — 94640 AIRWAY INHALATION TREATMENT: CPT

## 2024-06-08 PROCEDURE — 99285 EMERGENCY DEPT VISIT HI MDM: CPT

## 2024-06-08 PROCEDURE — 84436 ASSAY OF TOTAL THYROXINE: CPT

## 2024-06-08 RX ORDER — LORAZEPAM 1 MG/1
1 TABLET ORAL ONCE
Status: COMPLETED | OUTPATIENT
Start: 2024-06-08 | End: 2024-06-08

## 2024-06-08 RX ORDER — ALBUTEROL SULFATE 2.5 MG/3ML
2.5 SOLUTION RESPIRATORY (INHALATION) ONCE
Status: COMPLETED | OUTPATIENT
Start: 2024-06-08 | End: 2024-06-08

## 2024-06-08 RX ORDER — ALPRAZOLAM 0.25 MG/1
0.25 TABLET ORAL ONCE
Status: COMPLETED | OUTPATIENT
Start: 2024-06-08 | End: 2024-06-08

## 2024-06-08 RX ORDER — IPRATROPIUM BROMIDE AND ALBUTEROL SULFATE 2.5; .5 MG/3ML; MG/3ML
1 SOLUTION RESPIRATORY (INHALATION)
Status: DISCONTINUED | OUTPATIENT
Start: 2024-06-08 | End: 2024-06-09 | Stop reason: HOSPADM

## 2024-06-08 RX ADMIN — ALBUTEROL SULFATE 2.5 MG: 2.5 SOLUTION RESPIRATORY (INHALATION) at 20:31

## 2024-06-08 RX ADMIN — LORAZEPAM 1 MG: 1 TABLET ORAL at 20:44

## 2024-06-08 RX ADMIN — ALPRAZOLAM 0.25 MG: 0.25 TABLET ORAL at 18:35

## 2024-06-08 RX ADMIN — IPRATROPIUM BROMIDE AND ALBUTEROL SULFATE 1 DOSE: 2.5; .5 SOLUTION RESPIRATORY (INHALATION) at 17:06

## 2024-06-08 NOTE — ED PROVIDER NOTES
HPI:  24,   Time: 6:11 PM EDT         Brenda Nunn is a 58 y.o. female presenting to the ED for suicidal ideation with a plan to take all of her medications due to hopelessness and dyspnea secondary to her COPD, beginning several hours ago.  The complaint has been persistent, moderate in severity, and worsened by nothing.  Patient states she lives with her daughter she was recently discharged from the hospital she is so dyspneic she is not able to perform any ADLs.  Patient states that her daughter wants her to be more independent and she is on able.  Patient states that she is considering suicide because she has no quality of life and other options    ROS:   Pertinent positives and negatives are stated within HPI, all other systems reviewed and are negative.  --------------------------------------------- PAST HISTORY ---------------------------------------------  Past Medical History:  has a past medical history of CHF (congestive heart failure) ([facility]), COPD (chronic obstructive pulmonary disease) ([facility]), Depression, and Hypertension.    Past Surgical History:  has a past surgical history that includes  section and Hysterectomy ().    Social History:  reports that she has been smoking cigarettes. She started smoking about 46 years ago. She has a 46.4 pack-year smoking history. She has never used smokeless tobacco. She reports that she does not currently use drugs after having used the following drugs: Marijuana (Weed) and Methamphetamines (Crystal Meth). She reports that she does not drink alcohol.    Family History: family history is not on file.     The patient’s home medications have been reviewed.    Allergies: Pcn [penicillins]    -------------------------------------------------- RESULTS -------------------------------------------------  All laboratory and radiology results have been personally reviewed by myself   LABS:  Results for orders placed or performed during the hospital encounter of  NEGATIVE    Phencyclidine, Urine NEGATIVE NEGATIVE    Cannabinoid Scrn, Ur NEGATIVE NEGATIVE    Oxycodone Screen, Ur NEGATIVE NEGATIVE    Fentanyl, Ur NEGATIVE NEGATIVE    Buprenorphine Urine NEGATIVE NEGATIVE    Test Information       These drug screen results are for medical purposes only and should not be considered definitive or confirmed.   Urinalysis   Result Value Ref Range    Color, UA Yellow Yellow    Turbidity UA SLIGHTLY CLOUDY (A) Clear    Glucose, Ur NEGATIVE NEGATIVE mg/dL    Bilirubin, Urine NEGATIVE NEGATIVE    Ketones, Urine NEGATIVE NEGATIVE mg/dL    Specific Gravity, UA 1.020 1.005 - 1.030    Urine Hgb NEGATIVE NEGATIVE    pH, Urine 7.5 5.0 - 9.0    Protein, UA NEGATIVE NEGATIVE mg/dL    Urobilinogen, Urine 0.2 0.0 - 1.0 EU/dL    Nitrite, Urine NEGATIVE NEGATIVE    Leukocyte Esterase, Urine TRACE (A) NEGATIVE   TSH   Result Value Ref Range    TSH 1.47 0.27 - 4.20 uIU/mL   T4   Result Value Ref Range    T4, Total 7.0 4.5 - 11.7 ug/dL   Brain Natriuretic Peptide   Result Value Ref Range    Pro-BNP 59 0 - 125 pg/mL   Microscopic Urinalysis   Result Value Ref Range    WBC, UA 0 TO 5 0 TO 5 /HPF    RBC, UA 0 TO 2 0 TO 2 /HPF    Epithelial Cells, UA 0 TO 2 /HPF    Bacteria, UA 3+ (A) None   POC Pregnancy Urine Qual   Result Value Ref Range    HCG, Urine, POC Negative Negative    Lot Number [id number]     Positive QC Pass/Fail Acceptable     Negative QC Pass/Fail Acceptable    EKG 12 Lead   Result Value Ref Range    Ventricular Rate 105 BPM    Atrial Rate 105 BPM    P-R Interval 150 ms    QRS Duration 88 ms    Q-T Interval 340 ms    QTc Calculation (Bazett) 449 ms    P Axis 64 degrees    R Axis 64 degrees    T Axis 66 degrees     EKG:  This EKG is signed and interpreted by me.    Rate: 105  Rhythm: Sinus  Interpretation: sinus tachycardia  Comparison: stable as compared to patient's most recent EKG    RADIOLOGY:  Interpreted by Radiologist.  XR CHEST PORTABLE   Final Result   1. COPD.   2. No acute

## 2024-06-09 VITALS
RESPIRATION RATE: 18 BRPM | OXYGEN SATURATION: 98 % | DIASTOLIC BLOOD PRESSURE: 61 MMHG | TEMPERATURE: 98.1 F | HEART RATE: 75 BPM | SYSTOLIC BLOOD PRESSURE: 119 MMHG

## 2024-06-09 PROCEDURE — 94640 AIRWAY INHALATION TREATMENT: CPT

## 2024-06-09 PROCEDURE — 6370000000 HC RX 637 (ALT 250 FOR IP): Performed by: EMERGENCY MEDICINE

## 2024-06-09 PROCEDURE — 6370000000 HC RX 637 (ALT 250 FOR IP): Performed by: STUDENT IN AN ORGANIZED HEALTH CARE EDUCATION/TRAINING PROGRAM

## 2024-06-09 RX ORDER — FAMOTIDINE 20 MG/1
20 TABLET, FILM COATED ORAL ONCE
Status: COMPLETED | OUTPATIENT
Start: 2024-06-09 | End: 2024-06-09

## 2024-06-09 RX ADMIN — FAMOTIDINE 20 MG: 20 TABLET, FILM COATED ORAL at 03:39

## 2024-06-09 RX ADMIN — IPRATROPIUM BROMIDE AND ALBUTEROL SULFATE 1 DOSE: 2.5; .5 SOLUTION RESPIRATORY (INHALATION) at 08:34

## 2024-06-09 NOTE — ED NOTES
Patient is on her chronic 3 to 4 L nasal cannula which is her baseline.  She is sleeping comfortably in no acute respiratory distress.  Patient is medically cleared for social work evaluation and treatment.  Disposition pending social work assessment.      Amanda Dorsey,   06/09/24 0127

## 2024-06-09 NOTE — ED NOTES
Behavioral Health Crisis Assessment      Chief Complaint: Patient reported SI to her home health nurse who called for help.    Mental Status Exam: Patient alert and oriented x 3.  Patient withdrawn, kept his eyes closed.  Patient remained calm and cooperative with no sign of agitation.  Patient's speech was clear, thought process was organized.  Patient denied experiencing any hallucinations.  Patient did report that she sometimes feels fearful, does not know of who or what.  Patient reporting SI with plan.  Patient denied HI    Legal Status:  [] Voluntary:  [x] Involuntary, Issued by: ED doctor    Gender:  [] Male [x] Female [] Transgender  [] Other    Sexual Orientation:  [x] Heterosexual [] Homosexual [] Bisexual [] Other    Brief Clinical Summary:    Patient is a 58-year-old female who lives with daughter who was brought to the ED via ambulance.  Per chart per ED doctor note, patient reported she has had SI with a plan to take all her medications due to hopelessness and dyspnea secondary to her COPD. SW met with patient for assessment.  Patient reported that she has been having SI a couple times a week.  Patient reported that she is upset feels helpless and worthless. \"  I feel like a burden.\"  Patient stated she is not happy with life.  Patient maintains she still has SI with plan to overdose.  Patient with a history of 2 previous overdose attempts, patient unable to recall exactly when these occurred.  Patient also admitted to a history of self-injurious behavior, cutting.  Patient denied that she ever needed stitches.  Patient denied having HI, denied having a history of aggression.  Patient reports compliance with Abilify.  Per chart patient diagnosed with bipolar disorder, patient reports having a history of multiple hospitalizations.  Per chart patient was admitted to [facility] on 11/11/2023 for SI.  Patient denied ever being involved in outpatient mental health treatment.    Patient denied having current AOD

## 2024-06-09 NOTE — ED NOTES
SW received a call from [facility] ([facility]) who noted [facility] may accept, need involuntary hold faxed. SW faxed per request.

## 2024-06-09 NOTE — ED NOTES
SW contacted [facility] ([facility]), report given for referral out due to no beds at [facility].

## 2024-06-09 NOTE — ED NOTES
[facility] Patient assigned to bed [facility]. RN report number: [phone]. Physicians ETA at 9am.

## 2024-06-10 ENCOUNTER — CARE COORDINATION (OUTPATIENT)
Dept: CARE COORDINATION | Age: 59
End: 2024-06-10

## 2024-06-10 LAB
EKG ATRIAL RATE: 105 BPM
EKG P AXIS: 64 DEGREES
EKG P-R INTERVAL: 150 MS
EKG Q-T INTERVAL: 340 MS
EKG QRS DURATION: 88 MS
EKG QTC CALCULATION (BAZETT): 449 MS
EKG R AXIS: 64 DEGREES
EKG T AXIS: 66 DEGREES
EKG VENTRICULAR RATE: 105 BPM

## 2024-06-10 PROCEDURE — 93010 ELECTROCARDIOGRAM REPORT: CPT | Performed by: INTERNAL MEDICINE

## 2024-06-10 NOTE — CARE COORDINATION
Ambulatory Care Coordination Note     6/10/2024 2:20 PM     ACM was unable to reach the patient by telephone after 2 attempts and no response to unable to reach letter. [facility] notified by CTN that Brenda presented to ED 6/8-6/9 for suicidal ideation and was transferred to [facility].      Patient closed (unable to reach patient) from the High Risk Care Management program on 6/10/2024.  No further Ambulatory Care Manager follow up scheduled.

## 2024-06-13 ENCOUNTER — CARE COORDINATION (OUTPATIENT)
Dept: CARE COORDINATION | Age: 59
End: 2024-06-13

## 2024-06-18 ENCOUNTER — APPOINTMENT (OUTPATIENT)
Dept: CT IMAGING | Age: 59
End: 2024-06-18
Payer: MEDICARE

## 2024-06-18 ENCOUNTER — APPOINTMENT (OUTPATIENT)
Dept: GENERAL RADIOLOGY | Age: 59
End: 2024-06-18
Payer: MEDICARE

## 2024-06-18 ENCOUNTER — HOSPITAL ENCOUNTER (OUTPATIENT)
Age: 59
Setting detail: OBSERVATION
Discharge: NURSING, REHAB OR LONG-TERM CARE | End: 2024-06-21
Attending: EMERGENCY MEDICINE | Admitting: INTERNAL MEDICINE
Payer: MEDICARE

## 2024-06-18 DIAGNOSIS — J44.1 COPD EXACERBATION (HCC): Primary | ICD-10-CM

## 2024-06-18 LAB
ALBUMIN SERPL-MCNC: 3.5 G/DL (ref 3.5–5.2)
ALP SERPL-CCNC: 80 U/L (ref 35–104)
ALT SERPL-CCNC: 13 U/L (ref 0–32)
ANION GAP SERPL CALCULATED.3IONS-SCNC: 10 MMOL/L (ref 7–16)
AST SERPL-CCNC: 14 U/L (ref 0–31)
BASOPHILS # BLD: 0.03 K/UL (ref 0–0.2)
BASOPHILS NFR BLD: 0 % (ref 0–2)
BILIRUB SERPL-MCNC: 0.3 MG/DL (ref 0–1.2)
BNP SERPL-MCNC: 208 PG/ML (ref 0–125)
BUN SERPL-MCNC: 10 MG/DL (ref 6–20)
CALCIUM SERPL-MCNC: 9.4 MG/DL (ref 8.6–10.2)
CHLORIDE SERPL-SCNC: 100 MMOL/L (ref 98–107)
CO2 SERPL-SCNC: 31 MMOL/L (ref 22–29)
CREAT SERPL-MCNC: 0.7 MG/DL (ref 0.5–1)
EOSINOPHIL # BLD: 0.1 K/UL (ref 0.05–0.5)
EOSINOPHILS RELATIVE PERCENT: 1 % (ref 0–6)
ERYTHROCYTE [DISTWIDTH] IN BLOOD BY AUTOMATED COUNT: 12.8 % (ref 11.5–15)
GFR, ESTIMATED: >90 ML/MIN/1.73M2
GLUCOSE SERPL-MCNC: 107 MG/DL (ref 74–99)
HCT VFR BLD AUTO: 34.9 % (ref 34–48)
HGB BLD-MCNC: 10.9 G/DL (ref 11.5–15.5)
IMM GRANULOCYTES # BLD AUTO: 0.04 K/UL (ref 0–0.58)
IMM GRANULOCYTES NFR BLD: 0 % (ref 0–5)
LYMPHOCYTES NFR BLD: 1.24 K/UL (ref 1.5–4)
LYMPHOCYTES RELATIVE PERCENT: 13 % (ref 20–42)
MAGNESIUM SERPL-MCNC: 1.5 MG/DL (ref 1.6–2.6)
MCH RBC QN AUTO: 28.2 PG (ref 26–35)
MCHC RBC AUTO-ENTMCNC: 31.2 G/DL (ref 32–34.5)
MCV RBC AUTO: 90.4 FL (ref 80–99.9)
MONOCYTES NFR BLD: 0.8 K/UL (ref 0.1–0.95)
MONOCYTES NFR BLD: 8 % (ref 2–12)
NEUTROPHILS NFR BLD: 77 % (ref 43–80)
NEUTS SEG NFR BLD: 7.59 K/UL (ref 1.8–7.3)
PHOSPHATE SERPL-MCNC: 3.3 MG/DL (ref 2.5–4.5)
PLATELET # BLD AUTO: 244 K/UL (ref 130–450)
PMV BLD AUTO: 10.1 FL (ref 7–12)
POTASSIUM SERPL-SCNC: 4 MMOL/L (ref 3.5–5)
PROCALCITONIN SERPL-MCNC: 0.06 NG/ML (ref 0–0.08)
PROT SERPL-MCNC: 6.9 G/DL (ref 6.4–8.3)
RBC # BLD AUTO: 3.86 M/UL (ref 3.5–5.5)
SODIUM SERPL-SCNC: 141 MMOL/L (ref 132–146)
TROPONIN I SERPL HS-MCNC: 11 NG/L (ref 0–9)
TROPONIN I SERPL HS-MCNC: 6 NG/L (ref 0–9)
WBC OTHER # BLD: 9.8 K/UL (ref 4.5–11.5)

## 2024-06-18 PROCEDURE — 96374 THER/PROPH/DIAG INJ IV PUSH: CPT

## 2024-06-18 PROCEDURE — 84145 PROCALCITONIN (PCT): CPT

## 2024-06-18 PROCEDURE — 84100 ASSAY OF PHOSPHORUS: CPT

## 2024-06-18 PROCEDURE — G0378 HOSPITAL OBSERVATION PER HR: HCPCS

## 2024-06-18 PROCEDURE — 6370000000 HC RX 637 (ALT 250 FOR IP): Performed by: EMERGENCY MEDICINE

## 2024-06-18 PROCEDURE — 71045 X-RAY EXAM CHEST 1 VIEW: CPT

## 2024-06-18 PROCEDURE — 6360000002 HC RX W HCPCS

## 2024-06-18 PROCEDURE — 93005 ELECTROCARDIOGRAM TRACING: CPT | Performed by: EMERGENCY MEDICINE

## 2024-06-18 PROCEDURE — 85025 COMPLETE CBC W/AUTO DIFF WBC: CPT

## 2024-06-18 PROCEDURE — 6360000002 HC RX W HCPCS: Performed by: EMERGENCY MEDICINE

## 2024-06-18 PROCEDURE — 99285 EMERGENCY DEPT VISIT HI MDM: CPT

## 2024-06-18 PROCEDURE — 2700000000 HC OXYGEN THERAPY PER DAY

## 2024-06-18 PROCEDURE — 71275 CT ANGIOGRAPHY CHEST: CPT

## 2024-06-18 PROCEDURE — 83880 ASSAY OF NATRIURETIC PEPTIDE: CPT

## 2024-06-18 PROCEDURE — 94640 AIRWAY INHALATION TREATMENT: CPT

## 2024-06-18 PROCEDURE — 83735 ASSAY OF MAGNESIUM: CPT

## 2024-06-18 PROCEDURE — 96375 TX/PRO/DX INJ NEW DRUG ADDON: CPT

## 2024-06-18 PROCEDURE — 6360000004 HC RX CONTRAST MEDICATION: Performed by: RADIOLOGY

## 2024-06-18 PROCEDURE — 84484 ASSAY OF TROPONIN QUANT: CPT

## 2024-06-18 PROCEDURE — 80053 COMPREHEN METABOLIC PANEL: CPT

## 2024-06-18 RX ORDER — GUAIFENESIN 400 MG/1
400 TABLET ORAL 2 TIMES DAILY
Status: DISCONTINUED | OUTPATIENT
Start: 2024-06-18 | End: 2024-06-21 | Stop reason: HOSPADM

## 2024-06-18 RX ORDER — VENLAFAXINE HYDROCHLORIDE 75 MG/1
75 CAPSULE, EXTENDED RELEASE ORAL DAILY
Status: DISCONTINUED | OUTPATIENT
Start: 2024-06-19 | End: 2024-06-21 | Stop reason: HOSPADM

## 2024-06-18 RX ORDER — ARIPIPRAZOLE 10 MG/1
10 TABLET ORAL DAILY
Status: DISCONTINUED | OUTPATIENT
Start: 2024-06-19 | End: 2024-06-21 | Stop reason: HOSPADM

## 2024-06-18 RX ORDER — ACETAMINOPHEN 500 MG
1000 TABLET ORAL ONCE
Status: COMPLETED | OUTPATIENT
Start: 2024-06-18 | End: 2024-06-18

## 2024-06-18 RX ORDER — MAGNESIUM SULFATE IN WATER 40 MG/ML
2000 INJECTION, SOLUTION INTRAVENOUS ONCE
Status: COMPLETED | OUTPATIENT
Start: 2024-06-18 | End: 2024-06-19

## 2024-06-18 RX ORDER — ACETAMINOPHEN 650 MG/1
650 SUPPOSITORY RECTAL EVERY 6 HOURS PRN
Status: DISCONTINUED | OUTPATIENT
Start: 2024-06-18 | End: 2024-06-21 | Stop reason: HOSPADM

## 2024-06-18 RX ORDER — AZITHROMYCIN 250 MG/1
500 TABLET, FILM COATED ORAL
Status: DISCONTINUED | OUTPATIENT
Start: 2024-06-19 | End: 2024-06-21 | Stop reason: HOSPADM

## 2024-06-18 RX ORDER — DEXTROSE MONOHYDRATE 100 MG/ML
INJECTION, SOLUTION INTRAVENOUS CONTINUOUS PRN
Status: DISCONTINUED | OUTPATIENT
Start: 2024-06-18 | End: 2024-06-21 | Stop reason: HOSPADM

## 2024-06-18 RX ORDER — ROFLUMILAST 500 UG/1
500 TABLET ORAL DAILY
Status: DISCONTINUED | OUTPATIENT
Start: 2024-06-18 | End: 2024-06-21 | Stop reason: HOSPADM

## 2024-06-18 RX ORDER — VENLAFAXINE HYDROCHLORIDE 75 MG/1
75 CAPSULE, EXTENDED RELEASE ORAL DAILY
COMMUNITY
Start: 2024-06-17

## 2024-06-18 RX ORDER — ARIPIPRAZOLE 10 MG/1
10 TABLET ORAL DAILY
COMMUNITY
Start: 2024-06-17

## 2024-06-18 RX ORDER — KETOROLAC TROMETHAMINE 30 MG/ML
15 INJECTION, SOLUTION INTRAMUSCULAR; INTRAVENOUS ONCE
Status: COMPLETED | OUTPATIENT
Start: 2024-06-18 | End: 2024-06-18

## 2024-06-18 RX ORDER — ACETAMINOPHEN 325 MG/1
650 TABLET ORAL EVERY 6 HOURS PRN
Status: DISCONTINUED | OUTPATIENT
Start: 2024-06-18 | End: 2024-06-21 | Stop reason: HOSPADM

## 2024-06-18 RX ORDER — SODIUM CHLORIDE 9 MG/ML
INJECTION, SOLUTION INTRAVENOUS PRN
Status: DISCONTINUED | OUTPATIENT
Start: 2024-06-18 | End: 2024-06-21 | Stop reason: HOSPADM

## 2024-06-18 RX ORDER — POLYETHYLENE GLYCOL 3350 17 G/17G
17 POWDER, FOR SOLUTION ORAL DAILY PRN
Status: DISCONTINUED | OUTPATIENT
Start: 2024-06-18 | End: 2024-06-21 | Stop reason: HOSPADM

## 2024-06-18 RX ORDER — ENOXAPARIN SODIUM 100 MG/ML
40 INJECTION SUBCUTANEOUS DAILY
Status: DISCONTINUED | OUTPATIENT
Start: 2024-06-18 | End: 2024-06-19

## 2024-06-18 RX ORDER — IPRATROPIUM BROMIDE AND ALBUTEROL SULFATE 2.5; .5 MG/3ML; MG/3ML
3 SOLUTION RESPIRATORY (INHALATION) ONCE
Status: COMPLETED | OUTPATIENT
Start: 2024-06-18 | End: 2024-06-18

## 2024-06-18 RX ORDER — GLUCAGON 1 MG/ML
1 KIT INJECTION PRN
Status: DISCONTINUED | OUTPATIENT
Start: 2024-06-18 | End: 2024-06-21 | Stop reason: HOSPADM

## 2024-06-18 RX ORDER — ONDANSETRON 4 MG/1
4 TABLET, ORALLY DISINTEGRATING ORAL EVERY 8 HOURS PRN
Status: DISCONTINUED | OUTPATIENT
Start: 2024-06-18 | End: 2024-06-21 | Stop reason: HOSPADM

## 2024-06-18 RX ORDER — VITAMIN B COMPLEX
1000 TABLET ORAL DAILY
Status: DISCONTINUED | OUTPATIENT
Start: 2024-06-18 | End: 2024-06-21 | Stop reason: HOSPADM

## 2024-06-18 RX ORDER — SODIUM CHLORIDE 0.9 % (FLUSH) 0.9 %
5-40 SYRINGE (ML) INJECTION PRN
Status: DISCONTINUED | OUTPATIENT
Start: 2024-06-18 | End: 2024-06-21 | Stop reason: HOSPADM

## 2024-06-18 RX ORDER — ARIPIPRAZOLE 10 MG/1
5 TABLET ORAL DAILY
Status: DISCONTINUED | OUTPATIENT
Start: 2024-06-18 | End: 2024-06-18

## 2024-06-18 RX ORDER — LEVALBUTEROL INHALATION SOLUTION 0.63 MG/3ML
0.63 SOLUTION RESPIRATORY (INHALATION) EVERY 8 HOURS
Status: DISCONTINUED | OUTPATIENT
Start: 2024-06-18 | End: 2024-06-21 | Stop reason: HOSPADM

## 2024-06-18 RX ORDER — HYDROXYZINE PAMOATE 25 MG/1
25 CAPSULE ORAL EVERY 8 HOURS PRN
Status: DISCONTINUED | OUTPATIENT
Start: 2024-06-18 | End: 2024-06-20

## 2024-06-18 RX ORDER — ONDANSETRON 2 MG/ML
4 INJECTION INTRAMUSCULAR; INTRAVENOUS EVERY 6 HOURS PRN
Status: DISCONTINUED | OUTPATIENT
Start: 2024-06-18 | End: 2024-06-21 | Stop reason: HOSPADM

## 2024-06-18 RX ORDER — SODIUM CHLORIDE 0.9 % (FLUSH) 0.9 %
5-40 SYRINGE (ML) INJECTION EVERY 12 HOURS SCHEDULED
Status: DISCONTINUED | OUTPATIENT
Start: 2024-06-18 | End: 2024-06-21 | Stop reason: HOSPADM

## 2024-06-18 RX ORDER — BUDESONIDE 0.5 MG/2ML
0.5 INHALANT ORAL
Status: DISCONTINUED | OUTPATIENT
Start: 2024-06-18 | End: 2024-06-21 | Stop reason: HOSPADM

## 2024-06-18 RX ADMIN — LEVALBUTEROL HYDROCHLORIDE 0.63 MG: 0.63 SOLUTION RESPIRATORY (INHALATION) at 20:12

## 2024-06-18 RX ADMIN — IPRATROPIUM BROMIDE AND ALBUTEROL SULFATE 3 DOSE: 2.5; .5 SOLUTION RESPIRATORY (INHALATION) at 15:05

## 2024-06-18 RX ADMIN — BUDESONIDE 500 MCG: 0.5 SUSPENSION RESPIRATORY (INHALATION) at 20:12

## 2024-06-18 RX ADMIN — IPRATROPIUM BROMIDE 0.5 MG: 0.5 SOLUTION RESPIRATORY (INHALATION) at 20:12

## 2024-06-18 RX ADMIN — IOPAMIDOL 75 ML: 755 INJECTION, SOLUTION INTRAVENOUS at 16:57

## 2024-06-18 RX ADMIN — ACETAMINOPHEN 1000 MG: 500 TABLET ORAL at 16:37

## 2024-06-18 RX ADMIN — KETOROLAC TROMETHAMINE 15 MG: 30 INJECTION, SOLUTION INTRAMUSCULAR at 16:37

## 2024-06-18 ASSESSMENT — PAIN DESCRIPTION - LOCATION: LOCATION: HEAD

## 2024-06-18 ASSESSMENT — PAIN SCALES - GENERAL: PAINLEVEL_OUTOF10: 9

## 2024-06-18 ASSESSMENT — PAIN DESCRIPTION - DESCRIPTORS: DESCRIPTORS: ACHING

## 2024-06-18 NOTE — ED PROVIDER NOTES
[facility] EMERGENCY DEPARTMENT  EMERGENCY DEPARTMENT ENCOUNTER        Pt Name: Brenda Nunn  MRN: [id number]  Birthdate 1965  Date of evaluation: 6/18/2024  Provider: Chico Cherry MD  PCP: Genesis Enriquez MD  Note Started: 1:46 PM EDT 6/18/24    CHIEF COMPLAINT       Chief Complaint   Patient presents with    Shortness of Breath     Pt reports sob for 2 days. Given 1 duoneb and 125ml solumedrol        HISTORY OF PRESENT ILLNESS: 1 or more Elements        Limitations to history : None    Brenda Nunn is a 59 y.o. female who presents for shortness of breath. Worsening shortness of breath for the last 2 days. Non productive cough with wheezing. Reports some tightness in the chest as well which she says her lungs feel tight and has aching pain when coughing. Reports frequent coughing as well. Pain when coughing. Denies fever, chills, abd pain, orthopnea or peripheral edema.  Cough is non productive.    She is currently on Azithromycin    Nursing Notes were all reviewed and agreed with or any disagreements were addressed in the HPI.      REVIEW OF EXTERNAL NOTE :       EMS report from [facility]  6/7/24 internal medicine discharge summary    Admit Date:6/4/2024  Discharge Date: 6/6/2024     Admission Diagnosis:   COPD not on exacerbation, stable  Chronic hypoxic respiratory failure on 3 to 4 L oxygen at baseline  BENJAMIN on CPAP  Heart failure with preserved ejection fraction  Prediabetes mellitus  Bipolar 1 disorder  Vitamin D deficiency  Tobacco abuse  History of substance abuse  History of suicidal ideation        Discharge Diagnosis:     COPD not on exacerbation, stable  Chronic hypoxic respiratory failure on 3 to 4 L oxygen at baseline, stable  BENJAMIN on CPAP, stable  Heart failure with preserved ejection fraction, stable  Prediabetes mellitus, stable  Bipolar 1 disorder, stable  Tobacco abuse, stable  History of substance abuse, stable  History of suicidal ideation, stable      iopamidol (ISOVUE-370) 76 % injection 75 mL (75 mLs IntraVENous Given 6/18/24 3517)               Medical Decision Making/Differential Diagnosis:    CC/HPI Summary, Social Determinants of health, Records Reviewed, DDx, testing done/not done, ED Course, Reassessment, disposition considerations/shared decision making with patient, consults, disposition:           ED Course as of 06/18/24 2013 Tue Jun 18, 2024   1534 EKG Interpretation  Interpreted by emergency department physician, Dr. Cherry     6/18/24  Time: 1501    Rhythm: normal sinus   Rate: normal  Axis: normal  Conduction: normal  ST Segments: no acute change  T Waves: no acute change    Clinical Impression: normal sinus   Comparison to Old EKG Stable from prior EKG     [CD]   1534 The following tests were interpreted by me:       [CD]   1535 Brain Natriuretic Peptide(!):    Pro-(!) [CD]   1535 Troponin Now and Q 1 Hour(!):    Troponin, High Sensitivity 11(!) [CD]   1535 Comprehensive Metabolic Panel(!):    Sodium 141   Potassium 4.0   Chloride 100   CARBON DIOXIDE 31(!)   Anion Gap 10   Glucose 107(!)   BUN,BUNPL 10   Creatinine 0.7   Est, Glom Filt Rate >90   Calcium 9.4   Total Protein 6.9   Albumin 3.5   Total Bilirubin 0.3   Alkaline Phosphatase 80   ALT 13   AST 14 [CD]   1535 CBC with Auto Differential(!):    WBC 9.8   RBC 3.86   Hemoglobin Quant 10.9(!)   Hematocrit 34.9   MCV 90.4   MCH 28.2   MCHC 31.2(!)   RDW 12.8   Platelet Count 244   MPV 10.1   Neutrophils % 77   Lymphocyte % 13(!)   Monocytes % 8   Eosinophils % 1   Basophils % 0   Immature Granulocytes % 0   Neutrophils Absolute 7.59(!)   Lymphocytes Absolute 1.24(!)   Monocytes Absolute 0.80   Eosinophils Absolute 0.10   Basophils Absolute 0.03   Immature Granulocytes Absolute 0.04 [CD]   1535 The following tests were interpreted by me: CXR - no pneumothorax     [CD]      ED Course User Index  [CD] Chico Cherry MD       Medical Decision Making  Acute COPD exacerbation  No ptx on

## 2024-06-18 NOTE — H&P
no skin changes noticed by patient  Psychology: no depressed mood, no suicidal ideation    Physical Exam     Net IO Since Admission: No IO data has been entered for this period [06/18/24 1938]    Physical Exam  Vitals: BP (!) 158/92   Pulse 95   Resp 18   SpO2 92%     I & O - 24hr: No intake/output data recorded.   General Appearance: alert, appears stated age, and cooperative  HEENT:  Head: Normocephalic, no lesions, without obvious abnormality.  Neck: no adenopathy, no carotid bruit, no JVD, and supple, symmetrical, trachea midline  Lung: (+) wheezing bilateral lung fields, saturating 92% on 4L O2   Heart: regular rate and rhythm, S1, S2 normal, no murmur, click, rub or gallop  Abdomen: soft, non-tender; bowel sounds normal; no masses,  no organomegaly  Extremities:  extremities normal, atraumatic, no cyanosis or edema  Musculokeletal: No joint swelling, no muscle tenderness. ROM normal in all joints of extremities.   Neurologic: Mental status: Alert, oriented, thought content appropriate    Diet: ADULT DIET; Regular    Labs and Imaging Studies   Labs  Recent Labs     06/18/24  1356   WBC 9.8   RBC 3.86   HGB 10.9*   HCT 34.9   MCV 90.4   MCH 28.2   MCHC 31.2*   RDW 12.8      MPV 10.1     Recent Labs     06/18/24  1356      K 4.0      CO2 31*   BUN 10   CREATININE 0.7   ANIONGAP 10   GLUCOSE 107*   CALCIUM 9.4   BILITOT 0.3   ALKPHOS 80   AST 14   ALT 13     Recent Labs     06/18/24  1356   TROPHS 11*   PROBNP 208*     Imaging Studies:    CTA PULMONARY W CONTRAST   Final Result   1.  There are some limitation in the evaluation of the pulmonary arterial   circulation as the contrast density is not ideal in the arterial side of the   pulmonary circulation but no conspicuous central pulmonary embolus are seen   bilaterally.      2.  Bi basilar infiltrates more prominent in the right-side with areas of   consolidation.  In part they can be also relate with a component of   atelectasis.  This has

## 2024-06-19 LAB
APAP SERPL-MCNC: <5 UG/ML (ref 10–30)
BASOPHILS # BLD: 0.01 K/UL (ref 0–0.2)
BASOPHILS NFR BLD: 0 % (ref 0–2)
EKG ATRIAL RATE: 94 BPM
EKG P AXIS: 54 DEGREES
EKG P-R INTERVAL: 174 MS
EKG Q-T INTERVAL: 348 MS
EKG QRS DURATION: 82 MS
EKG QTC CALCULATION (BAZETT): 435 MS
EKG R AXIS: 27 DEGREES
EKG T AXIS: 57 DEGREES
EKG VENTRICULAR RATE: 94 BPM
EOSINOPHIL # BLD: 0.01 K/UL (ref 0.05–0.5)
EOSINOPHILS RELATIVE PERCENT: 0 % (ref 0–6)
ERYTHROCYTE [DISTWIDTH] IN BLOOD BY AUTOMATED COUNT: 12.6 % (ref 11.5–15)
ETHANOLAMINE SERPL-MCNC: <10 MG/DL (ref 0–0.08)
HCT VFR BLD AUTO: 34.6 % (ref 34–48)
HGB BLD-MCNC: 11 G/DL (ref 11.5–15.5)
IMM GRANULOCYTES # BLD AUTO: <0.03 K/UL (ref 0–0.58)
IMM GRANULOCYTES NFR BLD: 0 % (ref 0–5)
LYMPHOCYTES NFR BLD: 0.75 K/UL (ref 1.5–4)
LYMPHOCYTES RELATIVE PERCENT: 13 % (ref 20–42)
MCH RBC QN AUTO: 28.2 PG (ref 26–35)
MCHC RBC AUTO-ENTMCNC: 31.8 G/DL (ref 32–34.5)
MCV RBC AUTO: 88.7 FL (ref 80–99.9)
MONOCYTES NFR BLD: 0.39 K/UL (ref 0.1–0.95)
MONOCYTES NFR BLD: 7 % (ref 2–12)
NEUTROPHILS NFR BLD: 79 % (ref 43–80)
NEUTS SEG NFR BLD: 4.45 K/UL (ref 1.8–7.3)
PLATELET # BLD AUTO: 234 K/UL (ref 130–450)
PMV BLD AUTO: 9.9 FL (ref 7–12)
RBC # BLD AUTO: 3.9 M/UL (ref 3.5–5.5)
SALICYLATES SERPL-MCNC: <0.3 MG/DL (ref 0–30)
TOXIC TRICYCLIC SC,BLOOD: NEGATIVE
WBC OTHER # BLD: 5.6 K/UL (ref 4.5–11.5)

## 2024-06-19 PROCEDURE — 93010 ELECTROCARDIOGRAM REPORT: CPT | Performed by: INTERNAL MEDICINE

## 2024-06-19 PROCEDURE — 80143 DRUG ASSAY ACETAMINOPHEN: CPT

## 2024-06-19 PROCEDURE — 94640 AIRWAY INHALATION TREATMENT: CPT

## 2024-06-19 PROCEDURE — 80307 DRUG TEST PRSMV CHEM ANLYZR: CPT

## 2024-06-19 PROCEDURE — 96366 THER/PROPH/DIAG IV INF ADDON: CPT

## 2024-06-19 PROCEDURE — 2700000000 HC OXYGEN THERAPY PER DAY

## 2024-06-19 PROCEDURE — 51798 US URINE CAPACITY MEASURE: CPT

## 2024-06-19 PROCEDURE — 6370000000 HC RX 637 (ALT 250 FOR IP)

## 2024-06-19 PROCEDURE — 96365 THER/PROPH/DIAG IV INF INIT: CPT

## 2024-06-19 PROCEDURE — 51701 INSERT BLADDER CATHETER: CPT

## 2024-06-19 PROCEDURE — 6360000002 HC RX W HCPCS

## 2024-06-19 PROCEDURE — 94660 CPAP INITIATION&MGMT: CPT

## 2024-06-19 PROCEDURE — 96372 THER/PROPH/DIAG INJ SC/IM: CPT

## 2024-06-19 PROCEDURE — 6360000002 HC RX W HCPCS: Performed by: INTERNAL MEDICINE

## 2024-06-19 PROCEDURE — G0378 HOSPITAL OBSERVATION PER HR: HCPCS

## 2024-06-19 PROCEDURE — 2580000003 HC RX 258

## 2024-06-19 PROCEDURE — 36415 COLL VENOUS BLD VENIPUNCTURE: CPT

## 2024-06-19 PROCEDURE — 99223 1ST HOSP IP/OBS HIGH 75: CPT | Performed by: INTERNAL MEDICINE

## 2024-06-19 PROCEDURE — 85025 COMPLETE CBC W/AUTO DIFF WBC: CPT

## 2024-06-19 PROCEDURE — 2500000003 HC RX 250 WO HCPCS

## 2024-06-19 PROCEDURE — G0480 DRUG TEST DEF 1-7 CLASSES: HCPCS

## 2024-06-19 PROCEDURE — 80179 DRUG ASSAY SALICYLATE: CPT

## 2024-06-19 RX ORDER — ALPRAZOLAM 0.25 MG/1
0.25 TABLET ORAL
Status: DISCONTINUED | OUTPATIENT
Start: 2024-06-19 | End: 2024-06-19

## 2024-06-19 RX ORDER — MECOBALAMIN 5000 MCG
5 TABLET,DISINTEGRATING ORAL NIGHTLY
Status: DISCONTINUED | OUTPATIENT
Start: 2024-06-19 | End: 2024-06-21 | Stop reason: HOSPADM

## 2024-06-19 RX ORDER — ENOXAPARIN SODIUM 100 MG/ML
30 INJECTION SUBCUTANEOUS 2 TIMES DAILY
Status: DISCONTINUED | OUTPATIENT
Start: 2024-06-19 | End: 2024-06-21 | Stop reason: HOSPADM

## 2024-06-19 RX ORDER — NICOTINE 21 MG/24HR
1 PATCH, TRANSDERMAL 24 HOURS TRANSDERMAL DAILY
Status: DISCONTINUED | OUTPATIENT
Start: 2024-06-19 | End: 2024-06-21 | Stop reason: HOSPADM

## 2024-06-19 RX ORDER — LORAZEPAM 0.5 MG/1
0.25 TABLET ORAL ONCE
Status: COMPLETED | OUTPATIENT
Start: 2024-06-19 | End: 2024-06-19

## 2024-06-19 RX ADMIN — Medication 5 MG: at 20:55

## 2024-06-19 RX ADMIN — ENOXAPARIN SODIUM 30 MG: 100 INJECTION SUBCUTANEOUS at 20:45

## 2024-06-19 RX ADMIN — DICLOFENAC SODIUM 2 G: 10 GEL TOPICAL at 20:54

## 2024-06-19 RX ADMIN — IPRATROPIUM BROMIDE 0.5 MG: 0.5 SOLUTION RESPIRATORY (INHALATION) at 16:42

## 2024-06-19 RX ADMIN — GUAIFENESIN 400 MG: 400 TABLET ORAL at 20:44

## 2024-06-19 RX ADMIN — LEVALBUTEROL HYDROCHLORIDE 0.63 MG: 0.63 SOLUTION RESPIRATORY (INHALATION) at 20:42

## 2024-06-19 RX ADMIN — HYDROXYZINE PAMOATE 25 MG: 25 CAPSULE ORAL at 14:01

## 2024-06-19 RX ADMIN — IPRATROPIUM BROMIDE 0.5 MG: 0.5 SOLUTION RESPIRATORY (INHALATION) at 13:14

## 2024-06-19 RX ADMIN — LORAZEPAM 0.25 MG: 0.5 TABLET ORAL at 18:44

## 2024-06-19 RX ADMIN — ACETAMINOPHEN 650 MG: 325 TABLET ORAL at 15:58

## 2024-06-19 RX ADMIN — MICONAZOLE NITRATE: 20.6 POWDER TOPICAL at 20:48

## 2024-06-19 RX ADMIN — LEVALBUTEROL HYDROCHLORIDE 0.63 MG: 0.63 SOLUTION RESPIRATORY (INHALATION) at 13:14

## 2024-06-19 RX ADMIN — IPRATROPIUM BROMIDE 0.5 MG: 0.5 SOLUTION RESPIRATORY (INHALATION) at 20:42

## 2024-06-19 RX ADMIN — ARIPIPRAZOLE 10 MG: 10 TABLET ORAL at 10:50

## 2024-06-19 RX ADMIN — GUAIFENESIN 400 MG: 400 TABLET ORAL at 01:24

## 2024-06-19 RX ADMIN — SODIUM CHLORIDE, PRESERVATIVE FREE 10 ML: 5 INJECTION INTRAVENOUS at 20:45

## 2024-06-19 RX ADMIN — Medication 1000 UNITS: at 10:50

## 2024-06-19 RX ADMIN — Medication 1000 UNITS: at 01:24

## 2024-06-19 RX ADMIN — AZITHROMYCIN DIHYDRATE 500 MG: 250 TABLET ORAL at 20:44

## 2024-06-19 RX ADMIN — GUAIFENESIN 400 MG: 400 TABLET ORAL at 10:50

## 2024-06-19 RX ADMIN — ENOXAPARIN SODIUM 40 MG: 100 INJECTION SUBCUTANEOUS at 10:51

## 2024-06-19 RX ADMIN — DICLOFENAC SODIUM 2 G: 10 GEL TOPICAL at 10:52

## 2024-06-19 RX ADMIN — MAGNESIUM SULFATE HEPTAHYDRATE 2000 MG: 40 INJECTION, SOLUTION INTRAVENOUS at 01:24

## 2024-06-19 RX ADMIN — ENOXAPARIN SODIUM 40 MG: 100 INJECTION SUBCUTANEOUS at 01:24

## 2024-06-19 RX ADMIN — ROFLUMILAST 500 MCG: 500 TABLET ORAL at 10:50

## 2024-06-19 RX ADMIN — VENLAFAXINE HYDROCHLORIDE 75 MG: 75 CAPSULE, EXTENDED RELEASE ORAL at 10:50

## 2024-06-19 RX ADMIN — BUDESONIDE 500 MCG: 0.5 SUSPENSION RESPIRATORY (INHALATION) at 20:42

## 2024-06-19 ASSESSMENT — PAIN DESCRIPTION - ORIENTATION
ORIENTATION: MID
ORIENTATION: MID

## 2024-06-19 ASSESSMENT — PAIN DESCRIPTION - DESCRIPTORS
DESCRIPTORS: ACHING;CRAMPING;DISCOMFORT
DESCRIPTORS: ACHING;CRAMPING;DISCOMFORT

## 2024-06-19 ASSESSMENT — PAIN SCALES - GENERAL
PAINLEVEL_OUTOF10: 5
PAINLEVEL_OUTOF10: 7

## 2024-06-19 ASSESSMENT — PAIN DESCRIPTION - LOCATION
LOCATION: HEAD
LOCATION: STERNUM

## 2024-06-19 ASSESSMENT — PAIN - FUNCTIONAL ASSESSMENT
PAIN_FUNCTIONAL_ASSESSMENT: ACTIVITIES ARE NOT PREVENTED
PAIN_FUNCTIONAL_ASSESSMENT: ACTIVITIES ARE NOT PREVENTED

## 2024-06-19 NOTE — ACP (ADVANCE CARE PLANNING)
Advance Care Planning   Healthcare Decision Maker:    Primary Decision Maker: BRAIN SCHREIBER - Axel - [phone]    Click here to complete Healthcare Decision Makers including selection of the Healthcare Decision Maker Relationship (ie \"Primary\").  Today we documented Decision Maker(s) consistent with Legal Next of Kin hierarchy.       Electronically signed by Monika Ding RN on 6/19/2024 at 3:37 PM

## 2024-06-19 NOTE — CARE COORDINATION
6/19:  Transition of care:  Pt presented to the ER for COPD from home.  Pt is on the 4L/NC at 95% & SQ Lovenox.  Pulmonary consulted.  Pt was just released from [facility] for a short stay on 6/17.  CM spoke with pt bedside to discuss CM role & dc planning.  CM explained that pt normally doesn't qualify for SNF due to her PT/OT evals.  CM can look at long term choices due to her multiple admissions in [facility] stays pt will have limited choices for facilities.  Pt's PCP is Dr Enriquez & uses [facility] on [address].  Pt lives with her daughter, her daughter's s/O & their 6 kids in a house with 4 steps to enter.  The bedroom is on the 1st floor.  The bedroom is on the 2nd floor with 5-6 steps to enter.  PTA pt was independent with a Rolator, wc, nebulizer & 02-4L/NC via "Hey, Neighbor!".  Will need 02 testing.  Pt has had a stay at [facility].  Pt was active with [facility] sent referrals to [facility].  Sw/CM will continue to follow.  Electronically signed by Monika Ding RN on 6/19/2024 at 3:24 PM    Case Management Assessment  Initial Evaluation    Date/Time of Evaluation: 6/19/2024 3:29 PM  Assessment Completed by: Monika Ding RN    If patient is discharged prior to next notation, then this note serves as note for discharge by case management.    Patient Name: Brenda Nunn                   YOB: 1965  Diagnosis: Acute exacerbation of chronic obstructive pulmonary disease (COPD) (HCC) [J44.1]  COPD exacerbation (HCC) [J44.1]                   Date / Time: 6/18/2024  1:39 PM    Patient Admission Status: Observation   Readmission Risk (Low < 19, Mod (19-27), High > 27): Readmission Risk Score: 37.6    Current PCP: Genesis Enriquez MD  PCP verified by CM? Yes    Chart Reviewed: Yes      History Provided by: Patient  Patient Orientation: Alert and Oriented, Person, Place, Situation, Self    Patient Cognition: Alert    Hospitalization in the last 30 days (Readmission):   Yes    If yes, Readmission Assessment in  Navigator will be completed.    Advance Directives:      Code Status: Full Code   Patient's Primary Decision Maker is:      Primary Decision Maker: BRAIN SCHREIBER - Child - [phone]    Discharge Planning:    Patient lives with: Children Type of Home: House  Primary Care Giver: Family  Patient Support Systems include: Family Members   Current Financial resources:    Current community resources:    Current services prior to admission: C-pap, Other (Comment), Oxygen Therapy (nebulizer)            Current DME:              Type of Home Care services:  None    ADLS  Prior functional level: Assistance with the following:, Cooking, Housework, Shopping, Bathing  Current functional level: Assistance with the following:, Cooking, Housework, Shopping, Bathing    PT AM-PAC:   /24  OT AM-PAC:   /24    Family can provide assistance at DC: Yes  Would you like Case Management to discuss the discharge plan with any other family members/significant others, and if so, who? No  Plans to Return to Present Housing: Yes  Other Identified Issues/Barriers to RETURNING to current housing:   Potential Assistance needed at discharge: N/A            Potential DME:    Patient expects to discharge to: House  Plan for transportation at discharge:      Financial    Payor: [employer] / Plan: [employer] (PPO) / Product Type: *No Product type* /     Does insurance require precert for SNF: Yes    Potential assistance Purchasing Medications:    Meds-to-Beds request:        [facility] [address] - P [phone] - F [phone]  [facility] [place] [id number]  Phone: [phone] Fax: [phone]    [id number] - [place] - [address] -  [phone] - F [phone]  [facility] [address]  Phone: [phone] Fax: [phone]    [id number] - [place] [place] - [address] - P [phone] - F

## 2024-06-20 LAB
AMPHET UR QL SCN: NEGATIVE
ANION GAP SERPL CALCULATED.3IONS-SCNC: 8 MMOL/L (ref 7–16)
BARBITURATES UR QL SCN: NEGATIVE
BASOPHILS # BLD: 0.04 K/UL (ref 0–0.2)
BASOPHILS NFR BLD: 1 % (ref 0–2)
BENZODIAZ UR QL: NEGATIVE
BUN SERPL-MCNC: 21 MG/DL (ref 6–20)
BUPRENORPHINE UR QL: NEGATIVE
CALCIUM SERPL-MCNC: 9.5 MG/DL (ref 8.6–10.2)
CANNABINOIDS UR QL SCN: NEGATIVE
CHLORIDE SERPL-SCNC: 101 MMOL/L (ref 98–107)
CO2 SERPL-SCNC: 32 MMOL/L (ref 22–29)
COCAINE UR QL SCN: NEGATIVE
CREAT SERPL-MCNC: 0.7 MG/DL (ref 0.5–1)
EOSINOPHIL # BLD: 0.12 K/UL (ref 0.05–0.5)
EOSINOPHILS RELATIVE PERCENT: 2 % (ref 0–6)
ERYTHROCYTE [DISTWIDTH] IN BLOOD BY AUTOMATED COUNT: 12.7 % (ref 11.5–15)
FENTANYL UR QL: NEGATIVE
GFR, ESTIMATED: >90 ML/MIN/1.73M2
GLUCOSE SERPL-MCNC: 110 MG/DL (ref 74–99)
HCT VFR BLD AUTO: 36.2 % (ref 34–48)
HGB BLD-MCNC: 11.1 G/DL (ref 11.5–15.5)
IMM GRANULOCYTES # BLD AUTO: 0.05 K/UL (ref 0–0.58)
IMM GRANULOCYTES NFR BLD: 1 % (ref 0–5)
LYMPHOCYTES NFR BLD: 1.31 K/UL (ref 1.5–4)
LYMPHOCYTES RELATIVE PERCENT: 16 % (ref 20–42)
MCH RBC QN AUTO: 27.9 PG (ref 26–35)
MCHC RBC AUTO-ENTMCNC: 30.7 G/DL (ref 32–34.5)
MCV RBC AUTO: 91 FL (ref 80–99.9)
METHADONE UR QL: NEGATIVE
MONOCYTES NFR BLD: 0.79 K/UL (ref 0.1–0.95)
MONOCYTES NFR BLD: 10 % (ref 2–12)
NEUTROPHILS NFR BLD: 72 % (ref 43–80)
NEUTS SEG NFR BLD: 5.81 K/UL (ref 1.8–7.3)
OPIATES UR QL SCN: NEGATIVE
OXYCODONE UR QL SCN: NEGATIVE
PCP UR QL SCN: NEGATIVE
PLATELET # BLD AUTO: 255 K/UL (ref 130–450)
PMV BLD AUTO: 9.7 FL (ref 7–12)
POTASSIUM SERPL-SCNC: 3.9 MMOL/L (ref 3.5–5)
RBC # BLD AUTO: 3.98 M/UL (ref 3.5–5.5)
S PNEUM AG SPEC QL: NEGATIVE
SODIUM SERPL-SCNC: 141 MMOL/L (ref 132–146)
SPECIMEN SOURCE: NORMAL
TEST INFORMATION: NORMAL
WBC OTHER # BLD: 8.1 K/UL (ref 4.5–11.5)

## 2024-06-20 PROCEDURE — 80307 DRUG TEST PRSMV CHEM ANLYZR: CPT

## 2024-06-20 PROCEDURE — 6360000002 HC RX W HCPCS: Performed by: INTERNAL MEDICINE

## 2024-06-20 PROCEDURE — 96372 THER/PROPH/DIAG INJ SC/IM: CPT

## 2024-06-20 PROCEDURE — 80048 BASIC METABOLIC PNL TOTAL CA: CPT

## 2024-06-20 PROCEDURE — G0378 HOSPITAL OBSERVATION PER HR: HCPCS

## 2024-06-20 PROCEDURE — 2580000003 HC RX 258

## 2024-06-20 PROCEDURE — 94660 CPAP INITIATION&MGMT: CPT

## 2024-06-20 PROCEDURE — 36415 COLL VENOUS BLD VENIPUNCTURE: CPT

## 2024-06-20 PROCEDURE — 6370000000 HC RX 637 (ALT 250 FOR IP)

## 2024-06-20 PROCEDURE — 99232 SBSQ HOSP IP/OBS MODERATE 35: CPT | Performed by: INTERNAL MEDICINE

## 2024-06-20 PROCEDURE — 87899 AGENT NOS ASSAY W/OPTIC: CPT

## 2024-06-20 PROCEDURE — 85025 COMPLETE CBC W/AUTO DIFF WBC: CPT

## 2024-06-20 PROCEDURE — 94640 AIRWAY INHALATION TREATMENT: CPT

## 2024-06-20 PROCEDURE — 6360000002 HC RX W HCPCS

## 2024-06-20 PROCEDURE — 2700000000 HC OXYGEN THERAPY PER DAY

## 2024-06-20 RX ORDER — HYDROXYZINE PAMOATE 25 MG/1
50 CAPSULE ORAL EVERY 8 HOURS PRN
Status: DISCONTINUED | OUTPATIENT
Start: 2024-06-20 | End: 2024-06-21 | Stop reason: HOSPADM

## 2024-06-20 RX ORDER — PANTOPRAZOLE SODIUM 40 MG/1
40 TABLET, DELAYED RELEASE ORAL
Status: DISCONTINUED | OUTPATIENT
Start: 2024-06-20 | End: 2024-06-21 | Stop reason: HOSPADM

## 2024-06-20 RX ADMIN — MICONAZOLE NITRATE: 20.6 POWDER TOPICAL at 09:55

## 2024-06-20 RX ADMIN — ENOXAPARIN SODIUM 30 MG: 100 INJECTION SUBCUTANEOUS at 09:55

## 2024-06-20 RX ADMIN — MICONAZOLE NITRATE: 20.6 POWDER TOPICAL at 20:26

## 2024-06-20 RX ADMIN — GUAIFENESIN 400 MG: 400 TABLET ORAL at 20:25

## 2024-06-20 RX ADMIN — PANTOPRAZOLE SODIUM 40 MG: 40 TABLET, DELAYED RELEASE ORAL at 13:04

## 2024-06-20 RX ADMIN — GUAIFENESIN 400 MG: 400 TABLET ORAL at 09:54

## 2024-06-20 RX ADMIN — Medication 5 MG: at 20:25

## 2024-06-20 RX ADMIN — SODIUM CHLORIDE, PRESERVATIVE FREE 10 ML: 5 INJECTION INTRAVENOUS at 09:54

## 2024-06-20 RX ADMIN — HYDROXYZINE PAMOATE 25 MG: 25 CAPSULE ORAL at 02:49

## 2024-06-20 RX ADMIN — HYDROXYZINE PAMOATE 50 MG: 25 CAPSULE ORAL at 17:35

## 2024-06-20 RX ADMIN — ARIPIPRAZOLE 10 MG: 10 TABLET ORAL at 13:04

## 2024-06-20 RX ADMIN — LEVALBUTEROL HYDROCHLORIDE 0.63 MG: 0.63 SOLUTION RESPIRATORY (INHALATION) at 16:42

## 2024-06-20 RX ADMIN — IPRATROPIUM BROMIDE 0.5 MG: 0.5 SOLUTION RESPIRATORY (INHALATION) at 13:01

## 2024-06-20 RX ADMIN — VENLAFAXINE HYDROCHLORIDE 75 MG: 75 CAPSULE, EXTENDED RELEASE ORAL at 09:54

## 2024-06-20 RX ADMIN — HYDROXYZINE PAMOATE 25 MG: 25 CAPSULE ORAL at 11:00

## 2024-06-20 RX ADMIN — SODIUM CHLORIDE, PRESERVATIVE FREE 10 ML: 5 INJECTION INTRAVENOUS at 20:25

## 2024-06-20 RX ADMIN — IPRATROPIUM BROMIDE 0.5 MG: 0.5 SOLUTION RESPIRATORY (INHALATION) at 16:41

## 2024-06-20 RX ADMIN — IPRATROPIUM BROMIDE 0.5 MG: 0.5 SOLUTION RESPIRATORY (INHALATION) at 09:08

## 2024-06-20 RX ADMIN — ACETAMINOPHEN 650 MG: 325 TABLET ORAL at 02:49

## 2024-06-20 RX ADMIN — BUDESONIDE 500 MCG: 0.5 SUSPENSION RESPIRATORY (INHALATION) at 20:12

## 2024-06-20 RX ADMIN — BUDESONIDE 500 MCG: 0.5 SUSPENSION RESPIRATORY (INHALATION) at 09:08

## 2024-06-20 RX ADMIN — Medication 1000 UNITS: at 09:54

## 2024-06-20 RX ADMIN — IPRATROPIUM BROMIDE 0.5 MG: 0.5 SOLUTION RESPIRATORY (INHALATION) at 20:12

## 2024-06-20 RX ADMIN — ROFLUMILAST 500 MCG: 500 TABLET ORAL at 09:54

## 2024-06-20 RX ADMIN — ENOXAPARIN SODIUM 30 MG: 100 INJECTION SUBCUTANEOUS at 20:25

## 2024-06-20 RX ADMIN — LEVALBUTEROL HYDROCHLORIDE 0.63 MG: 0.63 SOLUTION RESPIRATORY (INHALATION) at 09:08

## 2024-06-20 ASSESSMENT — PAIN - FUNCTIONAL ASSESSMENT: PAIN_FUNCTIONAL_ASSESSMENT: ACTIVITIES ARE NOT PREVENTED

## 2024-06-20 ASSESSMENT — PAIN SCALES - GENERAL: PAINLEVEL_OUTOF10: 9

## 2024-06-20 ASSESSMENT — PAIN DESCRIPTION - LOCATION: LOCATION: HEAD

## 2024-06-20 ASSESSMENT — PAIN DESCRIPTION - ORIENTATION: ORIENTATION: RIGHT;LEFT

## 2024-06-20 ASSESSMENT — PAIN DESCRIPTION - DESCRIPTORS: DESCRIPTORS: ACHING;POUNDING;SORE

## 2024-06-20 NOTE — PLAN OF CARE
Problem: Chronic Conditions and Co-morbidities  Goal: Patient's chronic conditions and co-morbidity symptoms are monitored and maintained or improved  Outcome: Progressing  Flowsheets (Taken 6/19/2024 2045)  Care Plan - Patient's Chronic Conditions and Co-Morbidity Symptoms are Monitored and Maintained or Improved: Monitor and assess patient's chronic conditions and comorbid symptoms for stability, deterioration, or improvement     Problem: Discharge Planning  Goal: Discharge to home or other facility with appropriate resources  Outcome: Progressing  Flowsheets (Taken 6/19/2024 2045)  Discharge to home or other facility with appropriate resources: Identify barriers to discharge with patient and caregiver     Problem: Safety - Adult  Goal: Free from fall injury  Outcome: Progressing

## 2024-06-20 NOTE — PLAN OF CARE
Problem: Chronic Conditions and Co-morbidities  Goal: Patient's chronic conditions and co-morbidity symptoms are monitored and maintained or improved  6/20/2024 1006 by Hien Gutierrez RN  Outcome: Progressing  6/19/2024 2212 by Tiffany Begum RN  Outcome: Progressing  Flowsheets (Taken 6/19/2024 2045)  Care Plan - Patient's Chronic Conditions and Co-Morbidity Symptoms are Monitored and Maintained or Improved: Monitor and assess patient's chronic conditions and comorbid symptoms for stability, deterioration, or improvement     Problem: Discharge Planning  Goal: Discharge to home or other facility with appropriate resources  6/20/2024 1006 by Hien Gutierrez RN  Outcome: Progressing  6/19/2024 2212 by Tiffany Begum RN  Outcome: Progressing  Flowsheets (Taken 6/19/2024 2045)  Discharge to home or other facility with appropriate resources: Identify barriers to discharge with patient and caregiver     Problem: Safety - Adult  Goal: Free from fall injury  6/20/2024 1006 by Hien Gutierrez RN  Outcome: Progressing  6/19/2024 2212 by Tiffany Begum RN  Outcome: Progressing

## 2024-06-20 NOTE — CONSULTS
lobar, segmental and more proximal subsegmental branches of both lungs. There is no aneurysm formation or dissection of thoracic aorta.  Diameter for the ascending aorta is 3.7 cm.  Diameter for the main pulmonary artery 0.3 cm. Heart is normal size.  LV inner diameter: 4.3 cm.  RV inner diameter: 4.3 cm. There is minimal pericardial effusion.  Calcifications are seen in the coronary arteries. There are no mediastinal masses or adenopathy. Presence of areas pulmonary consolidation towards the basal region of both lower lobes basal segments particular on the right-side predominant towards the posterolateral and anterior basal region with areas of consolidation. There is more limited consolidation in the diaphragma surface of the left lower.  Is no pleural effusions. There early emphysematous changes in upper lobes.  There is no perihilar vascular congestion Upper abdominal structures not fully covered on this study.  There is a 3.2 cm lipid reach adenoma of the left adrenal.     1.  There are some limitation in the evaluation of the pulmonary arterial circulation as the contrast density is not ideal in the arterial side of the pulmonary circulation but no conspicuous central pulmonary embolus are seen bilaterally. 2.  Bi basilar infiltrates more prominent in the right-side with areas of consolidation.  In part they can be also relate with a component of atelectasis.  This has progressed since the study April 2024.  Please correlate clinically.     XR CHEST PORTABLE    Result Date: 6/18/2024  EXAMINATION: ONE XRAY VIEW OF THE CHEST 6/18/2024 2:34 pm COMPARISON: June 8, 2024 HISTORY: ORDERING SYSTEM PROVIDED HISTORY: sob TECHNOLOGIST PROVIDED HISTORY: Reason for exam:->sob FINDINGS: Mild bibasilar opacities versus overlapping soft tissues.  The heart is mildly enlarged.  Pulmonary vessels are prominent.  No pneumothorax or pleural effusion.     1. Cardiomegaly with mild pulmonary vascular congestion. 2. Mild bibasilar  opacities versus overlapping soft tissues.  Correlation with lateral projection recommended.     XR CHEST PORTABLE    Result Date: 6/8/2024  EXAMINATION: ONE XRAY VIEW OF THE CHEST 6/8/2024 3:24 pm COMPARISON: 06/04/2024 HISTORY: ORDERING SYSTEM PROVIDED HISTORY: Shortness of breath TECHNOLOGIST PROVIDED HISTORY: Reason for exam:->Shortness of breath FINDINGS: Lung volumes are increased compatible with COPD.  No pleural fluid or pneumonia.  Heart is upper limits of normal in size.  There is no evidence of pneumothorax.  Stable osseous structures peer     1. COPD. 2. No acute cardiopulmonary disease.     XR CHEST (2 VW)    Result Date: 6/4/2024  EXAMINATION: TWO XRAY VIEWS OF THE CHEST 6/4/2024 11:41 pm COMPARISON: 5/29/20 HISTORY: ORDERING SYSTEM PROVIDED HISTORY: cough TECHNOLOGIST PROVIDED HISTORY: Reason for exam:->cough FINDINGS: The lungs are without acute focal process.  There is no effusion or pneumothorax. The cardiomediastinal silhouette is without acute process. The osseous structures are without acute process.     No acute process.     XR CHEST PORTABLE    Result Date: 5/29/2024  EXAMINATION: ONE XRAY VIEW OF THE CHEST 5/29/2024 6:52 pm COMPARISON: 05/17/2024. HISTORY: ORDERING SYSTEM PROVIDED HISTORY: sob TECHNOLOGIST PROVIDED HISTORY: Reason for exam:->sob FINDINGS: The lungs are without acute focal process.  There is no effusion or pneumothorax. The cardiomediastinal silhouette is without acute process. The osseous structures are without acute process.     No acute process.       Assessment:   COPD  Anxiety  Morbid Obesiy          Plan:   Ok to discharge  Placement recommended  NIV per orders, have the rep come to the office with the paper work since doing anyother way doesn't seem to help        Jose J Vo DO MPH, FCCP, MACOI, FACP  Professor of Internal Medicine  Pulmonary, Critical Care and Sleep Medicine

## 2024-06-21 VITALS
BODY MASS INDEX: 39.22 KG/M2 | DIASTOLIC BLOOD PRESSURE: 90 MMHG | HEIGHT: 67 IN | SYSTOLIC BLOOD PRESSURE: 130 MMHG | WEIGHT: 249.9 LBS | TEMPERATURE: 98.6 F | HEART RATE: 97 BPM | RESPIRATION RATE: 18 BRPM | OXYGEN SATURATION: 97 %

## 2024-06-21 LAB
BASOPHILS # BLD: 0.02 K/UL (ref 0–0.2)
BASOPHILS NFR BLD: 0 % (ref 0–2)
EOSINOPHIL # BLD: 0.11 K/UL (ref 0.05–0.5)
EOSINOPHILS RELATIVE PERCENT: 2 % (ref 0–6)
ERYTHROCYTE [DISTWIDTH] IN BLOOD BY AUTOMATED COUNT: 12.9 % (ref 11.5–15)
HCT VFR BLD AUTO: 35 % (ref 34–48)
HGB BLD-MCNC: 11 G/DL (ref 11.5–15.5)
IMM GRANULOCYTES # BLD AUTO: 0.03 K/UL (ref 0–0.58)
IMM GRANULOCYTES NFR BLD: 1 % (ref 0–5)
LYMPHOCYTES NFR BLD: 0.99 K/UL (ref 1.5–4)
LYMPHOCYTES RELATIVE PERCENT: 16 % (ref 20–42)
MCH RBC QN AUTO: 28.6 PG (ref 26–35)
MCHC RBC AUTO-ENTMCNC: 31.4 G/DL (ref 32–34.5)
MCV RBC AUTO: 90.9 FL (ref 80–99.9)
MONOCYTES NFR BLD: 0.7 K/UL (ref 0.1–0.95)
MONOCYTES NFR BLD: 11 % (ref 2–12)
NEUTROPHILS NFR BLD: 70 % (ref 43–80)
NEUTS SEG NFR BLD: 4.33 K/UL (ref 1.8–7.3)
PLATELET # BLD AUTO: 250 K/UL (ref 130–450)
PMV BLD AUTO: 10 FL (ref 7–12)
RBC # BLD AUTO: 3.85 M/UL (ref 3.5–5.5)
WBC OTHER # BLD: 6.2 K/UL (ref 4.5–11.5)

## 2024-06-21 PROCEDURE — 97161 PT EVAL LOW COMPLEX 20 MIN: CPT

## 2024-06-21 PROCEDURE — 6360000002 HC RX W HCPCS

## 2024-06-21 PROCEDURE — 97165 OT EVAL LOW COMPLEX 30 MIN: CPT

## 2024-06-21 PROCEDURE — 6370000000 HC RX 637 (ALT 250 FOR IP)

## 2024-06-21 PROCEDURE — G0378 HOSPITAL OBSERVATION PER HR: HCPCS

## 2024-06-21 PROCEDURE — 85025 COMPLETE CBC W/AUTO DIFF WBC: CPT

## 2024-06-21 PROCEDURE — 99238 HOSP IP/OBS DSCHRG MGMT 30/<: CPT | Performed by: INTERNAL MEDICINE

## 2024-06-21 PROCEDURE — 94660 CPAP INITIATION&MGMT: CPT

## 2024-06-21 PROCEDURE — 2700000000 HC OXYGEN THERAPY PER DAY

## 2024-06-21 PROCEDURE — 94640 AIRWAY INHALATION TREATMENT: CPT

## 2024-06-21 PROCEDURE — 36415 COLL VENOUS BLD VENIPUNCTURE: CPT

## 2024-06-21 PROCEDURE — 2580000003 HC RX 258

## 2024-06-21 RX ORDER — NICOTINE 21 MG/24HR
1 PATCH, TRANSDERMAL 24 HOURS TRANSDERMAL DAILY
Qty: 30 PATCH | Refills: 1 | Status: SHIPPED | OUTPATIENT
Start: 2024-06-22

## 2024-06-21 RX ADMIN — PANTOPRAZOLE SODIUM 40 MG: 40 TABLET, DELAYED RELEASE ORAL at 05:52

## 2024-06-21 RX ADMIN — BUDESONIDE 500 MCG: 0.5 SUSPENSION RESPIRATORY (INHALATION) at 08:24

## 2024-06-21 RX ADMIN — MICONAZOLE NITRATE: 20.6 POWDER TOPICAL at 08:55

## 2024-06-21 RX ADMIN — GUAIFENESIN 400 MG: 400 TABLET ORAL at 08:53

## 2024-06-21 RX ADMIN — HYDROXYZINE PAMOATE 50 MG: 25 CAPSULE ORAL at 08:58

## 2024-06-21 RX ADMIN — ACETAMINOPHEN 650 MG: 325 TABLET ORAL at 05:52

## 2024-06-21 RX ADMIN — IPRATROPIUM BROMIDE 0.5 MG: 0.5 SOLUTION RESPIRATORY (INHALATION) at 08:24

## 2024-06-21 RX ADMIN — VENLAFAXINE HYDROCHLORIDE 75 MG: 75 CAPSULE, EXTENDED RELEASE ORAL at 08:53

## 2024-06-21 RX ADMIN — ARIPIPRAZOLE 10 MG: 10 TABLET ORAL at 08:53

## 2024-06-21 RX ADMIN — IPRATROPIUM BROMIDE 0.5 MG: 0.5 SOLUTION RESPIRATORY (INHALATION) at 11:50

## 2024-06-21 RX ADMIN — LEVALBUTEROL HYDROCHLORIDE 0.63 MG: 0.63 SOLUTION RESPIRATORY (INHALATION) at 08:24

## 2024-06-21 RX ADMIN — ROFLUMILAST 500 MCG: 500 TABLET ORAL at 08:53

## 2024-06-21 RX ADMIN — Medication 1000 UNITS: at 08:53

## 2024-06-21 RX ADMIN — DICLOFENAC SODIUM 2 G: 10 GEL TOPICAL at 08:55

## 2024-06-21 RX ADMIN — SODIUM CHLORIDE, PRESERVATIVE FREE 10 ML: 5 INJECTION INTRAVENOUS at 08:54

## 2024-06-21 ASSESSMENT — PAIN DESCRIPTION - ORIENTATION: ORIENTATION: RIGHT

## 2024-06-21 ASSESSMENT — PAIN SCALES - GENERAL
PAINLEVEL_OUTOF10: 10
PAINLEVEL_OUTOF10: 6

## 2024-06-21 ASSESSMENT — PAIN DESCRIPTION - LOCATION: LOCATION: HEAD

## 2024-06-21 ASSESSMENT — PAIN DESCRIPTION - DESCRIPTORS: DESCRIPTORS: ACHING;THROBBING

## 2024-06-21 NOTE — PLAN OF CARE
Problem: Chronic Conditions and Co-morbidities  Goal: Patient's chronic conditions and co-morbidity symptoms are monitored and maintained or improved  6/20/2024 2237 by Tiffany Begum RN  Outcome: Progressing  6/20/2024 1006 by Hien Gutierrez RN  Outcome: Progressing     Problem: Discharge Planning  Goal: Discharge to home or other facility with appropriate resources  6/20/2024 2237 by Tiffany Begum RN  Outcome: Progressing  6/20/2024 1006 by Hien Gutierrez RN  Outcome: Progressing     Problem: Safety - Adult  Goal: Free from fall injury  6/20/2024 2237 by Tiffany Begum RN  Outcome: Progressing  6/20/2024 1006 by Hien Gutierrez RN  Outcome: Progressing     Problem: ABCDS Injury Assessment  Goal: Absence of physical injury  Outcome: Progressing

## 2024-06-21 NOTE — DISCHARGE INSTRUCTIONS
[facility] Internal Medicine Resident Service    Activity as tolerated  Diet: common adult  Be compliant with your medications and take them as prescribed.    Special Instructions:   - Continue daliresp and azithromycin as prescribed  - Advised smoking cessation  - Pulmonary rehabilitation as outpatient  - Follow-up with Pulmonology    Hospital Follow up: [facility] with House Team   Call to confirm appointment Tel: [phone] ([facility])     Other Follow-Ups:    Future Appointments   Date Time Provider Department Center   7/1/2024  2:30 PM Paulie Chavez MD [place] [facility]     Other than any new prescriptions given to you today, the list of home going meds on this After Visit Summary are based on information provided to us from you. This information, including the list, dose, and frequency of medications is only as accurate as the information you provided. If you have any questions or concerns about your home medications, please contact your Primary Care Physician for further clarification.    Vincent Flowers MD PGY-1  6/21/2024  10:53 AM

## 2024-06-21 NOTE — PLAN OF CARE
Problem: Chronic Conditions and Co-morbidities  Goal: Patient's chronic conditions and co-morbidity symptoms are monitored and maintained or improved  6/21/2024 0016 by Maria C Noe RN  Outcome: Progressing  6/20/2024 2237 by Tiffany Begum RN  Outcome: Progressing     Problem: Discharge Planning  Goal: Discharge to home or other facility with appropriate resources  6/21/2024 0016 by Maria C Noe RN  Outcome: Progressing  6/20/2024 2237 by Tiffany Begum RN  Outcome: Progressing     Problem: Safety - Adult  Goal: Free from fall injury  6/21/2024 0016 by Maria C Noe RN  Outcome: Progressing  6/20/2024 2237 by Tiffany Begum RN  Outcome: Progressing     Problem: ABCDS Injury Assessment  Goal: Absence of physical injury  6/21/2024 0016 by Maria C Noe RN  Outcome: Progressing  6/20/2024 2237 by Tiffany Begum RN  Outcome: Progressing     Problem: Skin/Tissue Integrity  Goal: Absence of new skin breakdown  Description: 1.  Monitor for areas of redness and/or skin breakdown  2.  Assess vascular access sites hourly  3.  Every 4-6 hours minimum:  Change oxygen saturation probe site  4.  Every 4-6 hours:  If on nasal continuous positive airway pressure, respiratory therapy assess nares and determine need for appliance change or resting period.  Outcome: Progressing     Problem: Pain  Goal: Verbalizes/displays adequate comfort level or baseline comfort level  Outcome: Progressing

## 2024-06-21 NOTE — PROGRESS NOTES
[facility]  Internal Medicine Department    Attending Physician Statement:  Lorenzo Carroll Jr. D.O.    I have discussed the case, including pertinent history and exam findings with the resident. I have reviewed all past medical history, past surgical history, family history, social history, medications, and allergies and updated as appropriate in the history section of the chart. I have seen and examined the patient and the key elements of the encounter have been performed by me. I agree with the assessment, plan and orders as documented by the resident.      COPD   -not in exacerbation  -continue pulmonary hygiene   -continue inhalers and daliresp and azithromycin  -discussed tobacco cessation; donna using patch at this time and declines other treatment   -patient on baseline O2  -atelectasis in right lower lung; has been present for 2 months; pulmonology followup as outpatient for further evaluation 2/2 history of tobacco use and further evaluation; will need repeat CT scan in 1 month to evaluate progression   -pulmonology consulted as patient still does not have her NIV at home as insurance denied it and discussed that further documentation is needed from pulmonology for re-submission; appreciate their aid      Depression  -continue outpatient followup with psychaitrist   -continue current regimen   -as psychiatrist does not prescribe benzodiazepine; they will not be prescribed on DC     Urinary Retention  -resolved     donna is at high risk of re-admission; multiple attempts at [facility], long term care and optimization of medical care by hospital team; donna continues to smoke and multiple attempts and discussions had with the patient; will attmept to provide patient with NIV at this visit to prevent future admissions; please refer to previous notes     For DC to facility today     Remainder of medical problems as per resident note.    
    [facility]  Internal Medicine Department    Attending Physician Statement:  Lorenzo Carroll Jr. D.O.    I have discussed the case, including pertinent history and exam findings with the resident. I have reviewed all past medical history, past surgical history, family history, social history, medications, and allergies and updated as appropriate in the history section of the chart. I have seen and examined the patient and the key elements of the encounter have been performed by me. I agree with the assessment, plan and orders as documented by the resident.      COPD   -not in exacerbation  -continue pulmonary hygiene   -continue inhalers and daliresp and azithromycin  -discussed tobacco cessation; patinet using patch at this time and declines other treatment   -patient on baseline O2  -atelectasis in right lower lung; has been present for 2 months; pulmonology followup as outpatient for further evaluation 2/2 history of tobacco use and further evaluation; will need repeat CT scan in 1 month to evaluate progression   -pulmonology consulted as patient still does not have her NIV at home as insurance denied it and discussed that further documentation is needed from pulmonology for re-submission; appreciate their aid      Depression  -continue outpatient followup with psychaitrist   -continue current regimen   -as psychiatrist does not prescribe benzodiazepine; they will not be prescribed on DC     Urinary Retention  -resolved    Likely discharge in next 24-48 hours; donna is at high risk of re-admission; multiple attempts at [facility], long term care and optimization of medical care by hospital team; donna continues to smoke and multiple attempts and discussions had with the patient; will attmept to provide patient with NIV at this visit to prevent future admissions; please refer to previous notes     Remainder of medical problems as per resident note.    
    [facility]  Internal Medicine Department    Attending Physician Statement:  Lorenzo Carroll Jr. D.O.    I have discussed the case, including pertinent history and exam findings with the resident. I have reviewed all past medical history, past surgical history, family history, social history, medications, and allergies and updated as appropriate in the history section of the chart. I have seen and examined the patient and the key elements of the encounter have been performed by me. I agree with the assessment, plan and orders as documented by the resident.      COPD   -not in exacerbation  -continue pulmonary hygiene   -continue inhalers and daliresp and azithromycin  -discussed tobacco cessation; patinet using patch at this time and declines other treatment   -patient on baseline O2  -atelectasis in right lower lung; has been present for 2 months; pulmonology followup as outpatient for further evaluation 2/2 history of tobacco use and further evaluation; will need repeat CT scan in 1 month to evaluate progression      Depression  -continue outpatient followup with psychaitrist   -continue current regimen     Urinary Retention  -350 cc of retention this AM; straight cath ordered; hydroxyzine held   -patient will need to urinate wihtout cath prior to DC     Likely discharge in next 24-48 hours     Remainder of medical problems as per resident note.    
4 Eyes Skin Assessment     NAME:  Brenda Nunn  YOB: 1965  MEDICAL RECORD NUMBER:  [id number]    The patient is being assessed for  Admission    I agree that at least one RN has performed a thorough Head to Toe Skin Assessment on the patient. ALL assessment sites listed below have been assessed.      Areas assessed by both nurses:    Head, Face, Ears, Shoulders, Back, Chest, Arms, Elbows, Hands, Sacrum. Buttock, Coccyx, Ischium, Legs. Feet and Heels, and Under Medical Devices         Does the Patient have a Wound? No noted wound(s)  Excoriation in abdominal and breast folds       Gabe Prevention initiated by RN: Yes  Wound Care Orders initiated by RN: No    Pressure Injury (Stage 3,4, Unstageable, DTI, NWPT, and Complex wounds) if present, place Wound referral order by RN under : No    New Ostomies, if present place, Ostomy referral order under : No     Nurse 1 eSignature: Electronically signed by PRADIP JIM RN on 6/19/24 at 3:50 PM EDT    **SHARE this note so that the co-signing nurse can place an eSignature**    Nurse 2 eSignature: Electronically signed by Rosie Thomas RN on 6/19/24 at 4:14 PM EDT   
Attempted to call report 3 times to Izabel Roque, all three attempts I was transferred to a phone that no one answered .  
Bladder scanned: 394cc retaining. Straight cathed patient for 410 cc. External catheter applied to patient. Will continue to monitor.   
Occupational Therapy  OCCUPATIONAL THERAPY INITIAL EVALUATION    [facility]  [address], [place]      Date:2024                                                Patient Name: Brenda Nunn  MRN: [id number]  : 1965  Room: [address] [place] [address]    Evaluating OT: Rehan Turcios OTR/L #[id number]     Referring Provider: Lorenzo Carroll Jr., DO   Specific Provider Orders/Date: OT eval and treat 24    Diagnosis: Acute exacerbation of chronic obstructive pulmonary disease (COPD) ([facility]) [J44.1]  COPD exacerbation ([facility]) [J44.1]   Pt admitted to hospital with SOB    Pertinent Medical History:  has a past medical history of CHF (congestive heart failure) ([facility]), COPD (chronic obstructive pulmonary disease) ([facility]), Depression, and Hypertension.       Precautions:  Fall Risk, O2, continuous pulse ox     Assessment of current deficits    [x] Functional mobility          [x]ADLs           [x] Strength                  []Cognition    [x] Functional transfers        [x] IADLs         [x] Safety Awareness   [x]Endurance    [] Fine Coordination                        [x] Balance      [] Vision/perception   []Sensation      []Gross Motor Coordination            [] ROM           [] Delirium                   [] Motor Control      OT PLAN OF CARE   OT POC based on physician orders, patient diagnosis and results of clinical assessment     Frequency/Duration 1-3 days/wk for 2 weeks PRN   Specific OT Treatment Interventions to include:   * Instruction/training on adapted ADL techniques and AE recommendations to increase functional independence within precautions       * Training on energy conservation strategies, correct breathing pattern and techniques to improve independence/tolerance for self-care routine  * Functional transfer/mobility training/DME recommendations for increased independence, safety, and fall prevention  * Patient/Family education to increase follow through 
Patient requesting something other than vistaril for her anxiety. Resident notified.   
Physical Therapy  Initial Assessment     Name: Brenda Nunn  : 1965  MRN: [id number]      Date of Service: 2024    Evaluating PT: Paulie Mckeon, PT, DPT [id number]      Room #:  6424/6424-A  Diagnosis:  Acute exacerbation of chronic obstructive pulmonary disease (COPD) ([facility]) [J44.1]  COPD exacerbation ([facility]) [J44.1]  PMHx/PSHx:   has a past medical history of CHF (congestive heart failure) ([facility]), COPD (chronic obstructive pulmonary disease) ([facility]), Depression, and Hypertension.  Precautions:  Fall risk, O2, Continuous pulse ox    SUBJECTIVE:    Pt lives with daughter in a 2 story house with 4-5 stair(s) and 2 wide rail(s) to enter. Bed is on the first floor. 15 stairs and 1 rail to second floor bed and bath. Pt ambulated with rollator prior to admission. Pt is on 4 L O2/min at baseline.    OBJECTIVE:   Initial Evaluation  Date: 24 Treatment Date: Short Term/ Long Term   Goals   AM-PAC 6 Clicks      Was pt agreeable to Eval/treatment? Yes     Does pt have pain? No complaints of pain     Bed Mobility  Rolling: NT  Supine to sit: NT  Sit to supine: NT  Scooting: NT  Rolling: Independent   Supine to sit: Independent   Sit to supine: Independent   Scooting: Independent    Transfers Sit to stand: SBA  Stand to sit: SBA  Stand pivot: Linda without AD  Sit to stand: Independent   Stand to sit: Independent   Stand pivot: Independent    Ambulation   15 feet without AD with Linda  >100 feet with AAD with Supervision   Stair negotiation: ascended and descended NT  >4 steps with 1 rail with Supervision   ROM BUE: Refer to OT note  BLE: WFL     Strength BUE: Refer to OT note  BLE: WFL     Balance Sitting EOB: Independent   Dynamic Standing: SBA without AD  Dynamic Standing: Independent      Pt is A & O x: 4 to person, place, month/year, and situation.   Sensation: Pt denies numbness and tingling of extremities.   Edema: Unremarkable    Patient education  Pt educated on PT role in acute care setting.    Patient 
Pulmonology consult sent to DR. Vo  
[facility]  Internal Medicine Residency Program  Progress Note - House Team       Patient:  Brenda Nunn 59 y.o. female   MRN: [id number]       Date of Service: 6/20/2024    CC: Shortness of breath, chest pain    Subjective     Overnight events: No acute events overnight. Noted  cc not requiring catheterization  Patient seen and examined at bedside this morning, alert and oriented. Noted some abdominal discomfort. No chest pain, no vomiting, tolerating oral intake. Saturating well on 4L     Objective     Physical Exam  Vitals: BP (!) 153/92   Pulse 94   Temp 97.7 °F (36.5 °C) (Oral)   Resp 20   Ht 1.702 m (5' 7\")   Wt 113.4 kg (249 lb 14.4 oz)   SpO2 100%   BMI 39.14 kg/m²     I & O - 24hr: No intake/output data recorded.   General Appearance: alert, appears stated age, and cooperative  HEENT:  Head: Normocephalic, no lesions, without obvious abnormality.  Neck: no adenopathy, no carotid bruit, no JVD, and supple, symmetrical, trachea midline  Lung: wheezes bilaterally  Heart: regular rate and rhythm, S1, S2 normal, no murmur, click, rub or gallop  Abdomen: soft, non-tender; bowel sounds normal; no masses,  no organomegaly  Extremities:  extremities normal, atraumatic, no cyanosis or edema  Musculokeletal: No joint swelling, no muscle tenderness. ROM normal in all joints of extremities.   Neurologic: Mental status: Alert, oriented, thought content appropriate    Diet:   ADULT DIET; Regular    Pertinent Labs & Imaging Studies     Labs  Recent Labs     06/18/24  1356 06/19/24  0643 06/20/24  0447   WBC 9.8 5.6 8.1   RBC 3.86 3.90 3.98   HGB 10.9* 11.0* 11.1*   HCT 34.9 34.6 36.2   MCV 90.4 88.7 91.0   MCH 28.2 28.2 27.9   MCHC 31.2* 31.8* 30.7*   RDW 12.8 12.6 12.7    234 255   MPV 10.1 9.9 9.7       Recent Labs     06/18/24  1356 06/18/24  1954 06/20/24  0447     --  141   K 4.0  --  3.9     --  101   MG  --  1.5*  --    PHOS  --  3.3  --    CO2 31*  --  32*   BUN 
options    ---------  PT/OT evaluation: Not indicated at this time   DVT prophylaxis: Lovenox   GI prophylaxis: Diet  Diet:   ADULT DIET; Regular   Bowel regimen: Glycolax   Pain management: as needed  Code status: Full Code   Disposition: Continue Current Care  Family: updated as available    Vincent Flowers MD, PGY-1   Attending physician: Dr. Carroll

## 2024-06-21 NOTE — DISCHARGE INSTR - COC
processes intact, and able to concentrate and follow conversation    IV Access:  - None    Nursing Mobility/ADLs:  Walking   Independent  Transfer  Independent  Bathing  Independent  Dressing  Independent  Toileting  Independent  Feeding  Independent  Med Admin  Independent  Med Delivery   whole    Wound Care Documentation and Therapy:        Elimination:  Continence:   Bowel: Yes  Bladder: Yes  Urinary Catheter: None   Colostomy/Ileostomy/Ileal Conduit: No       Date of Last BM: 6/21/2024      Intake/Output Summary (Last 24 hours) at 6/21/2024 1158  Last data filed at 6/21/2024 0555  Gross per 24 hour   Intake --   Output 300 ml   Net -300 ml     I/O last 3 completed shifts:  In: -   Out: 600 [Urine:600]    Safety Concerns:     None    Impairments/Disabilities:      None    Nutrition Therapy:  Current Nutrition Therapy:   - Oral Diet:  General    Routes of Feeding: Oral  Liquids: Thin Liquids  Daily Fluid Restriction: no  Last Modified Barium Swallow with Video (Video Swallowing Test): not done    Treatments at the Time of Hospital Discharge:   Respiratory Treatments: ***  Oxygen Therapy:  is on oxygen at 4 L/min per nasal cannula.  Ventilator:    - No ventilator support    Rehab Therapies: Physical Therapy and Occupational Therapy  Weight Bearing Status/Restrictions: No weight bearing restrictions  Other Medical Equipment (for information only, NOT a DME order):  none  Other Treatments: none    Patient's personal belongings (please select all that are sent with patient):  None    RN SIGNATURE:  Electronically signed by Kaci Maya RN on 6/21/24 at 12:43 PM EDT    CASE MANAGEMENT/SOCIAL WORK SECTION    Inpatient Status Date: 6/18/2024    Readmission Risk Assessment Score:  Readmission Risk              Risk of Unplanned Readmission:  0           Discharging to Facility/ Agency   Name: [facility]  Address:  Phone:  Fax:    Dialysis Facility (if applicable)   Name:  Address:  Dialysis  Schedule:  Phone:  Fax:    / signature: Electronically signed by Monika Ding RN on 6/21/2024 at 12:37 PM      PHYSICIAN SECTION    Prognosis: Good    Condition at Discharge: Stable    Rehab Potential (if transferring to Rehab): Good    Recommended Labs or Other Treatments After Discharge:   - Pulmonary rehabilitation   - Continue prescribed medications    Physician Certification: I certify the above information and transfer of Brenda Nunn  is necessary for the continuing treatment of the diagnosis listed and that she requires LTAC for greater 30 days.     Update Admission H&P: Changes in H&P as follows -    The patient is a 59 y.o. female with a PMHx of COPD, pre-DM, BPD, anxiety, HFpEF who presented to the ED with shortness of breath and R-sided chest pain.      Patient notes increasing shortness of breath over the past 2 days, notes difficulty walking due to shortness of breath. She also notes presence of cough which is chronic, but reports increased thickness of sputum. Denies fevers or chills, no nausea/vomiting, no diarrhea. No recent exposure to sick contacts. Patient is compliant to medications. Of note, she was recently admitted to [facility] from 6/9 - 6/17 for suicidal ideations with intent. She was discharged stable on Effexor and Abilify. She then went home where she noted that her room was very hot, and this aggravated her dyspnea. Patient has had multiple admissions and ED visits over the past few months for similar symptoms. Her most recent admission was from 6/4/24 to 6/6/24 where she was managed as COPD not in exacerbation, discharged with a few doses of alprazolam and a steroid taper, she was also started on Azithromycin MWF and Roflumilast.      ED Course: In the ED, patient was initially tachycardic 103, mildly elevated BP, saturating within target on baseline O2. EKG showed NSR, no acute ischemic changes. CXR showed mild bibasilar opacities vs

## 2024-06-21 NOTE — CARE COORDINATION
6/21:  Update CM Note:  Pt is dc today.  Pt's dc plan is [facility].  CM faxed information on CPAP to facility.  CM also spoke with Carroll to see if they can requested paper work from Dr Vo for NIV.  Per Olivia/Telly Roque they can accept & had someone speak with the pt at bedside.  Pt is set up with [facility] for 2pm today.  CM advise pt, RN & facility.  MICHAEL,Ambulette & HENS completed & placed in soft chart.  SW/CM will continue to follow.  Electronically signed by Monika Ding RN on 6/21/2024 at 12:36 PM

## 2024-06-21 NOTE — DISCHARGE SUMMARY
Effexor and Abilify. She then went home where she noted that her room was very hot, and this aggravated her dyspnea. Patient has had multiple admissions and ED visits over the past few months for similar symptoms. Her most recent admission was from 6/4/24 to 6/6/24 where she was managed as COPD not in exacerbation, discharged with a few doses of alprazolam and a steroid taper, she was also started on Azithromycin MWF and Roflumilast.      ED Course: In the ED, patient was initially tachycardic 103, mildly elevated BP, saturating within target on baseline O2. EKG showed NSR, no acute ischemic changes. CXR showed mild bibasilar opacities vs overlapping soft tissues, heart mildly enlarged, pulmonary vessels prominent. CTA chest showed no PE, however with bibasilar infiltrates R > L with areas of consolidation, possibly atelectasis. proBNP 208, Trop 11. CBC showed hgb 10, no leukocytosis. CMP wnl. She ED Meds: Patient was given tylenol, toradol, duoneb nebulization. She was then admitted under observation for further evaluation and management. Patient remained stable on baseline O2. Noted concern of urinary retention, bladder scan was ~400 cc. However, no noted recurrence of retention, patient able to urinate spontaneously. Pulmonology was consulted, advised NIV at night, and cleared for discharge. No other concerns noted. Patient was then discharged stable.     Physical Exam  General Appearance: alert, appears stated age, and cooperative  HEENT:  Head: Normocephalic, no lesions, without obvious abnormality.  Neck: no adenopathy, no carotid bruit, no JVD, and supple, symmetrical, trachea midline  Lung: wheezes bilaterally  Heart: regular rate and rhythm, S1, S2 normal, no murmur, click, rub or gallop  Abdomen: soft, non-tender; bowel sounds normal; no masses,  no organomegaly  Extremities:  extremities normal, atraumatic, no cyanosis or edema  Musculokeletal: No joint swelling, no muscle tenderness. ROM normal in all  daily           * This list has 2 medication(s) that are the same as other medications prescribed for you. Read the directions carefully, and ask your doctor or other care provider to review them with you.                 Activity as tolerated  Diet: common adult  Be compliant with your medications and take them as prescribed.    Special Instructions:   - Continue daliresp and azithromycin as prescribed  - Advised smoking cessation  - Pulmonary rehabilitation as outpatient  - Follow-up with Pulmonology    Hospital Follow up: [facility] with House Team   Call to confirm appointment Tel: [phone] ([facility])     Other Follow-Ups:  Future Appointments   Date Time Provider Department Center   7/1/2024  2:30 PM Paulie Chavez MD [place] [facility]. Shani Flowers MD  PGY- 1  12:03 PM 6/21/2024    Attending physician: Dr. Carroll

## 2024-06-21 NOTE — DISCHARGE INSTR - DIET

## 2024-08-07 ENCOUNTER — TELEPHONE (OUTPATIENT)
Dept: INTERNAL MEDICINE | Age: 59
End: 2024-08-07

## 2024-08-07 NOTE — TELEPHONE ENCOUNTER
Attempted to call patient on phone numbers of record. Neither phone numbers are in service any longer. Letter mailed. Марина Ball LPN

## 2025-07-03 ENCOUNTER — HOSPITAL ENCOUNTER (INPATIENT)
Age: 60
LOS: 2 days | Discharge: NURSING, REHAB OR LONG-TERM CARE | DRG: 190 | End: 2025-07-05
Attending: EMERGENCY MEDICINE | Admitting: INTERNAL MEDICINE
Payer: MEDICARE

## 2025-07-03 ENCOUNTER — APPOINTMENT (OUTPATIENT)
Dept: GENERAL RADIOLOGY | Age: 60
DRG: 190 | End: 2025-07-03
Payer: MEDICARE

## 2025-07-03 DIAGNOSIS — J44.1 ACUTE EXACERBATION OF CHRONIC OBSTRUCTIVE PULMONARY DISEASE (COPD) (HCC): Primary | ICD-10-CM

## 2025-07-03 DIAGNOSIS — J44.9 CHRONIC OBSTRUCTIVE PULMONARY DISEASE, UNSPECIFIED COPD TYPE (HCC): ICD-10-CM

## 2025-07-03 LAB
ALBUMIN SERPL-MCNC: 4 G/DL (ref 3.5–5.2)
ALP SERPL-CCNC: 85 U/L (ref 35–104)
ALT SERPL-CCNC: 29 U/L (ref 0–35)
ANION GAP SERPL CALCULATED.3IONS-SCNC: 11 MMOL/L (ref 7–16)
AST SERPL-CCNC: 27 U/L (ref 0–35)
B PARAP IS1001 DNA NPH QL NAA+NON-PROBE: NOT DETECTED
B PERT DNA SPEC QL NAA+PROBE: NOT DETECTED
BASOPHILS # BLD: 0.03 K/UL (ref 0–0.2)
BASOPHILS NFR BLD: 0 % (ref 0–2)
BILIRUB SERPL-MCNC: <0.2 MG/DL (ref 0–1.2)
BNP SERPL-MCNC: 57 PG/ML (ref 0–125)
BUN SERPL-MCNC: 14 MG/DL (ref 6–20)
C PNEUM DNA NPH QL NAA+NON-PROBE: NOT DETECTED
CALCIUM SERPL-MCNC: 9.6 MG/DL (ref 8.6–10)
CHLORIDE SERPL-SCNC: 101 MMOL/L (ref 98–107)
CO2 SERPL-SCNC: 32 MMOL/L (ref 22–29)
CREAT SERPL-MCNC: 0.9 MG/DL (ref 0.5–1)
D-DIMER QUANTITATIVE: <200 NG/ML DDU (ref 0–230)
EOSINOPHIL # BLD: 0.23 K/UL (ref 0.05–0.5)
EOSINOPHILS RELATIVE PERCENT: 3 % (ref 0–6)
ERYTHROCYTE [DISTWIDTH] IN BLOOD BY AUTOMATED COUNT: 12.9 % (ref 11.5–15)
FLUAV RNA NPH QL NAA+NON-PROBE: NOT DETECTED
FLUBV RNA NPH QL NAA+NON-PROBE: NOT DETECTED
GFR, ESTIMATED: 72 ML/MIN/1.73M2
GLUCOSE SERPL-MCNC: 101 MG/DL (ref 74–99)
HADV DNA NPH QL NAA+NON-PROBE: NOT DETECTED
HCOV 229E RNA NPH QL NAA+NON-PROBE: NOT DETECTED
HCOV HKU1 RNA NPH QL NAA+NON-PROBE: NOT DETECTED
HCOV NL63 RNA NPH QL NAA+NON-PROBE: NOT DETECTED
HCOV OC43 RNA NPH QL NAA+NON-PROBE: NOT DETECTED
HCT VFR BLD AUTO: 41.3 % (ref 34–48)
HGB BLD-MCNC: 12.5 G/DL (ref 11.5–15.5)
HMPV RNA NPH QL NAA+NON-PROBE: NOT DETECTED
HPIV1 RNA NPH QL NAA+NON-PROBE: NOT DETECTED
HPIV2 RNA NPH QL NAA+NON-PROBE: NOT DETECTED
HPIV3 RNA NPH QL NAA+NON-PROBE: NOT DETECTED
HPIV4 RNA NPH QL NAA+NON-PROBE: NOT DETECTED
IMM GRANULOCYTES # BLD AUTO: <0.03 K/UL (ref 0–0.58)
IMM GRANULOCYTES NFR BLD: 0 % (ref 0–5)
LYMPHOCYTES NFR BLD: 1.62 K/UL (ref 1.5–4)
LYMPHOCYTES RELATIVE PERCENT: 22 % (ref 20–42)
M PNEUMO DNA NPH QL NAA+NON-PROBE: NOT DETECTED
MCH RBC QN AUTO: 27.2 PG (ref 26–35)
MCHC RBC AUTO-ENTMCNC: 30.3 G/DL (ref 32–34.5)
MCV RBC AUTO: 89.8 FL (ref 80–99.9)
MONOCYTES NFR BLD: 0.65 K/UL (ref 0.1–0.95)
MONOCYTES NFR BLD: 9 % (ref 2–12)
NEUTROPHILS NFR BLD: 65 % (ref 43–80)
NEUTS SEG NFR BLD: 4.73 K/UL (ref 1.8–7.3)
PLATELET # BLD AUTO: 230 K/UL (ref 130–450)
PMV BLD AUTO: 10.4 FL (ref 7–12)
POTASSIUM SERPL-SCNC: 4.6 MMOL/L (ref 3.5–5.1)
PROT SERPL-MCNC: 7 G/DL (ref 6.4–8.3)
RBC # BLD AUTO: 4.6 M/UL (ref 3.5–5.5)
RSV RNA NPH QL NAA+NON-PROBE: NOT DETECTED
RV+EV RNA NPH QL NAA+NON-PROBE: NOT DETECTED
SARS-COV-2 RNA NPH QL NAA+NON-PROBE: NOT DETECTED
SODIUM SERPL-SCNC: 144 MMOL/L (ref 136–145)
SPECIMEN DESCRIPTION: NORMAL
TROPONIN I SERPL HS-MCNC: 7 NG/L (ref 0–14)
TROPONIN I SERPL HS-MCNC: 9 NG/L (ref 0–14)
WBC OTHER # BLD: 7.3 K/UL (ref 4.5–11.5)

## 2025-07-03 PROCEDURE — 84484 ASSAY OF TROPONIN QUANT: CPT

## 2025-07-03 PROCEDURE — 94664 DEMO&/EVAL PT USE INHALER: CPT

## 2025-07-03 PROCEDURE — 99285 EMERGENCY DEPT VISIT HI MDM: CPT

## 2025-07-03 PROCEDURE — 94640 AIRWAY INHALATION TREATMENT: CPT

## 2025-07-03 PROCEDURE — 96375 TX/PRO/DX INJ NEW DRUG ADDON: CPT

## 2025-07-03 PROCEDURE — 80053 COMPREHEN METABOLIC PANEL: CPT

## 2025-07-03 PROCEDURE — 85025 COMPLETE CBC W/AUTO DIFF WBC: CPT

## 2025-07-03 PROCEDURE — 83880 ASSAY OF NATRIURETIC PEPTIDE: CPT

## 2025-07-03 PROCEDURE — 6360000002 HC RX W HCPCS: Performed by: EMERGENCY MEDICINE

## 2025-07-03 PROCEDURE — 85379 FIBRIN DEGRADATION QUANT: CPT

## 2025-07-03 PROCEDURE — 6370000000 HC RX 637 (ALT 250 FOR IP): Performed by: EMERGENCY MEDICINE

## 2025-07-03 PROCEDURE — 0202U NFCT DS 22 TRGT SARS-COV-2: CPT

## 2025-07-03 PROCEDURE — 2500000003 HC RX 250 WO HCPCS: Performed by: EMERGENCY MEDICINE

## 2025-07-03 PROCEDURE — 71046 X-RAY EXAM CHEST 2 VIEWS: CPT

## 2025-07-03 PROCEDURE — 96374 THER/PROPH/DIAG INJ IV PUSH: CPT

## 2025-07-03 PROCEDURE — 93005 ELECTROCARDIOGRAM TRACING: CPT | Performed by: EMERGENCY MEDICINE

## 2025-07-03 PROCEDURE — 2060000000 HC ICU INTERMEDIATE R&B

## 2025-07-03 PROCEDURE — 2580000003 HC RX 258: Performed by: EMERGENCY MEDICINE

## 2025-07-03 RX ORDER — ALBUTEROL SULFATE 0.83 MG/ML
2.5 SOLUTION RESPIRATORY (INHALATION) ONCE
Status: COMPLETED | OUTPATIENT
Start: 2025-07-03 | End: 2025-07-03

## 2025-07-03 RX ORDER — IPRATROPIUM BROMIDE AND ALBUTEROL SULFATE 2.5; .5 MG/3ML; MG/3ML
3 SOLUTION RESPIRATORY (INHALATION) ONCE
Status: COMPLETED | OUTPATIENT
Start: 2025-07-03 | End: 2025-07-03

## 2025-07-03 RX ADMIN — IPRATROPIUM BROMIDE AND ALBUTEROL SULFATE 3 DOSE: .5; 3 SOLUTION RESPIRATORY (INHALATION) at 17:19

## 2025-07-03 RX ADMIN — WATER 125 MG: 1 INJECTION INTRAMUSCULAR; INTRAVENOUS; SUBCUTANEOUS at 17:23

## 2025-07-03 RX ADMIN — DOXYCYCLINE 100 MG: 100 INJECTION, POWDER, LYOPHILIZED, FOR SOLUTION INTRAVENOUS at 19:34

## 2025-07-03 RX ADMIN — ALBUTEROL SULFATE 2.5 MG: 0.83 SOLUTION RESPIRATORY (INHALATION) at 19:34

## 2025-07-03 ASSESSMENT — PAIN SCALES - GENERAL: PAINLEVEL_OUTOF10: 3

## 2025-07-03 ASSESSMENT — PAIN DESCRIPTION - LOCATION: LOCATION: CHEST

## 2025-07-03 ASSESSMENT — PAIN - FUNCTIONAL ASSESSMENT: PAIN_FUNCTIONAL_ASSESSMENT: 0-10

## 2025-07-03 NOTE — ED PROVIDER NOTES
[facility] EMERGENCY DEPARTMENT  EMERGENCY DEPARTMENT ENCOUNTER        Pt Name: Brenda Nunn  MRN: [id number]  Birthdate 1965  Date of evaluation: 7/3/2025  Provider: Denisha Moeller DO  PCP: No primary care provider on file.  Note Started: 4:24 PM EDT 7/3/25    CHIEF COMPLAINT       Chief Complaint   Patient presents with    Shortness of Breath     Pt sent over from [facility] for sob and left sided chest pain worse with cough.        HISTORY OF PRESENT ILLNESS: 1 or more Elements   History From: Patient    Limitations to history : None    Brenda Nunn is a 60 y.o. female who presents with concern for worsening shortness of breath.  Notes has been progressing over the past 2 days, nothing is made it better or worse, she has a history of asthma, has been using breathing treatments and she is feeling slightly better.  Chronically on 4 L nasal cannula oxygen.  She has been having a productive cough over the past day.  No fevers.  No swelling to her legs.  She is also having a left lower rib pain.  No other associated complaints.      EXTERNAL NOTE REVIEW:      Last echo on 2024 with ejection fraction 55 to 60%    REVIEW OF SYSTEMS :      Positives and Pertinent negatives as per HPI.     SURGICAL HISTORY     Past Surgical History:   Procedure Laterality Date     SECTION      HYSTERECTOMY (CERVIX STATUS UNKNOWN)      Ovaries retained. Unknown cervix status. performed for uterine fibroids       CURRENTMEDICATIONS       Previous Medications    ALBUTEROL SULFATE HFA (VENTOLIN HFA) 108 (90 BASE) MCG/ACT INHALER    Inhale 2 puffs into the lungs every 6 hours as needed for Wheezing or Shortness of Breath    ARIPIPRAZOLE (ABILIFY) 10 MG TABLET    Take 1 tablet by mouth daily    AZITHROMYCIN (ZITHROMAX) 500 MG TABLET    Take 1 tablet by mouth three times a week    BUDESONIDE-FORMOTEROL (SYMBICORT) 160-4.5 MCG/ACT AERO    Inhale 2 puffs into the lungs 2 times daily     CHF (congestive heart failure) (HCC), COPD (chronic obstructive pulmonary disease) ([facility]), Depression, and Hypertension.     EMERGENCY DEPARTMENT COURSE    Vitals:    Vitals:    07/03/25 1559 07/03/25 1938   BP: 125/79 117/71   Pulse: 95 85   Resp: 16 18   Temp: 98.8 °F (37.1 °C)    TempSrc: Temporal    SpO2: 94% 97%   Weight:  131.5 kg (290 lb)   Height:  1.702 m (5' 7\")       Patient was given the following medications:  Medications   ipratropium 0.5 mg-albuterol 2.5 mg (DUONEB) nebulizer solution 3 Dose (3 Doses Inhalation Given 7/3/25 1719)   methylPREDNISolone sodium succ (SOLU-MEDROL) 125 mg in sterile water 2 mL injection (125 mg IntraVENous Given 7/3/25 1723)   doxycycline (VIBRAMYCIN) 100 mg in sodium chloride 0.9 % 100 mL IVPB ([id number]) (0 mg IntraVENous Stopped 7/3/25 2140)   albuterol (PROVENTIL) (2.5 MG/3ML) 0.083% nebulizer solution 2.5 mg (2.5 mg Nebulization Given 7/3/25 1934)         Is this patient to be included in the SEP-1 core measure? No Exclusion criteria - the patient is NOT to be included for SEP-1 Core Measure due to: Infection is not suspected        Medical Decision Making/Differential Diagnosis:    CC/HPI Summary, Social Determinants of health, Records Reviewed, DDx, testing done/not done, ED Course, Reassessment, disposition considerations/shared decision making with patient, consults, disposition:      ED Course as of 07/04/25 0102   Thu Jul 03, 2025 1654 EKG:  This EKG is signed and interpreted by me.    Rate: 87  Rhythm: Sinus  Interpretation: no acute changes, elevations, axis is normal, QTc 418,   Comparison: stable as compared to patient's most recent EKG on 6/18/2024   [MB]   1903 Reevaluated, still demonstrating diffuse end expiratory wheezing bilaterally, will admit for COPD exacerbation. [MB]   1925 Internal med will accept for admission [MB]      ED Course User Index  [MB] Denisha Moeller, DO        Medical Decision Making  Amount and/or Complexity of Data

## 2025-07-04 LAB
25(OH)D3 SERPL-MCNC: 27.2 NG/ML (ref 30–100)
ANION GAP SERPL CALCULATED.3IONS-SCNC: 10 MMOL/L (ref 7–16)
BASOPHILS # BLD: 0.01 K/UL (ref 0–0.2)
BASOPHILS NFR BLD: 0 % (ref 0–2)
BUN SERPL-MCNC: 17 MG/DL (ref 6–20)
CALCIUM SERPL-MCNC: 9.7 MG/DL (ref 8.6–10)
CHLORIDE SERPL-SCNC: 100 MMOL/L (ref 98–107)
CO2 SERPL-SCNC: 30 MMOL/L (ref 22–29)
CREAT SERPL-MCNC: 0.7 MG/DL (ref 0.5–1)
EOSINOPHIL # BLD: 0.01 K/UL (ref 0.05–0.5)
EOSINOPHILS RELATIVE PERCENT: 0 % (ref 0–6)
ERYTHROCYTE [DISTWIDTH] IN BLOOD BY AUTOMATED COUNT: 12.8 % (ref 11.5–15)
GFR, ESTIMATED: >90 ML/MIN/1.73M2
GLUCOSE SERPL-MCNC: 195 MG/DL (ref 74–99)
HCT VFR BLD AUTO: 42.5 % (ref 34–48)
HGB BLD-MCNC: 13.1 G/DL (ref 11.5–15.5)
IMM GRANULOCYTES # BLD AUTO: 0.04 K/UL (ref 0–0.58)
IMM GRANULOCYTES NFR BLD: 1 % (ref 0–5)
LYMPHOCYTES NFR BLD: 0.71 K/UL (ref 1.5–4)
LYMPHOCYTES RELATIVE PERCENT: 9 % (ref 20–42)
MAGNESIUM SERPL-MCNC: 1.6 MG/DL (ref 1.6–2.6)
MCH RBC QN AUTO: 27.2 PG (ref 26–35)
MCHC RBC AUTO-ENTMCNC: 30.8 G/DL (ref 32–34.5)
MCV RBC AUTO: 88.4 FL (ref 80–99.9)
MONOCYTES NFR BLD: 0.3 K/UL (ref 0.1–0.95)
MONOCYTES NFR BLD: 4 % (ref 2–12)
NEUTROPHILS NFR BLD: 87 % (ref 43–80)
NEUTS SEG NFR BLD: 6.78 K/UL (ref 1.8–7.3)
PHOSPHATE SERPL-MCNC: 2.6 MG/DL (ref 2.5–4.5)
PLATELET # BLD AUTO: 212 K/UL (ref 130–450)
PLATELET CONFIRMATION: NORMAL
PMV BLD AUTO: 10.4 FL (ref 7–12)
POTASSIUM SERPL-SCNC: 4.8 MMOL/L (ref 3.5–5.1)
PROCALCITONIN SERPL-MCNC: 0.04 NG/ML (ref 0–0.08)
RBC # BLD AUTO: 4.81 M/UL (ref 3.5–5.5)
SODIUM SERPL-SCNC: 140 MMOL/L (ref 136–145)
WBC OTHER # BLD: 7.8 K/UL (ref 4.5–11.5)

## 2025-07-04 PROCEDURE — 2500000003 HC RX 250 WO HCPCS

## 2025-07-04 PROCEDURE — 84100 ASSAY OF PHOSPHORUS: CPT

## 2025-07-04 PROCEDURE — 6370000000 HC RX 637 (ALT 250 FOR IP)

## 2025-07-04 PROCEDURE — 6360000002 HC RX W HCPCS

## 2025-07-04 PROCEDURE — 82306 VITAMIN D 25 HYDROXY: CPT

## 2025-07-04 PROCEDURE — 36415 COLL VENOUS BLD VENIPUNCTURE: CPT

## 2025-07-04 PROCEDURE — 94640 AIRWAY INHALATION TREATMENT: CPT

## 2025-07-04 PROCEDURE — 80048 BASIC METABOLIC PNL TOTAL CA: CPT

## 2025-07-04 PROCEDURE — 2060000000 HC ICU INTERMEDIATE R&B

## 2025-07-04 PROCEDURE — 99221 1ST HOSP IP/OBS SF/LOW 40: CPT | Performed by: INTERNAL MEDICINE

## 2025-07-04 PROCEDURE — 83735 ASSAY OF MAGNESIUM: CPT

## 2025-07-04 PROCEDURE — 5A09357 ASSISTANCE WITH RESPIRATORY VENTILATION, LESS THAN 24 CONSECUTIVE HOURS, CONTINUOUS POSITIVE AIRWAY PRESSURE: ICD-10-PCS | Performed by: INTERNAL MEDICINE

## 2025-07-04 PROCEDURE — 85025 COMPLETE CBC W/AUTO DIFF WBC: CPT

## 2025-07-04 PROCEDURE — 94660 CPAP INITIATION&MGMT: CPT

## 2025-07-04 PROCEDURE — 84145 PROCALCITONIN (PCT): CPT

## 2025-07-04 RX ORDER — ONDANSETRON 4 MG/1
4 TABLET, ORALLY DISINTEGRATING ORAL EVERY 8 HOURS PRN
Status: DISCONTINUED | OUTPATIENT
Start: 2025-07-04 | End: 2025-07-05 | Stop reason: HOSPADM

## 2025-07-04 RX ORDER — SODIUM CHLORIDE 9 MG/ML
INJECTION, SOLUTION INTRAVENOUS PRN
Status: DISCONTINUED | OUTPATIENT
Start: 2025-07-04 | End: 2025-07-05 | Stop reason: HOSPADM

## 2025-07-04 RX ORDER — BISACODYL 10 MG
10 SUPPOSITORY, RECTAL RECTAL
COMMUNITY

## 2025-07-04 RX ORDER — HYDRALAZINE HYDROCHLORIDE 20 MG/ML
10 INJECTION INTRAMUSCULAR; INTRAVENOUS EVERY 6 HOURS PRN
Status: DISCONTINUED | OUTPATIENT
Start: 2025-07-04 | End: 2025-07-05 | Stop reason: HOSPADM

## 2025-07-04 RX ORDER — VENLAFAXINE HYDROCHLORIDE 75 MG/1
75 CAPSULE, EXTENDED RELEASE ORAL DAILY
Status: DISCONTINUED | OUTPATIENT
Start: 2025-07-04 | End: 2025-07-05 | Stop reason: HOSPADM

## 2025-07-04 RX ORDER — ONDANSETRON 2 MG/ML
4 INJECTION INTRAMUSCULAR; INTRAVENOUS EVERY 6 HOURS PRN
Status: DISCONTINUED | OUTPATIENT
Start: 2025-07-04 | End: 2025-07-05 | Stop reason: HOSPADM

## 2025-07-04 RX ORDER — ROFLUMILAST 500 UG/1
500 TABLET ORAL DAILY
Status: DISCONTINUED | OUTPATIENT
Start: 2025-07-04 | End: 2025-07-05 | Stop reason: HOSPADM

## 2025-07-04 RX ORDER — ARIPIPRAZOLE 10 MG/1
10 TABLET ORAL DAILY
Status: DISCONTINUED | OUTPATIENT
Start: 2025-07-04 | End: 2025-07-05 | Stop reason: HOSPADM

## 2025-07-04 RX ORDER — ENOXAPARIN SODIUM 100 MG/ML
30 INJECTION SUBCUTANEOUS 2 TIMES DAILY
Status: DISCONTINUED | OUTPATIENT
Start: 2025-07-04 | End: 2025-07-05 | Stop reason: HOSPADM

## 2025-07-04 RX ORDER — AZITHROMYCIN 250 MG/1
500 TABLET, FILM COATED ORAL
Status: DISCONTINUED | OUTPATIENT
Start: 2025-07-04 | End: 2025-07-05

## 2025-07-04 RX ORDER — ACETAMINOPHEN 325 MG/1
650 TABLET ORAL EVERY 6 HOURS PRN
Status: DISCONTINUED | OUTPATIENT
Start: 2025-07-04 | End: 2025-07-05 | Stop reason: HOSPADM

## 2025-07-04 RX ORDER — HYDROXYZINE HYDROCHLORIDE 25 MG/1
25 TABLET, FILM COATED ORAL EVERY 8 HOURS PRN
Status: DISCONTINUED | OUTPATIENT
Start: 2025-07-04 | End: 2025-07-05 | Stop reason: HOSPADM

## 2025-07-04 RX ORDER — VITAMIN B COMPLEX
1000 TABLET ORAL DAILY
Status: DISCONTINUED | OUTPATIENT
Start: 2025-07-04 | End: 2025-07-05 | Stop reason: HOSPADM

## 2025-07-04 RX ORDER — PANTOPRAZOLE SODIUM 40 MG/1
40 TABLET, DELAYED RELEASE ORAL DAILY
Status: DISCONTINUED | OUTPATIENT
Start: 2025-07-04 | End: 2025-07-05 | Stop reason: HOSPADM

## 2025-07-04 RX ORDER — IPRATROPIUM BROMIDE AND ALBUTEROL SULFATE 2.5; .5 MG/3ML; MG/3ML
1 SOLUTION RESPIRATORY (INHALATION)
Status: ON HOLD | COMMUNITY
End: 2025-07-05

## 2025-07-04 RX ORDER — SODIUM CHLORIDE 0.9 % (FLUSH) 0.9 %
5-40 SYRINGE (ML) INJECTION PRN
Status: DISCONTINUED | OUTPATIENT
Start: 2025-07-04 | End: 2025-07-05 | Stop reason: HOSPADM

## 2025-07-04 RX ORDER — ARFORMOTEROL TARTRATE 15 UG/2ML
15 SOLUTION RESPIRATORY (INHALATION)
Status: DISCONTINUED | OUTPATIENT
Start: 2025-07-04 | End: 2025-07-05 | Stop reason: HOSPADM

## 2025-07-04 RX ORDER — SODIUM CHLORIDE 0.9 % (FLUSH) 0.9 %
5-40 SYRINGE (ML) INJECTION EVERY 12 HOURS SCHEDULED
Status: DISCONTINUED | OUTPATIENT
Start: 2025-07-04 | End: 2025-07-05 | Stop reason: HOSPADM

## 2025-07-04 RX ORDER — BUDESONIDE 0.5 MG/2ML
0.5 INHALANT ORAL
Status: DISCONTINUED | OUTPATIENT
Start: 2025-07-04 | End: 2025-07-05 | Stop reason: HOSPADM

## 2025-07-04 RX ORDER — ACETAMINOPHEN 325 MG/1
650 TABLET ORAL EVERY 6 HOURS PRN
COMMUNITY

## 2025-07-04 RX ORDER — POLYETHYLENE GLYCOL 3350 17 G/17G
17 POWDER, FOR SOLUTION ORAL DAILY PRN
COMMUNITY

## 2025-07-04 RX ORDER — IPRATROPIUM BROMIDE AND ALBUTEROL SULFATE 2.5; .5 MG/3ML; MG/3ML
1 SOLUTION RESPIRATORY (INHALATION)
Status: DISCONTINUED | OUTPATIENT
Start: 2025-07-04 | End: 2025-07-05 | Stop reason: HOSPADM

## 2025-07-04 RX ORDER — POLYETHYLENE GLYCOL 3350 17 G/17G
17 POWDER, FOR SOLUTION ORAL DAILY PRN
Status: DISCONTINUED | OUTPATIENT
Start: 2025-07-04 | End: 2025-07-05 | Stop reason: HOSPADM

## 2025-07-04 RX ORDER — ACETAMINOPHEN 650 MG/1
650 SUPPOSITORY RECTAL EVERY 6 HOURS PRN
Status: DISCONTINUED | OUTPATIENT
Start: 2025-07-04 | End: 2025-07-05 | Stop reason: HOSPADM

## 2025-07-04 RX ADMIN — WATER 40 MG: 1 INJECTION INTRAMUSCULAR; INTRAVENOUS; SUBCUTANEOUS at 12:37

## 2025-07-04 RX ADMIN — Medication 1000 UNITS: at 12:37

## 2025-07-04 RX ADMIN — VENLAFAXINE HYDROCHLORIDE 75 MG: 75 CAPSULE, EXTENDED RELEASE ORAL at 20:52

## 2025-07-04 RX ADMIN — ENOXAPARIN SODIUM 30 MG: 100 INJECTION SUBCUTANEOUS at 20:48

## 2025-07-04 RX ADMIN — ROFLUMILAST 500 MCG: 500 TABLET ORAL at 20:51

## 2025-07-04 RX ADMIN — MICONAZOLE NITRATE: 20 POWDER TOPICAL at 20:49

## 2025-07-04 RX ADMIN — IPRATROPIUM BROMIDE AND ALBUTEROL SULFATE 1 DOSE: 2.5; .5 SOLUTION RESPIRATORY (INHALATION) at 15:41

## 2025-07-04 RX ADMIN — ARFORMOTEROL TARTRATE 15 MCG: 15 SOLUTION RESPIRATORY (INHALATION) at 18:57

## 2025-07-04 RX ADMIN — ARIPIPRAZOLE 10 MG: 10 TABLET ORAL at 20:50

## 2025-07-04 RX ADMIN — PANTOPRAZOLE SODIUM 40 MG: 40 TABLET, DELAYED RELEASE ORAL at 12:37

## 2025-07-04 RX ADMIN — BUDESONIDE 500 MCG: 0.5 SUSPENSION RESPIRATORY (INHALATION) at 18:57

## 2025-07-04 RX ADMIN — ARFORMOTEROL TARTRATE 15 MCG: 15 SOLUTION RESPIRATORY (INHALATION) at 06:30

## 2025-07-04 RX ADMIN — IPRATROPIUM BROMIDE AND ALBUTEROL SULFATE 1 DOSE: 2.5; .5 SOLUTION RESPIRATORY (INHALATION) at 06:30

## 2025-07-04 RX ADMIN — ACETAMINOPHEN 650 MG: 325 TABLET ORAL at 14:36

## 2025-07-04 RX ADMIN — IPRATROPIUM BROMIDE AND ALBUTEROL SULFATE 1 DOSE: 2.5; .5 SOLUTION RESPIRATORY (INHALATION) at 12:55

## 2025-07-04 RX ADMIN — ACETAMINOPHEN 650 MG: 325 TABLET ORAL at 20:48

## 2025-07-04 RX ADMIN — AZITHROMYCIN DIHYDRATE 500 MG: 250 TABLET ORAL at 20:50

## 2025-07-04 RX ADMIN — ENOXAPARIN SODIUM 30 MG: 100 INJECTION SUBCUTANEOUS at 12:36

## 2025-07-04 RX ADMIN — BUDESONIDE 500 MCG: 0.5 SUSPENSION RESPIRATORY (INHALATION) at 06:30

## 2025-07-04 RX ADMIN — SODIUM CHLORIDE, PRESERVATIVE FREE 10 ML: 5 INJECTION INTRAVENOUS at 12:38

## 2025-07-04 RX ADMIN — IPRATROPIUM BROMIDE AND ALBUTEROL SULFATE 1 DOSE: 2.5; .5 SOLUTION RESPIRATORY (INHALATION) at 18:57

## 2025-07-04 ASSESSMENT — PAIN SCALES - GENERAL
PAINLEVEL_OUTOF10: 7
PAINLEVEL_OUTOF10: 8

## 2025-07-04 ASSESSMENT — PAIN DESCRIPTION - LOCATION
LOCATION: HEAD
LOCATION: HEAD

## 2025-07-04 ASSESSMENT — PAIN DESCRIPTION - DESCRIPTORS
DESCRIPTORS: ACHING;THROBBING;DISCOMFORT
DESCRIPTORS: ACHING;DISCOMFORT;THROBBING

## 2025-07-04 ASSESSMENT — PAIN - FUNCTIONAL ASSESSMENT: PAIN_FUNCTIONAL_ASSESSMENT: ACTIVITIES ARE NOT PREVENTED

## 2025-07-04 ASSESSMENT — PAIN DESCRIPTION - ORIENTATION: ORIENTATION: ANTERIOR;LEFT;RIGHT

## 2025-07-04 NOTE — PLAN OF CARE
Problem: Chronic Conditions and Co-morbidities  Goal: Patient's chronic conditions and co-morbidity symptoms are monitored and maintained or improved  Outcome: Progressing  Flowsheets (Taken 7/4/2025 1130)  Care Plan - Patient's Chronic Conditions and Co-Morbidity Symptoms are Monitored and Maintained or Improved: Monitor and assess patient's chronic conditions and comorbid symptoms for stability, deterioration, or improvement     Problem: Discharge Planning  Goal: Discharge to home or other facility with appropriate resources  Outcome: Progressing  Flowsheets  Taken 7/4/2025 1130  Discharge to home or other facility with appropriate resources: Identify barriers to discharge with patient and caregiver  Taken 7/4/2025 1119  Discharge to home or other facility with appropriate resources: Identify barriers to discharge with patient and caregiver     Problem: Pain  Goal: Verbalizes/displays adequate comfort level or baseline comfort level  Outcome: Progressing  Flowsheets (Taken 7/4/2025 1117)  Verbalizes/displays adequate comfort level or baseline comfort level: Encourage patient to monitor pain and request assistance     Problem: Skin/Tissue Integrity  Goal: Skin integrity remains intact  Description: 1.  Monitor for areas of redness and/or skin breakdown  2.  Assess vascular access sites hourly  3.  Every 4-6 hours minimum:  Change oxygen saturation probe site  4.  Every 4-6 hours:  If on nasal continuous positive airway pressure, respiratory therapy assess nares and determine need for appliance change or resting period  Outcome: Progressing  Flowsheets (Taken 7/4/2025 1130)  Skin Integrity Remains Intact: Monitor for areas of redness and/or skin breakdown     Problem: ABCDS Injury Assessment  Goal: Absence of physical injury  Outcome: Progressing     Problem: Safety - Adult  Goal: Free from fall injury  Outcome: Progressing  Flowsheets (Taken 7/4/2025 1130)  Free From Fall Injury: Instruct family/caregiver on

## 2025-07-04 NOTE — PROGRESS NOTES
Medication reconciliation complete with changes noted. Perfect serve message sent to Dr. Weller with an update.   Electronically signed by Chely Cordova RN on 7/4/2025 at 5:10 PM     Message noted to be read per Perfect Serve.     Additional message sent to update that pt has red, moist, excoriation under breasts, abdominal pannus, and bilateral groin. Pt would benefit from Nystatin-antifungal powder.   Electronically signed by Chely Cordova RN on 7/4/2025 at 5:28 PM

## 2025-07-04 NOTE — PROGRESS NOTES
07/04/25 0628   NIV Type   NIV Started/Stopped Off  (NIV was not setup, pt in hallway bed)

## 2025-07-04 NOTE — PROGRESS NOTES
4 Eyes Skin Assessment     NAME:  Brenda Nunn  YOB: 1965  MEDICAL RECORD NUMBER:  [id number]    The patient is being assessed for  Admission    I agree that at least one RN has performed a thorough Head to Toe Skin Assessment on the patient. ALL assessment sites listed below have been assessed.      Areas assessed by both nurses:    Head, Face, Ears, Shoulders, Back, Chest, Arms, Elbows, Hands, Sacrum. Buttock, Coccyx, Ischium, Legs. Feet and Heels, and Under Medical Devices     Areas to note -    -Excoriation  -moist and red under bilateral breast, abdominal pannus and bilateral groin   - Ecchymosis to BUE   - Blanchable redness/boggy bilateral heels        Does the Patient have a Wound? No noted wound(s)       Gabe Prevention initiated by RN: Yes  Wound Care Orders initiated by RN: No    Pressure Injury (Stage 1,2,3,4, Unstageable, DTI, NWPT, and Complex wounds) if present, place Wound referral order by RN under : No    New Ostomies, if present place, Ostomy referral order under : No     Nurse 1 eSignature: Electronically signed by Chely Cordova RN on 7/4/25 at 5:10 PM EDT    **SHARE this note so that the co-signing nurse can place an eSignature**    Nurse 2 eSignature: Electronically signed by Santa Dewitt RN on 7/4/25 at 7:34 PM EDT

## 2025-07-04 NOTE — PROGRESS NOTES
[facility]  Internal Medicine Residency / House Medicine Service      Initial H and P    Attending Physician Statement  I have discussed the case, including pertinent history and exam findings with the resident and the team. I have reviewed all significant past medical history, surgical history, social history, family history, allergies, and home medications independently.  I have seen and examined the patient and the key elements of the encounter have been performed by me.  I agree with the assessment, plan and orders as documented by the resident.      Case Discussed During AM Rounds    Admitted early AM for worsening shortness of breath   Last admission 1 year prior- has been residing at [facility]   Notes possible sick contacts at facility   Respiratory array unremarkable   No expectorant of cough   Cough has improved and dyspnea with acute management    Ongoing management     Acute COPD Exacerbation   Steroids    Aerosols    Maintain home oxygen level   Continue to follow cultures- no respiratory culture due to non-expectorant cough   Continued cough and wheezing    Chronic Hypoxic Respiratory Failure    On home oxygen level     Hypertension- uncontrolled   Initiate management if not meeting goal     Recommending Pulmonary outpatient follow-up     Disposition- Likely DC in next 24 hours       Remainder of medical problems as per resident note.  Attending note is in collaboration with resident-physician Dr. Everett [facility] on 7/4/2025.     Samy Oliveira MD  Internal Medicine Residency Faculty

## 2025-07-04 NOTE — ED NOTES
ED TO INPATIENT SBAR HANDOFF    Patient Name: Brenda Nunn   Preferred Name: Brenda  : 1965  60 y.o.   Code Status Order: Full Code  Telemetry Order: Yes  C-SSRS: Risk of Suicide: No Risk  Sitter no   Restraints:       Situation  Chief Complaint   Patient presents with    Shortness of Breath     Pt sent over from [facility] for sob and left sided chest pain worse with cough.      Brief Description of Patient's Condition: from [facility] admitted with COPD exacerbation on 3L NC  Mental Status: oriented, alert, coherent, logical, thought processes intact, and able to concentrate and follow conversation    Background  Allergies:   Allergies   Allergen Reactions    Pcn [Penicillins] Rash       Assessment  Vitals/MEWS: MEWS Score: 1  Level of Consciousness: Alert (0)   Vitals:    25 0236 25 0449 25 0630 25 0815   BP: 133/79 133/81 (!) 122/90 (!) 123/110   Pulse: 91 88 86 83   Resp: 20 20 18 16   Temp:       TempSrc:       SpO2: 92% 95% 98% 97%   Weight:       Height:         Cardiac Rhythm:    Deterioration Index (DI): Deterioration Index: 26.76  Deterioration Index (DI) Interventions Performed:    O2 Flow Rate: O2 Flow Rate (L/min): 3 L/min  O2 Device: O2 Device: Nasal cannula    Active Central Lines:                          Active Wounds:    Active Thompson's:      Recommendation  Patient Belongings:    Additional Comments:   If any further questions, please call Sending RN at [phone]  Opportunity for questions and clarification were provided to (name of person notified and time): [phone]       Electronically signed by: Electronically signed by Nadja Leonardo RN on 2025 at 9:12 AM

## 2025-07-04 NOTE — H&P
[facility]  Internal Medicine Residency Program  House Team   Admitting History and Physical Exam      Patient:  Brenda Nunn 60 y.o. female MRN: [id number] : 1965   Admitted:  7/3/2025  3:58 PM    Date of Service:  2025    Room: [facility]    Chief complaint: had concerns including Shortness of Breath (Pt sent over from [facility] for sob and left sided chest pain worse with cough. ).     Subjective     The patient is a 60 y.o. female,  has a past medical history of CHF (congestive heart failure) ([facility]), COPD (chronic obstructive pulmonary disease) ([facility]), Depression, and Hypertension. who presented to the ED with CC of had concerns including Shortness of Breath (Pt sent over from [facility] for sob and left sided chest pain worse with cough. ).    Patient comes from [facility] with complaints of worsening shortness of breath.  Patient reports having cough with increased sputum production associated with worsening shortness of breath.  Patient noticed shortness of breath exacerbated with exertion relieved by rest. Patient also notes left lower rib tenderness, nonradiating, reproducible on palpation..  Patient wears 4 L O2 oxygen at baseline.  Patient denies any fevers, chills, fatigue, nausea, chest pressure.    ED Course:   Upon arrival at the ED patient was hemodynamically stable afebrile, saturating at 94% on her baseline 4 L O2 via nasal cannula.  Electrolytes were normal.  Troponin 9 repeat 7.  proBNP 57.  CBC did not reveal anemia or leukocytosis.  Respiratory panel was negative.  Chest x-ray did not reveal any acute process.  In the ED patient received breathing treatments, doxycycline, Solu-Medrol 125 mg.  Patient is admitted for close observation    ED Meds:  ED Fluids:    Medications   sodium chloride flush 0.9 % injection 5-40 mL (has no administration in time range)   sodium chloride flush 0.9 % injection 5-40 mL (has no administration in time range)   0.9 % sodium  Dylan  7/4/2025 6:26 AM

## 2025-07-05 VITALS
BODY MASS INDEX: 45.52 KG/M2 | OXYGEN SATURATION: 93 % | DIASTOLIC BLOOD PRESSURE: 98 MMHG | RESPIRATION RATE: 17 BRPM | HEIGHT: 67 IN | HEART RATE: 97 BPM | TEMPERATURE: 97.2 F | WEIGHT: 290 LBS | SYSTOLIC BLOOD PRESSURE: 151 MMHG

## 2025-07-05 LAB
ANION GAP SERPL CALCULATED.3IONS-SCNC: 10 MMOL/L (ref 7–16)
BASOPHILS # BLD: 0.03 K/UL (ref 0–0.2)
BASOPHILS NFR BLD: 0 % (ref 0–2)
BUN SERPL-MCNC: 19 MG/DL (ref 6–20)
CALCIUM SERPL-MCNC: 9.7 MG/DL (ref 8.6–10)
CHLORIDE SERPL-SCNC: 102 MMOL/L (ref 98–107)
CO2 SERPL-SCNC: 31 MMOL/L (ref 22–29)
CREAT SERPL-MCNC: 0.7 MG/DL (ref 0.5–1)
EKG ATRIAL RATE: 87 BPM
EKG P AXIS: 69 DEGREES
EKG P-R INTERVAL: 168 MS
EKG Q-T INTERVAL: 348 MS
EKG QRS DURATION: 84 MS
EKG QTC CALCULATION (BAZETT): 418 MS
EKG R AXIS: 45 DEGREES
EKG T AXIS: 61 DEGREES
EKG VENTRICULAR RATE: 87 BPM
EOSINOPHIL # BLD: 0.02 K/UL (ref 0.05–0.5)
EOSINOPHILS RELATIVE PERCENT: 0 % (ref 0–6)
ERYTHROCYTE [DISTWIDTH] IN BLOOD BY AUTOMATED COUNT: 13.1 % (ref 11.5–15)
GFR, ESTIMATED: >90 ML/MIN/1.73M2
GLUCOSE SERPL-MCNC: 109 MG/DL (ref 74–99)
HCT VFR BLD AUTO: 41.2 % (ref 34–48)
HGB BLD-MCNC: 12.6 G/DL (ref 11.5–15.5)
IMM GRANULOCYTES # BLD AUTO: 0.04 K/UL (ref 0–0.58)
IMM GRANULOCYTES NFR BLD: 1 % (ref 0–5)
LYMPHOCYTES NFR BLD: 2 K/UL (ref 1.5–4)
LYMPHOCYTES RELATIVE PERCENT: 23 % (ref 20–42)
MAGNESIUM SERPL-MCNC: 1.8 MG/DL (ref 1.6–2.6)
MCH RBC QN AUTO: 27.3 PG (ref 26–35)
MCHC RBC AUTO-ENTMCNC: 30.6 G/DL (ref 32–34.5)
MCV RBC AUTO: 89.4 FL (ref 80–99.9)
MONOCYTES NFR BLD: 0.69 K/UL (ref 0.1–0.95)
MONOCYTES NFR BLD: 8 % (ref 2–12)
NEUTROPHILS NFR BLD: 69 % (ref 43–80)
NEUTS SEG NFR BLD: 6.05 K/UL (ref 1.8–7.3)
PHOSPHATE SERPL-MCNC: 3.6 MG/DL (ref 2.5–4.5)
PLATELET # BLD AUTO: 226 K/UL (ref 130–450)
PMV BLD AUTO: 10.1 FL (ref 7–12)
POTASSIUM SERPL-SCNC: 4.2 MMOL/L (ref 3.5–5.1)
RBC # BLD AUTO: 4.61 M/UL (ref 3.5–5.5)
SODIUM SERPL-SCNC: 142 MMOL/L (ref 136–145)
WBC OTHER # BLD: 8.8 K/UL (ref 4.5–11.5)

## 2025-07-05 PROCEDURE — 36415 COLL VENOUS BLD VENIPUNCTURE: CPT

## 2025-07-05 PROCEDURE — 2500000003 HC RX 250 WO HCPCS

## 2025-07-05 PROCEDURE — 93010 ELECTROCARDIOGRAM REPORT: CPT | Performed by: INTERNAL MEDICINE

## 2025-07-05 PROCEDURE — 80048 BASIC METABOLIC PNL TOTAL CA: CPT

## 2025-07-05 PROCEDURE — 94660 CPAP INITIATION&MGMT: CPT

## 2025-07-05 PROCEDURE — 85025 COMPLETE CBC W/AUTO DIFF WBC: CPT

## 2025-07-05 PROCEDURE — 6360000002 HC RX W HCPCS

## 2025-07-05 PROCEDURE — 6370000000 HC RX 637 (ALT 250 FOR IP)

## 2025-07-05 PROCEDURE — 83735 ASSAY OF MAGNESIUM: CPT

## 2025-07-05 PROCEDURE — 94640 AIRWAY INHALATION TREATMENT: CPT

## 2025-07-05 PROCEDURE — 99238 HOSP IP/OBS DSCHRG MGMT 30/<: CPT | Performed by: INTERNAL MEDICINE

## 2025-07-05 PROCEDURE — 84100 ASSAY OF PHOSPHORUS: CPT

## 2025-07-05 RX ORDER — ROFLUMILAST 500 UG/1
500 TABLET ORAL DAILY
DISCHARGE
Start: 2025-07-05

## 2025-07-05 RX ORDER — BUDESONIDE AND FORMOTEROL FUMARATE DIHYDRATE 160; 4.5 UG/1; UG/1
2 AEROSOL RESPIRATORY (INHALATION) 2 TIMES DAILY
DISCHARGE
Start: 2025-07-05 | End: 2025-08-19

## 2025-07-05 RX ORDER — AZITHROMYCIN 250 MG/1
250 TABLET, FILM COATED ORAL DAILY
DISCHARGE
Start: 2025-07-05 | End: 2025-07-09

## 2025-07-05 RX ORDER — ALBUTEROL SULFATE 90 UG/1
2 INHALANT RESPIRATORY (INHALATION) EVERY 6 HOURS PRN
DISCHARGE
Start: 2025-07-05

## 2025-07-05 RX ORDER — IPRATROPIUM BROMIDE AND ALBUTEROL SULFATE 2.5; .5 MG/3ML; MG/3ML
1 SOLUTION RESPIRATORY (INHALATION)
DISCHARGE
Start: 2025-07-05

## 2025-07-05 RX ORDER — IPRATROPIUM BROMIDE AND ALBUTEROL SULFATE 2.5; .5 MG/3ML; MG/3ML
1 SOLUTION RESPIRATORY (INHALATION) EVERY 12 HOURS PRN
DISCHARGE
Start: 2025-07-05

## 2025-07-05 RX ORDER — PREDNISONE 20 MG/1
20 TABLET ORAL DAILY
DISCHARGE
Start: 2025-07-06 | End: 2025-07-10

## 2025-07-05 RX ORDER — PANTOPRAZOLE SODIUM 40 MG/1
40 TABLET, DELAYED RELEASE ORAL DAILY
DISCHARGE
Start: 2025-07-06

## 2025-07-05 RX ORDER — AZITHROMYCIN 250 MG/1
250 TABLET, FILM COATED ORAL DAILY
Status: DISCONTINUED | OUTPATIENT
Start: 2025-07-05 | End: 2025-07-05 | Stop reason: HOSPADM

## 2025-07-05 RX ORDER — PREDNISONE 20 MG/1
20 TABLET ORAL DAILY
Status: DISCONTINUED | OUTPATIENT
Start: 2025-07-05 | End: 2025-07-05 | Stop reason: HOSPADM

## 2025-07-05 RX ADMIN — IPRATROPIUM BROMIDE AND ALBUTEROL SULFATE 1 DOSE: 2.5; .5 SOLUTION RESPIRATORY (INHALATION) at 15:35

## 2025-07-05 RX ADMIN — PANTOPRAZOLE SODIUM 40 MG: 40 TABLET, DELAYED RELEASE ORAL at 08:23

## 2025-07-05 RX ADMIN — ARIPIPRAZOLE 10 MG: 10 TABLET ORAL at 08:23

## 2025-07-05 RX ADMIN — VENLAFAXINE HYDROCHLORIDE 75 MG: 75 CAPSULE, EXTENDED RELEASE ORAL at 08:23

## 2025-07-05 RX ADMIN — ENOXAPARIN SODIUM 30 MG: 100 INJECTION SUBCUTANEOUS at 08:22

## 2025-07-05 RX ADMIN — ARFORMOTEROL TARTRATE 15 MCG: 15 SOLUTION RESPIRATORY (INHALATION) at 11:34

## 2025-07-05 RX ADMIN — BUDESONIDE 500 MCG: 0.5 SUSPENSION RESPIRATORY (INHALATION) at 11:35

## 2025-07-05 RX ADMIN — WATER 40 MG: 1 INJECTION INTRAMUSCULAR; INTRAVENOUS; SUBCUTANEOUS at 08:23

## 2025-07-05 RX ADMIN — Medication 1000 UNITS: at 08:23

## 2025-07-05 RX ADMIN — SODIUM CHLORIDE, PRESERVATIVE FREE 10 ML: 5 INJECTION INTRAVENOUS at 08:23

## 2025-07-05 RX ADMIN — IPRATROPIUM BROMIDE AND ALBUTEROL SULFATE 1 DOSE: 2.5; .5 SOLUTION RESPIRATORY (INHALATION) at 11:35

## 2025-07-05 RX ADMIN — MICONAZOLE NITRATE: 20 POWDER TOPICAL at 08:24

## 2025-07-05 ASSESSMENT — PAIN SCALES - GENERAL: PAINLEVEL_OUTOF10: 0

## 2025-07-05 NOTE — CARE COORDINATION
7/5:  Update CM Note:  CM spoke with the floor who advise that pt is ready for dc.  CM sent referral to [facility] pending a reply.  Per Izabel Roque pt at the AL & can return.  Per PAS  at 4pm.  Electronically signed by Monika Ding RN on 7/5/2025 at 2:19 PM

## 2025-07-05 NOTE — PLAN OF CARE
Problem: Respiratory - Adult  Goal: Achieves optimal ventilation and oxygenation  Outcome: Progressing     Problem: Skin/Tissue Integrity - Adult  Goal: Skin integrity remains intact  Description: 1.  Monitor for areas of redness and/or skin breakdown  2.  Assess vascular access sites hourly  3.  Every 4-6 hours minimum:  Change oxygen saturation probe site  4.  Every 4-6 hours:  If on nasal continuous positive airway pressure, respiratory therapy assess nares and determine need for appliance change or resting period  7/5/2025 0618 by Sharla Mitchell, RN  Outcome: Progressing  7/5/2025 0617 by Sharla Mitchell, RN  Outcome: Progressing  Flowsheets (Taken 7/4/2025 2050)  Skin Integrity Remains Intact: Monitor for areas of redness and/or skin breakdown  7/4/2025 1724 by Chely Cordova, RN  Outcome: Progressing  Flowsheets (Taken 7/4/2025 1130)  Skin Integrity Remains Intact: Monitor for areas of redness and/or skin breakdown

## 2025-07-05 NOTE — DISCHARGE INSTR - COC
***    Readmission Risk Assessment Score:  [facility] RISK OF UNPLANNED READMISSION 2.0             12.2 Total Score        Discharging to Facility/ Agency   Name:   Address:  Phone:  Fax:    Dialysis Facility (if applicable)   Name:  Address:  Dialysis Schedule:  Phone:  Fax:    / signature: {Ting:[id number]}    PHYSICIAN SECTION    Prognosis: Good    Condition at Discharge: Stable    Rehab Potential (if transferring to Rehab): Good    Recommended Labs or Other Treatments After Discharge: None    Physician Certification: I certify the above information and transfer of Brenda Nunn  is necessary for the continuing treatment of the diagnosis listed and that she requires Skilled Nursing Facility for less 30 days.     Update Admission H&P:     Ms. Brenda Nunn, is a 60 y.o. female with past medical history of CHF (congestive heart failure) (HCC), COPD (chronic obstructive pulmonary disease) (HCC), Depression, and Hypertension. who presented to the ED from [facility] with worsening shortness of breath, associated with increased sputum production, but denies chest pain, fever, nausea, vomiting. Patient wears 4 L O2 oxygen at baseline.  Upon arrival at the ED patient was hemodynamically stable afebrile, saturating at 94% on her baseline 4 L O2 via nasal cannula.  Electrolytes were normal.  Troponin 9 repeat 7.  proBNP 57.  CBC did not reveal anemia or leukocytosis.  Respiratory panel was negative.  Chest x-ray did not reveal any acute process.  In the ED patient received breathing treatments, doxycycline, Solu-Medrol 125 mg.  Patient is admitted for close observation for COPD exacerbation.     During her hospital stay, patient had significant improvement of her symptoms and her O2 requirements is back to her baseline. She will be discharged on Azithromycin 250 mg once daily for 4 days and prednisone 20 mg once daily for 4 days to complete a course of 5 days, encouraged to continue home regimen as  needed. While inpatient, she has had elevated blood pressure, advised her to follow as outpatient, for close monitoring before starting anti-hypertensive. She was also counseled to follow Pulmonology as outpatient.     On the day of discharge, patient respiratory status has improved and back to her baseline, no acute concerns, plan and treatments discussed with patient and daughter Farhana (phone). All questions answered.     PHYSICIAN SIGNATURE:  Electronically signed by Xiao Harper MD on 7/5/25 at 3:22 PM EDT

## 2025-07-05 NOTE — PROGRESS NOTES
[facility]  Internal Medicine Residency / House Medicine Service      Attending Physician Statement  I have discussed the case, including pertinent history and exam findings with the resident and the team. I have seen and examined the patient and the key elements of the encounter have been performed by me.  I agree with the assessment, plan and orders as documented by the resident.      Case Discussed During AM Rounds    Remains on home oxygen level- chronic respiratory failure on 3 L NC    Remains hemodynamically stable    Dyspnea and cough improving at this time   Cough remains non-productive   Rash noted and miconazole added     Acute COPD Exacerbation- clinically improving    Steroids- PO steroids for DC     Aerosols at VT    Continue Azithromycin for exacerbation as an anti-inflammatory component- Qtc less than 430   Maintain home oxygen level   Continue to follow cultures- no respiratory culture due to non-expectorant cough   Improved respiratory status     Chronic Hypoxic Respiratory Failure    On home oxygen level     Hypertension- BP improved with monitoring     Pre-DM  Continue lifestyle changes- currently stable   Anticipate hyperglycemia with steroid use     Rash under breasts   Miconazole continued     Recommending Pulmonary outpatient follow-up     Disposition- VT today back to AL at [facility]       Remainder of medical problems as per resident note.  Attending note is in collaboration with resident-physician Dr. Robles Follow-up progress note on 7/5/2025    Samy Oliveira MD  Internal Medicine Residency Faculty

## 2025-07-05 NOTE — PLAN OF CARE
Problem: Chronic Conditions and Co-morbidities  Goal: Patient's chronic conditions and co-morbidity symptoms are monitored and maintained or improved  7/5/2025 0617 by Sharla Mitchell RN  Outcome: Progressing  7/4/2025 1724 by Chely Cordova RN  Outcome: Progressing  Flowsheets (Taken 7/4/2025 1130)  Care Plan - Patient's Chronic Conditions and Co-Morbidity Symptoms are Monitored and Maintained or Improved: Monitor and assess patient's chronic conditions and comorbid symptoms for stability, deterioration, or improvement     Problem: Discharge Planning  Goal: Discharge to home or other facility with appropriate resources  7/5/2025 0617 by Sharla Mitchell RN  Outcome: Progressing  7/4/2025 1724 by Cheyl Cordova RN  Outcome: Progressing  Flowsheets  Taken 7/4/2025 1130  Discharge to home or other facility with appropriate resources: Identify barriers to discharge with patient and caregiver  Taken 7/4/2025 1119  Discharge to home or other facility with appropriate resources: Identify barriers to discharge with patient and caregiver     Problem: Pain  Goal: Verbalizes/displays adequate comfort level or baseline comfort level  7/5/2025 0617 by Sharla Mitchell RN  Outcome: Progressing  7/4/2025 1724 by Chely Cordova RN  Outcome: Progressing  Flowsheets (Taken 7/4/2025 1117)  Verbalizes/displays adequate comfort level or baseline comfort level: Encourage patient to monitor pain and request assistance     Problem: Skin/Tissue Integrity  Goal: Skin integrity remains intact  Description: 1.  Monitor for areas of redness and/or skin breakdown  2.  Assess vascular access sites hourly  3.  Every 4-6 hours minimum:  Change oxygen saturation probe site  4.  Every 4-6 hours:  If on nasal continuous positive airway pressure, respiratory therapy assess nares and determine need for appliance change or resting period  7/5/2025 0617 by Sharla Mitchell RN  Outcome: Progressing  Flowsheets (Taken 7/4/2025 2050)  Skin

## 2025-07-05 NOTE — PLAN OF CARE
Problem: Chronic Conditions and Co-morbidities  Goal: Patient's chronic conditions and co-morbidity symptoms are monitored and maintained or improved  7/5/2025 1035 by Nidhi Roberson RN  Outcome: Progressing  Flowsheets (Taken 7/5/2025 0821)  Care Plan - Patient's Chronic Conditions and Co-Morbidity Symptoms are Monitored and Maintained or Improved: Monitor and assess patient's chronic conditions and comorbid symptoms for stability, deterioration, or improvement  7/5/2025 0617 by Sharla Mitchell RN  Outcome: Progressing     Problem: Discharge Planning  Goal: Discharge to home or other facility with appropriate resources  7/5/2025 1035 by Nidhi Roberson RN  Outcome: Progressing  Flowsheets (Taken 7/5/2025 0821)  Discharge to home or other facility with appropriate resources: Identify barriers to discharge with patient and caregiver  7/5/2025 0617 by Sharla Mitchell RN  Outcome: Progressing     Problem: Pain  Goal: Verbalizes/displays adequate comfort level or baseline comfort level  7/5/2025 1035 by Nidhi Roberson RN  Outcome: Progressing  7/5/2025 0617 by Sharla Mitchell RN  Outcome: Progressing     Problem: Skin/Tissue Integrity  Goal: Skin integrity remains intact  Description: 1.  Monitor for areas of redness and/or skin breakdown  2.  Assess vascular access sites hourly  3.  Every 4-6 hours minimum:  Change oxygen saturation probe site  4.  Every 4-6 hours:  If on nasal continuous positive airway pressure, respiratory therapy assess nares and determine need for appliance change or resting period  7/5/2025 1035 by Nidhi Roberson RN  Outcome: Progressing  Flowsheets (Taken 7/5/2025 0821)  Skin Integrity Remains Intact: Monitor for areas of redness and/or skin breakdown  7/5/2025 0618 by Sharla Mitchell RN  Outcome: Progressing  7/5/2025 0617 by Sharla Mitchell RN  Outcome: Progressing  Flowsheets (Taken 7/4/2025 2050)  Skin Integrity Remains Intact: Monitor for areas of redness

## 2025-07-05 NOTE — PLAN OF CARE
Problem: Chronic Conditions and Co-morbidities  Goal: Patient's chronic conditions and co-morbidity symptoms are monitored and maintained or improved  7/5/2025 1532 by Nidhi Roberson RN  Outcome: Completed  7/5/2025 1035 by Nidhi Roberson RN  Outcome: Progressing  Flowsheets (Taken 7/5/2025 0821)  Care Plan - Patient's Chronic Conditions and Co-Morbidity Symptoms are Monitored and Maintained or Improved: Monitor and assess patient's chronic conditions and comorbid symptoms for stability, deterioration, or improvement  7/5/2025 0617 by Sharla Mitchell RN  Outcome: Progressing     Problem: Discharge Planning  Goal: Discharge to home or other facility with appropriate resources  7/5/2025 1532 by Nidhi Roberson RN  Outcome: Completed  7/5/2025 1035 by Nidhi Roberson RN  Outcome: Progressing  Flowsheets (Taken 7/5/2025 0821)  Discharge to home or other facility with appropriate resources: Identify barriers to discharge with patient and caregiver  7/5/2025 0617 by Sharla Mitchell RN  Outcome: Progressing     Problem: Pain  Goal: Verbalizes/displays adequate comfort level or baseline comfort level  7/5/2025 1532 by Nidhi Roberson RN  Outcome: Completed  7/5/2025 1035 by Nidhi Roberson RN  Outcome: Progressing  7/5/2025 0617 by Sharla Mitchell RN  Outcome: Progressing     Problem: Skin/Tissue Integrity  Goal: Skin integrity remains intact  Description: 1.  Monitor for areas of redness and/or skin breakdown  2.  Assess vascular access sites hourly  3.  Every 4-6 hours minimum:  Change oxygen saturation probe site  4.  Every 4-6 hours:  If on nasal continuous positive airway pressure, respiratory therapy assess nares and determine need for appliance change or resting period  7/5/2025 1532 by Nidhi Roberson RN  Outcome: Completed  7/5/2025 1035 by Nidhi Roberson RN  Outcome: Progressing  Flowsheets (Taken 7/5/2025 0821)  Skin Integrity Remains Intact: Monitor for areas of redness and/or  7/4/2025 2050)  Skin Integrity Remains Intact: Monitor for areas of redness and/or skin breakdown

## 2025-07-05 NOTE — DISCHARGE SUMMARY
[facility]  Discharge Summary    PCP: No primary care provider on file.    Admit Date:7/3/2025  Discharge Date: 7/5/2025    Admission Diagnosis:   COPD not in exacerbation  Chronic hypoxic respiratory failure on 4 L O2 at baseline, on Roflumilast and azithromycin  HFpEF (EF 55 to 60%)  Prediabetes (A1c 5.7%)  Bipolar disorder type I  BENJAMIN on CPAP    Discharge Diagnosis:  Acute COPD exacerbation - resolved  Acute on chronic hypoxic respiratory failure, on baseline 4 L O2   HFpEF, EF 55-60%  Hypertension  Pre-diabetes  BENJAMIN, on CPAP  Bipolar disorder    Hospital Course:     Ms. Brenda Nunn, is a 60 y.o. female with past medical history of CHF (congestive heart failure) ([facility]), COPD (chronic obstructive pulmonary disease) ([facility]), Depression, and Hypertension. who presented to the ED from [facility] with worsening shortness of breath, associated with increased sputum production, but denies chest pain, fever, nausea, vomiting. Patient wears 4 L O2 oxygen at baseline.  Upon arrival at the ED patient was hemodynamically stable afebrile, saturating at 94% on her baseline 4 L O2 via nasal cannula.  Electrolytes were normal.  Troponin 9 repeat 7.  proBNP 57.  CBC did not reveal anemia or leukocytosis.  Respiratory panel was negative.  Chest x-ray did not reveal any acute process.  In the ED patient received breathing treatments, doxycycline, Solu-Medrol 125 mg.  Patient is admitted for close observation for COPD exacerbation.     During her hospital stay, patient had significant improvement of her symptoms and her O2 requirements is back to her baseline. She will be discharged on Azithromycin 250 mg once daily for 4 days and prednisone 20 mg once daily for 4 days to complete a course of 5 days, encouraged to continue home regimen as needed. While inpatient, she has had elevated blood pressure, advised her to follow as outpatient, for close monitoring before starting    ARIPiprazole 10 MG tablet  Commonly known as: ABILIFY     bisacodyl 10 MG suppository  Commonly known as: DULCOLAX     budesonide-formoterol 160-4.5 MCG/ACT Aero  Commonly known as: Symbicort  Inhale 2 puffs into the lungs 2 times daily     Full Kit Nebulizer Set Misc  Use as directed with nebulized medication.     hydrOXYzine HCl 25 MG tablet  Commonly known as: ATARAX  Take 1 tablet by mouth every 8 hours as needed for Anxiety     * ipratropium 0.5 mg-albuterol 2.5 mg 0.5-2.5 (3) MG/3ML Soln nebulizer solution  Commonly known as: DUONEB  Inhale 3 mLs into the lungs every 12 hours as needed for Shortness of Breath     * ipratropium 0.5 mg-albuterol 2.5 mg 0.5-2.5 (3) MG/3ML Soln nebulizer solution  Commonly known as: DUONEB  Inhale 3 mLs into the lungs 6 times daily     magnesium hydroxide 400 MG/5ML suspension  Commonly known as: MILK OF MAGNESIA     Melatonin 10 MG Tabs     mineral oil enema     OXYGEN     pantoprazole 40 MG tablet  Commonly known as: PROTONIX  Take 1 tablet by mouth daily  Start taking on: July 6, 2025     polyethylene glycol 17 g packet  Commonly known as: GLYCOLAX     Roflumilast 500 MCG tablet  Commonly known as: DALIRESP  Take 1 tablet by mouth daily     VENLAFAXINE HCL ER PO           * This list has 2 medication(s) that are the same as other medications prescribed for you. Read the directions carefully, and ask your doctor or other care provider to review them with you.                STOP taking these medications      nicotine 14 MG/24HR  Commonly known as: NICODERM CQ            ASK your doctor about these medications      diclofenac sodium 1 % Gel  Commonly known as: VOLTAREN  Apply 2 g topically 2 times daily     miconazole 2 % powder  Commonly known as: MICOTIN  Apply topically 2 times daily.     vitamin D 25 MCG (1000 UT) Tabs tablet  Commonly known as: CHOLECALCIFEROL  Take 1 tablet by mouth daily               Where to Get Your Medications        Information about where to get

## 2025-07-05 NOTE — PROGRESS NOTES
[facility]  Internal Medicine Residency Program  Progress Note - House Team 1    Patient:  Brenda Nunn 60 y.o. female MRN: [id number]     Date of Service: 7/5/2025         Chief Complaint   Patient presents with    Shortness of Breath     Pt sent over from [facility] for sob and left sided chest pain worse with cough.       Overnight events: Concern for Intertrigo- miconazole added     Subjective     Patient was seen and evaluated at bedside this morning.  Shortness of breath and cough improving. Denies chest pain and dizziness.  Denies fever, chills, abdominal pain, nausea, vomiting and diarrhea.     Objective     Physical Exam:  Vitals: BP (!) 135/90   Pulse 83   Temp 97.4 °F (36.3 °C) (Infrared)   Resp 19   Ht 1.702 m (5' 7\")   Wt 131.5 kg (290 lb)   SpO2 95%   BMI 45.42 kg/m²     I & O - 24hr: I/O this shift:  In: -   Out: 400 [Urine:400]   General Appearance: alert, appears stated age, cooperative, no distress, and mildly obese  HEENT:  Head: Normocephalic, no lesions, without obvious abnormality.  Neck: no adenopathy, no carotid bruit, no JVD, supple, symmetrical, trachea midline, and thyroid not enlarged, symmetric, no tenderness/mass/nodules  Lung: diminished breath sounds bilaterally, B/L wheeze aprreciated  Heart: regular rate and rhythm, S1, S2 normal, no murmur, click, rub or gallop  Abdomen: soft, non-tender; bowel sounds normal; no masses,  no organomegaly  Extremities:  extremities normal, atraumatic, no cyanosis or edema  Musculokeletal: No joint swelling, no muscle tenderness. ROM normal in all joints of extremities.   Neurologic: Mental status: Alert, oriented, thought content appropriate  Subject  Pertinent Labs & Imaging Studies   chanell  CBC:   Lab Results   Component Value Date/Time    WBC 8.8 07/05/2025 06:23 AM    RBC 4.61 07/05/2025 06:23 AM    HGB 12.6 07/05/2025 06:23 AM    HCT 41.2 07/05/2025 06:23 AM    MCV 89.4 07/05/2025 06:23 AM    MCH 27.3 07/05/2025 06:23

## 2025-07-28 NOTE — PROGRESS NOTES
Physician Progress Note      PATIENT:               ARUNA MADRID  CSN #:                  [id number]  :                       1965  ADMIT DATE:       7/3/2025 3:58 PM  DISCH DATE:        2025 4:16 PM  RESPONDING  PROVIDER #:        Samy Oliveira MD          QUERY TEXT:    Acute on chronic hypoxic respiratory failure is documented in the medical   record - Discharge Summary (2025/Xiao Harper MD. Please provide   additional clinical indicators supportive of your documentation. Or please   document if the diagnosis of Acute on chronic hypoxic respiratory failure has   been ruled out after study.    The clinical indicators include:  -\"Worsening shortness of breath. Has been progressing over the past 2 days,   nothing is made it better or worse.\"(MISHA - Denisha Moeller,  - 7/3/2025)  -\"Chronically on 4 L nasal cannula oxygen\" (MISHA - Denisha Moeller,  -   7/3/2025)  -\"Upon arrival at the ED patient was hemodynamically stable afebrile,   saturating at 94% on her baseline 4 L O2 via nasal cannula\" (Tomás Underwood MD at )  -\"In the ED patient received breathing treatments, doxycycline, Solu-Medrol   125 mg\" (Tomás Underwood MD at )  -\"Chronic hypoxic respiratory failure on 4 L O2 at baseline, on Roflumilast   and azithromycin\" (Tomás Underwood MD at )  -\"Chronic hypoxic respiratory failure. Continue home oxygen supplementation\"   (Jose Amaral MD - )  -\"Remains on home oxygen level- chronic respiratory failure on 3 L NC\". (Samy Liang MD - )  -\"Acute on chronic hypoxic respiratory failure, on baseline 4 L O2\" (NIKO -   Xiao Harper MD - )    PE:  -Coarse rhonchorous breath sounds with diffuse end expiratory wheezing, on   baseline nasal cannula oxygen - (MISHA - Denisha Moeller,  - 7/3)  -Pulmonary effort is normal. No respiratory distress. Wheezing present. (H&P -   Tomás Everett